# Patient Record
Sex: FEMALE | Race: WHITE | NOT HISPANIC OR LATINO | Employment: OTHER | URBAN - METROPOLITAN AREA
[De-identification: names, ages, dates, MRNs, and addresses within clinical notes are randomized per-mention and may not be internally consistent; named-entity substitution may affect disease eponyms.]

---

## 2017-01-17 ENCOUNTER — OFFICE VISIT (OUTPATIENT)
Dept: UROGYNECOLOGY | Facility: CLINIC | Age: 82
End: 2017-01-17
Payer: MEDICARE

## 2017-01-17 VITALS
DIASTOLIC BLOOD PRESSURE: 82 MMHG | BODY MASS INDEX: 18.66 KG/M2 | HEIGHT: 65 IN | WEIGHT: 112 LBS | SYSTOLIC BLOOD PRESSURE: 156 MMHG | HEART RATE: 74 BPM

## 2017-01-17 DIAGNOSIS — N39.8 VOIDING DYSFUNCTION: Primary | ICD-10-CM

## 2017-01-17 DIAGNOSIS — K58.0 IRRITABLE BOWEL SYNDROME WITH DIARRHEA: ICD-10-CM

## 2017-01-17 DIAGNOSIS — R39.12 POOR URINARY STREAM: ICD-10-CM

## 2017-01-17 LAB
BILIRUB SERPL-MCNC: ABNORMAL MG/DL
BLOOD URINE, POC: ABNORMAL
COLOR, POC UA: YELLOW
GLUCOSE UR QL STRIP: ABNORMAL
KETONES UR QL STRIP: ABNORMAL
LEUKOCYTE ESTERASE URINE, POC: ABNORMAL
NITRITE, POC UA: ABNORMAL
PH, POC UA: 7
PROTEIN, POC: ABNORMAL
SPECIFIC GRAVITY, POC UA: 1.01
UROBILINOGEN, POC UA: ABNORMAL

## 2017-01-17 PROCEDURE — 99999 PR PBB SHADOW E&M-EST. PATIENT-LVL III: CPT | Mod: PBBFAC,,, | Performed by: OBSTETRICS & GYNECOLOGY

## 2017-01-17 PROCEDURE — 99213 OFFICE O/P EST LOW 20 MIN: CPT | Mod: S$PBB,,, | Performed by: OBSTETRICS & GYNECOLOGY

## 2017-01-17 PROCEDURE — 81002 URINALYSIS NONAUTO W/O SCOPE: CPT | Mod: PBBFAC,PO | Performed by: OBSTETRICS & GYNECOLOGY

## 2017-01-17 PROCEDURE — 99213 OFFICE O/P EST LOW 20 MIN: CPT | Mod: PBBFAC,PO | Performed by: OBSTETRICS & GYNECOLOGY

## 2017-01-17 NOTE — PROGRESS NOTES
Subjective:     Chief Complaint: Diarrhea  History of Present Illness: Angelique Hamilton is a 83 y.o. female who presents for annual follow-up from colpocleisis in 2016.  Her major complaint is diarrhea from her IBS.  She has tried multiple medications without good success.  She wears a pad on a regular basis for fear of fecal soiling.  From a surgical standpoint she has done well she has no vaginal bleeding or discharge.  She is not very physically active because of mobility issues. she previously had some urge incontinence and frequency but this does not seem to be a major problem at this time.  She denies any dysuria or hematuria or recent UTIs.  The only urinary complaints she has at this time is that when she urinates her stream seems to go on a somewhat posterior direction.  This is not a significant problem and she does not want medications for it.  She has learned to adapt to it and leans forward when she sits on the toilet.    Review of Systems    Constitutional: No acute distress. No weight gain/loss.  Eyes: Cataracts.  ENT: Vertigo   Respiratory: No SOB.  Cardiovascular:  Hypertension  Gastrointestinal: IBS  Genitourinary: No vaginal bleeding or discharge.  Integument/Breast: Negative  Hematologic/Lymphatic: No history of anemia.  Musculoskeletal: osteoporosis  Neurological: Vertebral fracture, headaches  Behavioral/Psych: No history of depression.   Endocrine: No hormonal replacement.  Allergy/Immune: no recent reactions     Objective:   General Exam:  General appearance: frail  HEENT: Decreased auditory acuity  Neck: Normal thyroid.   Back: No CVA tenderness.  Kyphosis  RESP: No SOB.  Breasts: deferred  Abdomen: Benign without localizing signs.  Extremities: trace edema. No varices.  Lymphatic: noncontributory  Skin: No rashes. No lesions.  Neurologic: Intact.   Psych: Oriented.   Pelvic Exam:  V:  No lesions. No palpable nodes.   Va:No d/c bleeding or lesions.  Well-healed colpocleisis.  Sulcus on either  side is palpable with no discharge lesions or bleeding  .Meatus:No caruncle or stenosis  Urethra: Non tender. No suburethral masses.  Cx/Cuff: Normal   Uterus: Surgically absent.  Ad: No mass or tenderness.  Levators :Symmetrical. Normal tone. Non tender.  BL: Non tender  RV:  hemorrhoids.  Assessment:   Anatomic result is very good  Symptomatically she is doing well  IBS and diarrhea seem to be her major problem  Voiding dysfunction is not bothersome and she does not want medication at this time       Plan:      rtc 1 yr

## 2017-10-13 ENCOUNTER — HOSPITAL ENCOUNTER (EMERGENCY)
Facility: HOSPITAL | Age: 82
Discharge: HOME | End: 2017-10-13
Attending: EMERGENCY MEDICINE
Payer: MEDICARE

## 2017-10-13 VITALS
TEMPERATURE: 98 F | HEART RATE: 96 BPM | OXYGEN SATURATION: 98 % | DIASTOLIC BLOOD PRESSURE: 84 MMHG | HEIGHT: 60 IN | RESPIRATION RATE: 12 BRPM | SYSTOLIC BLOOD PRESSURE: 156 MMHG | BODY MASS INDEX: 21.99 KG/M2 | WEIGHT: 112 LBS

## 2017-10-13 DIAGNOSIS — R10.30 LOWER ABDOMINAL PAIN: Primary | ICD-10-CM

## 2017-10-13 DIAGNOSIS — K44.9 HIATAL HERNIA: ICD-10-CM

## 2017-10-13 DIAGNOSIS — N13.30 HYDRONEPHROSIS, RIGHT: ICD-10-CM

## 2017-10-13 LAB
ALBUMIN SERPL BCP-MCNC: 3.6 G/DL
ALP SERPL-CCNC: 83 U/L
ALT SERPL W/O P-5'-P-CCNC: 8 U/L
ANION GAP SERPL CALC-SCNC: 11 MMOL/L
AST SERPL-CCNC: 18 U/L
BACTERIA #/AREA URNS HPF: ABNORMAL /HPF
BASOPHILS # BLD AUTO: 0 K/UL
BASOPHILS NFR BLD: 0.9 %
BILIRUB SERPL-MCNC: 0.7 MG/DL
BILIRUB UR QL STRIP: NEGATIVE
BUN SERPL-MCNC: 19 MG/DL
CALCIUM SERPL-MCNC: 10.2 MG/DL
CHLORIDE SERPL-SCNC: 101 MMOL/L
CLARITY UR: CLEAR
CO2 SERPL-SCNC: 28 MMOL/L
COLOR UR: YELLOW
CREAT SERPL-MCNC: 1.3 MG/DL
DIFFERENTIAL METHOD: ABNORMAL
EOSINOPHIL # BLD AUTO: 0.1 K/UL
EOSINOPHIL NFR BLD: 3.2 %
ERYTHROCYTE [DISTWIDTH] IN BLOOD BY AUTOMATED COUNT: 15.3 %
EST. GFR  (AFRICAN AMERICAN): 44 ML/MIN/1.73 M^2
EST. GFR  (NON AFRICAN AMERICAN): 38 ML/MIN/1.73 M^2
GLUCOSE SERPL-MCNC: 92 MG/DL
GLUCOSE UR QL STRIP: NEGATIVE
HCT VFR BLD AUTO: 32.8 %
HGB BLD-MCNC: 10.9 G/DL
HGB UR QL STRIP: ABNORMAL
KETONES UR QL STRIP: NEGATIVE
LEUKOCYTE ESTERASE UR QL STRIP: NEGATIVE
LIPASE SERPL-CCNC: 28 U/L
LYMPHOCYTES # BLD AUTO: 0.9 K/UL
LYMPHOCYTES NFR BLD: 20.5 %
MCH RBC QN AUTO: 28.4 PG
MCHC RBC AUTO-ENTMCNC: 33.2 G/DL
MCV RBC AUTO: 85 FL
MICROSCOPIC COMMENT: ABNORMAL
MONOCYTES # BLD AUTO: 0.3 K/UL
MONOCYTES NFR BLD: 6.3 %
NEUTROPHILS # BLD AUTO: 3 K/UL
NEUTROPHILS NFR BLD: 69.1 %
NITRITE UR QL STRIP: NEGATIVE
PH UR STRIP: 7 [PH] (ref 5–8)
PLATELET # BLD AUTO: 206 K/UL
PMV BLD AUTO: 7.3 FL
POTASSIUM SERPL-SCNC: 4.1 MMOL/L
PROT SERPL-MCNC: 7.4 G/DL
PROT UR QL STRIP: NEGATIVE
RBC # BLD AUTO: 3.84 M/UL
RBC #/AREA URNS HPF: 12 /HPF (ref 0–4)
SODIUM SERPL-SCNC: 140 MMOL/L
SP GR UR STRIP: <=1.005 (ref 1–1.03)
SQUAMOUS #/AREA URNS HPF: 3 /HPF
URN SPEC COLLECT METH UR: ABNORMAL
UROBILINOGEN UR STRIP-ACNC: NEGATIVE EU/DL
WBC # BLD AUTO: 4.3 K/UL
WBC #/AREA URNS HPF: 1 /HPF (ref 0–5)

## 2017-10-13 PROCEDURE — 96360 HYDRATION IV INFUSION INIT: CPT

## 2017-10-13 PROCEDURE — 25500020 PHARM REV CODE 255

## 2017-10-13 PROCEDURE — 80053 COMPREHEN METABOLIC PANEL: CPT

## 2017-10-13 PROCEDURE — 96361 HYDRATE IV INFUSION ADD-ON: CPT

## 2017-10-13 PROCEDURE — 36415 COLL VENOUS BLD VENIPUNCTURE: CPT

## 2017-10-13 PROCEDURE — 99284 EMERGENCY DEPT VISIT MOD MDM: CPT | Mod: 25

## 2017-10-13 PROCEDURE — 85025 COMPLETE CBC W/AUTO DIFF WBC: CPT

## 2017-10-13 PROCEDURE — 83690 ASSAY OF LIPASE: CPT

## 2017-10-13 PROCEDURE — 25000003 PHARM REV CODE 250: Performed by: EMERGENCY MEDICINE

## 2017-10-13 PROCEDURE — 81000 URINALYSIS NONAUTO W/SCOPE: CPT

## 2017-10-13 RX ORDER — DICYCLOMINE HYDROCHLORIDE 20 MG/1
20 TABLET ORAL 3 TIMES DAILY PRN
Qty: 20 TABLET | Refills: 0 | Status: SHIPPED | OUTPATIENT
Start: 2017-10-13 | End: 2017-11-12

## 2017-10-13 RX ORDER — SODIUM CHLORIDE 9 MG/ML
1000 INJECTION, SOLUTION INTRAVENOUS
Status: COMPLETED | OUTPATIENT
Start: 2017-10-13 | End: 2017-10-13

## 2017-10-13 RX ADMIN — SODIUM CHLORIDE 1000 ML: 0.9 INJECTION, SOLUTION INTRAVENOUS at 09:10

## 2017-10-13 RX ADMIN — IOHEXOL 75 ML: 350 INJECTION, SOLUTION INTRAVENOUS at 10:10

## 2017-10-14 NOTE — ED NOTES
Patient identifiers for Angelique Hamilton checked and correct.  LOC:  Patient is awake, alert, and aware of environment with an appropriate affect. Patient is oriented x 3 and speaking appropriately.  APPEARANCE:  Patient resting comfortably and in no acute distress. Patient is clean and well groomed, patient's clothing is properly fastened.  SKIN:  The skin is warm and dry. Patient has normal skin turgor and moist mucus membranes. Skin is intact; no bruising or breakdown noted.  MUSCULOSKELETAL:  Patient is moving all extremities well, no obvious deformities noted. Pulses intact.   RESPIRATORY:  Airway is open and patent. Respirations are spontaneous and non-labored with normal effort and rate.  CARDIAC:  Patient has a normal rate and rhythm. No peripheral edema noted. Capillary refill < 3 seconds.  ABDOMEN:  No distention noted.  Soft and non-tender upon palpation.  NEUROLOGICAL:  PERRL. Facial expression is symmetrical. Hand grasps are equal bilaterally. Normal sensation in all extremities when touched with finger.  Allergies reported:    Review of patient's allergies indicates:   Allergen Reactions    Flexeril [cyclobenzaprine]      Hallucinations    Hydrocodone     Statins-hmg-coa reductase inhibitors      OTHER NOTES:   Patient here with left side/flank pain off and on for few months and now worse.

## 2017-10-14 NOTE — ED PROVIDER NOTES
Encounter Date: 10/13/2017    SCRIBE #1 NOTE: I, Radha Servin, am scribing for, and in the presence of, Dr. Gómez.       History     Chief Complaint   Patient presents with    Flank Pain     L flank pain x months, worse today.       10/13/2017 8:55 PM     Chief complaint: Bilateral Flank Pain      Angelique Hamilton is a 84 y.o. female who presents to the ED with an onset of bilateral flank pain x a few months. Pain worsened today. Pt takes Tramadol nightly. Denies vomiting, nausea, fever, hematuria, dysuria, bloody stools, abnormal appetite. PMHx includes IBS, overactive bladder, constipation, and prolapsed rectum. Pertinent surgical history includes hysterectomy, oophorectomy.       The history is provided by the patient.     Review of patient's allergies indicates:   Allergen Reactions    Flexeril [cyclobenzaprine]      Hallucinations    Hydrocodone     Statins-hmg-coa reductase inhibitors      Past Medical History:   Diagnosis Date    Back problem     Constipation     History of fractured vertebra     Hyperlipidemia     Hypertension     IBS (irritable bowel syndrome)     Migraine headache     Osteoporosis     Rectal prolapse     Scoliosis     Vertigo      Past Surgical History:   Procedure Laterality Date    LEWIS      Back Pain    EYE SURGERY      Bilateral Cataracs    HYSTERECTOMY      OOPHORECTOMY      ROTATOR CUFF REPAIR      bialteral    TONSILLECTOMY       Family History   Problem Relation Age of Onset    Colon cancer Father     Breast cancer Mother 70    Hypertension Mother     Ovarian cancer Neg Hx      Social History   Substance Use Topics    Smoking status: Never Smoker    Smokeless tobacco: Never Used    Alcohol use No     Review of Systems   Constitutional: Negative for appetite change and fever.   HENT: Negative for sore throat.    Respiratory: Negative for shortness of breath.    Cardiovascular: Negative for chest pain.   Gastrointestinal: Negative for blood in stool, nausea  and vomiting.   Genitourinary: Positive for flank pain (Bilateral.). Negative for dysuria and hematuria.   Musculoskeletal: Negative for back pain.   Skin: Negative for rash.   Neurological: Negative for weakness.   Hematological: Does not bruise/bleed easily.       Physical Exam     Initial Vitals [10/13/17 1810]   BP Pulse Resp Temp SpO2   (!) 156/84 96 12 98.4 °F (36.9 °C) 98 %      MAP       108         Physical Exam    Nursing note and vitals reviewed.  Constitutional: She appears well-developed and well-nourished. She is not diaphoretic. No distress.   HENT:   Head: Normocephalic and atraumatic.   Eyes: EOM are normal. Pupils are equal, round, and reactive to light.   Neck: Normal range of motion. Neck supple.   Cardiovascular: Normal rate, regular rhythm and intact distal pulses. Exam reveals no gallop and no friction rub.    Murmur heard.  Pulmonary/Chest: Breath sounds normal. No respiratory distress. She has no wheezes. She has no rhonchi. She has no rales.   Abdominal: Soft. Bowel sounds are normal. There is tenderness. There is no rebound and no guarding.   Tenderness to bilateral lower quadrants.   Musculoskeletal: Normal range of motion.   Neurological: She is alert and oriented to person, place, and time.   Skin: Skin is warm and dry.   Psychiatric: She has a normal mood and affect. Her behavior is normal. Judgment and thought content normal.         ED Course   Procedures  Labs Reviewed   CBC W/ AUTO DIFFERENTIAL - Abnormal; Notable for the following:        Result Value    RBC 3.84 (*)     Hemoglobin 10.9 (*)     Hematocrit 32.8 (*)     RDW 15.3 (*)     MPV 7.3 (*)     Lymph # 0.9 (*)     All other components within normal limits   COMPREHENSIVE METABOLIC PANEL - Abnormal; Notable for the following:     ALT 8 (*)     eGFR if  44 (*)     eGFR if non  38 (*)     All other components within normal limits   URINALYSIS - Abnormal; Notable for the following:     Specific  Gravity, UA <=1.005 (*)     Occult Blood UA 2+ (*)     All other components within normal limits   URINALYSIS MICROSCOPIC - Abnormal; Notable for the following:     RBC, UA 12 (*)     All other components within normal limits   LIPASE     Imaging Results          CT Abdomen Pelvis With Contrast (In process)  Result time 10/13/17 22:23:57                        Medical Decision Making:   History:   Old Medical Records: I decided to obtain old medical records.  Initial Assessment:   84-year-old female presented with a chief complaint of abdominal pain.  Differential Diagnosis:   DDx includes, but is not limited to diverticulitis, appendicitis, colitis, and IBS.  Clinical Tests:   Lab Tests: Ordered and Reviewed  Radiological Study: Reviewed and Ordered  ED Management:  The patient was emergently evaluated in the emergency Department, her evaluation was significant for an elderly female with lower abdominal tenderness.  The patient's labs were significant for an contaminated urine.  The patient's CT scan was significant for only for a hilar hernia and mild right hydronephrosis, but showed no acute abnormalities per virtual radiology.  I'm unclear as to the etiology of the patient's pain but she is stable for discharge to home.  She will be discharged home with by mouth Cristhianyl and she is to follow-up with her PCP for further care.            Scribe Attestation:   Scribe #1: I performed the above scribed service and the documentation accurately describes the services I performed. I attest to the accuracy of the note.    Attending Attestation:           Physician Attestation for Scribe:  Physician Attestation Statement for Scribe #1: IRico, reviewed documentation, as scribed by OLAMIDE Servin in my presence, and it is both accurate and complete.                 ED Course      Clinical Impression:     1. Lower abdominal pain    2. Hiatal hernia    3. Hydronephrosis, right          Disposition:   Disposition:  Discharged  Condition: Stable                        Carlos Gómez MD  10/13/17 7795

## 2018-11-27 ENCOUNTER — HOSPITAL ENCOUNTER (EMERGENCY)
Facility: HOSPITAL | Age: 83
Discharge: HOME | End: 2018-11-27
Attending: EMERGENCY MEDICINE
Payer: MEDICARE

## 2018-11-27 VITALS
SYSTOLIC BLOOD PRESSURE: 173 MMHG | TEMPERATURE: 98 F | BODY MASS INDEX: 21.99 KG/M2 | HEIGHT: 60 IN | DIASTOLIC BLOOD PRESSURE: 83 MMHG | OXYGEN SATURATION: 97 % | WEIGHT: 112 LBS | HEART RATE: 83 BPM | RESPIRATION RATE: 18 BRPM

## 2018-11-27 DIAGNOSIS — K59.00 CONSTIPATION, UNSPECIFIED CONSTIPATION TYPE: ICD-10-CM

## 2018-11-27 DIAGNOSIS — R10.84 GENERALIZED ABDOMINAL PAIN: Primary | ICD-10-CM

## 2018-11-27 LAB
ALBUMIN SERPL BCP-MCNC: 3.9 G/DL
ALP SERPL-CCNC: 78 U/L
ALT SERPL W/O P-5'-P-CCNC: 10 U/L
ANION GAP SERPL CALC-SCNC: 11 MMOL/L
AST SERPL-CCNC: 24 U/L
BASOPHILS NFR BLD: 0 %
BILIRUB SERPL-MCNC: 0.8 MG/DL
BILIRUB UR QL STRIP: NEGATIVE
BUN SERPL-MCNC: 16 MG/DL
CALCIUM SERPL-MCNC: 10.2 MG/DL
CHLORIDE SERPL-SCNC: 98 MMOL/L
CLARITY UR: CLEAR
CO2 SERPL-SCNC: 28 MMOL/L
COLOR UR: YELLOW
CREAT SERPL-MCNC: 1 MG/DL
DIFFERENTIAL METHOD: ABNORMAL
EOSINOPHIL NFR BLD: 1 %
ERYTHROCYTE [DISTWIDTH] IN BLOOD BY AUTOMATED COUNT: 15.8 %
EST. GFR  (AFRICAN AMERICAN): 59 ML/MIN/1.73 M^2
EST. GFR  (NON AFRICAN AMERICAN): 51 ML/MIN/1.73 M^2
GLUCOSE SERPL-MCNC: 93 MG/DL
GLUCOSE UR QL STRIP: NEGATIVE
HCT VFR BLD AUTO: 31.2 %
HGB BLD-MCNC: 10.5 G/DL
HGB UR QL STRIP: ABNORMAL
KETONES UR QL STRIP: NEGATIVE
LACTATE SERPL-SCNC: 0.8 MMOL/L
LEUKOCYTE ESTERASE UR QL STRIP: NEGATIVE
LIPASE SERPL-CCNC: 24 U/L
LIPASE SERPL-CCNC: 29 U/L
LYMPHOCYTES NFR BLD: 13 %
MCH RBC QN AUTO: 28.9 PG
MCHC RBC AUTO-ENTMCNC: 33.7 G/DL
MCV RBC AUTO: 86 FL
MICROSCOPIC COMMENT: ABNORMAL
MONOCYTES NFR BLD: 3 %
NEUTROPHILS NFR BLD: 83 %
NITRITE UR QL STRIP: NEGATIVE
PH UR STRIP: 7 [PH] (ref 5–8)
PLATELET # BLD AUTO: 188 K/UL
PMV BLD AUTO: 8.1 FL
POTASSIUM SERPL-SCNC: 3.8 MMOL/L
PROT SERPL-MCNC: 7.3 G/DL
PROT UR QL STRIP: NEGATIVE
RBC # BLD AUTO: 3.64 M/UL
RBC #/AREA URNS HPF: 5 /HPF (ref 0–4)
SODIUM SERPL-SCNC: 137 MMOL/L
SP GR UR STRIP: <=1.005 (ref 1–1.03)
URN SPEC COLLECT METH UR: ABNORMAL
UROBILINOGEN UR STRIP-ACNC: NEGATIVE EU/DL
WBC # BLD AUTO: 3.8 K/UL

## 2018-11-27 PROCEDURE — 63600175 PHARM REV CODE 636 W HCPCS: Performed by: EMERGENCY MEDICINE

## 2018-11-27 PROCEDURE — 83605 ASSAY OF LACTIC ACID: CPT

## 2018-11-27 PROCEDURE — 25500020 PHARM REV CODE 255: Performed by: EMERGENCY MEDICINE

## 2018-11-27 PROCEDURE — 85027 COMPLETE CBC AUTOMATED: CPT

## 2018-11-27 PROCEDURE — 25000003 PHARM REV CODE 250: Performed by: EMERGENCY MEDICINE

## 2018-11-27 PROCEDURE — 96374 THER/PROPH/DIAG INJ IV PUSH: CPT

## 2018-11-27 PROCEDURE — 96361 HYDRATE IV INFUSION ADD-ON: CPT

## 2018-11-27 PROCEDURE — 83690 ASSAY OF LIPASE: CPT

## 2018-11-27 PROCEDURE — 80053 COMPREHEN METABOLIC PANEL: CPT

## 2018-11-27 PROCEDURE — 81000 URINALYSIS NONAUTO W/SCOPE: CPT

## 2018-11-27 PROCEDURE — 36415 COLL VENOUS BLD VENIPUNCTURE: CPT

## 2018-11-27 PROCEDURE — 83690 ASSAY OF LIPASE: CPT | Mod: 91

## 2018-11-27 PROCEDURE — 99285 EMERGENCY DEPT VISIT HI MDM: CPT | Mod: 25

## 2018-11-27 PROCEDURE — 96376 TX/PRO/DX INJ SAME DRUG ADON: CPT

## 2018-11-27 PROCEDURE — 85007 BL SMEAR W/DIFF WBC COUNT: CPT

## 2018-11-27 RX ORDER — POLYETHYLENE GLYCOL 3350 17 G/17G
17 POWDER, FOR SOLUTION ORAL DAILY
Qty: 238 G | Refills: 0 | Status: SHIPPED | OUTPATIENT
Start: 2018-11-27 | End: 2018-12-11

## 2018-11-27 RX ORDER — MECLIZINE HCL 12.5 MG 12.5 MG/1
6.25 TABLET ORAL 2 TIMES DAILY PRN
COMMUNITY
End: 2020-07-21 | Stop reason: SDUPTHER

## 2018-11-27 RX ORDER — SYRING-NEEDL,DISP,INSUL,0.3 ML 29 G X1/2"
296 SYRINGE, EMPTY DISPOSABLE MISCELLANEOUS
Status: COMPLETED | OUTPATIENT
Start: 2018-11-27 | End: 2018-11-27

## 2018-11-27 RX ORDER — DICYCLOMINE HYDROCHLORIDE 10 MG/1
20 CAPSULE ORAL
Status: COMPLETED | OUTPATIENT
Start: 2018-11-27 | End: 2018-11-27

## 2018-11-27 RX ORDER — ONDANSETRON 2 MG/ML
4 INJECTION INTRAMUSCULAR; INTRAVENOUS
Status: COMPLETED | OUTPATIENT
Start: 2018-11-27 | End: 2018-11-27

## 2018-11-27 RX ORDER — DOCUSATE SODIUM 100 MG/1
100 CAPSULE, LIQUID FILLED ORAL 2 TIMES DAILY PRN
Qty: 60 CAPSULE | Refills: 0 | Status: SHIPPED | OUTPATIENT
Start: 2018-11-27 | End: 2018-12-27

## 2018-11-27 RX ORDER — SODIUM CHLORIDE 9 MG/ML
INJECTION, SOLUTION INTRAVENOUS
Status: DISCONTINUED
Start: 2018-11-27 | End: 2018-11-28 | Stop reason: HOSPADM

## 2018-11-27 RX ORDER — POLYETHYLENE GLYCOL 3350, SODIUM SULFATE ANHYDROUS, SODIUM BICARBONATE, SODIUM CHLORIDE, POTASSIUM CHLORIDE 236; 22.74; 6.74; 5.86; 2.97 G/4L; G/4L; G/4L; G/4L; G/4L
240 POWDER, FOR SOLUTION ORAL ONCE
Qty: 240 ML | Refills: 0 | Status: SHIPPED | OUTPATIENT
Start: 2018-11-27 | End: 2018-11-27

## 2018-11-27 RX ADMIN — ONDANSETRON 4 MG: 2 INJECTION INTRAMUSCULAR; INTRAVENOUS at 07:11

## 2018-11-27 RX ADMIN — SODIUM CHLORIDE 1000 ML: 0.9 INJECTION, SOLUTION INTRAVENOUS at 07:11

## 2018-11-27 RX ADMIN — DICYCLOMINE HYDROCHLORIDE 20 MG: 10 CAPSULE ORAL at 08:11

## 2018-11-27 RX ADMIN — Medication 296 ML: at 11:11

## 2018-11-27 RX ADMIN — ONDANSETRON 4 MG: 2 INJECTION INTRAMUSCULAR; INTRAVENOUS at 09:11

## 2018-11-27 RX ADMIN — IOHEXOL 30 ML: 350 INJECTION, SOLUTION INTRAVENOUS at 09:11

## 2018-11-27 RX ADMIN — IOHEXOL 75 ML: 350 INJECTION, SOLUTION INTRAVENOUS at 10:11

## 2018-11-28 NOTE — ED NOTES
Bed: 14  Expected date:   Expected time:   Means of arrival:   Comments:  JEFFREY Hayden LPN  11/27/18 1836

## 2018-11-28 NOTE — PROGRESS NOTES
11/27/20186:29 PM  I have evaluated and performed a medical screening assessment on this patient while awaiting a thorough evaluation and treatment. All of the emergency department beds are occupied at this time. When appropriate, laboratory studies will be ordered from triage. The patient has been advised of this process and care plan. Patient complains of abdominal pain.    Orders pending:labs  Disposition: stable

## 2018-11-28 NOTE — PROGRESS NOTES
11/27/20186:33 PM  I have evaluated and performed a medical screening assessment on this patient while awaiting a thorough evaluation and treatment. All of the emergency department beds are occupied at this time. When appropriate, laboratory studies will be ordered from triage. The patient has been advised of this process and care plan. Patient complains of ABDOMINAL PAIN and urinary hesitancy and constipation    Orders pending: urinalysis and labs  Disposition: stable

## 2018-11-28 NOTE — ED PROVIDER NOTES
Encounter Date: 11/27/2018    SCRIBE #1 NOTE: ICarla, am scribing for, and in the presence of, Dr. Skaggs.       History     Chief Complaint   Patient presents with    Abdominal Pain     bm yesterday     Urinary Retention     last void early am        Time seen by provider: 7:30 PM on 11/27/2018    Angelique Hamilton is a 85 y.o. female who presents to the ED complaining of worsening upper abdominal pain x 1 day. Pt states that she has not had a bowel movement since yesterday and that it was small in size. She describes her pain as different than when she normally experiences constipation. Pt states that she has IBS and takes IBgard. She also notes a subjective fever x 1 day. The patient denies nausea, vomiting, difficulty urinating, or any other Sx at this time. Other PMHx includes HTN, HDL, and rectal prolapse. SHx includes a hysterectomy and oophorectomy.      The history is provided by the patient.     Review of patient's allergies indicates:   Allergen Reactions    Flexeril [cyclobenzaprine]      Hallucinations    Hydrocodone     Statins-hmg-coa reductase inhibitors      Past Medical History:   Diagnosis Date    Back problem     Constipation     History of fractured vertebra     Hyperlipidemia     Hypertension     IBS (irritable bowel syndrome)     Migraine headache     Osteoporosis     Rectal prolapse     Scoliosis     Vertigo      Past Surgical History:   Procedure Laterality Date    COLONOSCOPY N/A 6/15/2015    [facility]    COLPORRHAPHY N/A 3/31/2016    [facility]    LEWIS      Back Pain    EYE SURGERY      Bilateral Cataracs    HYSTERECTOMY      OOPHORECTOMY      RECTOPEXY    Altemeir procedure perineal approach N/A 6/15/2015    [facility]    ROTATOR CUFF REPAIR      bialteral    TONSILLECTOMY       Family History   Problem Relation Age of Onset    Colon cancer Father      Breast cancer Mother 70    Hypertension Mother     Ovarian cancer Neg Hx      Social History     Tobacco Use    Smoking status: Never Smoker    Smokeless tobacco: Never Used   Substance Use Topics    Alcohol use: No    Drug use: No     Review of Systems   Constitutional: Positive for fever (subjective).   HENT: Negative for congestion.    Eyes: Negative for visual disturbance.   Respiratory: Negative for wheezing.    Cardiovascular: Negative for chest pain.   Gastrointestinal: Positive for abdominal pain. Negative for nausea and vomiting.   Genitourinary: Negative for difficulty urinating and dysuria.   Musculoskeletal: Negative for joint swelling.   Skin: Negative for rash.   Neurological: Negative for syncope.   Hematological: Does not bruise/bleed easily.   Psychiatric/Behavioral: Negative for confusion.       Physical Exam     Initial Vitals [11/27/18 1726]   BP Pulse Resp Temp SpO2   (!) 175/80 87 18 97.9 °F (36.6 °C) --      MAP       --         Physical Exam    Nursing note and vitals reviewed.  Constitutional: She appears well-nourished.   HENT:   Head: Normocephalic and atraumatic.   Eyes: Conjunctivae and EOM are normal.   Neck: Normal range of motion. Neck supple. No thyroid mass present.   Cardiovascular: Normal rate, regular rhythm and normal heart sounds. Exam reveals no gallop and no friction rub.    No murmur heard.  Pulmonary/Chest: Breath sounds normal. She has no wheezes. She has no rhonchi. She has no rales.   Abdominal: Soft. Normal appearance and bowel sounds are normal. There is no tenderness. There is no rigidity.   Abdominal pain in left upper quadrant radiating down to left lower quadrant. There is no bloating.   Neurological: She is alert and oriented to person, place, and time. She has normal strength. No cranial nerve deficit or sensory deficit.   Skin: Skin is warm and dry. No rash noted. No erythema.   Psychiatric: She has a normal mood and affect. Her speech is normal. Cognition  and memory are normal.         ED Course   Procedures  Labs Reviewed   CBC W/ AUTO DIFFERENTIAL   COMPREHENSIVE METABOLIC PANEL   LIPASE   URINALYSIS, REFLEX TO URINE CULTURE   URINALYSIS MICROSCOPIC          Imaging Results    None          Medical Decision Making:   History:   Old Medical Records: I decided to obtain old medical records.  Clinical Tests:   Lab Tests: Ordered and Reviewed  Radiological Study: Ordered and Reviewed  ED Management:  This patient was interviewed and examined emergently.  Initial vital signs significant for systolic hypertension.  The patient has a nonsurgical abdominal examination at this time but a full lab analyses and CT scan of the abdomen and pelvis with dye, including oral and IV contrast is negative for evidence diverticulitis, perforation, abscess, colitis, appendicitis, pyelonephritis.  I suspect the patient's symptoms are progressing secondary to ongoing constipation and she will be provided multiple therapies to relieve this at home. I discussed with patient and her daughter that evaluation in the ED does not suggest any emergent or life threatening condition medical condition requiring immediate intervention beyond what was provided in the ED, and I believe patient is safe for discharge.  Regardless, an unremarkable evaluation in the ED does not preclude the development or presence of a serious of life threatening condition. As such, patient was instructed to return immediately for any worsening or change in current symptoms              Scribe Attestation:   Scribe #1: I performed the above scribed service and the documentation accurately describes the services I performed. I attest to the accuracy of the note.    I, Dr. Luther Skaggs, personally performed the services described in this documentation. All medical record entries made by the scribe were at my direction and in my presence.  I have reviewed the chart and agree that the record reflects my personal performance  and is accurate and complete. Luther Skaggs MD.  7:02 AM 11/28/2018             Clinical Impression:   The primary encounter diagnosis was Generalized abdominal pain. A diagnosis of Constipation, unspecified constipation type was also pertinent to this visit.      Disposition:   Disposition: Discharged  Condition: Stable                        Luther Skaggs MD  11/28/18 0702

## 2019-05-19 RX ORDER — FLUTICASONE PROPIONATE 50 MCG
SPRAY, SUSPENSION (ML) NASAL
COMMUNITY
End: 2020-07-21

## 2019-05-19 RX ORDER — CALCIUM CARB/VITAMIN D3/VIT K1 500-500-40
TABLET,CHEWABLE ORAL
COMMUNITY
End: 2019-06-03

## 2019-05-19 RX ORDER — DICYCLOMINE HYDROCHLORIDE 20 MG/1
20 TABLET ORAL
COMMUNITY
Start: 2016-12-29 | End: 2020-07-21

## 2019-05-19 RX ORDER — CEFUROXIME AXETIL 500 MG/1
500 TABLET ORAL
COMMUNITY
Start: 2017-09-14 | End: 2019-10-25

## 2019-05-19 RX ORDER — ALPRAZOLAM 0.25 MG/1
0.25 TABLET ORAL
COMMUNITY
Start: 2018-04-20 | End: 2019-10-25

## 2019-05-19 RX ORDER — LOSARTAN POTASSIUM 100 MG/1
TABLET ORAL
COMMUNITY
End: 2019-10-25

## 2019-05-19 RX ORDER — GABAPENTIN 100 MG/1
100 CAPSULE ORAL
COMMUNITY
Start: 2016-10-25 | End: 2019-10-25

## 2019-05-19 RX ORDER — HYOSCYAMINE SULFATE 0.38 MG/1
TABLET, EXTENDED RELEASE ORAL
COMMUNITY
End: 2020-07-21

## 2019-05-19 RX ORDER — HYDROCHLOROTHIAZIDE 12.5 MG/1
CAPSULE ORAL
COMMUNITY
End: 2019-10-25 | Stop reason: SDUPTHER

## 2019-05-19 RX ORDER — DIPHENOXYLATE HYDROCHLORIDE AND ATROPINE SULFATE 2.5; .025 MG/1; MG/1
TABLET ORAL
COMMUNITY
Start: 2016-05-24 | End: 2020-07-21

## 2019-05-20 ENCOUNTER — OFFICE VISIT (OUTPATIENT)
Dept: PODIATRY | Facility: CLINIC | Age: 84
End: 2019-05-20
Payer: MEDICARE

## 2019-05-20 VITALS
DIASTOLIC BLOOD PRESSURE: 89 MMHG | HEART RATE: 73 BPM | WEIGHT: 112 LBS | BODY MASS INDEX: 21.99 KG/M2 | SYSTOLIC BLOOD PRESSURE: 159 MMHG | RESPIRATION RATE: 17 BRPM | OXYGEN SATURATION: 98 % | HEIGHT: 60 IN

## 2019-05-20 DIAGNOSIS — M79.671 BILATERAL FOOT PAIN: ICD-10-CM

## 2019-05-20 DIAGNOSIS — M79.672 BILATERAL FOOT PAIN: ICD-10-CM

## 2019-05-20 DIAGNOSIS — B35.1 ONYCHOMYCOSIS DUE TO DERMATOPHYTE: ICD-10-CM

## 2019-05-20 DIAGNOSIS — L60.2 ONYCHOGRYPHOSIS: ICD-10-CM

## 2019-05-20 DIAGNOSIS — L98.9 BENIGN SKIN LESION: Primary | ICD-10-CM

## 2019-05-20 PROCEDURE — 99203 PR OFFICE/OUTPT VISIT, NEW, LEVL III, 30-44 MIN: ICD-10-PCS | Mod: 25,,, | Performed by: PODIATRIST

## 2019-05-20 PROCEDURE — 17110 DESTRUCTION B9 LES UP TO 14: CPT | Mod: ,,, | Performed by: PODIATRIST

## 2019-05-20 PROCEDURE — 99203 OFFICE O/P NEW LOW 30 MIN: CPT | Mod: 25,,, | Performed by: PODIATRIST

## 2019-05-20 PROCEDURE — 99070 SPECIAL SUPPLIES PHYS/QHP: CPT | Mod: ,,, | Performed by: PODIATRIST

## 2019-05-20 PROCEDURE — 17110 PR DESTRUCTION BENIGN LESIONS UP TO 14: ICD-10-PCS | Mod: ,,, | Performed by: PODIATRIST

## 2019-05-20 PROCEDURE — 99070 PR SPECIAL SUPPLIES: ICD-10-PCS | Mod: ,,, | Performed by: PODIATRIST

## 2019-05-20 NOTE — PROGRESS NOTES
[address] [facility] [address]  [place] [address]  Phone: [phone]   [phone]    Patient's PCP:Ryan Hurtado MD  Referring Provider: No ref. provider found    Subjective:      Chief Complaint:: Nail Care (bilateral great toenails thick and discolored)    HPI  Angelique Hamilton is a 86 y.o. female who presents with a complaint of  Thick painful bilateral great toenails and painful callouses on the heels of both feet lasting for months. Onset of the symptoms was unknown.  Current symptoms include pain.  Treatment to date have included topical balm. Patients rates pain 5/10 on pain scale.        Vitals:    05/20/19 0928   BP: (!) 159/89   Pulse: 73   Resp: 17     Shoe Size: 8/8.5    Past Surgical History:   Procedure Laterality Date    COLONOSCOPY N/A 6/15/2015    Performed by [facility] at [facility] OR    COLPORRHAPHY N/A 3/31/2016    Performed by Rogers Victor MD at [facility] OR    LEWIS      Back Pain    EYE SURGERY      Bilateral Cataracs    HYSTERECTOMY      OOPHORECTOMY      RECTOPEXY    Altemeir procedure perineal approach N/A 6/15/2015    Performed by [facility] at [facility] OR    ROTATOR CUFF REPAIR      bialteral    TONSILLECTOMY       Past Medical History:   Diagnosis Date    Back problem     Constipation     History of fractured vertebra     Hyperlipidemia     Hypertension     IBS (irritable bowel syndrome)     Migraine headache     Osteoporosis     Rectal prolapse     Scoliosis     Vertigo      Family History   Problem Relation Age of Onset    Colon cancer Father     Breast cancer Mother 70    Hypertension Mother     Ovarian cancer Neg Hx         Social History:   Marital Status:   Alcohol History:  reports that she does not drink alcohol.  Tobacco History:  reports that she has never smoked. She has never used smokeless tobacco.  Drug History:  reports that she does not use drugs.    Review of patient's allergies indicates:   Allergen Reactions    Flexeril  [cyclobenzaprine]      Hallucinations    Hydrocodone     Statins-hmg-coa reductase inhibitors        Current Outpatient Medications   Medication Sig Dispense Refill    ALPRAZolam (XANAX) 0.25 MG tablet Take 0.25 mg by mouth.      calcium-vitamin D3 (CALCIUM 500 + D) 500 mg(1,250mg) -200 unit per tablet Take 1 tablet by mouth 2 (two) times daily with meals.      cefUROXime (CEFTIN) 500 MG tablet Take 500 mg by mouth.      cholecalciferol, vitamin D3, (VITAMIN D3) 2,000 unit Tab Take 2,000 Units by mouth once daily.       cholecalciferol, vitamin D3, 400 unit Cap Vitamin D3 400 unit capsule   Take 1 capsule every day by oral route.      dicyclomine (BENTYL) 20 mg tablet Take 20 mg by mouth.      diphenoxylate-atropine 2.5-0.025 mg (LOMOTIL) 2.5-0.025 mg per tablet Take by mouth.      fluticasone propionate (FLONASE) 50 mcg/actuation nasal spray fluticasone propionate 50 mcg/actuation nasal spray,suspension   Inhale 1 spray every day by intranasal route.      gabapentin (NEURONTIN) 100 MG capsule Take 100 mg by mouth.      hydroCHLOROthiazide (HYDRODIURIL) 25 MG tablet Take 25 mg by mouth once daily.       hydroCHLOROthiazide (MICROZIDE) 12.5 mg capsule hydrochlorothiazide 12.5 mg capsule   Take 1 capsule every day by oral route.      hyoscyamine (LEVBID) 0.375 mg Tb12 hyoscyamine ER 0.375 mg tablet,extended release,12 hr   Take 1 tablet twice a day by oral route for 30 days.      losartan (COZAAR) 100 MG tablet losartan 100 mg tablet   Take 1 tablet every day by oral route.      losartan (COZAAR) 50 MG tablet Take 50 mg by mouth once daily.       meclizine (ANTIVERT) 12.5 mg tablet Take 6.25 mg by mouth 2 (two) times daily as needed for Dizziness (1/2 tablet twice daily as needed for dizziness).      mirabegron (MYRBETRIQ) 25 mg Tb24 ER tablet Take 25 mg by mouth.      multivit with minerals/lutein (MULTIVITAMIN 50 PLUS ORAL) multivitamin   1x daily      omeprazole (PRILOSEC) 20 MG capsule Take 20  mg by mouth once daily.        peppermint oil (IBGARD ORAL) Take 1 tablet by mouth once daily.      rifAXIMin (XIFAXAN) 550 mg Tab Take 550 mg by mouth.      tramadol (ULTRAM) 50 mg tablet Take 50 mg by mouth every evening.       verapamil (CALAN-SR) 240 MG CR tablet Take 120 mg by mouth every evening. 1/2 tablet at bedtime  11     No current facility-administered medications for this visit.        Review of Systems   Constitutional: Negative for chills, fatigue, fever and unexpected weight change.   HENT: Negative for hearing loss and trouble swallowing.    Eyes: Negative for photophobia and visual disturbance.   Respiratory: Positive for shortness of breath. Negative for cough and wheezing.    Cardiovascular: Positive for leg swelling. Negative for chest pain and palpitations.   Gastrointestinal: Negative for abdominal pain and nausea.   Genitourinary: Positive for frequency and urgency. Negative for dysuria.   Musculoskeletal: Negative for arthralgias, back pain and joint swelling.   Skin: Negative for rash.   Neurological: Positive for weakness and headaches. Negative for tremors, seizures and numbness.   Hematological: Bruises/bleeds easily.         Objective:    3 lesions on bilateral heels    Physical Exam:   Foot Exam    General  General Appearance: appears stated age and healthy   Orientation: alert and oriented to person, place, and time   Affect: appropriate   Gait: unimpaired       Right Foot/Ankle     Inspection and Palpation  Ecchymosis: none  Tenderness: none   Swelling: none   Arch: normal  Hammertoes: absent  Claw Toes: absent  Hallux valgus: no  Hallux limitus: no  Skin Exam: skin intact;     Neurovascular  Dorsalis pedis: 2+  Posterior tibial: 2+  Saphenous nerve sensation: normal  Tibial nerve sensation: normal  Superficial peroneal nerve sensation: normal  Deep peroneal nerve sensation: normal  Sural nerve sensation: normal      Left Foot/Ankle      Inspection and Palpation  Ecchymosis:  none  Tenderness: none   Swelling: none   Arch: normal  Hammertoes: absent  Claw toes: absent  Hallux valgus: no  Hallux limitus: no  Skin Exam: skin intact;     Neurovascular  Dorsalis pedis: 2+  Posterior tibial: 2+  Saphenous nerve sensation: normal  Tibial nerve sensation: normal  Superficial peroneal nerve sensation: normal  Deep peroneal nerve sensation: normal  Sural nerve sensation: normal          Physical Exam   Cardiovascular:   Pulses:       Dorsalis pedis pulses are 2+ on the right side, and 2+ on the left side.        Posterior tibial pulses are 2+ on the right side, and 2+ on the left side.   Musculoskeletal:        Feet:        Imaging:            Assessment:       1. Benign skin lesion    2. Onychomycosis due to dermatophyte    3. Onychogryphosis    4. Bilateral foot pain      Plan:   Benign skin lesion    Onychomycosis due to dermatophyte    Onychogryphosis    Bilateral foot pain    Cantharone treatment of benign skin lesions - 3 lesions on each heel. Informed consent was obtained, hyperkeratotic skin was debrided from each lesion utilizing a scapel, Cantharone acid was applied, and was covered with a Band-Aid. Written and verbal structures are given to the patient. Patient tolerated the procedure well.     Instructions After Cantharone Acid Treatment    1. Keep dry for 3 days  2. If a blister forms, pop it  3. After 3rd day, begin wart stick twice daily for two week  4. Follow-up appointment 2 weeks      Wart Treatment: Twice Daily    1. Bathe area at night  2. File with pumice stone or nail file  3. Apply wart stick to affected area  4. Cover with a bad aid    Follow up in about 2 weeks (around 6/3/2019) for f/u canthrone bilateral heels.    Procedures - None    Counseling:     I provided patient education verbally regarding:   Patient diagnosis, treatment options, as well as alternatives, risks, and benefits.   Fungal infection of toenails explained. Treatment options including no treatment,  periodic debridement, topical medications, oral medications, and removal of the nail were discussed, as well as success rates and risks of recurrence. We agreed on periodic debridement   patient will go to nail [facility] for her toenail since is a noncovered service item.     benign skin lesion:  I explained the lesion to the pt. and the treatment options of 1)  periodic debridement and off loading of the lesion.  2)  Canthrone treatment plan,  3)  Curretage of the lesion.  I explained there was no guarantee with any of the treatments that the lesion would comletely resolve or not reoccur.   benign skin lesion:  I explained the lesion to the pt. and the treatment options of 1)  periodic debridement and off loading of the lesion.  2)  Canthrone treatment plan,  3)  Curretage of the lesion.  I explained there was no guarantee with any of the treatments that the lesion would comletely resolve or not reoccur.     I discussed with the pt the medicare guidleines for routine foot care and that the services they are requesting does not meet the guidlines and will not be covered by medicare.  The service will be labled as an A-code and will not be submitted to medicare.  The cost of the services was discussed with the patient and/or family.        This note was created using Dragon voice recognition software that occasionally misinterpreted phrases or words.

## 2019-06-03 ENCOUNTER — OFFICE VISIT (OUTPATIENT)
Dept: PODIATRY | Facility: CLINIC | Age: 84
End: 2019-06-03
Payer: MEDICARE

## 2019-06-03 VITALS
DIASTOLIC BLOOD PRESSURE: 78 MMHG | SYSTOLIC BLOOD PRESSURE: 138 MMHG | BODY MASS INDEX: 21.99 KG/M2 | HEART RATE: 72 BPM | WEIGHT: 112 LBS | HEIGHT: 60 IN

## 2019-06-03 DIAGNOSIS — M79.671 BILATERAL FOOT PAIN: ICD-10-CM

## 2019-06-03 DIAGNOSIS — L98.9 BENIGN SKIN LESION: Primary | ICD-10-CM

## 2019-06-03 DIAGNOSIS — M79.672 BILATERAL FOOT PAIN: ICD-10-CM

## 2019-06-03 PROCEDURE — 99213 OFFICE O/P EST LOW 20 MIN: CPT | Mod: 25,,, | Performed by: PODIATRIST

## 2019-06-03 PROCEDURE — 17110 DESTRUCTION B9 LES UP TO 14: CPT | Mod: ,,, | Performed by: PODIATRIST

## 2019-06-03 PROCEDURE — 99213 PR OFFICE/OUTPT VISIT, EST, LEVL III, 20-29 MIN: ICD-10-PCS | Mod: 25,,, | Performed by: PODIATRIST

## 2019-06-03 PROCEDURE — 17110 PR DESTRUCTION BENIGN LESIONS UP TO 14: ICD-10-PCS | Mod: ,,, | Performed by: PODIATRIST

## 2019-06-03 NOTE — PROGRESS NOTES
[address] [facility] [address]  [place] [address]  Phone: [phone]   [phone]    Patient's PCP:Ryan Hurtado MD  Referring Provider: No ref. provider found    Subjective:      Chief Complaint:: Follow-up (b heel /canthrone)    HPI  Angelique Hamilton is a 86 y.o. female who presents with a follow up with canthrone bilateral heels.    Vitals:    06/03/19 0917   BP: 138/78   Pulse: 72     Shoe Size:     Past Surgical History:   Procedure Laterality Date    COLONOSCOPY N/A 6/15/2015    [facility]formed [facility] at [facility]    COLPORRHAPHY N/A 3/31/2016    Performed by Rogers [facility] at [facility] OR    LEWIS      Back Pain    EYE SURGERY      Bilateral Cataracs    HYSTERECTOMY      OOPHORECTOMY      RECTOPEXY    Altemeir procedure perineal approach N/A 6/15/2015    Performed by [facility] at [facility] OR    ROTATOR CUFF REPAIR      bialteral    TONSILLECTOMY       Past Medical History:   Diagnosis Date    Back problem     Constipation     History of fractured vertebra     Hyperlipidemia     Hypertension     IBS (irritable bowel syndrome)     Migraine headache     Osteoporosis     Rectal prolapse     Scoliosis     Vertigo      Family History   Problem Relation Age of Onset    Colon cancer Father     Breast cancer Mother 70    Hypertension Mother     Ovarian cancer Neg Hx         Social History:   Marital Status:   Alcohol History:  reports that she does not drink alcohol.  Tobacco History:  reports that she has never smoked. She has never used smokeless tobacco.  Drug History:  reports that she does not use drugs.    Review of patient's allergies indicates:   Allergen Reactions    Flexeril [cyclobenzaprine]      Hallucinations    Hydrocodone     Statins-hmg-coa reductase inhibitors        Current Outpatient Medications   Medication Sig Dispense Refill    ALPRAZolam (XANAX) 0.25 MG tablet Take 0.25 mg by mouth.      calcium-vitamin D3 (CALCIUM 500 + D) 500  mg(1,250mg) -200 unit per tablet Take 1 tablet by mouth 2 (two) times daily with meals.      cefUROXime (CEFTIN) 500 MG tablet Take 500 mg by mouth.      cholecalciferol, vitamin D3, (VITAMIN D3) 2,000 unit Tab Take 2,000 Units by mouth once daily.       dicyclomine (BENTYL) 20 mg tablet Take 20 mg by mouth.      diphenoxylate-atropine 2.5-0.025 mg (LOMOTIL) 2.5-0.025 mg per tablet Take by mouth.      fluticasone propionate (FLONASE) 50 mcg/actuation nasal spray fluticasone propionate 50 mcg/actuation nasal spray,suspension   Inhale 1 spray every day by intranasal route.      gabapentin (NEURONTIN) 100 MG capsule Take 100 mg by mouth.      hydroCHLOROthiazide (HYDRODIURIL) 25 MG tablet Take 25 mg by mouth once daily.       hydroCHLOROthiazide (MICROZIDE) 12.5 mg capsule hydrochlorothiazide 12.5 mg capsule   Take 1 capsule every day by oral route.      hyoscyamine (LEVBID) 0.375 mg Tb12 hyoscyamine ER 0.375 mg tablet,extended release,12 hr   Take 1 tablet twice a day by oral route for 30 days.      losartan (COZAAR) 100 MG tablet losartan 100 mg tablet   Take 1 tablet every day by oral route.      losartan (COZAAR) 50 MG tablet Take 50 mg by mouth once daily.       meclizine (ANTIVERT) 12.5 mg tablet Take 6.25 mg by mouth 2 (two) times daily as needed for Dizziness (1/2 tablet twice daily as needed for dizziness).      mirabegron (MYRBETRIQ) 25 mg Tb24 ER tablet Take 25 mg by mouth.      multivit with minerals/lutein (MULTIVITAMIN 50 PLUS ORAL) multivitamin   1x daily      omeprazole (PRILOSEC) 20 MG capsule Take 20 mg by mouth once daily.        peppermint oil (IBGARD ORAL) Take 1 tablet by mouth once daily.      rifAXIMin (XIFAXAN) 550 mg Tab Take 550 mg by mouth.      tramadol (ULTRAM) 50 mg tablet Take 50 mg by mouth every evening.       verapamil (CALAN-SR) 240 MG CR tablet Take 120 mg by mouth every evening. 1/2 tablet at bedtime  11     No current facility-administered medications for this  visit.        Review of Systems      Objective:        Physical Exam:   Foot Exam  Physical Exam  Cantherone chemical destruction of skin lesion bilateral  foot shows normal response with lesion still present   Imaging:            Assessment:       1. Benign skin lesion    2. Bilateral foot pain      Plan:   Benign skin lesion    Bilateral foot pain    Cantharone treatment of benign skin lesions - bilateral plantar calcaneus. Informed consent was obtained, hyperkeratotic skin was debrided from each lesion utilizing a scapel, Cantharone acid was applied, and was covered with a Band-Aid. Written and verbal structures are given to the patient. Patient tolerated the procedure well.     Instructions After Cantharone Acid Treatment    1. Keep dry for 3 days  2. If a blister forms, pop it  3. After 3rd day, begin wart stick twice daily for two week  4. Follow-up appointment 2 weeks      Wart Treatment: Twice Daily    1. Bathe area at night  2. File with pumice stone or nail file  3. Apply wart stick to affected area  4. Cover with a bad aid  Patient will attempt to control the keratosis on her own return as needed  No follow-ups on file.    Procedures - None    Counseling:     I provided patient education verbally regarding:   Patient diagnosis, treatment options, as well as alternatives, risks, and benefits.     This note was created using Dragon voice recognition software that occasionally misinterpreted phrases or words.

## 2019-08-02 ENCOUNTER — LAB VISIT (OUTPATIENT)
Dept: LAB | Facility: HOSPITAL | Age: 84
End: 2019-08-02
Attending: FAMILY MEDICINE
Payer: MEDICARE

## 2019-08-02 DIAGNOSIS — M81.0 SENILE OSTEOPOROSIS: Primary | ICD-10-CM

## 2019-08-02 DIAGNOSIS — I10 ESSENTIAL HYPERTENSION, MALIGNANT: ICD-10-CM

## 2019-08-02 DIAGNOSIS — N81.10 CYSTOCELE AFFECTING PREGNANCY: ICD-10-CM

## 2019-08-02 DIAGNOSIS — O34.80 CYSTOCELE AFFECTING PREGNANCY: ICD-10-CM

## 2019-08-02 LAB
ALBUMIN SERPL BCP-MCNC: 3.4 G/DL (ref 3.5–5.2)
ALP SERPL-CCNC: 110 U/L (ref 55–135)
ALT SERPL W/O P-5'-P-CCNC: 9 U/L (ref 10–44)
ANION GAP SERPL CALC-SCNC: 8 MMOL/L (ref 8–16)
AST SERPL-CCNC: 17 U/L (ref 10–40)
BASOPHILS # BLD AUTO: 0.05 K/UL (ref 0–0.2)
BASOPHILS NFR BLD: 1.3 % (ref 0–1.9)
BILIRUB SERPL-MCNC: 0.8 MG/DL (ref 0.1–1)
BILIRUB UR QL STRIP: NEGATIVE
BUN SERPL-MCNC: 16 MG/DL (ref 8–23)
CALCIUM SERPL-MCNC: 10 MG/DL (ref 8.7–10.5)
CHLORIDE SERPL-SCNC: 105 MMOL/L (ref 95–110)
CHOLEST SERPL-MCNC: 205 MG/DL (ref 120–199)
CHOLEST/HDLC SERPL: 2.3 {RATIO} (ref 2–5)
CLARITY UR: CLEAR
CO2 SERPL-SCNC: 31 MMOL/L (ref 23–29)
COLOR UR: YELLOW
CREAT SERPL-MCNC: 1 MG/DL (ref 0.5–1.4)
DIFFERENTIAL METHOD: ABNORMAL
EOSINOPHIL # BLD AUTO: 0.4 K/UL (ref 0–0.5)
EOSINOPHIL NFR BLD: 9.1 % (ref 0–8)
ERYTHROCYTE [DISTWIDTH] IN BLOOD BY AUTOMATED COUNT: 16.2 % (ref 11.5–14.5)
EST. GFR  (AFRICAN AMERICAN): 58.9 ML/MIN/1.73 M^2
EST. GFR  (NON AFRICAN AMERICAN): 51.1 ML/MIN/1.73 M^2
GLUCOSE SERPL-MCNC: 90 MG/DL (ref 70–110)
GLUCOSE UR QL STRIP: NEGATIVE
HCT VFR BLD AUTO: 31.3 % (ref 37–48.5)
HDLC SERPL-MCNC: 89 MG/DL (ref 40–75)
HDLC SERPL: 43.4 % (ref 20–50)
HGB BLD-MCNC: 9.5 G/DL (ref 12–16)
HGB UR QL STRIP: NEGATIVE
IMM GRANULOCYTES # BLD AUTO: 0.02 K/UL (ref 0–0.04)
IMM GRANULOCYTES NFR BLD AUTO: 0.5 % (ref 0–0.5)
KETONES UR QL STRIP: NEGATIVE
LDLC SERPL CALC-MCNC: 91.2 MG/DL (ref 63–159)
LEUKOCYTE ESTERASE UR QL STRIP: NEGATIVE
LYMPHOCYTES # BLD AUTO: 1.4 K/UL (ref 1–4.8)
LYMPHOCYTES NFR BLD: 36 % (ref 18–48)
MCH RBC QN AUTO: 25.7 PG (ref 27–31)
MCHC RBC AUTO-ENTMCNC: 30.4 G/DL (ref 32–36)
MCV RBC AUTO: 85 FL (ref 82–98)
MONOCYTES # BLD AUTO: 0.4 K/UL (ref 0.3–1)
MONOCYTES NFR BLD: 10.9 % (ref 4–15)
NEUTROPHILS # BLD AUTO: 1.7 K/UL (ref 1.8–7.7)
NEUTROPHILS NFR BLD: 42.2 % (ref 38–73)
NITRITE UR QL STRIP: NEGATIVE
NONHDLC SERPL-MCNC: 116 MG/DL
NRBC BLD-RTO: 0 /100 WBC
PH UR STRIP: 7 [PH] (ref 5–8)
PLATELET # BLD AUTO: 213 K/UL (ref 150–350)
PMV BLD AUTO: 10.4 FL (ref 9.2–12.9)
POTASSIUM SERPL-SCNC: 4.2 MMOL/L (ref 3.5–5.1)
PROT SERPL-MCNC: 6.8 G/DL (ref 6–8.4)
PROT UR QL STRIP: NEGATIVE
RBC # BLD AUTO: 3.69 M/UL (ref 4–5.4)
SODIUM SERPL-SCNC: 144 MMOL/L (ref 136–145)
SP GR UR STRIP: 1 (ref 1–1.03)
TRIGL SERPL-MCNC: 124 MG/DL (ref 30–150)
TSH SERPL DL<=0.005 MIU/L-ACNC: 1.7 UIU/ML (ref 0.34–5.6)
URN SPEC COLLECT METH UR: NORMAL
UROBILINOGEN UR STRIP-ACNC: NEGATIVE EU/DL
WBC # BLD AUTO: 3.94 K/UL (ref 3.9–12.7)

## 2019-08-02 PROCEDURE — 81003 URINALYSIS AUTO W/O SCOPE: CPT

## 2019-08-02 PROCEDURE — 80053 COMPREHEN METABOLIC PANEL: CPT

## 2019-08-02 PROCEDURE — 80061 LIPID PANEL: CPT

## 2019-08-02 PROCEDURE — 84443 ASSAY THYROID STIM HORMONE: CPT

## 2019-08-02 PROCEDURE — 85025 COMPLETE CBC W/AUTO DIFF WBC: CPT

## 2019-09-23 ENCOUNTER — TELEPHONE (OUTPATIENT)
Dept: FAMILY MEDICINE | Facility: CLINIC | Age: 84
End: 2019-09-23

## 2019-09-23 ENCOUNTER — OUTSIDE PLACE OF SERVICE (OUTPATIENT)
Dept: ADMINISTRATIVE | Facility: HOSPITAL | Age: 84
End: 2019-09-23
Payer: MEDICARE

## 2019-09-23 DIAGNOSIS — M80.08XD AGE-RELATED OSTEOPOROSIS WITH CURRENT PATHOLOGICAL FRACTURE OF VERTEBRA WITH ROUTINE HEALING: ICD-10-CM

## 2019-09-23 PROCEDURE — G0179 MD RECERTIFICATION HHA PT: HCPCS | Mod: ,,, | Performed by: FAMILY MEDICINE

## 2019-09-23 PROCEDURE — G0179 PR HOME HEALTH MD RECERTIFICATION: ICD-10-PCS | Mod: ,,, | Performed by: FAMILY MEDICINE

## 2019-09-23 NOTE — TELEPHONE ENCOUNTER
MO CANTRELL  8/11/2019 11:29:39 AM > send letter. LABS LOOK GOOD EXCEPT SHE'S SLIGHTLY MORE  ANEMIC  HCT  31...RECHECK CBC IN 1 MONTH..  DEXTER ROLDAN  8/12/2019 7:57:29 AM > See if she is still with [facility]     Spoke to pt-she still uses [facility]-

## 2019-09-26 ENCOUNTER — TELEPHONE (OUTPATIENT)
Dept: FAMILY MEDICINE | Facility: CLINIC | Age: 84
End: 2019-09-26

## 2019-09-26 NOTE — TELEPHONE ENCOUNTER
----- Message from Margo Harrell sent at 9/26/2019  2:56 PM CDT -----  Timmy at [facility] needs lab orders for Angelique. #[phone]. Fax #[phone]

## 2019-09-27 ENCOUNTER — LAB VISIT (OUTPATIENT)
Dept: LAB | Facility: HOSPITAL | Age: 84
End: 2019-09-27
Attending: FAMILY MEDICINE
Payer: MEDICARE

## 2019-09-27 DIAGNOSIS — I10 ESSENTIAL HYPERTENSION, MALIGNANT: Primary | ICD-10-CM

## 2019-09-27 LAB
BASOPHILS # BLD AUTO: 0.04 K/UL (ref 0–0.2)
BASOPHILS NFR BLD: 1 % (ref 0–1.9)
DIFFERENTIAL METHOD: ABNORMAL
EOSINOPHIL # BLD AUTO: 0.1 K/UL (ref 0–0.5)
EOSINOPHIL NFR BLD: 1.7 % (ref 0–8)
ERYTHROCYTE [DISTWIDTH] IN BLOOD BY AUTOMATED COUNT: 16.2 % (ref 11.5–14.5)
HCT VFR BLD AUTO: 30 % (ref 37–48.5)
HGB BLD-MCNC: 9.7 G/DL (ref 12–16)
IMM GRANULOCYTES # BLD AUTO: 0.03 K/UL (ref 0–0.04)
IMM GRANULOCYTES NFR BLD AUTO: 0.7 % (ref 0–0.5)
LYMPHOCYTES # BLD AUTO: 0.9 K/UL (ref 1–4.8)
LYMPHOCYTES NFR BLD: 22.1 % (ref 18–48)
MCH RBC QN AUTO: 25.9 PG (ref 27–31)
MCHC RBC AUTO-ENTMCNC: 32.3 G/DL (ref 32–36)
MCV RBC AUTO: 80 FL (ref 82–98)
MONOCYTES # BLD AUTO: 0.3 K/UL (ref 0.3–1)
MONOCYTES NFR BLD: 7.2 % (ref 4–15)
NEUTROPHILS # BLD AUTO: 2.7 K/UL (ref 1.8–7.7)
NEUTROPHILS NFR BLD: 67.3 % (ref 38–73)
NRBC BLD-RTO: 0 /100 WBC
PLATELET # BLD AUTO: 192 K/UL (ref 150–350)
PMV BLD AUTO: 9.9 FL (ref 9.2–12.9)
RBC # BLD AUTO: 3.75 M/UL (ref 4–5.4)
WBC # BLD AUTO: 4.02 K/UL (ref 3.9–12.7)

## 2019-09-27 PROCEDURE — 36415 COLL VENOUS BLD VENIPUNCTURE: CPT

## 2019-09-27 PROCEDURE — 85025 COMPLETE CBC W/AUTO DIFF WBC: CPT

## 2019-09-30 ENCOUNTER — TELEPHONE (OUTPATIENT)
Dept: FAMILY MEDICINE | Facility: CLINIC | Age: 84
End: 2019-09-30

## 2019-09-30 NOTE — TELEPHONE ENCOUNTER
Spoke with pt and let her know that her anemia is mildly worsening. Agrees to discuss options in October visit.

## 2019-09-30 NOTE — TELEPHONE ENCOUNTER
----- Message from Ryan Hurtado MD sent at 9/29/2019  8:26 PM CDT -----  Call patient.  Her anemia is mildly worsening.  Hematocrit down to 30.0 we will discuss different treatment options during her October visit.

## 2019-10-18 ENCOUNTER — TELEPHONE (OUTPATIENT)
Dept: FAMILY MEDICINE | Facility: CLINIC | Age: 84
End: 2019-10-18

## 2019-10-18 NOTE — TELEPHONE ENCOUNTER
Let her know as long as stool is not black or bloody then can take another dose of lomotil. If she starts with pain, fever or watery diarrhea then need to be seen.

## 2019-10-18 NOTE — TELEPHONE ENCOUNTER
Pt states she has IBS she went to the bathroom 6/7 times yesterday. She took something for diarrhea it didn't help. She has been to the bathroom 5 times already this morning. Its not diarrhea. Its super soft. [phone]

## 2019-10-24 RX ORDER — HYDROCODONE BITARTRATE AND ACETAMINOPHEN 7.5; 325 MG/1; MG/1
1 TABLET ORAL EVERY 6 HOURS
Refills: 0 | COMMUNITY
Start: 2019-07-24 | End: 2019-10-25

## 2019-10-24 RX ORDER — LISINOPRIL 20 MG/1
20 TABLET ORAL DAILY
Refills: 3 | COMMUNITY
Start: 2019-08-19 | End: 2020-09-01

## 2019-10-24 RX ORDER — METHOCARBAMOL 500 MG/1
2 TABLET, FILM COATED ORAL EVERY 6 HOURS
Refills: 0 | COMMUNITY
Start: 2019-07-23 | End: 2019-10-25

## 2019-10-24 RX ORDER — VERAPAMIL HYDROCHLORIDE 120 MG/1
1 TABLET, FILM COATED, EXTENDED RELEASE ORAL DAILY
Refills: 3 | COMMUNITY
Start: 2019-08-30 | End: 2020-09-01 | Stop reason: SDUPTHER

## 2019-10-25 ENCOUNTER — TELEPHONE (OUTPATIENT)
Dept: FAMILY MEDICINE | Facility: CLINIC | Age: 84
End: 2019-10-25

## 2019-10-25 ENCOUNTER — OFFICE VISIT (OUTPATIENT)
Dept: FAMILY MEDICINE | Facility: CLINIC | Age: 84
End: 2019-10-25
Payer: MEDICARE

## 2019-10-25 VITALS
SYSTOLIC BLOOD PRESSURE: 136 MMHG | WEIGHT: 94 LBS | HEIGHT: 56 IN | BODY MASS INDEX: 21.15 KG/M2 | HEART RATE: 60 BPM | DIASTOLIC BLOOD PRESSURE: 86 MMHG

## 2019-10-25 DIAGNOSIS — D50.8 IRON DEFICIENCY ANEMIA SECONDARY TO INADEQUATE DIETARY IRON INTAKE: ICD-10-CM

## 2019-10-25 DIAGNOSIS — E78.2 MIXED HYPERLIPIDEMIA: ICD-10-CM

## 2019-10-25 DIAGNOSIS — Z23 NEED FOR INFLUENZA VACCINATION: ICD-10-CM

## 2019-10-25 DIAGNOSIS — K21.9 GASTROESOPHAGEAL REFLUX DISEASE, ESOPHAGITIS PRESENCE NOT SPECIFIED: ICD-10-CM

## 2019-10-25 DIAGNOSIS — R42 VERTIGO: ICD-10-CM

## 2019-10-25 DIAGNOSIS — I35.0 AORTIC STENOSIS WITH TRILEAFLET VALVE: ICD-10-CM

## 2019-10-25 DIAGNOSIS — G43.909 MIGRAINE WITHOUT STATUS MIGRAINOSUS, NOT INTRACTABLE, UNSPECIFIED MIGRAINE TYPE: ICD-10-CM

## 2019-10-25 DIAGNOSIS — Z87.81 HISTORY OF VERTEBRAL COMPRESSION FRACTURE: ICD-10-CM

## 2019-10-25 DIAGNOSIS — K58.2 IRRITABLE BOWEL SYNDROME WITH BOTH CONSTIPATION AND DIARRHEA: ICD-10-CM

## 2019-10-25 DIAGNOSIS — I10 ESSENTIAL HYPERTENSION: Primary | ICD-10-CM

## 2019-10-25 DIAGNOSIS — M15.0 PRIMARY GENERALIZED (OSTEO)ARTHRITIS: ICD-10-CM

## 2019-10-25 PROCEDURE — 99214 OFFICE O/P EST MOD 30 MIN: CPT | Mod: 25,S$GLB,, | Performed by: FAMILY MEDICINE

## 2019-10-25 PROCEDURE — G0008 FLU VACCINE - HIGH DOSE (65+) PRESERVATIVE FREE IM: ICD-10-PCS | Mod: S$GLB,,, | Performed by: FAMILY MEDICINE

## 2019-10-25 PROCEDURE — G0008 ADMIN INFLUENZA VIRUS VAC: HCPCS | Mod: S$GLB,,, | Performed by: FAMILY MEDICINE

## 2019-10-25 PROCEDURE — 90662 FLU VACCINE - HIGH DOSE (65+) PRESERVATIVE FREE IM: ICD-10-PCS | Mod: S$GLB,,, | Performed by: FAMILY MEDICINE

## 2019-10-25 PROCEDURE — 90662 IIV NO PRSV INCREASED AG IM: CPT | Mod: S$GLB,,, | Performed by: FAMILY MEDICINE

## 2019-10-25 PROCEDURE — 99214 PR OFFICE/OUTPT VISIT, EST, LEVL IV, 30-39 MIN: ICD-10-PCS | Mod: 25,S$GLB,, | Performed by: FAMILY MEDICINE

## 2019-10-25 RX ORDER — OMEPRAZOLE 20 MG/1
20 CAPSULE, DELAYED RELEASE ORAL DAILY
Qty: 90 CAPSULE | Refills: 3 | Status: SHIPPED | OUTPATIENT
Start: 2019-10-25 | End: 2020-04-17 | Stop reason: SDUPTHER

## 2019-10-25 RX ORDER — DOCUSATE SODIUM 100 MG/1
100 CAPSULE, LIQUID FILLED ORAL 2 TIMES DAILY
COMMUNITY
End: 2020-07-21

## 2019-10-25 RX ORDER — TRAMADOL HYDROCHLORIDE 50 MG/1
50 TABLET ORAL NIGHTLY
Qty: 30 TABLET | Refills: 5 | Status: SHIPPED | OUTPATIENT
Start: 2019-10-25 | End: 2020-04-09 | Stop reason: SDUPTHER

## 2019-10-25 NOTE — TELEPHONE ENCOUNTER
----- Message from Clarita Zelaya sent at 10/25/2019  7:32 AM CDT -----  vm- pt has been having a stomach virus, she has been on a bland diet and diahrea medication for 5 days. She took at constipatation pill and stool softener. She has an appt today and would like to know if she should still come in   [phone]

## 2019-10-25 NOTE — PROGRESS NOTES
SUBJECTIVE:    Patient ID: Angelique Hamilton is a 86 y.o. female.    Chief Complaint: Follow-up (brought bottles but only wants refills on 2 medications ac); Diarrhea; and Abdominal Pain    This 86-year-old female has had difficulty with her bowels recently she also states feeling constipation and diarrhea.  She took 3 Senokot sin several stool softeners and had a blowout in ball vein incontinence of stool that required her daughter-in-law to come clean upper house.  She has been on a bland diet at the recommendation of her home health nurse and now is willing to increase it to include fiber.  He has had no recent falls.  She lives in an apartment alone but has family to check in on her.      Lab Visit on 09/27/2019   Component Date Value Ref Range Status    WBC 09/27/2019 4.02  3.90 - 12.70 K/uL Final    RBC 09/27/2019 3.75* 4.00 - 5.40 M/uL Final    Hemoglobin 09/27/2019 9.7* 12.0 - 16.0 g/dL Final    Hematocrit 09/27/2019 30.0* 37.0 - 48.5 % Final    Mean Corpuscular Volume 09/27/2019 80* 82 - 98 fL Final    Mean Corpuscular Hemoglobin 09/27/2019 25.9* 27.0 - 31.0 pg Final    Mean Corpuscular Hemoglobin Conc 09/27/2019 32.3  32.0 - 36.0 g/dL Final    RDW 09/27/2019 16.2* 11.5 - 14.5 % Final    Platelets 09/27/2019 192  150 - 350 K/uL Final    MPV 09/27/2019 9.9  9.2 - 12.9 fL Final    Immature Granulocytes 09/27/2019 0.7* 0.0 - 0.5 % Final    Gran # (ANC) 09/27/2019 2.7  1.8 - 7.7 K/uL Final    Immature Grans (Abs) 09/27/2019 0.03  0.00 - 0.04 K/uL Final    Lymph # 09/27/2019 0.9* 1.0 - 4.8 K/uL Final    Mono # 09/27/2019 0.3  0.3 - 1.0 K/uL Final    Eos # 09/27/2019 0.1  0.0 - 0.5 K/uL Final    Baso # 09/27/2019 0.04  0.00 - 0.20 K/uL Final    nRBC 09/27/2019 0  0 /100 WBC Final    Gran% 09/27/2019 67.3  38.0 - 73.0 % Final    Lymph% 09/27/2019 22.1  18.0 - 48.0 % Final    Mono% 09/27/2019 7.2  4.0 - 15.0 % Final    Eosinophil% 09/27/2019 1.7  0.0 - 8.0 % Final    Basophil% 09/27/2019 1.0   0.0 - 1.9 % Final    Differential Method 09/27/2019 Automated   Final   Lab Visit on 08/02/2019   Component Date Value Ref Range Status    WBC 08/02/2019 3.94  3.90 - 12.70 K/uL Final    RBC 08/02/2019 3.69* 4.00 - 5.40 M/uL Final    Hemoglobin 08/02/2019 9.5* 12.0 - 16.0 g/dL Final    Hematocrit 08/02/2019 31.3* 37.0 - 48.5 % Final    Mean Corpuscular Volume 08/02/2019 85  82 - 98 fL Final    Mean Corpuscular Hemoglobin 08/02/2019 25.7* 27.0 - 31.0 pg Final    Mean Corpuscular Hemoglobin Conc 08/02/2019 30.4* 32.0 - 36.0 g/dL Final    RDW 08/02/2019 16.2* 11.5 - 14.5 % Final    Platelets 08/02/2019 213  150 - 350 K/uL Final    MPV 08/02/2019 10.4  9.2 - 12.9 fL Final    Immature Granulocytes 08/02/2019 0.5  0.0 - 0.5 % Final    Gran # (ANC) 08/02/2019 1.7* 1.8 - 7.7 K/uL Final    Immature Grans (Abs) 08/02/2019 0.02  0.00 - 0.04 K/uL Final    Lymph # 08/02/2019 1.4  1.0 - 4.8 K/uL Final    Mono # 08/02/2019 0.4  0.3 - 1.0 K/uL Final    Eos # 08/02/2019 0.4  0.0 - 0.5 K/uL Final    Baso # 08/02/2019 0.05  0.00 - 0.20 K/uL Final    nRBC 08/02/2019 0  0 /100 WBC Final    Gran% 08/02/2019 42.2  38.0 - 73.0 % Final    Lymph% 08/02/2019 36.0  18.0 - 48.0 % Final    Mono% 08/02/2019 10.9  4.0 - 15.0 % Final    Eosinophil% 08/02/2019 9.1* 0.0 - 8.0 % Final    Basophil% 08/02/2019 1.3  0.0 - 1.9 % Final    Differential Method 08/02/2019 Automated   Final    Cholesterol 08/02/2019 205* 120 - 199 mg/dL Final    Triglycerides 08/02/2019 124  30 - 150 mg/dL Final    HDL 08/02/2019 89* 40 - 75 mg/dL Final    LDL Cholesterol 08/02/2019 91.2  63.0 - 159.0 mg/dL Final    Hdl/Cholesterol Ratio 08/02/2019 43.4  20.0 - 50.0 % Final    Total Cholesterol/HDL Ratio 08/02/2019 2.3  2.0 - 5.0 Final    Non-HDL Cholesterol 08/02/2019 116  mg/dL Final    Specimen UA 08/02/2019 Urine, Clean CatchUrine, Illeal LoopUrin   Final    Color, UA 08/02/2019 Yellow  Yellow, Straw, Enma Final    Appearance, UA  08/02/2019 Clear  Clear Final    pH, UA 08/02/2019 7.0  5.0 - 8.0 Final    Specific Gravity, UA 08/02/2019 1.005  1.005 - 1.030 Final    Protein, UA 08/02/2019 Negative  Negative Final    Glucose, UA 08/02/2019 Negative  Negative Final    Ketones, UA 08/02/2019 Negative  Negative Final    Bilirubin (UA) 08/02/2019 Negative  Negative Final    Occult Blood UA 08/02/2019 Negative  Negative Final    Nitrite, UA 08/02/2019 Negative  Negative Final    Urobilinogen, UA 08/02/2019 Negative  Negative EU/dL Final    Leukocytes, UA 08/02/2019 Negative  Negative Final    TSH 08/02/2019 1.700  0.340 - 5.600 uIU/mL Final    Sodium 08/02/2019 144  136 - 145 mmol/L Final    Potassium 08/02/2019 4.2  3.5 - 5.1 mmol/L Final    Chloride 08/02/2019 105  95 - 110 mmol/L Final    CO2 08/02/2019 31* 23 - 29 mmol/L Final    Glucose 08/02/2019 90  70 - 110 mg/dL Final    BUN, Bld 08/02/2019 16  8 - 23 mg/dL Final    Creatinine 08/02/2019 1.0  0.5 - 1.4 mg/dL Final    Calcium 08/02/2019 10.0  8.7 - 10.5 mg/dL Final    Total Protein 08/02/2019 6.8  6.0 - 8.4 g/dL Final    Albumin 08/02/2019 3.4* 3.5 - 5.2 g/dL Final    Total Bilirubin 08/02/2019 0.8  0.1 - 1.0 mg/dL Final    Alkaline Phosphatase 08/02/2019 110  55 - 135 U/L Final    AST 08/02/2019 17  10 - 40 U/L Final    ALT 08/02/2019 9* 10 - 44 U/L Final    Anion Gap 08/02/2019 8  8 - 16 mmol/L Final    eGFR if  08/02/2019 58.9* >60 mL/min/1.73 m^2 Final    eGFR if non African American 08/02/2019 51.1* >60 mL/min/1.73 m^2 Final       Past Medical History:   Diagnosis Date    Back problem     Constipation     History of fractured vertebra     Hyperlipidemia     Hypertension     IBS (irritable bowel syndrome)     Migraine headache     Osteoporosis     Rectal prolapse     Scoliosis     Vertigo      Past Surgical History:   Procedure Laterality Date    LEWIS      Back Pain    EYE SURGERY      Bilateral Cataracs    HYSTERECTOMY       OOPHORECTOMY      ROTATOR CUFF REPAIR      bialteral    TONSILLECTOMY       Family History   Problem Relation Age of Onset    Colon cancer Father     Breast cancer Mother 70    Hypertension Mother     Ovarian cancer Neg Hx        Marital Status:   Alcohol History:  reports that she does not drink alcohol.  Tobacco History:  reports that she has never smoked. She has never used smokeless tobacco.  Drug History:  reports that she does not use drugs.    Review of patient's allergies indicates:   Allergen Reactions    Antihistamines - alkylamine     Torrie [amlodipine-olmesartan]     Flexeril [cyclobenzaprine]      Hallucinations    Hydrocodone     Hydrocodone-acetaminophen Other (See Comments)    Simvastatin     Statins-hmg-coa reductase inhibitors        Current Outpatient Medications:     cholecalciferol, vitamin D3, (VITAMIN D3) 2,000 unit Tab, Take 2,000 Units by mouth once daily. , Disp: , Rfl:     docusate sodium (COLACE) 100 MG capsule, Take 100 mg by mouth 2 (two) times daily., Disp: , Rfl:     hydroCHLOROthiazide (HYDRODIURIL) 25 MG tablet, Take 25 mg by mouth once daily. , Disp: , Rfl:     lisinopril (PRINIVIL,ZESTRIL) 20 MG tablet, Take 20 mg by mouth once daily., Disp: , Rfl: 3    meclizine (ANTIVERT) 12.5 mg tablet, Take 6.25 mg by mouth 2 (two) times daily as needed for Dizziness (1/2 tablet twice daily as needed for dizziness)., Disp: , Rfl:     omeprazole (PRILOSEC) 20 MG capsule, Take 1 capsule (20 mg total) by mouth once daily., Disp: 90 capsule, Rfl: 3    traMADol (ULTRAM) 50 mg tablet, Take 1 tablet (50 mg total) by mouth every evening., Disp: 30 tablet, Rfl: 5    verapamil (CALAN-SR) 120 MG CR tablet, Take 1 tablet by mouth once daily., Disp: , Rfl: 3    calcium-vitamin D3 (CALCIUM 500 + D) 500 mg(1,250mg) -200 unit per tablet, Take 1 tablet by mouth 2 (two) times daily with meals., Disp: , Rfl:     dicyclomine (BENTYL) 20 mg tablet, Take 20 mg by mouth., Disp: , Rfl:  "    diphenoxylate-atropine 2.5-0.025 mg (LOMOTIL) 2.5-0.025 mg per tablet, Take by mouth., Disp: , Rfl:     fluticasone propionate (FLONASE) 50 mcg/actuation nasal spray, fluticasone propionate 50 mcg/actuation nasal spray,suspension  Inhale 1 spray every day by intranasal route., Disp: , Rfl:     hyoscyamine (LEVBID) 0.375 mg Tb12, hyoscyamine ER 0.375 mg tablet,extended release,12 hr  Take 1 tablet twice a day by oral route for 30 days., Disp: , Rfl:     Review of Systems   Constitutional: Negative for appetite change, chills, fatigue, fever and unexpected weight change.   HENT: Negative for congestion, ear pain, sinus pain, sore throat and trouble swallowing.    Eyes: Negative for pain, discharge and visual disturbance.   Respiratory: Negative for apnea, cough, shortness of breath and wheezing.    Cardiovascular: Negative for chest pain, palpitations and leg swelling.   Gastrointestinal: Positive for constipation and diarrhea (laxatives). Negative for abdominal pain, nausea and vomiting.        Dysphagia w/ pills   Endocrine: Negative for heat intolerance, polydipsia and polyuria.   Genitourinary: Negative for difficulty urinating, dyspareunia, dysuria, frequency, hematuria and menstrual problem.        Nocturia 2-3 x a night   Musculoskeletal: Negative for arthralgias, back pain (back hurts  daily, not on meds), gait problem, joint swelling and myalgias.   Allergic/Immunologic: Negative for environmental allergies, food allergies and immunocompromised state.   Neurological: Negative for dizziness, tremors, seizures, numbness and headaches.        Walks w/ rolling walker/rollator   Psychiatric/Behavioral: Negative for behavioral problems, confusion, hallucinations, sleep disturbance and suicidal ideas. The patient is not nervous/anxious.           Objective:      Vitals:    10/25/19 0827   BP: 136/86   Pulse: 60   Weight: 42.6 kg (94 lb)   Height: 4' 8" (1.422 m)     Body mass index is 21.07 kg/m².  Physical " Exam   Constitutional: She is oriented to person, place, and time. She appears well-developed and well-nourished.   Frail elderly white female walking with a walker   HENT:   Head: Normocephalic and atraumatic.   Right Ear: External ear normal.   Left Ear: External ear normal.   Nose: Nose normal.   Mouth/Throat: Oropharynx is clear and moist.   Eyes: Pupils are equal, round, and reactive to light. EOM are normal.   Neck: Normal range of motion. Neck supple. Carotid bruit is not present. No thyromegaly present.   Cardiovascular: Normal rate, regular rhythm and intact distal pulses.   No murmur heard.  Grade 3/6 systolic murmur   Pulmonary/Chest: Effort normal and breath sounds normal. She has no wheezes. She has no rales.   Abdominal: Soft. Bowel sounds are normal. She exhibits no distension. There is no hepatosplenomegaly. There is no tenderness.   Musculoskeletal: Normal range of motion. She exhibits no tenderness or deformity.        Lumbar back: Normal. She exhibits no pain and no spasm.   Bends 90 degrees at  Waist, has moderate severe kyphoscoliosis, knees have full range of motion.  Walks slowly with a walker.   Lymphadenopathy:     She has no cervical adenopathy.   Neurological: She is alert and oriented to person, place, and time. No cranial nerve deficit. Coordination normal.   Skin: Skin is warm and dry. No rash noted.   Psychiatric: She has a normal mood and affect. Her behavior is normal. Judgment and thought content normal.   Nursing note and vitals reviewed.        Assessment:       1. Essential hypertension    2. Mixed hyperlipidemia    3. Vertigo    4. Migraine without status migrainosus, not intractable, unspecified migraine type    5. Need for influenza vaccination    6. Gastroesophageal reflux disease, esophagitis presence not specified    7. Primary generalized (osteo)arthritis    8. Iron deficiency anemia secondary to inadequate dietary iron intake    9. History of vertebral compression  fracture    10. Aortic stenosis with trileaflet valve    11. Irritable bowel syndrome with both constipation and diarrhea         Plan:       Essential hypertension  -     CBC auto differential; Future; Expected date: 10/25/2019  Blood pressure stable at this time  Mixed hyperlipidemia    Vertigo  Continue meclizine twice a day  Migraine without status migrainosus, not intractable, unspecified migraine type  No migraines lately  Need for influenza vaccination  -     Influenza - High Dose (65+) (PF) (IM)  Flu vaccine today  Gastroesophageal reflux disease, esophagitis presence not specified  -     omeprazole (PRILOSEC) 20 MG capsule; Take 1 capsule (20 mg total) by mouth once daily.  Dispense: 90 capsule; Refill: 3  Managed her GERD well with omeprazole  Primary generalized (osteo)arthritis  -     traMADol (ULTRAM) 50 mg tablet; Take 1 tablet (50 mg total) by mouth every evening.  Dispense: 30 tablet; Refill: 5    Iron deficiency anemia secondary to inadequate dietary iron intake  -     CBC auto differential; Future; Expected date: 10/25/2019  -     Ferritin; Future; Expected date: 10/25/2019  -     Basic metabolic panel; Future; Expected date: 10/25/2019  Add ferrous sulfate 325 mg daily and vitamin-C 500 mg daily.  Recheck CBC and ferritin in 2 months  History of vertebral compression fracture    Aortic stenosis with trileaflet valve  Dr. Barreto following this and Cardiology    Follow up in about 2 months (around 12/25/2019) for Isela in 2 months with labs.      Has irritable bowel syndrome and laxative abuse. recommend Belvita crackers and fiber supplements.  Reduce her laxatives.

## 2019-11-12 ENCOUNTER — TELEPHONE (OUTPATIENT)
Dept: FAMILY MEDICINE | Facility: CLINIC | Age: 84
End: 2019-11-12

## 2019-11-12 NOTE — TELEPHONE ENCOUNTER
----- Message from Margo Harrell sent at 11/12/2019 12:41 PM CST -----  Pt is calling concerning her hair falling out. She says it's coming  out by the handfull? Please advise Pt #[phone]

## 2019-11-21 ENCOUNTER — TELEPHONE (OUTPATIENT)
Dept: FAMILY MEDICINE | Facility: CLINIC | Age: 84
End: 2019-11-21

## 2019-11-21 NOTE — TELEPHONE ENCOUNTER
----- Message from Margo Harrell sent at 11/21/2019  4:11 PM CST -----  Riccardo with [facility] is calling concerning the patient is losing her hair badly. It is falling out. She is concerned that it might be due to her iron deficiency. #[phone].

## 2019-11-21 NOTE — TELEPHONE ENCOUNTER
Please let him know hair loss is likely due to overal poor nutrition. She is supposed to be on iron and vitamin C daily and having repeat labs in December to monitor her anemia.

## 2019-11-21 NOTE — TELEPHONE ENCOUNTER
Spoke with Riccardo and let him know the below per Margo. Agrees to relay the message to the pt. Will have pt repeat labs before OV.

## 2019-12-16 ENCOUNTER — TELEPHONE (OUTPATIENT)
Dept: FAMILY MEDICINE | Facility: CLINIC | Age: 84
End: 2019-12-16

## 2019-12-17 NOTE — TELEPHONE ENCOUNTER
Please let pt/HH know no additional labs needed however if she continues to have concerns regarding hair loss then can refer her to dermatology- Dr. Raymundo

## 2019-12-17 NOTE — TELEPHONE ENCOUNTER
----- Message from Misty Mayorga MA sent at 12/16/2019  4:52 PM CST -----      ----- Message -----  From: Edith Chavez  Sent: 12/16/2019   4:20 PM CST  To: Ryan Hurtado [facility]    HOME HEALTH, [facility], 12/16/19

## 2019-12-18 ENCOUNTER — TELEPHONE (OUTPATIENT)
Dept: FAMILY MEDICINE | Facility: CLINIC | Age: 84
End: 2019-12-18

## 2019-12-18 NOTE — TELEPHONE ENCOUNTER
Spoke to patient that lab is due prior to 12/27 office visit. Said that home health is supposed to come draw it this week. Patient said that she is with [facility]. I faxed orders to them just to be sure they have what we need.

## 2019-12-20 ENCOUNTER — LAB VISIT (OUTPATIENT)
Dept: LAB | Facility: HOSPITAL | Age: 84
End: 2019-12-20
Attending: FAMILY MEDICINE
Payer: MEDICARE

## 2019-12-20 DIAGNOSIS — D63.1 ANEMIA OF CHRONIC RENAL FAILURE: Primary | ICD-10-CM

## 2019-12-20 DIAGNOSIS — N18.30 CHRONIC KIDNEY DISEASE, STAGE III (MODERATE): ICD-10-CM

## 2019-12-20 DIAGNOSIS — N18.9 ANEMIA OF CHRONIC RENAL FAILURE: Primary | ICD-10-CM

## 2019-12-20 DIAGNOSIS — I12.9 PARENCHYMAL RENAL HYPERTENSION: ICD-10-CM

## 2019-12-20 LAB
ANION GAP SERPL CALC-SCNC: 8 MMOL/L (ref 8–16)
BUN SERPL-MCNC: 14 MG/DL (ref 8–23)
CALCIUM SERPL-MCNC: 10.4 MG/DL (ref 8.7–10.5)
CHLORIDE SERPL-SCNC: 95 MMOL/L (ref 95–110)
CO2 SERPL-SCNC: 29 MMOL/L (ref 23–29)
CREAT SERPL-MCNC: 1 MG/DL (ref 0.5–1.4)
ERYTHROCYTE [DISTWIDTH] IN BLOOD BY AUTOMATED COUNT: 18.7 % (ref 11.5–14.5)
EST. GFR  (AFRICAN AMERICAN): 58.9 ML/MIN/1.73 M^2
EST. GFR  (NON AFRICAN AMERICAN): 51.1 ML/MIN/1.73 M^2
FERRITIN SERPL-MCNC: 31 NG/ML (ref 20–300)
GLUCOSE SERPL-MCNC: 84 MG/DL (ref 70–110)
HCT VFR BLD AUTO: 39.9 % (ref 37–48.5)
HGB BLD-MCNC: 13.3 G/DL (ref 12–16)
MCH RBC QN AUTO: 29.9 PG (ref 27–31)
MCHC RBC AUTO-ENTMCNC: 33.3 G/DL (ref 32–36)
MCV RBC AUTO: 90 FL (ref 82–98)
PLATELET # BLD AUTO: 183 K/UL (ref 150–350)
PMV BLD AUTO: 10.1 FL (ref 9.2–12.9)
POTASSIUM SERPL-SCNC: 5.1 MMOL/L (ref 3.5–5.1)
RBC # BLD AUTO: 4.45 M/UL (ref 4–5.4)
SODIUM SERPL-SCNC: 132 MMOL/L (ref 136–145)
WBC # BLD AUTO: 4.85 K/UL (ref 3.9–12.7)

## 2019-12-20 PROCEDURE — 36415 COLL VENOUS BLD VENIPUNCTURE: CPT

## 2019-12-20 PROCEDURE — 80048 BASIC METABOLIC PNL TOTAL CA: CPT

## 2019-12-20 PROCEDURE — 85027 COMPLETE CBC AUTOMATED: CPT

## 2019-12-20 PROCEDURE — 82728 ASSAY OF FERRITIN: CPT

## 2019-12-27 ENCOUNTER — OFFICE VISIT (OUTPATIENT)
Dept: FAMILY MEDICINE | Facility: CLINIC | Age: 84
End: 2019-12-27
Payer: MEDICARE

## 2019-12-27 DIAGNOSIS — I10 ESSENTIAL HYPERTENSION: ICD-10-CM

## 2019-12-27 DIAGNOSIS — L65.9 HAIR LOSS: ICD-10-CM

## 2019-12-27 DIAGNOSIS — I35.0 AORTIC VALVE STENOSIS, ETIOLOGY OF CARDIAC VALVE DISEASE UNSPECIFIED: ICD-10-CM

## 2019-12-27 DIAGNOSIS — K58.9 IRRITABLE BOWEL SYNDROME, UNSPECIFIED TYPE: ICD-10-CM

## 2019-12-27 DIAGNOSIS — E44.1 MILD PROTEIN-CALORIE MALNUTRITION: ICD-10-CM

## 2019-12-27 DIAGNOSIS — D50.9 IRON DEFICIENCY ANEMIA, UNSPECIFIED IRON DEFICIENCY ANEMIA TYPE: Primary | ICD-10-CM

## 2019-12-27 PROCEDURE — 1126F AMNT PAIN NOTED NONE PRSNT: CPT | Mod: S$GLB,,, | Performed by: NURSE PRACTITIONER

## 2019-12-27 PROCEDURE — 1159F MED LIST DOCD IN RCRD: CPT | Mod: S$GLB,,, | Performed by: NURSE PRACTITIONER

## 2019-12-27 PROCEDURE — 99214 OFFICE O/P EST MOD 30 MIN: CPT | Mod: S$GLB,,, | Performed by: NURSE PRACTITIONER

## 2019-12-27 PROCEDURE — 99214 PR OFFICE/OUTPT VISIT, EST, LEVL IV, 30-39 MIN: ICD-10-PCS | Mod: S$GLB,,, | Performed by: NURSE PRACTITIONER

## 2019-12-27 PROCEDURE — 1159F PR MEDICATION LIST DOCUMENTED IN MEDICAL RECORD: ICD-10-PCS | Mod: S$GLB,,, | Performed by: NURSE PRACTITIONER

## 2019-12-27 PROCEDURE — 1126F PR PAIN SEVERITY QUANTIFIED, NO PAIN PRESENT: ICD-10-PCS | Mod: S$GLB,,, | Performed by: NURSE PRACTITIONER

## 2019-12-27 NOTE — PROGRESS NOTES
SUBJECTIVE:    Patient ID: Angelique Hamilton is a 86 y.o. female.    Chief Complaint: Hypertension (2 month follow up.....labs completed on 12/20/19.....Med bottles present and reconciled.....mlr) and Hair Loss (Pt also reports new onset of hair loss which begun about 2 weeks after her last visit.....mlr)    Pt here for regular f/u-patient reports since her last appointment here in October she has noticed she has been losing quite a bit a hair.  She is complaining of diffuse thinning of her hair, no patchy baldness.  Patient here with her daughter-in-law and reports overall she is eating a little bit better though appetite continues to be quite poor- has gained 7lbs since Oct visit.  Family is trying to get her to drink more protein shakes and she has been on iron supplements since anemia diagnosed a couple months ago.  Overall feeling okay, no complaints other than the hair loss  Has follow-up soon with Dr. Barreto      Lab Visit on 12/20/2019   Component Date Value Ref Range Status    WBC 12/20/2019 4.85  3.90 - 12.70 K/uL Final    RBC 12/20/2019 4.45  4.00 - 5.40 M/uL Final    Hemoglobin 12/20/2019 13.3  12.0 - 16.0 g/dL Final    Hematocrit 12/20/2019 39.9  37.0 - 48.5 % Final    Mean Corpuscular Volume 12/20/2019 90  82 - 98 fL Final    Mean Corpuscular Hemoglobin 12/20/2019 29.9  27.0 - 31.0 pg Final    Mean Corpuscular Hemoglobin Conc 12/20/2019 33.3  32.0 - 36.0 g/dL Final    RDW 12/20/2019 18.7* 11.5 - 14.5 % Final    Platelets 12/20/2019 183  150 - 350 K/uL Final    MPV 12/20/2019 10.1  9.2 - 12.9 fL Final    Sodium 12/20/2019 132* 136 - 145 mmol/L Final    Potassium 12/20/2019 5.1  3.5 - 5.1 mmol/L Final    Chloride 12/20/2019 95  95 - 110 mmol/L Final    CO2 12/20/2019 29  23 - 29 mmol/L Final    Glucose 12/20/2019 84  70 - 110 mg/dL Final    BUN, Bld 12/20/2019 14  8 - 23 mg/dL Final    Creatinine 12/20/2019 1.0  0.5 - 1.4 mg/dL Final    Calcium 12/20/2019 10.4  8.7 - 10.5 mg/dL Final     Anion Gap 12/20/2019 8  8 - 16 mmol/L Final    eGFR if  12/20/2019 58.9* >60 mL/min/1.73 m^2 Final    eGFR if non  12/20/2019 51.1* >60 mL/min/1.73 m^2 Final    Ferritin 12/20/2019 31  20.0 - 300.0 ng/mL Final   Lab Visit on 09/27/2019   Component Date Value Ref Range Status    WBC 09/27/2019 4.02  3.90 - 12.70 K/uL Final    RBC 09/27/2019 3.75* 4.00 - 5.40 M/uL Final    Hemoglobin 09/27/2019 9.7* 12.0 - 16.0 g/dL Final    Hematocrit 09/27/2019 30.0* 37.0 - 48.5 % Final    Mean Corpuscular Volume 09/27/2019 80* 82 - 98 fL Final    Mean Corpuscular Hemoglobin 09/27/2019 25.9* 27.0 - 31.0 pg Final    Mean Corpuscular Hemoglobin Conc 09/27/2019 32.3  32.0 - 36.0 g/dL Final    RDW 09/27/2019 16.2* 11.5 - 14.5 % Final    Platelets 09/27/2019 192  150 - 350 K/uL Final    MPV 09/27/2019 9.9  9.2 - 12.9 fL Final    Immature Granulocytes 09/27/2019 0.7* 0.0 - 0.5 % Final    Gran # (ANC) 09/27/2019 2.7  1.8 - 7.7 K/uL Final    Immature Grans (Abs) 09/27/2019 0.03  0.00 - 0.04 K/uL Final    Lymph # 09/27/2019 0.9* 1.0 - 4.8 K/uL Final    Mono # 09/27/2019 0.3  0.3 - 1.0 K/uL Final    Eos # 09/27/2019 0.1  0.0 - 0.5 K/uL Final    Baso # 09/27/2019 0.04  0.00 - 0.20 K/uL Final    nRBC 09/27/2019 0  0 /100 WBC Final    Gran% 09/27/2019 67.3  38.0 - 73.0 % Final    Lymph% 09/27/2019 22.1  18.0 - 48.0 % Final    Mono% 09/27/2019 7.2  4.0 - 15.0 % Final    Eosinophil% 09/27/2019 1.7  0.0 - 8.0 % Final    Basophil% 09/27/2019 1.0  0.0 - 1.9 % Final    Differential Method 09/27/2019 Automated   Final   Lab Visit on 08/02/2019   Component Date Value Ref Range Status    WBC 08/02/2019 3.94  3.90 - 12.70 K/uL Final    RBC 08/02/2019 3.69* 4.00 - 5.40 M/uL Final    Hemoglobin 08/02/2019 9.5* 12.0 - 16.0 g/dL Final    Hematocrit 08/02/2019 31.3* 37.0 - 48.5 % Final    Mean Corpuscular Volume 08/02/2019 85  82 - 98 fL Final    Mean Corpuscular Hemoglobin 08/02/2019 25.7*  27.0 - 31.0 pg Final    Mean Corpuscular Hemoglobin Conc 08/02/2019 30.4* 32.0 - 36.0 g/dL Final    RDW 08/02/2019 16.2* 11.5 - 14.5 % Final    Platelets 08/02/2019 213  150 - 350 K/uL Final    MPV 08/02/2019 10.4  9.2 - 12.9 fL Final    Immature Granulocytes 08/02/2019 0.5  0.0 - 0.5 % Final    Gran # (ANC) 08/02/2019 1.7* 1.8 - 7.7 K/uL Final    Immature Grans (Abs) 08/02/2019 0.02  0.00 - 0.04 K/uL Final    Lymph # 08/02/2019 1.4  1.0 - 4.8 K/uL Final    Mono # 08/02/2019 0.4  0.3 - 1.0 K/uL Final    Eos # 08/02/2019 0.4  0.0 - 0.5 K/uL Final    Baso # 08/02/2019 0.05  0.00 - 0.20 K/uL Final    nRBC 08/02/2019 0  0 /100 WBC Final    Gran% 08/02/2019 42.2  38.0 - 73.0 % Final    Lymph% 08/02/2019 36.0  18.0 - 48.0 % Final    Mono% 08/02/2019 10.9  4.0 - 15.0 % Final    Eosinophil% 08/02/2019 9.1* 0.0 - 8.0 % Final    Basophil% 08/02/2019 1.3  0.0 - 1.9 % Final    Differential Method 08/02/2019 Automated   Final    Cholesterol 08/02/2019 205* 120 - 199 mg/dL Final    Triglycerides 08/02/2019 124  30 - 150 mg/dL Final    HDL 08/02/2019 89* 40 - 75 mg/dL Final    LDL Cholesterol 08/02/2019 91.2  63.0 - 159.0 mg/dL Final    Hdl/Cholesterol Ratio 08/02/2019 43.4  20.0 - 50.0 % Final    Total Cholesterol/HDL Ratio 08/02/2019 2.3  2.0 - 5.0 Final    Non-HDL Cholesterol 08/02/2019 116  mg/dL Final    Specimen UA 08/02/2019 Urine, Clean CatchUrine, Illeal LoopUrin   Final    Color, UA 08/02/2019 Yellow  Yellow, Straw, Enma Final    Appearance, UA 08/02/2019 Clear  Clear Final    pH, UA 08/02/2019 7.0  5.0 - 8.0 Final    Specific Gravity, UA 08/02/2019 1.005  1.005 - 1.030 Final    Protein, UA 08/02/2019 Negative  Negative Final    Glucose, UA 08/02/2019 Negative  Negative Final    Ketones, UA 08/02/2019 Negative  Negative Final    Bilirubin (UA) 08/02/2019 Negative  Negative Final    Occult Blood UA 08/02/2019 Negative  Negative Final    Nitrite, UA 08/02/2019 Negative  Negative Final     Urobilinogen, UA 08/02/2019 Negative  Negative EU/dL Final    Leukocytes, UA 08/02/2019 Negative  Negative Final    TSH 08/02/2019 1.700  0.340 - 5.600 uIU/mL Final    Sodium 08/02/2019 144  136 - 145 mmol/L Final    Potassium 08/02/2019 4.2  3.5 - 5.1 mmol/L Final    Chloride 08/02/2019 105  95 - 110 mmol/L Final    CO2 08/02/2019 31* 23 - 29 mmol/L Final    Glucose 08/02/2019 90  70 - 110 mg/dL Final    BUN, Bld 08/02/2019 16  8 - 23 mg/dL Final    Creatinine 08/02/2019 1.0  0.5 - 1.4 mg/dL Final    Calcium 08/02/2019 10.0  8.7 - 10.5 mg/dL Final    Total Protein 08/02/2019 6.8  6.0 - 8.4 g/dL Final    Albumin 08/02/2019 3.4* 3.5 - 5.2 g/dL Final    Total Bilirubin 08/02/2019 0.8  0.1 - 1.0 mg/dL Final    Alkaline Phosphatase 08/02/2019 110  55 - 135 U/L Final    AST 08/02/2019 17  10 - 40 U/L Final    ALT 08/02/2019 9* 10 - 44 U/L Final    Anion Gap 08/02/2019 8  8 - 16 mmol/L Final    eGFR if  08/02/2019 58.9* >60 mL/min/1.73 m^2 Final    eGFR if non African American 08/02/2019 51.1* >60 mL/min/1.73 m^2 Final       Past Medical History:   Diagnosis Date    Back problem     Constipation     History of fractured vertebra     Hyperlipidemia     Hypertension     IBS (irritable bowel syndrome)     Migraine headache     Osteoporosis     Rectal prolapse     Scoliosis     Vertigo      Past Surgical History:   Procedure Laterality Date    LEWIS      Back Pain    EYE SURGERY      Bilateral Cataracs    HYSTERECTOMY      OOPHORECTOMY      ROTATOR CUFF REPAIR      bialteral    TONSILLECTOMY       Family History   Problem Relation Age of Onset    Colon cancer Father     Breast cancer Mother 70    Hypertension Mother     Ovarian cancer Neg Hx        Marital Status:   Alcohol History:  reports that she does not drink alcohol.  Tobacco History:  reports that she has never smoked. She has never used smokeless tobacco.  Drug History:  reports that she does not use  drugs.    Review of patient's allergies indicates:   Allergen Reactions    Antihistamines - alkylamine     Torrie [amlodipine-olmesartan]     Flexeril [cyclobenzaprine]      Hallucinations    Hydrocodone     Hydrocodone-acetaminophen Other (See Comments)    Simvastatin     Statins-hmg-coa reductase inhibitors        Current Outpatient Medications:     calcium-vitamin D3 (CALCIUM 500 + D) 500 mg(1,250mg) -200 unit per tablet, Take 1 tablet by mouth 2 (two) times daily with meals., Disp: , Rfl:     cholecalciferol, vitamin D3, (VITAMIN D3) 2,000 unit Tab, Take 2,000 Units by mouth once daily. , Disp: , Rfl:     dicyclomine (BENTYL) 20 mg tablet, Take 20 mg by mouth., Disp: , Rfl:     diphenoxylate-atropine 2.5-0.025 mg (LOMOTIL) 2.5-0.025 mg per tablet, Take by mouth., Disp: , Rfl:     docusate sodium (COLACE) 100 MG capsule, Take 100 mg by mouth 2 (two) times daily., Disp: , Rfl:     fluticasone propionate (FLONASE) 50 mcg/actuation nasal spray, fluticasone propionate 50 mcg/actuation nasal spray,suspension  Inhale 1 spray every day by intranasal route., Disp: , Rfl:     hydroCHLOROthiazide (HYDRODIURIL) 25 MG tablet, Take 25 mg by mouth every other day. , Disp: , Rfl:     hyoscyamine (LEVBID) 0.375 mg Tb12, hyoscyamine ER 0.375 mg tablet,extended release,12 hr  Take 1 tablet twice a day by oral route for 30 days., Disp: , Rfl:     lisinopril (PRINIVIL,ZESTRIL) 20 MG tablet, Take 20 mg by mouth once daily., Disp: , Rfl: 3    meclizine (ANTIVERT) 12.5 mg tablet, Take 6.25 mg by mouth 2 (two) times daily as needed for Dizziness (1/2 tablet twice daily as needed for dizziness)., Disp: , Rfl:     omeprazole (PRILOSEC) 20 MG capsule, Take 1 capsule (20 mg total) by mouth once daily., Disp: 90 capsule, Rfl: 3    traMADol (ULTRAM) 50 mg tablet, Take 1 tablet (50 mg total) by mouth every evening., Disp: 30 tablet, Rfl: 5    verapamil (CALAN-SR) 120 MG CR tablet, Take 1 tablet by mouth once daily., Disp: ,  "Rfl: 3    Review of Systems   Constitutional: Positive for unexpected weight change (Weight up 7 lb since October his it). Negative for chills and fever.   HENT: Negative for sore throat.    Respiratory: Negative for cough, shortness of breath and wheezing.    Cardiovascular: Negative for chest pain, palpitations and leg swelling.   Gastrointestinal: Positive for diarrhea ( chronic diarrhea after she eats). Negative for abdominal pain, blood in stool, constipation, nausea and vomiting.   Genitourinary: Negative for dysuria and hematuria.   Skin: Negative for rash.        Hair thinning   Neurological: Negative for dizziness and headaches.   Psychiatric/Behavioral: Negative for dysphoric mood.          Objective:      Vitals:    12/27/19 0846   BP: 122/78   Pulse: 76   Weight: 45.8 kg (101 lb)   Height: 4' 8" (1.422 m)     Physical Exam   Constitutional: She is oriented to person, place, and time. She appears well-developed and well-nourished.   HENT:   Head: Normocephalic and atraumatic.   Right Ear: Tympanic membrane and ear canal normal.   Left Ear: Tympanic membrane and ear canal normal.   Mouth/Throat: Mucous membranes are normal. No posterior oropharyngeal erythema.   Neck: Neck supple. Carotid bruit is not present.   Cardiovascular: Normal rate and regular rhythm. Exam reveals no gallop and no friction rub.   Murmur (2nd ICS RSB, radiates into bilat carotids) heard.   Systolic murmur is present with a grade of 3/6.  Pulmonary/Chest: Effort normal and breath sounds normal. No respiratory distress. She has no wheezes. She has no rales.   Abdominal: Soft. She exhibits no distension. There is no tenderness.   Lymphadenopathy:     She has no cervical adenopathy.   Neurological: She is alert and oriented to person, place, and time. She has normal strength. Gait normal.   Skin: Skin is warm and dry. No rash noted.   Diffuse thinning of hair to scalp, no rash or patchy baldness   Psychiatric: She has a normal mood and " affect.   Nursing note and vitals reviewed.        Assessment:       1. Iron deficiency anemia, unspecified iron deficiency anemia type    2. Hair loss    3. Essential hypertension    4. Irritable bowel syndrome, unspecified type    5. Mild protein-calorie malnutrition    6. Aortic valve stenosis, etiology of cardiac valve disease unspecified           Plan:       Iron deficiency anemia, unspecified iron deficiency anemia type  -reviewed recent labs with patient/family.  Edema anemia has now corrected with iron supplement.  Advised to continue with iron and vitamin-C daily    Hair loss  -long discussion with patient and family regarding her hair loss.  This is most likely nutritional in discussed importance of proper nutrition including protein intake.  Advised it can be several months after nutrition has improved to begin to see improvement in hair loss/thinning however offered referral to Dermatology if condition continues to worsen    Essential hypertension  -BP well controlled    Irritable bowel syndrome, unspecified type  -stable, chronic intermittent diarrhea though denies any diffuse diarrhea or worsening recently     Mild protein-calorie malnutrition  -long discussion with patient's family regarding importance of nutrition.  Recommended protein supplement at least 1-2 cans a day and also discussed other sources of protein    Aortic valve stenosis, etiology of cardiac valve disease unspecified  -stable, asymptomatic    Follow up in about 4 months (around 4/27/2020).        12/29/2019 Margo Burns NP

## 2019-12-29 VITALS
SYSTOLIC BLOOD PRESSURE: 122 MMHG | DIASTOLIC BLOOD PRESSURE: 78 MMHG | HEIGHT: 56 IN | BODY MASS INDEX: 22.72 KG/M2 | WEIGHT: 101 LBS | HEART RATE: 76 BPM

## 2020-01-18 ENCOUNTER — TELEPHONE (OUTPATIENT)
Dept: FAMILY MEDICINE | Facility: CLINIC | Age: 85
End: 2020-01-18

## 2020-01-18 DIAGNOSIS — R53.1 WEAKNESS: Primary | ICD-10-CM

## 2020-01-19 NOTE — TELEPHONE ENCOUNTER
----- Message from Misty Mayorga MA sent at 1/17/2020  8:27 AM CST -----      ----- Message -----  From: Edith Chavez  Sent: 1/17/2020   8:21 AM CST  To: Ryan Hurtado [facility], 01/16/20

## 2020-01-23 ENCOUNTER — TELEPHONE (OUTPATIENT)
Dept: FAMILY MEDICINE | Facility: CLINIC | Age: 85
End: 2020-01-23

## 2020-01-23 DIAGNOSIS — R53.1 WEAKNESS: Primary | ICD-10-CM

## 2020-01-23 NOTE — TELEPHONE ENCOUNTER
----- Message from Inga Raza sent at 1/23/2020  9:33 AM CST -----  [place] at home-pt is having trouble going to the take a bath, and chair transfers and has weakness needing occupational therapy shawanda SanchezKmd-[phone]  Fax [phone]

## 2020-02-12 ENCOUNTER — EXTERNAL HOME HEALTH (OUTPATIENT)
Dept: HOME HEALTH SERVICES | Facility: HOSPITAL | Age: 85
End: 2020-02-12

## 2020-02-18 ENCOUNTER — EXTERNAL HOME HEALTH (OUTPATIENT)
Dept: HOME HEALTH SERVICES | Facility: HOSPITAL | Age: 85
End: 2020-02-18
Payer: MEDICARE

## 2020-02-23 ENCOUNTER — TELEPHONE (OUTPATIENT)
Dept: FAMILY MEDICINE | Facility: CLINIC | Age: 85
End: 2020-02-23

## 2020-02-23 DIAGNOSIS — R53.1 WEAKNESS: Primary | ICD-10-CM

## 2020-02-24 RX ORDER — HYDROCHLOROTHIAZIDE 25 MG/1
25 TABLET ORAL EVERY OTHER DAY
Qty: 45 TABLET | Refills: 1 | Status: SHIPPED | OUTPATIENT
Start: 2020-02-24 | End: 2020-07-21 | Stop reason: SDUPTHER

## 2020-02-24 NOTE — TELEPHONE ENCOUNTER
----- Message from Misty Mayorga MA sent at 2/20/2020 11:12 AM CST -----      ----- Message -----  From: Edith Chavez  Sent: 2/20/2020  11:04 AM CST  To: Ryan Hurtado [facility], 02/20/20

## 2020-02-24 NOTE — TELEPHONE ENCOUNTER
----- Message from Inga Raza sent at 2/24/2020 10:17 AM CST -----  Refills on hydroCHLOROthiazide   [facility]    Pt  [phone]

## 2020-03-05 ENCOUNTER — TELEPHONE (OUTPATIENT)
Dept: HOME HEALTH SERVICES | Facility: HOSPITAL | Age: 85
End: 2020-03-05

## 2020-03-06 ENCOUNTER — TELEPHONE (OUTPATIENT)
Dept: HOME HEALTH SERVICES | Facility: HOSPITAL | Age: 85
End: 2020-03-06

## 2020-03-24 ENCOUNTER — EXTERNAL HOME HEALTH (OUTPATIENT)
Dept: HOME HEALTH SERVICES | Facility: HOSPITAL | Age: 85
End: 2020-03-24

## 2020-04-08 DIAGNOSIS — M15.0 PRIMARY GENERALIZED (OSTEO)ARTHRITIS: ICD-10-CM

## 2020-04-08 NOTE — TELEPHONE ENCOUNTER
----- Message from Nataliia Cordova MA sent at 4/8/2020 11:52 AM CDT -----  Brittany olsen/ SIMRAN called in and would like to know if pt can take Tramadol 50 mg nightly, however HH would like to know if pt can take another Tramadol 50 mg during the day?    Pharmacy - [facility] [address]    Brittany - [phone]

## 2020-04-09 RX ORDER — TRAMADOL HYDROCHLORIDE 50 MG/1
50 TABLET ORAL EVERY 12 HOURS PRN
Qty: 60 TABLET | Refills: 2 | Status: SHIPPED | OUTPATIENT
Start: 2020-04-09 | End: 2020-09-01 | Stop reason: SDUPTHER

## 2020-04-17 ENCOUNTER — OFFICE VISIT (OUTPATIENT)
Dept: FAMILY MEDICINE | Facility: CLINIC | Age: 85
End: 2020-04-17
Payer: MEDICARE

## 2020-04-17 DIAGNOSIS — K21.9 GASTROESOPHAGEAL REFLUX DISEASE, ESOPHAGITIS PRESENCE NOT SPECIFIED: ICD-10-CM

## 2020-04-17 DIAGNOSIS — M41.9 SCOLIOSIS OF THORACOLUMBAR SPINE, UNSPECIFIED SCOLIOSIS TYPE: ICD-10-CM

## 2020-04-17 DIAGNOSIS — D64.9 ANEMIA, UNSPECIFIED TYPE: ICD-10-CM

## 2020-04-17 DIAGNOSIS — E44.1 MILD PROTEIN-CALORIE MALNUTRITION: ICD-10-CM

## 2020-04-17 DIAGNOSIS — M51.36 DDD (DEGENERATIVE DISC DISEASE), LUMBAR: ICD-10-CM

## 2020-04-17 DIAGNOSIS — I10 ESSENTIAL HYPERTENSION: Primary | ICD-10-CM

## 2020-04-17 PROCEDURE — 99213 PR OFFICE/OUTPT VISIT, EST, LEVL III, 20-29 MIN: ICD-10-PCS | Mod: 95,,, | Performed by: NURSE PRACTITIONER

## 2020-04-17 PROCEDURE — 99213 OFFICE O/P EST LOW 20 MIN: CPT | Mod: 95,,, | Performed by: NURSE PRACTITIONER

## 2020-04-17 RX ORDER — OMEPRAZOLE 20 MG/1
20 CAPSULE, DELAYED RELEASE ORAL DAILY
Qty: 90 CAPSULE | Refills: 3 | Status: SHIPPED | OUTPATIENT
Start: 2020-04-17 | End: 2021-04-17 | Stop reason: SDUPTHER

## 2020-04-17 NOTE — PROGRESS NOTES
Subjective:        The chief complaint leading to consultation is: regular f/u for HTN/lumbar DDD  The patient location is:  Home  Visit type: Virtual visit with synchronous audio/video or audio only  This was a video visit in lieu of in-person visit due to the coronavirus emergency. Patient acknowledged and consented to the video visit encounter.     Pt reports overall doing okay- has been having more lower back pain which is now radiating toward left buttock, previously used to radiate into right buttock. Taking tramadol which does help with pain. Denies radiation of pain down left leg, no falls. Has previously seen Dr. Hannah and had LEWIS which provided some relief.  Reports weight has been stable at 99lbs- drinking ensure 1-2 cans a day. Hair has started to grow back also  Monitors her BP every AM- last few readings- 117/65, 125/70,134/68,113/63      Past Surgical History:   Procedure Laterality Date    LEWIS      Back Pain    EYE SURGERY      Bilateral Cataracs    HYSTERECTOMY      OOPHORECTOMY      ROTATOR CUFF REPAIR      bialteral    TONSILLECTOMY       Past Medical History:   Diagnosis Date    Back problem     Constipation     History of fractured vertebra     Hyperlipidemia     Hypertension     IBS (irritable bowel syndrome)     Migraine headache     Osteoporosis     Rectal prolapse     Scoliosis     Vertigo      Family History   Problem Relation Age of Onset    Colon cancer Father     Breast cancer Mother 70    Hypertension Mother     Ovarian cancer Neg Hx         Social History:   Marital Status:   Alcohol History:  reports that she does not drink alcohol.  Tobacco History:  reports that she has never smoked. She has never used smokeless tobacco.  Drug History:  reports that she does not use drugs.    Review of patient's allergies indicates:   Allergen Reactions    Antihistamines - alkylamine     Torrie [amlodipine-olmesartan]     Flexeril [cyclobenzaprine]       Hallucinations    Hydrocodone     Hydrocodone-acetaminophen Other (See Comments)    Simvastatin     Statins-hmg-coa reductase inhibitors        Current Outpatient Medications   Medication Sig Dispense Refill    calcium-vitamin D3 (CALCIUM 500 + D) 500 mg(1,250mg) -200 unit per tablet Take 1 tablet by mouth 2 (two) times daily with meals.      cholecalciferol, vitamin D3, (VITAMIN D3) 2,000 unit Tab Take 2,000 Units by mouth once daily.       dicyclomine (BENTYL) 20 mg tablet Take 20 mg by mouth.      diphenoxylate-atropine 2.5-0.025 mg (LOMOTIL) 2.5-0.025 mg per tablet Take by mouth.      docusate sodium (COLACE) 100 MG capsule Take 100 mg by mouth 2 (two) times daily.      fluticasone propionate (FLONASE) 50 mcg/actuation nasal spray fluticasone propionate 50 mcg/actuation nasal spray,suspension   Inhale 1 spray every day by intranasal route.      hydroCHLOROthiazide (HYDRODIURIL) 25 MG tablet Take 1 tablet (25 mg total) by mouth every other day. 45 tablet 1    hyoscyamine (LEVBID) 0.375 mg Tb12 hyoscyamine ER 0.375 mg tablet,extended release,12 hr   Take 1 tablet twice a day by oral route for 30 days.      lisinopril (PRINIVIL,ZESTRIL) 20 MG tablet Take 20 mg by mouth once daily.  3    meclizine (ANTIVERT) 12.5 mg tablet Take 6.25 mg by mouth 2 (two) times daily as needed for Dizziness (1/2 tablet twice daily as needed for dizziness).      omeprazole (PRILOSEC) 20 MG capsule Take 1 capsule (20 mg total) by mouth once daily. 90 capsule 3    traMADoL (ULTRAM) 50 mg tablet Take 1 tablet (50 mg total) by mouth every 12 (twelve) hours as needed for Pain. 60 tablet 2    verapamil (CALAN-SR) 120 MG CR tablet Take 1 tablet by mouth once daily.  3     No current facility-administered medications for this visit.        Review of Systems   Constitutional: Negative for appetite change, chills, fever and unexpected weight change.   Respiratory: Negative for cough, shortness of breath and wheezing.     Cardiovascular: Negative for chest pain, palpitations and leg swelling.   Gastrointestinal: Negative for abdominal pain, constipation and diarrhea.   Genitourinary: Positive for frequency. Negative for dysuria and hematuria.   Musculoskeletal: Positive for back pain. Negative for gait problem.   Skin: Negative for rash.   Neurological: Negative for dizziness (takes meclizine bid routinely), syncope and numbness.         Objective:        Physical Exam:   Physical Exam   Constitutional: She is oriented to person, place, and time. She appears well-developed and well-nourished.   Neurological: She is alert and oriented to person, place, and time.   Skin:   New hair growth noted to scalp   Psychiatric: She has a normal mood and affect.            Assessment:       1. Essential hypertension    2. Gastroesophageal reflux disease, esophagitis presence not specified    3. Scoliosis of thoracolumbar spine, unspecified scoliosis type    4. DDD (degenerative disc disease), lumbar    5. Anemia, unspecified type    6. Mild protein-calorie malnutrition      Plan:   Essential hypertension  -     CBC auto differential; Future; Expected date: 04/17/2020  -     Comprehensive metabolic panel; Future; Expected date: 04/17/2020  -     TSH w/reflex to FT4; Future; Expected date: 04/17/2020  -     Ferritin; Future; Expected date: 04/17/2020  -BP well controlled on home readings    Gastroesophageal reflux disease, esophagitis presence not specified  -     omeprazole (PRILOSEC) 20 MG capsule; Take 1 capsule (20 mg total) by mouth once daily.  Dispense: 90 capsule; Refill: 3  -controlled    Scoliosis of thoracolumbar spine, unspecified scoliosis type  -continue with tramadol, also recommended topical pain patch to help with pain    DDD (degenerative disc disease), lumbar  -pt advised may benefit from another LEWIS once COVID19 restrictions are lifted, continue with tramadol for now and call if pain not controlled or develops new  symptoms    Anemia, unspecified type  -     Ferritin; Future; Expected date: 04/17/2020  -corrected on last labs    Mild protein-calorie malnutrition  -improved/stable with weight holding at 99-100lbs, up from 94lbs, hair is also beginning to grow back which is a good sign. Continue with protein supplements      Follow up in about 4 months (around 8/17/2020) for HTN, Labs before next visit.    Total time spent with patient: 18    Each patient to whom he or she provides medical services by telemedicine is:  (1) informed of the relationship between the physician and patient and the respective role of any other health care provider with respect to management of the patient; and (2) notified that he or she may decline to receive medical services by telemedicine and may withdraw from such care at any time.    This note was created using [employer] voice recognition software that occasionally misinterprets phrases or words.

## 2020-04-20 ENCOUNTER — TELEPHONE (OUTPATIENT)
Dept: FAMILY MEDICINE | Facility: CLINIC | Age: 85
End: 2020-04-20

## 2020-04-20 NOTE — TELEPHONE ENCOUNTER
----- Message from Margo Burns NP sent at 4/17/2020  8:45 AM CDT -----  Needs f/u appt in 4 monts with labs before next visit

## 2020-05-07 ENCOUNTER — TELEPHONE (OUTPATIENT)
Dept: FAMILY MEDICINE | Facility: CLINIC | Age: 85
End: 2020-05-07

## 2020-05-07 ENCOUNTER — DOCUMENT SCAN (OUTPATIENT)
Dept: HOME HEALTH SERVICES | Facility: HOSPITAL | Age: 85
End: 2020-05-07

## 2020-05-07 NOTE — TELEPHONE ENCOUNTER
Please call pt and let her know we got HH note stating BP running on the low side. Recommend reducing lisinopril to 10mg daily and continue to monitor BP- call if BP >150/90

## 2020-05-07 NOTE — TELEPHONE ENCOUNTER
----- Message from Misty Mayorga MA sent at 5/7/2020 10:27 AM CDT -----      ----- Message -----  From: Edith Chavez  Sent: 5/7/2020   9:33 AM CDT  To: Ryan Hurtado Staff    [facility], 5/06/20

## 2020-05-08 NOTE — TELEPHONE ENCOUNTER
Spoke to pt's daughter in law. Advised to cut lisinopril to 10mg from 20mg & monitor BP & let us know if it gets 150/90 or higher. She said okay and will have pt cut her 20mg tabs in half.

## 2020-06-09 ENCOUNTER — TELEPHONE (OUTPATIENT)
Dept: FAMILY MEDICINE | Facility: CLINIC | Age: 85
End: 2020-06-09

## 2020-06-09 NOTE — TELEPHONE ENCOUNTER
----- Message from Harry Farias sent at 6/9/2020  2:13 PM CDT -----  Contact: Angelique Alfonso   Pt says  That since Home health has been gone for the past 2 weeks now, her bp has been going down and today it is low 93/60 and yesterday it was 92/61. She would like to know what should she do ? She is on half of the Lisinopril currently . Please give the patient a call back # [phone]

## 2020-06-09 NOTE — TELEPHONE ENCOUNTER
Spoke to pt. Denied having any dizziness. She is just has weakness/tiredness. Those BP readings are low for the patient she says.

## 2020-06-09 NOTE — TELEPHONE ENCOUNTER
Please call pt and let her know I recommend holding lisinopril unless BP is over 140. Ask if she's eating and drinking normally and if she's losing weight again. She needs to make sure she's staying hydrated with adequate protein intake.   Monitor BP at home and call if BP <90 off lisinopril or she's having symptoms of dizziness.

## 2020-06-17 ENCOUNTER — TELEPHONE (OUTPATIENT)
Dept: FAMILY MEDICINE | Facility: CLINIC | Age: 85
End: 2020-06-17

## 2020-06-17 NOTE — TELEPHONE ENCOUNTER
----- Message from Harry Farias sent at 6/17/2020 10:03 AM CDT -----  Regarding: Needs call back from Nurse  Contact: Karen pk's daughter  Daughter says that the patient has been really dizzy and thinks that her meclizine hasn't been going all the way down and would like to know can she crush the pills instead? The daughter doesn't think that's what the problem is but the patient has been weak as well as dizzy. She was told to stop taking the lisinopril but her pressure went up a little bit so she took half of the lisinopril . Please give the daughter a call back . Karen's# [phone]

## 2020-06-17 NOTE — TELEPHONE ENCOUNTER
Spoke to pt's daughter. Advised ok to crush the meclizine. And to make a log of her blood pressures and let us know how it's doing in a few days. She said yesterday is when the patient took the lisinopril because he top number was 138. But her BP this morning was 113/62.

## 2020-06-17 NOTE — TELEPHONE ENCOUNTER
Ok to crush meclizine?  Patient denied dizziness when she called and reported low BP in the 90's over 60's last week. Margo had her stop lisinopril and only take it if the top number was 140 or higher.     Please advise.

## 2020-06-21 ENCOUNTER — PATIENT MESSAGE (OUTPATIENT)
Dept: FAMILY MEDICINE | Facility: CLINIC | Age: 85
End: 2020-06-21

## 2020-06-21 NOTE — TELEPHONE ENCOUNTER
Please let pt know her BP readings look stable so I would continue to hold lisinopril unless it's consistently over 140/90. Ask how her dizziness symptoms are and also is she eating/drinking okay, losing weight? If she continues with symptoms she needs to be seen

## 2020-07-17 ENCOUNTER — TELEPHONE (OUTPATIENT)
Dept: FAMILY MEDICINE | Facility: CLINIC | Age: 85
End: 2020-07-17

## 2020-07-17 NOTE — TELEPHONE ENCOUNTER
----- Message from Brittany Gamez sent at 7/17/2020  8:49 AM CDT -----  Korin whittington's daughter in law stated when using refresh eyedrops and a clump came out now her eye is very red and hurting.    # [phone]

## 2020-07-21 ENCOUNTER — OFFICE VISIT (OUTPATIENT)
Dept: FAMILY MEDICINE | Facility: CLINIC | Age: 85
End: 2020-07-21
Payer: MEDICARE

## 2020-07-21 VITALS
TEMPERATURE: 98 F | HEART RATE: 72 BPM | DIASTOLIC BLOOD PRESSURE: 80 MMHG | WEIGHT: 107 LBS | BODY MASS INDEX: 24.07 KG/M2 | OXYGEN SATURATION: 95 % | HEIGHT: 56 IN | SYSTOLIC BLOOD PRESSURE: 162 MMHG

## 2020-07-21 DIAGNOSIS — M41.9 SCOLIOSIS OF THORACOLUMBAR SPINE, UNSPECIFIED SCOLIOSIS TYPE: ICD-10-CM

## 2020-07-21 DIAGNOSIS — H11.32 SUBCONJUNCTIVAL HEMORRHAGE OF LEFT EYE: ICD-10-CM

## 2020-07-21 DIAGNOSIS — R42 VERTIGO: ICD-10-CM

## 2020-07-21 DIAGNOSIS — R42 DIZZINESS: Primary | ICD-10-CM

## 2020-07-21 DIAGNOSIS — I10 ESSENTIAL HYPERTENSION: ICD-10-CM

## 2020-07-21 PROCEDURE — 99214 OFFICE O/P EST MOD 30 MIN: CPT | Mod: S$GLB,,, | Performed by: NURSE PRACTITIONER

## 2020-07-21 PROCEDURE — 99214 PR OFFICE/OUTPT VISIT, EST, LEVL IV, 30-39 MIN: ICD-10-PCS | Mod: S$GLB,,, | Performed by: NURSE PRACTITIONER

## 2020-07-21 RX ORDER — BIOTIN 1 MG
1000 TABLET ORAL DAILY
COMMUNITY

## 2020-07-21 RX ORDER — HYDROCHLOROTHIAZIDE 25 MG/1
25 TABLET ORAL EVERY OTHER DAY
Qty: 45 TABLET | Refills: 1 | Status: SHIPPED | OUTPATIENT
Start: 2020-07-21 | End: 2021-03-19 | Stop reason: SDUPTHER

## 2020-07-21 RX ORDER — FERROUS SULFATE 325(65) MG
325 TABLET ORAL NIGHTLY
COMMUNITY

## 2020-07-21 RX ORDER — MECLIZINE HCL 12.5 MG 12.5 MG/1
6.25 TABLET ORAL 2 TIMES DAILY PRN
Qty: 90 TABLET | Refills: 1 | Status: SHIPPED | OUTPATIENT
Start: 2020-07-21 | End: 2021-03-11 | Stop reason: SDUPTHER

## 2020-07-21 NOTE — PROGRESS NOTES
" Patient ID: Angelique Hamilton is a 87 y.o. female.    Chief Complaint: Dizziness (brought bottles// SW) and Eye Problem (irritation, left eye x1 week// SW)    86 y/o female here for sick visit. Pt reports has a hx of vertigo and takes meclizine regularly BID which helps. Recently over the past couple weeks however describes a different "dizziness" though is really unable to further describe her symptoms. Reports it's not like the spinning type sensation she has with her vertigo. Describes a vague "foggy feeling", most often in the morning when she first gets up  And during the morning time. Seems to feel better later in the day. Denies HA, vision change, balance issues, palpitations or any recent falls/head trauma.    Also c/oing last week used some dry eye eye drops and accidentally instilled large amt into the left eye- woke up the next am with subconjunctival hemmorrhage and mild erythema around the eye. Denies eye pain, discharge, matting or vision change.  BP elevated today however Lisinopril was stopped a few weeks ago d/t low BP readings and dizzines c/os at the time. Daughter in law sent in home readings recently and BP looks well controlled at home off lisinopril. Pt reports she had f/u with Dr. Barreto within the past couple weeks and he also advised her goal BP of <150/90 and rec staying off lisinopril unless BP is consistently above that range  Reports appetite is good and no change in energy level.              Past Medical History:   Diagnosis Date    Back problem     Constipation     History of fractured vertebra     Hyperlipidemia     Hypertension     IBS (irritable bowel syndrome)     Migraine headache     Osteoporosis     Rectal prolapse     Scoliosis     Vertigo      Past Surgical History:   Procedure Laterality Date    LEWIS      Back Pain    EYE SURGERY      Bilateral Cataracs    HYSTERECTOMY      OOPHORECTOMY      ROTATOR CUFF REPAIR      bialteral    TONSILLECTOMY   "         Tobacco History:  reports that she has never smoked. She has never used smokeless tobacco.      Review of patient's allergies indicates:   Allergen Reactions    Antihistamines - alkylamine     Torrie [amlodipine-olmesartan]     Flexeril [cyclobenzaprine]      Hallucinations    Hydrocodone     Hydrocodone-acetaminophen Other (See Comments)    Simvastatin     Statins-hmg-coa reductase inhibitors        Current Outpatient Medications:     biotin 1 mg tablet, Take 1,000 mcg by mouth 3 (three) times daily., Disp: , Rfl:     calcium-vitamin D3 (CALCIUM 500 + D) 500 mg(1,250mg) -200 unit per tablet, Take 1 tablet by mouth 2 (two) times daily with meals., Disp: , Rfl:     cholecalciferol, vitamin D3, (VITAMIN D3) 2,000 unit Tab, Take 2,000 Units by mouth once daily. , Disp: , Rfl:     ferrous sulfate (IRON) 325 mg (65 mg iron) Tab tablet, Take 325 mg by mouth daily with breakfast., Disp: , Rfl:     hydroCHLOROthiazide (HYDRODIURIL) 25 MG tablet, Take 1 tablet (25 mg total) by mouth every other day., Disp: 45 tablet, Rfl: 1    meclizine (ANTIVERT) 12.5 mg tablet, Take 0.5 tablets (6.25 mg total) by mouth 2 (two) times daily as needed for Dizziness (1/2 tablet twice daily as needed for dizziness)., Disp: 90 tablet, Rfl: 1    omeprazole (PRILOSEC) 20 MG capsule, Take 1 capsule (20 mg total) by mouth once daily., Disp: 90 capsule, Rfl: 3    traMADoL (ULTRAM) 50 mg tablet, Take 1 tablet (50 mg total) by mouth every 12 (twelve) hours as needed for Pain., Disp: 60 tablet, Rfl: 2    verapamil (CALAN-SR) 120 MG CR tablet, Take 1 tablet by mouth once daily., Disp: , Rfl: 3    lisinopril (PRINIVIL,ZESTRIL) 20 MG tablet, Take 20 mg by mouth once daily., Disp: , Rfl: 3    Review of Systems   Constitutional: Negative for chills, fever and unexpected weight change.   HENT: Positive for postnasal drip. Negative for ear pain, rhinorrhea and sore throat.    Eyes: Positive for redness. Negative for pain, discharge,  "itching and visual disturbance.   Respiratory: Negative for cough, shortness of breath and wheezing.    Cardiovascular: Negative for chest pain, palpitations and leg swelling.   Gastrointestinal: Negative for abdominal pain, constipation and diarrhea.   Genitourinary: Negative for dysuria and hematuria.   Skin: Negative for rash.   Neurological: Positive for dizziness. Negative for syncope, speech difficulty and headaches.          Objective:      Vitals:    07/21/20 0804 07/21/20 0813 07/21/20 0846 07/21/20 0847   BP: (!) 144/72 (!) 160/82 (!) 142/68 (!) 162/80   Pulse: 72      Temp: 98 °F (36.7 °C)      SpO2:    95%   Weight: 48.5 kg (107 lb)      Height: 4' 8" (1.422 m)        Physical Exam  Vitals signs and nursing note reviewed.   Constitutional:       General: She is not in acute distress.     Appearance: She is well-developed.      Comments: Elderly WF, ambulating with walker   HENT:      Head: Normocephalic and atraumatic.      Right Ear: Ear canal normal.      Left Ear: Tympanic membrane and ear canal normal.      Ears:      Comments: Cerumen to right ear, not completely impacted, unable to visualize TM     Mouth/Throat:      Pharynx: No posterior oropharyngeal erythema.   Eyes:      General:         Left eye: No foreign body.      Extraocular Movements: Extraocular movements intact.      Conjunctiva/sclera:      Left eye: Left conjunctiva is not injected. Hemorrhage present. No exudate.    Neck:      Musculoskeletal: Neck supple.      Vascular: No carotid bruit.   Cardiovascular:      Rate and Rhythm: Normal rate and regular rhythm.      Heart sounds: Murmur present. Systolic (2 ICS radiates to bilateral carotids) murmur present. No friction rub. No gallop.    Pulmonary:      Effort: Pulmonary effort is normal. No respiratory distress.      Breath sounds: Normal breath sounds. No wheezing or rales.   Abdominal:      General: There is no distension.      Palpations: Abdomen is soft.      Tenderness: There " is no abdominal tenderness.   Lymphadenopathy:      Cervical: No cervical adenopathy.   Skin:     General: Skin is warm and dry.      Findings: No rash.   Neurological:      General: No focal deficit present.      Mental Status: She is alert and oriented to person, place, and time.      Cranial Nerves: No cranial nerve deficit.      Motor: No weakness.      Gait: Gait normal.      Comments: Gait stable with walker, able to rise from chair using arms to push up           Assessment:       1. Dizziness    2. Subconjunctival hemorrhage of left eye    3. Vertigo    4. Essential hypertension    5. Scoliosis of thoracolumbar spine, unspecified scoliosis type           Plan:       Dizziness  Comments:  no obvious cause for pt's vague sxs with normal exa- recommended labs and continue monitor BP at home- cautioned to call if sxs worsen and will send for CT scan    Subconjunctival hemorrhage of left eye  Comments:  pt reassured appears to have subconjunctival hemorrhage, appears to likely have rubbed her eye as she has faint ecchymosis around the eye also- cautioned to call for any pain or vision change    Vertigo  Comments:  pt reports vertigo sxs stable with meclizine  Orders:  -     meclizine (ANTIVERT) 12.5 mg tablet; Take 0.5 tablets (6.25 mg total) by mouth 2 (two) times daily as needed for Dizziness (1/2 tablet twice daily as needed for dizziness).  Dispense: 90 tablet; Refill: 1    Essential hypertension  Comments:  BP a little on the high side though pt/family advised goal BP <150/90, continue to monitor at home and call if BP consistently above that range  Orders:  -     hydroCHLOROthiazide (HYDRODIURIL) 25 MG tablet; Take 1 tablet (25 mg total) by mouth every other day.  Dispense: 45 tablet; Refill: 1    Scoliosis of thoracolumbar spine, unspecified scoliosis type  Comments:  c/oing of increased mid-lower back pain- only takes tramadol qhs- advised could take during day if needed or could take aleve occas, 1-2  times/week      Follow up for as scheduled, labs to be drawn today.        7/21/2020 Margo Burns NP

## 2020-07-21 NOTE — PATIENT INSTRUCTIONS
Subconjunctival Hemorrhage    A subconjunctival hemorrhage is a result of a broken blood vessel in the white portion of the eye. It is usually painless and may be caused by coughing, sneezing, or vomiting. An injury to the eye can cause this. It can also be a sign of hypertension (high blood pressure) or a bleeding disorder.  Although it can look frightening, the presence of the blood is not serious. The blood will be reabsorbed without treatment within 2 to 3 weeks.  Home care  You may continue your usual activities.  Follow-up care  Follow up with your healthcare provider, or as advised.  When to seek medical advice  Contact your healthcare provider right away if any of these occur:  · Pain in the eye  · Change in vision  · The blood does not disappear within three weeks  · Increasing redness or swelling of the eye  · Severe headache or dizziness  · Signs of bruising or bleeding from other parts of your body  Date Last Reviewed: 6/14/2015  © 7872-5072 [employer], [facility]. [address] [place] [address], [place] [address]. All rights reserved. This information is not intended as a substitute for professional medical care. Always follow your healthcare professional's instructions.

## 2020-07-22 LAB
ALBUMIN SERPL-MCNC: 4.1 G/DL (ref 3.6–5.1)
ALBUMIN/GLOB SERPL: 1.4 (CALC) (ref 1–2.5)
ALP SERPL-CCNC: 83 U/L (ref 37–153)
ALT SERPL-CCNC: 8 U/L (ref 6–29)
AST SERPL-CCNC: 20 U/L (ref 10–35)
BASOPHILS # BLD AUTO: 51 CELLS/UL (ref 0–200)
BASOPHILS NFR BLD AUTO: 1.1 %
BILIRUB SERPL-MCNC: 1.4 MG/DL (ref 0.2–1.2)
BUN SERPL-MCNC: 22 MG/DL (ref 7–25)
BUN/CREAT SERPL: 23 (CALC) (ref 6–22)
CALCIUM SERPL-MCNC: 10.2 MG/DL (ref 8.6–10.4)
CHLORIDE SERPL-SCNC: 98 MMOL/L (ref 98–110)
CO2 SERPL-SCNC: 34 MMOL/L (ref 20–32)
CREAT SERPL-MCNC: 0.97 MG/DL (ref 0.6–0.88)
EOSINOPHIL # BLD AUTO: 239 CELLS/UL (ref 15–500)
EOSINOPHIL NFR BLD AUTO: 5.2 %
ERYTHROCYTE [DISTWIDTH] IN BLOOD BY AUTOMATED COUNT: 11.7 % (ref 11–15)
FERRITIN SERPL-MCNC: 60 NG/ML (ref 16–288)
GFRSERPLBLD MDRD-ARVRAT: 53 ML/MIN/1.73M2
GLOBULIN SER CALC-MCNC: 2.9 G/DL (CALC) (ref 1.9–3.7)
GLUCOSE SERPL-MCNC: 82 MG/DL (ref 65–99)
HCT VFR BLD AUTO: 42 % (ref 35–45)
HGB BLD-MCNC: 14 G/DL (ref 11.7–15.5)
LYMPHOCYTES # BLD AUTO: 1228 CELLS/UL (ref 850–3900)
LYMPHOCYTES NFR BLD AUTO: 26.7 %
MCH RBC QN AUTO: 31.8 PG (ref 27–33)
MCHC RBC AUTO-ENTMCNC: 33.3 G/DL (ref 32–36)
MCV RBC AUTO: 95.5 FL (ref 80–100)
MONOCYTES # BLD AUTO: 382 CELLS/UL (ref 200–950)
MONOCYTES NFR BLD AUTO: 8.3 %
NEUTROPHILS # BLD AUTO: 2700 CELLS/UL (ref 1500–7800)
NEUTROPHILS NFR BLD AUTO: 58.7 %
PLATELET # BLD AUTO: 158 THOUSAND/UL (ref 140–400)
PMV BLD REES-ECKER: 10 FL (ref 7.5–12.5)
POTASSIUM SERPL-SCNC: 3.5 MMOL/L (ref 3.5–5.3)
PROT SERPL-MCNC: 7 G/DL (ref 6.1–8.1)
RBC # BLD AUTO: 4.4 MILLION/UL (ref 3.8–5.1)
SODIUM SERPL-SCNC: 141 MMOL/L (ref 135–146)
TSH SERPL-ACNC: 2.44 MIU/L (ref 0.4–4.5)
WBC # BLD AUTO: 4.6 THOUSAND/UL (ref 3.8–10.8)

## 2020-07-23 ENCOUNTER — TELEPHONE (OUTPATIENT)
Dept: FAMILY MEDICINE | Facility: CLINIC | Age: 85
End: 2020-07-23

## 2020-07-23 NOTE — PROGRESS NOTES
Please call pt and let her know her labs look good. Kidney function is stable- normal potassium, liver, blood sugar and thyroid levels and no anemia on blood count.

## 2020-07-23 NOTE — TELEPHONE ENCOUNTER
----- Message from Margo Burns NP sent at 7/22/2020  8:23 PM CDT -----  Please call pt and let her know her labs look good. Kidney function is stable- normal potassium, liver, blood sugar and thyroid levels and no anemia on blood count.

## 2020-08-17 ENCOUNTER — TELEPHONE (OUTPATIENT)
Dept: FAMILY MEDICINE | Facility: CLINIC | Age: 85
End: 2020-08-17

## 2020-08-17 NOTE — TELEPHONE ENCOUNTER
Spoke to pt regarding her appt on 8/25. Offered virtual visit pt stated she wants to come into the office for her appt. c-19 reviewed. Pt stated her daughter in law will come into the office with her

## 2020-09-01 ENCOUNTER — OFFICE VISIT (OUTPATIENT)
Dept: FAMILY MEDICINE | Facility: CLINIC | Age: 85
End: 2020-09-01
Payer: MEDICARE

## 2020-09-01 VITALS
WEIGHT: 104 LBS | BODY MASS INDEX: 23.39 KG/M2 | SYSTOLIC BLOOD PRESSURE: 128 MMHG | HEART RATE: 64 BPM | HEIGHT: 56 IN | DIASTOLIC BLOOD PRESSURE: 82 MMHG | OXYGEN SATURATION: 99 % | TEMPERATURE: 98 F

## 2020-09-01 DIAGNOSIS — M15.0 PRIMARY GENERALIZED (OSTEO)ARTHRITIS: ICD-10-CM

## 2020-09-01 DIAGNOSIS — M41.9 SCOLIOSIS OF THORACOLUMBAR SPINE, UNSPECIFIED SCOLIOSIS TYPE: ICD-10-CM

## 2020-09-01 DIAGNOSIS — I10 ESSENTIAL HYPERTENSION: Primary | ICD-10-CM

## 2020-09-01 DIAGNOSIS — D50.9 IRON DEFICIENCY ANEMIA, UNSPECIFIED IRON DEFICIENCY ANEMIA TYPE: ICD-10-CM

## 2020-09-01 DIAGNOSIS — R42 DIZZINESS: ICD-10-CM

## 2020-09-01 DIAGNOSIS — F41.9 ANXIETY: ICD-10-CM

## 2020-09-01 PROCEDURE — 99214 OFFICE O/P EST MOD 30 MIN: CPT | Mod: S$GLB,,, | Performed by: NURSE PRACTITIONER

## 2020-09-01 PROCEDURE — 99214 PR OFFICE/OUTPT VISIT, EST, LEVL IV, 30-39 MIN: ICD-10-PCS | Mod: S$GLB,,, | Performed by: NURSE PRACTITIONER

## 2020-09-01 RX ORDER — TRAMADOL HYDROCHLORIDE 50 MG/1
50 TABLET ORAL EVERY 12 HOURS PRN
Qty: 60 TABLET | Refills: 2 | Status: SHIPPED | OUTPATIENT
Start: 2020-09-01 | End: 2021-01-12 | Stop reason: SDUPTHER

## 2020-09-01 RX ORDER — TRAMADOL HYDROCHLORIDE 50 MG/1
50 TABLET ORAL EVERY 12 HOURS PRN
Qty: 60 TABLET | Refills: 2 | Status: CANCELLED | OUTPATIENT
Start: 2020-09-01

## 2020-09-01 RX ORDER — VERAPAMIL HYDROCHLORIDE 120 MG/1
120 TABLET, FILM COATED, EXTENDED RELEASE ORAL DAILY
Qty: 90 TABLET | Refills: 3 | Status: SHIPPED | OUTPATIENT
Start: 2020-09-01 | End: 2022-10-28 | Stop reason: SDUPTHER

## 2020-09-01 RX ORDER — BUSPIRONE HYDROCHLORIDE 5 MG/1
5 TABLET ORAL DAILY
Qty: 30 TABLET | Refills: 5 | Status: SHIPPED | OUTPATIENT
Start: 2020-09-01 | End: 2021-01-21
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
## 2020-11-04 NOTE — TELEPHONE ENCOUNTER
She can try biotin or hair/skin/nails OTC- her labs in August were normal so no obvious cause, could be nutritional or just age related. If she's having balding patches then needs to be seen however She just saw Dr. Hurtado 2 weeks ago and he didn't mention any significant concern   Clindamycin Counseling: I counseled the patient regarding use of clindamycin as an antibiotic for prophylactic and/or therapeutic purposes. Clindamycin is active against numerous classes of bacteria, including skin bacteria. Side effects may include nausea, diarrhea, gastrointestinal upset, rash, hives, yeast infections, and in rare cases, colitis.

## 2021-01-12 DIAGNOSIS — M15.0 PRIMARY GENERALIZED (OSTEO)ARTHRITIS: ICD-10-CM

## 2021-01-12 RX ORDER — TRAMADOL HYDROCHLORIDE 50 MG/1
50 TABLET ORAL EVERY 12 HOURS PRN
Qty: 60 TABLET | Refills: 2 | Status: SHIPPED | OUTPATIENT
Start: 2021-01-12 | End: 2021-07-15 | Stop reason: SDUPTHER

## 2021-01-18 ENCOUNTER — TELEPHONE (OUTPATIENT)
Dept: FAMILY MEDICINE | Facility: CLINIC | Age: 86
End: 2021-01-18

## 2021-01-21 ENCOUNTER — OFFICE VISIT (OUTPATIENT)
Dept: FAMILY MEDICINE | Facility: CLINIC | Age: 86
End: 2021-01-21
Payer: MEDICARE

## 2021-01-21 ENCOUNTER — TELEPHONE (OUTPATIENT)
Dept: FAMILY MEDICINE | Facility: CLINIC | Age: 86
End: 2021-01-21

## 2021-01-21 VITALS
SYSTOLIC BLOOD PRESSURE: 128 MMHG | DIASTOLIC BLOOD PRESSURE: 82 MMHG | WEIGHT: 100 LBS | BODY MASS INDEX: 22.5 KG/M2 | HEART RATE: 70 BPM | HEIGHT: 56 IN

## 2021-01-21 DIAGNOSIS — R13.10 DYSPHAGIA, UNSPECIFIED TYPE: Primary | ICD-10-CM

## 2021-01-21 DIAGNOSIS — I35.0 AORTIC STENOSIS WITH TRILEAFLET VALVE: ICD-10-CM

## 2021-01-21 DIAGNOSIS — I10 ESSENTIAL HYPERTENSION: ICD-10-CM

## 2021-01-21 DIAGNOSIS — M41.9 SCOLIOSIS OF THORACOLUMBAR SPINE, UNSPECIFIED SCOLIOSIS TYPE: ICD-10-CM

## 2021-01-21 DIAGNOSIS — N30.00 ACUTE CYSTITIS WITHOUT HEMATURIA: ICD-10-CM

## 2021-01-21 PROCEDURE — 99214 PR OFFICE/OUTPT VISIT, EST, LEVL IV, 30-39 MIN: ICD-10-PCS | Mod: 25,S$GLB,, | Performed by: NURSE PRACTITIONER

## 2021-01-21 PROCEDURE — 99214 OFFICE O/P EST MOD 30 MIN: CPT | Mod: 25,S$GLB,, | Performed by: NURSE PRACTITIONER

## 2021-01-21 PROCEDURE — 81003 URINALYSIS AUTO W/O SCOPE: CPT | Mod: QW,S$GLB,, | Performed by: NURSE PRACTITIONER

## 2021-01-21 PROCEDURE — 81003 POCT URINALYSIS, DIPSTICK, AUTOMATED, W/O SCOPE: ICD-10-PCS | Mod: QW,S$GLB,, | Performed by: NURSE PRACTITIONER

## 2021-01-21 RX ORDER — CEFUROXIME AXETIL 250 MG/1
250 TABLET ORAL 2 TIMES DAILY
Qty: 14 TABLET | Refills: 0 | Status: SHIPPED | OUTPATIENT
Start: 2021-01-21 | End: 2021-01-28

## 2021-01-21 RX ORDER — LISINOPRIL 10 MG/1
5 TABLET ORAL DAILY
COMMUNITY
Start: 2020-12-30 | End: 2023-11-25 | Stop reason: DRUGHIGH

## 2021-01-23 LAB — BACTERIA UR CULT: NORMAL

## 2021-01-25 ENCOUNTER — TELEPHONE (OUTPATIENT)
Dept: FAMILY MEDICINE | Facility: CLINIC | Age: 86
End: 2021-01-25

## 2021-01-25 LAB
BILIRUB UR QL STRIP: NEGATIVE
GLUCOSE UR QL STRIP: NEGATIVE
KETONES UR QL STRIP: NEGATIVE
LEUKOCYTE ESTERASE UR QL STRIP: NEGATIVE
PH, POC UA: 6.5
POC BLOOD, URINE: POSITIVE
POC NITRATES, URINE: NEGATIVE
PROT UR QL STRIP: NEGATIVE
SP GR UR STRIP: 1.01 (ref 1–1.03)
UROBILINOGEN UR STRIP-ACNC: NEGATIVE (ref 0.1–1.1)

## 2021-02-25 ENCOUNTER — TELEPHONE (OUTPATIENT)
Dept: FAMILY MEDICINE | Facility: CLINIC | Age: 86
End: 2021-02-25

## 2021-02-25 DIAGNOSIS — I10 ESSENTIAL HYPERTENSION: ICD-10-CM

## 2021-02-25 DIAGNOSIS — D50.9 IRON DEFICIENCY ANEMIA, UNSPECIFIED IRON DEFICIENCY ANEMIA TYPE: ICD-10-CM

## 2021-02-25 DIAGNOSIS — F41.9 ANXIETY: ICD-10-CM

## 2021-02-25 DIAGNOSIS — Z79.899 ENCOUNTER FOR LONG-TERM (CURRENT) USE OF OTHER MEDICATIONS: Primary | ICD-10-CM

## 2021-03-04 ENCOUNTER — TELEPHONE (OUTPATIENT)
Dept: FAMILY MEDICINE | Facility: CLINIC | Age: 86
End: 2021-03-04

## 2021-03-11 ENCOUNTER — OFFICE VISIT (OUTPATIENT)
Dept: FAMILY MEDICINE | Facility: CLINIC | Age: 86
End: 2021-03-11
Payer: MEDICARE

## 2021-03-11 VITALS
DIASTOLIC BLOOD PRESSURE: 78 MMHG | HEIGHT: 56 IN | BODY MASS INDEX: 22.72 KG/M2 | SYSTOLIC BLOOD PRESSURE: 138 MMHG | HEART RATE: 68 BPM | WEIGHT: 101 LBS

## 2021-03-11 DIAGNOSIS — G40.909 NONINTRACTABLE EPILEPSY WITHOUT STATUS EPILEPTICUS, UNSPECIFIED EPILEPSY TYPE: ICD-10-CM

## 2021-03-11 DIAGNOSIS — R42 VERTIGO: ICD-10-CM

## 2021-03-11 DIAGNOSIS — N32.81 OVERACTIVE BLADDER: Primary | ICD-10-CM

## 2021-03-11 DIAGNOSIS — M79.671 BILATERAL FOOT PAIN: ICD-10-CM

## 2021-03-11 DIAGNOSIS — E44.1 MILD PROTEIN-CALORIE MALNUTRITION: ICD-10-CM

## 2021-03-11 DIAGNOSIS — K58.2 IRRITABLE BOWEL SYNDROME WITH BOTH CONSTIPATION AND DIARRHEA: ICD-10-CM

## 2021-03-11 DIAGNOSIS — M46.1 SACROILIITIS, NOT ELSEWHERE CLASSIFIED: ICD-10-CM

## 2021-03-11 DIAGNOSIS — E78.2 MIXED HYPERLIPIDEMIA: ICD-10-CM

## 2021-03-11 DIAGNOSIS — N18.31 STAGE 3A CHRONIC KIDNEY DISEASE: ICD-10-CM

## 2021-03-11 DIAGNOSIS — M79.672 BILATERAL FOOT PAIN: ICD-10-CM

## 2021-03-11 DIAGNOSIS — I10 ESSENTIAL HYPERTENSION: ICD-10-CM

## 2021-03-11 DIAGNOSIS — I35.0 AORTIC STENOSIS WITH TRILEAFLET VALVE: ICD-10-CM

## 2021-03-11 DIAGNOSIS — Z87.81 HISTORY OF VERTEBRAL COMPRESSION FRACTURE: ICD-10-CM

## 2021-03-11 PROCEDURE — 99214 PR OFFICE/OUTPT VISIT, EST, LEVL IV, 30-39 MIN: ICD-10-PCS | Mod: S$GLB,,, | Performed by: FAMILY MEDICINE

## 2021-03-11 PROCEDURE — 99214 OFFICE O/P EST MOD 30 MIN: CPT | Mod: S$GLB,,, | Performed by: FAMILY MEDICINE

## 2021-03-11 RX ORDER — MECLIZINE HCL 12.5 MG 12.5 MG/1
6.25 TABLET ORAL 2 TIMES DAILY PRN
Qty: 90 TABLET | Refills: 1 | Status: SHIPPED | OUTPATIENT
Start: 2021-03-11 | End: 2021-08-27 | Stop reason: SDUPTHER

## 2021-03-11 RX ORDER — OXYBUTYNIN CHLORIDE 5 MG/1
TABLET ORAL
Qty: 60 TABLET | Refills: 5 | Status: SHIPPED | OUTPATIENT
Start: 2021-03-11 | End: 2021-06-09

## 2021-03-12 PROBLEM — M46.1 SACROILIITIS, NOT ELSEWHERE CLASSIFIED: Status: ACTIVE | Noted: 2021-03-12

## 2021-03-12 PROBLEM — G40.909 NONINTRACTABLE EPILEPSY WITHOUT STATUS EPILEPTICUS: Status: ACTIVE | Noted: 2021-03-12

## 2021-03-12 PROBLEM — N18.31 STAGE 3A CHRONIC KIDNEY DISEASE: Status: ACTIVE | Noted: 2021-03-12

## 2021-03-12 LAB
ALBUMIN SERPL-MCNC: 4.2 G/DL (ref 3.6–5.1)
ALBUMIN/CREAT UR: 7 MCG/MG CREAT
ALBUMIN/GLOB SERPL: 1.4 (CALC) (ref 1–2.5)
ALP SERPL-CCNC: 97 U/L (ref 37–153)
ALT SERPL-CCNC: 12 U/L (ref 6–29)
APPEARANCE UR: CLEAR
AST SERPL-CCNC: 23 U/L (ref 10–35)
BACTERIA #/AREA URNS HPF: ABNORMAL /HPF
BACTERIA UR CULT: ABNORMAL
BASOPHILS # BLD AUTO: 62 CELLS/UL (ref 0–200)
BASOPHILS NFR BLD AUTO: 0.9 %
BILIRUB SERPL-MCNC: 1.4 MG/DL (ref 0.2–1.2)
BILIRUB UR QL STRIP: NEGATIVE
BUN SERPL-MCNC: 28 MG/DL (ref 7–25)
BUN/CREAT SERPL: 31 (CALC) (ref 6–22)
CALCIUM SERPL-MCNC: 9.9 MG/DL (ref 8.6–10.4)
CHLORIDE SERPL-SCNC: 95 MMOL/L (ref 98–110)
CHOLEST SERPL-MCNC: 246 MG/DL
CHOLEST/HDLC SERPL: 2.5 (CALC)
CO2 SERPL-SCNC: 31 MMOL/L (ref 20–32)
COLOR UR: YELLOW
CREAT SERPL-MCNC: 0.89 MG/DL (ref 0.6–0.88)
CREAT UR-MCNC: 43 MG/DL (ref 20–275)
EOSINOPHIL # BLD AUTO: 248 CELLS/UL (ref 15–500)
EOSINOPHIL NFR BLD AUTO: 3.6 %
ERYTHROCYTE [DISTWIDTH] IN BLOOD BY AUTOMATED COUNT: 11.5 % (ref 11–15)
GFRSERPLBLD MDRD-ARVRAT: 58 ML/MIN/1.73M2
GLOBULIN SER CALC-MCNC: 2.9 G/DL (CALC) (ref 1.9–3.7)
GLUCOSE SERPL-MCNC: 89 MG/DL (ref 65–99)
GLUCOSE UR QL STRIP: NEGATIVE
HCT VFR BLD AUTO: 39.3 % (ref 35–45)
HDLC SERPL-MCNC: 98 MG/DL
HGB BLD-MCNC: 13.3 G/DL (ref 11.7–15.5)
HGB UR QL STRIP: NEGATIVE
HYALINE CASTS #/AREA URNS LPF: ABNORMAL /LPF
KETONES UR QL STRIP: NEGATIVE
LDLC SERPL CALC-MCNC: 127 MG/DL (CALC)
LEUKOCYTE ESTERASE UR QL STRIP: NEGATIVE
LYMPHOCYTES # BLD AUTO: 1470 CELLS/UL (ref 850–3900)
LYMPHOCYTES NFR BLD AUTO: 21.3 %
MCH RBC QN AUTO: 32.5 PG (ref 27–33)
MCHC RBC AUTO-ENTMCNC: 33.8 G/DL (ref 32–36)
MCV RBC AUTO: 96.1 FL (ref 80–100)
MICROALBUMIN UR-MCNC: 0.3 MG/DL
MONOCYTES # BLD AUTO: 545 CELLS/UL (ref 200–950)
MONOCYTES NFR BLD AUTO: 7.9 %
NEUTROPHILS # BLD AUTO: 4575 CELLS/UL (ref 1500–7800)
NEUTROPHILS NFR BLD AUTO: 66.3 %
NITRITE UR QL STRIP: NEGATIVE
NONHDLC SERPL-MCNC: 148 MG/DL (CALC)
PH UR STRIP: 6.5 [PH] (ref 5–8)
PLATELET # BLD AUTO: 174 THOUSAND/UL (ref 140–400)
PMV BLD REES-ECKER: 10.1 FL (ref 7.5–12.5)
POTASSIUM SERPL-SCNC: 3.6 MMOL/L (ref 3.5–5.3)
PROT SERPL-MCNC: 7.1 G/DL (ref 6.1–8.1)
PROT UR QL STRIP: NEGATIVE
RBC # BLD AUTO: 4.09 MILLION/UL (ref 3.8–5.1)
RBC #/AREA URNS HPF: ABNORMAL /HPF
SODIUM SERPL-SCNC: 137 MMOL/L (ref 135–146)
SP GR UR STRIP: 1.01 (ref 1–1.03)
SQUAMOUS #/AREA URNS HPF: ABNORMAL /HPF
TRIGL SERPL-MCNC: 107 MG/DL
TSH SERPL-ACNC: 2.28 MIU/L (ref 0.4–4.5)
WBC # BLD AUTO: 6.9 THOUSAND/UL (ref 3.8–10.8)
WBC #/AREA URNS HPF: ABNORMAL /HPF

## 2021-03-15 ENCOUNTER — TELEPHONE (OUTPATIENT)
Dept: FAMILY MEDICINE | Facility: CLINIC | Age: 86
End: 2021-03-15

## 2021-03-19 DIAGNOSIS — I10 ESSENTIAL HYPERTENSION: ICD-10-CM

## 2021-03-19 RX ORDER — HYDROCHLOROTHIAZIDE 25 MG/1
25 TABLET ORAL EVERY OTHER DAY
Qty: 45 TABLET | Refills: 1 | Status: ON HOLD | OUTPATIENT
Start: 2021-03-19 | End: 2023-11-26 | Stop reason: SDUPTHER

## 2021-04-13 ENCOUNTER — TELEPHONE (OUTPATIENT)
Dept: FAMILY MEDICINE | Facility: CLINIC | Age: 86
End: 2021-04-13

## 2021-04-17 DIAGNOSIS — K21.9 GASTROESOPHAGEAL REFLUX DISEASE: ICD-10-CM

## 2021-04-18 ENCOUNTER — HOSPITAL ENCOUNTER (EMERGENCY)
Facility: HOSPITAL | Age: 86
Discharge: HOME | End: 2021-04-18
Attending: EMERGENCY MEDICINE
Payer: MEDICARE

## 2021-04-18 VITALS
OXYGEN SATURATION: 98 % | TEMPERATURE: 98 F | SYSTOLIC BLOOD PRESSURE: 149 MMHG | HEIGHT: 56 IN | BODY MASS INDEX: 22.72 KG/M2 | DIASTOLIC BLOOD PRESSURE: 76 MMHG | HEART RATE: 67 BPM | WEIGHT: 101 LBS | RESPIRATION RATE: 16 BRPM

## 2021-04-18 DIAGNOSIS — R41.0 CONFUSION: ICD-10-CM

## 2021-04-18 DIAGNOSIS — F22 DELUSIONS: Primary | ICD-10-CM

## 2021-04-18 LAB
ALBUMIN SERPL BCP-MCNC: 3.8 G/DL (ref 3.5–5.2)
ALP SERPL-CCNC: 86 U/L (ref 55–135)
ALT SERPL W/O P-5'-P-CCNC: 11 U/L (ref 10–44)
AMPHET+METHAMPHET UR QL: NEGATIVE
ANION GAP SERPL CALC-SCNC: 12 MMOL/L (ref 8–16)
APAP SERPL-MCNC: <3 UG/ML (ref 10–20)
AST SERPL-CCNC: 20 U/L (ref 10–40)
BARBITURATES UR QL SCN>200 NG/ML: NEGATIVE
BASOPHILS # BLD AUTO: 0.06 K/UL (ref 0–0.2)
BASOPHILS NFR BLD: 1.3 % (ref 0–1.9)
BENZODIAZ UR QL SCN>200 NG/ML: NEGATIVE
BILIRUB SERPL-MCNC: 1 MG/DL (ref 0.1–1)
BILIRUB UR QL STRIP: NEGATIVE
BUN SERPL-MCNC: 21 MG/DL (ref 8–23)
BZE UR QL SCN: NEGATIVE
CALCIUM SERPL-MCNC: 10.2 MG/DL (ref 8.7–10.5)
CANNABINOIDS UR QL SCN: NEGATIVE
CHLORIDE SERPL-SCNC: 98 MMOL/L (ref 95–110)
CLARITY UR: CLEAR
CO2 SERPL-SCNC: 29 MMOL/L (ref 23–29)
COLOR UR: YELLOW
CREAT SERPL-MCNC: 1 MG/DL (ref 0.5–1.4)
CREAT UR-MCNC: 29.1 MG/DL (ref 15–325)
DIFFERENTIAL METHOD: ABNORMAL
EOSINOPHIL # BLD AUTO: 0.2 K/UL (ref 0–0.5)
EOSINOPHIL NFR BLD: 4.5 % (ref 0–8)
ERYTHROCYTE [DISTWIDTH] IN BLOOD BY AUTOMATED COUNT: 11.9 % (ref 11.5–14.5)
EST. GFR  (AFRICAN AMERICAN): 59 ML/MIN/1.73 M^2
EST. GFR  (NON AFRICAN AMERICAN): 51 ML/MIN/1.73 M^2
ETHANOL SERPL-MCNC: <10 MG/DL
GLUCOSE SERPL-MCNC: 99 MG/DL (ref 70–110)
GLUCOSE UR QL STRIP: NEGATIVE
HCT VFR BLD AUTO: 39.1 % (ref 37–48.5)
HGB BLD-MCNC: 13.3 G/DL (ref 12–16)
HGB UR QL STRIP: ABNORMAL
IMM GRANULOCYTES # BLD AUTO: 0.02 K/UL (ref 0–0.04)
IMM GRANULOCYTES NFR BLD AUTO: 0.4 % (ref 0–0.5)
KETONES UR QL STRIP: NEGATIVE
LEUKOCYTE ESTERASE UR QL STRIP: NEGATIVE
LYMPHOCYTES # BLD AUTO: 0.9 K/UL (ref 1–4.8)
LYMPHOCYTES NFR BLD: 19.6 % (ref 18–48)
MCH RBC QN AUTO: 32.9 PG (ref 27–31)
MCHC RBC AUTO-ENTMCNC: 34 G/DL (ref 32–36)
MCV RBC AUTO: 97 FL (ref 82–98)
METHADONE UR QL SCN>300 NG/ML: NEGATIVE
MONOCYTES # BLD AUTO: 0.5 K/UL (ref 0.3–1)
MONOCYTES NFR BLD: 9.8 % (ref 4–15)
NEUTROPHILS # BLD AUTO: 3 K/UL (ref 1.8–7.7)
NEUTROPHILS NFR BLD: 64.4 % (ref 38–73)
NITRITE UR QL STRIP: NEGATIVE
NRBC BLD-RTO: 0 /100 WBC
OPIATES UR QL SCN: NEGATIVE
PCP UR QL SCN>25 NG/ML: NEGATIVE
PH UR STRIP: 8 [PH] (ref 5–8)
PLATELET # BLD AUTO: 153 K/UL (ref 150–450)
PMV BLD AUTO: 9.3 FL (ref 9.2–12.9)
POTASSIUM SERPL-SCNC: 3.6 MMOL/L (ref 3.5–5.1)
PROT SERPL-MCNC: 7 G/DL (ref 6–8.4)
PROT UR QL STRIP: NEGATIVE
RBC # BLD AUTO: 4.04 M/UL (ref 4–5.4)
SODIUM SERPL-SCNC: 139 MMOL/L (ref 136–145)
SP GR UR STRIP: 1.01 (ref 1–1.03)
TOXICOLOGY INFORMATION: NORMAL
TSH SERPL DL<=0.005 MIU/L-ACNC: 3.92 UIU/ML (ref 0.4–4)
URN SPEC COLLECT METH UR: ABNORMAL
UROBILINOGEN UR STRIP-ACNC: NEGATIVE EU/DL
WBC # BLD AUTO: 4.69 K/UL (ref 3.9–12.7)

## 2021-04-18 PROCEDURE — 85025 COMPLETE CBC W/AUTO DIFF WBC: CPT | Performed by: EMERGENCY MEDICINE

## 2021-04-18 PROCEDURE — 80053 COMPREHEN METABOLIC PANEL: CPT | Performed by: EMERGENCY MEDICINE

## 2021-04-18 PROCEDURE — 80143 DRUG ASSAY ACETAMINOPHEN: CPT | Performed by: EMERGENCY MEDICINE

## 2021-04-18 PROCEDURE — 81003 URINALYSIS AUTO W/O SCOPE: CPT | Mod: 59 | Performed by: EMERGENCY MEDICINE

## 2021-04-18 PROCEDURE — 99285 EMERGENCY DEPT VISIT HI MDM: CPT | Mod: 25

## 2021-04-18 PROCEDURE — 82077 ASSAY SPEC XCP UR&BREATH IA: CPT | Performed by: EMERGENCY MEDICINE

## 2021-04-18 PROCEDURE — 36415 COLL VENOUS BLD VENIPUNCTURE: CPT | Performed by: EMERGENCY MEDICINE

## 2021-04-18 PROCEDURE — 84443 ASSAY THYROID STIM HORMONE: CPT | Performed by: EMERGENCY MEDICINE

## 2021-04-18 PROCEDURE — 80307 DRUG TEST PRSMV CHEM ANLYZR: CPT | Performed by: EMERGENCY MEDICINE

## 2021-04-19 RX ORDER — OMEPRAZOLE 20 MG/1
20 CAPSULE, DELAYED RELEASE ORAL DAILY
Qty: 90 CAPSULE | Refills: 3 | Status: SHIPPED | OUTPATIENT
Start: 2021-04-19 | End: 2021-06-02 | Stop reason: SDUPTHER

## 2021-04-19 RX ORDER — QUETIAPINE FUMARATE 50 MG/1
50 TABLET, FILM COATED ORAL NIGHTLY
Qty: 30 TABLET | Refills: 2 | Status: SHIPPED | OUTPATIENT
Start: 2021-04-19 | End: 2021-07-10 | Stop reason: SDUPTHER

## 2021-04-29 ENCOUNTER — PATIENT MESSAGE (OUTPATIENT)
Dept: RESEARCH | Facility: HOSPITAL | Age: 86
End: 2021-04-29

## 2021-05-01 NOTE — Clinical Note
Needs f/u appt in 4 monts with labs before next visit
impairments found/functional limitations in following categories/risk reduction/prevention/rehab potential/therapy frequency/predicted duration of therapy intervention/anticipated equipment needs at discharge/anticipated discharge recommendation

## 2021-05-22 ENCOUNTER — HOSPITAL ENCOUNTER (EMERGENCY)
Facility: HOSPITAL | Age: 86
Discharge: HOME | End: 2021-05-22
Attending: EMERGENCY MEDICINE
Payer: MEDICARE

## 2021-05-22 VITALS
BODY MASS INDEX: 22.95 KG/M2 | TEMPERATURE: 98 F | OXYGEN SATURATION: 99 % | RESPIRATION RATE: 16 BRPM | DIASTOLIC BLOOD PRESSURE: 86 MMHG | SYSTOLIC BLOOD PRESSURE: 179 MMHG | WEIGHT: 102 LBS | HEART RATE: 79 BPM | HEIGHT: 56 IN

## 2021-05-22 VITALS
RESPIRATION RATE: 18 BRPM | SYSTOLIC BLOOD PRESSURE: 157 MMHG | BODY MASS INDEX: 22.87 KG/M2 | WEIGHT: 102 LBS | DIASTOLIC BLOOD PRESSURE: 72 MMHG | OXYGEN SATURATION: 98 % | HEART RATE: 82 BPM | TEMPERATURE: 98 F

## 2021-05-22 DIAGNOSIS — K59.00 CONSTIPATION, UNSPECIFIED CONSTIPATION TYPE: Primary | ICD-10-CM

## 2021-05-22 DIAGNOSIS — R10.84 ABDOMINAL PAIN, DIFFUSE: ICD-10-CM

## 2021-05-22 LAB
ALBUMIN SERPL BCP-MCNC: 3.8 G/DL (ref 3.5–5.2)
ALP SERPL-CCNC: 81 U/L (ref 55–135)
ALT SERPL W/O P-5'-P-CCNC: 13 U/L (ref 10–44)
ANION GAP SERPL CALC-SCNC: 10 MMOL/L (ref 8–16)
AST SERPL-CCNC: 24 U/L (ref 10–40)
BASOPHILS # BLD AUTO: 0.03 K/UL (ref 0–0.2)
BASOPHILS NFR BLD: 0.6 % (ref 0–1.9)
BILIRUB SERPL-MCNC: 0.7 MG/DL (ref 0.1–1)
BUN SERPL-MCNC: 25 MG/DL (ref 8–23)
CALCIUM SERPL-MCNC: 9.6 MG/DL (ref 8.7–10.5)
CHLORIDE SERPL-SCNC: 97 MMOL/L (ref 95–110)
CO2 SERPL-SCNC: 28 MMOL/L (ref 23–29)
CREAT SERPL-MCNC: 1 MG/DL (ref 0.5–1.4)
DIFFERENTIAL METHOD: ABNORMAL
EOSINOPHIL # BLD AUTO: 0.1 K/UL (ref 0–0.5)
EOSINOPHIL NFR BLD: 2.1 % (ref 0–8)
ERYTHROCYTE [DISTWIDTH] IN BLOOD BY AUTOMATED COUNT: 11.9 % (ref 11.5–14.5)
EST. GFR  (AFRICAN AMERICAN): 58 ML/MIN/1.73 M^2
EST. GFR  (NON AFRICAN AMERICAN): 50 ML/MIN/1.73 M^2
GLUCOSE SERPL-MCNC: 136 MG/DL (ref 70–110)
HCT VFR BLD AUTO: 37.2 % (ref 37–48.5)
HGB BLD-MCNC: 12.4 G/DL (ref 12–16)
IMM GRANULOCYTES # BLD AUTO: 0.02 K/UL (ref 0–0.04)
IMM GRANULOCYTES NFR BLD AUTO: 0.4 % (ref 0–0.5)
LYMPHOCYTES # BLD AUTO: 1 K/UL (ref 1–4.8)
LYMPHOCYTES NFR BLD: 21.4 % (ref 18–48)
MCH RBC QN AUTO: 32.9 PG (ref 27–31)
MCHC RBC AUTO-ENTMCNC: 33.3 G/DL (ref 32–36)
MCV RBC AUTO: 99 FL (ref 82–98)
MONOCYTES # BLD AUTO: 0.3 K/UL (ref 0.3–1)
MONOCYTES NFR BLD: 7.1 % (ref 4–15)
NEUTROPHILS # BLD AUTO: 3.2 K/UL (ref 1.8–7.7)
NEUTROPHILS NFR BLD: 68.4 % (ref 38–73)
NRBC BLD-RTO: 0 /100 WBC
PLATELET # BLD AUTO: 139 K/UL (ref 150–450)
PMV BLD AUTO: 9.3 FL (ref 9.2–12.9)
POTASSIUM SERPL-SCNC: 4 MMOL/L (ref 3.5–5.1)
PROT SERPL-MCNC: 6.8 G/DL (ref 6–8.4)
RBC # BLD AUTO: 3.77 M/UL (ref 4–5.4)
SODIUM SERPL-SCNC: 135 MMOL/L (ref 136–145)
WBC # BLD AUTO: 4.67 K/UL (ref 3.9–12.7)

## 2021-05-22 PROCEDURE — 85025 COMPLETE CBC W/AUTO DIFF WBC: CPT | Performed by: EMERGENCY MEDICINE

## 2021-05-22 PROCEDURE — 99284 EMERGENCY DEPT VISIT MOD MDM: CPT | Mod: 25,27

## 2021-05-22 PROCEDURE — 25000003 PHARM REV CODE 250: Performed by: EMERGENCY MEDICINE

## 2021-05-22 PROCEDURE — 36415 COLL VENOUS BLD VENIPUNCTURE: CPT | Performed by: EMERGENCY MEDICINE

## 2021-05-22 PROCEDURE — 80053 COMPREHEN METABOLIC PANEL: CPT | Performed by: EMERGENCY MEDICINE

## 2021-05-22 PROCEDURE — 99281 EMR DPT VST MAYX REQ PHY/QHP: CPT | Mod: 25

## 2021-05-22 RX ORDER — ONDANSETRON 4 MG/1
8 TABLET, ORALLY DISINTEGRATING ORAL
Status: COMPLETED | OUTPATIENT
Start: 2021-05-22 | End: 2021-05-22

## 2021-05-22 RX ORDER — SYRING-NEEDL,DISP,INSUL,0.3 ML 29 G X1/2"
296 SYRINGE, EMPTY DISPOSABLE MISCELLANEOUS ONCE
Status: COMPLETED | OUTPATIENT
Start: 2021-05-22 | End: 2021-05-22

## 2021-05-22 RX ORDER — POLYETHYLENE GLYCOL 3350, SODIUM SULFATE ANHYDROUS, SODIUM BICARBONATE, SODIUM CHLORIDE, POTASSIUM CHLORIDE 236; 22.74; 6.74; 5.86; 2.97 G/4L; G/4L; G/4L; G/4L; G/4L
4 POWDER, FOR SOLUTION ORAL ONCE
Qty: 4000 ML | Refills: 0 | Status: SHIPPED | OUTPATIENT
Start: 2021-05-22 | End: 2021-05-22

## 2021-05-22 RX ORDER — POLYETHYLENE GLYCOL 3350, SODIUM SULFATE ANHYDROUS, SODIUM BICARBONATE, SODIUM CHLORIDE, POTASSIUM CHLORIDE 236; 22.74; 6.74; 5.86; 2.97 G/4L; G/4L; G/4L; G/4L; G/4L
4 POWDER, FOR SOLUTION ORAL ONCE
Qty: 4000 ML | Refills: 0 | Status: SHIPPED | OUTPATIENT
Start: 2021-05-22 | End: 2021-05-22 | Stop reason: SDUPTHER

## 2021-05-22 RX ADMIN — MAGNESIUM CITRATE 296 ML: 1.75 LIQUID ORAL at 05:05

## 2021-05-22 RX ADMIN — ONDANSETRON 8 MG: 4 TABLET, ORALLY DISINTEGRATING ORAL at 08:05

## 2021-05-22 RX ADMIN — MAGNESIUM CITRATE: 1.75 LIQUID ORAL at 06:05

## 2021-05-29 ENCOUNTER — HOSPITAL ENCOUNTER (EMERGENCY)
Facility: HOSPITAL | Age: 86
Discharge: HOME | End: 2021-05-29
Attending: EMERGENCY MEDICINE
Payer: MEDICARE

## 2021-05-29 VITALS
HEART RATE: 78 BPM | BODY MASS INDEX: 22.95 KG/M2 | WEIGHT: 102 LBS | TEMPERATURE: 99 F | HEIGHT: 56 IN | DIASTOLIC BLOOD PRESSURE: 67 MMHG | RESPIRATION RATE: 16 BRPM | OXYGEN SATURATION: 97 % | SYSTOLIC BLOOD PRESSURE: 135 MMHG

## 2021-05-29 DIAGNOSIS — F01.50 VASCULAR DEMENTIA WITHOUT BEHAVIORAL DISTURBANCE: Primary | ICD-10-CM

## 2021-05-29 DIAGNOSIS — K21.9 GASTROESOPHAGEAL REFLUX DISEASE: ICD-10-CM

## 2021-05-29 DIAGNOSIS — R06.02 SOB (SHORTNESS OF BREATH): ICD-10-CM

## 2021-05-29 DIAGNOSIS — R07.89 CHEST DISCOMFORT: ICD-10-CM

## 2021-05-29 LAB
ALBUMIN SERPL BCP-MCNC: 3.7 G/DL (ref 3.5–5.2)
ALP SERPL-CCNC: 76 U/L (ref 55–135)
ALT SERPL W/O P-5'-P-CCNC: 22 U/L (ref 10–44)
ANION GAP SERPL CALC-SCNC: 11 MMOL/L (ref 8–16)
AST SERPL-CCNC: 28 U/L (ref 10–40)
BACTERIA #/AREA URNS HPF: NORMAL /HPF
BASOPHILS # BLD AUTO: 0.04 K/UL (ref 0–0.2)
BASOPHILS NFR BLD: 0.7 % (ref 0–1.9)
BILIRUB SERPL-MCNC: 0.9 MG/DL (ref 0.1–1)
BILIRUB UR QL STRIP: NEGATIVE
BNP SERPL-MCNC: 131 PG/ML (ref 0–99)
BUN SERPL-MCNC: 19 MG/DL (ref 8–23)
CALCIUM SERPL-MCNC: 10.2 MG/DL (ref 8.7–10.5)
CHLORIDE SERPL-SCNC: 102 MMOL/L (ref 95–110)
CLARITY UR: CLEAR
CO2 SERPL-SCNC: 26 MMOL/L (ref 23–29)
COLOR UR: YELLOW
CREAT SERPL-MCNC: 0.9 MG/DL (ref 0.5–1.4)
DIFFERENTIAL METHOD: ABNORMAL
EOSINOPHIL # BLD AUTO: 0.1 K/UL (ref 0–0.5)
EOSINOPHIL NFR BLD: 1.5 % (ref 0–8)
ERYTHROCYTE [DISTWIDTH] IN BLOOD BY AUTOMATED COUNT: 12 % (ref 11.5–14.5)
EST. GFR  (AFRICAN AMERICAN): >60 ML/MIN/1.73 M^2
EST. GFR  (NON AFRICAN AMERICAN): 57 ML/MIN/1.73 M^2
GLUCOSE SERPL-MCNC: 97 MG/DL (ref 70–110)
GLUCOSE UR QL STRIP: NEGATIVE
HCT VFR BLD AUTO: 38.5 % (ref 37–48.5)
HGB BLD-MCNC: 12.9 G/DL (ref 12–16)
HGB UR QL STRIP: ABNORMAL
IMM GRANULOCYTES # BLD AUTO: 0.04 K/UL (ref 0–0.04)
IMM GRANULOCYTES NFR BLD AUTO: 0.7 % (ref 0–0.5)
KETONES UR QL STRIP: NEGATIVE
LEUKOCYTE ESTERASE UR QL STRIP: NEGATIVE
LYMPHOCYTES # BLD AUTO: 0.9 K/UL (ref 1–4.8)
LYMPHOCYTES NFR BLD: 16.6 % (ref 18–48)
MCH RBC QN AUTO: 32.4 PG (ref 27–31)
MCHC RBC AUTO-ENTMCNC: 33.5 G/DL (ref 32–36)
MCV RBC AUTO: 97 FL (ref 82–98)
MICROSCOPIC COMMENT: NORMAL
MONOCYTES # BLD AUTO: 0.5 K/UL (ref 0.3–1)
MONOCYTES NFR BLD: 8.3 % (ref 4–15)
NEUTROPHILS # BLD AUTO: 3.9 K/UL (ref 1.8–7.7)
NEUTROPHILS NFR BLD: 72.2 % (ref 38–73)
NITRITE UR QL STRIP: NEGATIVE
NRBC BLD-RTO: 0 /100 WBC
PH UR STRIP: 7 [PH] (ref 5–8)
PLATELET # BLD AUTO: 150 K/UL (ref 150–450)
PMV BLD AUTO: 9 FL (ref 9.2–12.9)
POTASSIUM SERPL-SCNC: 4.2 MMOL/L (ref 3.5–5.1)
PROT SERPL-MCNC: 6.8 G/DL (ref 6–8.4)
PROT UR QL STRIP: NEGATIVE
RBC # BLD AUTO: 3.98 M/UL (ref 4–5.4)
RBC #/AREA URNS HPF: 3 /HPF (ref 0–4)
SODIUM SERPL-SCNC: 139 MMOL/L (ref 136–145)
SP GR UR STRIP: <=1.005 (ref 1–1.03)
URN SPEC COLLECT METH UR: ABNORMAL
UROBILINOGEN UR STRIP-ACNC: NEGATIVE EU/DL
WBC # BLD AUTO: 5.41 K/UL (ref 3.9–12.7)

## 2021-05-29 PROCEDURE — 99285 EMERGENCY DEPT VISIT HI MDM: CPT | Mod: 25

## 2021-05-29 PROCEDURE — 93010 EKG 12-LEAD: ICD-10-PCS | Mod: ,,, | Performed by: INTERNAL MEDICINE

## 2021-05-29 PROCEDURE — 80053 COMPREHEN METABOLIC PANEL: CPT | Performed by: EMERGENCY MEDICINE

## 2021-05-29 PROCEDURE — 36415 COLL VENOUS BLD VENIPUNCTURE: CPT | Performed by: EMERGENCY MEDICINE

## 2021-05-29 PROCEDURE — 85025 COMPLETE CBC W/AUTO DIFF WBC: CPT | Performed by: EMERGENCY MEDICINE

## 2021-05-29 PROCEDURE — 93005 ELECTROCARDIOGRAM TRACING: CPT

## 2021-05-29 PROCEDURE — 81000 URINALYSIS NONAUTO W/SCOPE: CPT | Performed by: EMERGENCY MEDICINE

## 2021-05-29 PROCEDURE — 93010 ELECTROCARDIOGRAM REPORT: CPT | Mod: ,,, | Performed by: INTERNAL MEDICINE

## 2021-05-29 PROCEDURE — P9612 CATHETERIZE FOR URINE SPEC: HCPCS

## 2021-05-29 PROCEDURE — 83880 ASSAY OF NATRIURETIC PEPTIDE: CPT | Performed by: EMERGENCY MEDICINE

## 2021-05-29 RX ORDER — OMEPRAZOLE 20 MG/1
20 CAPSULE, DELAYED RELEASE ORAL DAILY
Qty: 90 CAPSULE | Refills: 3 | Status: CANCELLED | OUTPATIENT
Start: 2021-05-29

## 2021-06-02 DIAGNOSIS — K21.9 GASTROESOPHAGEAL REFLUX DISEASE: ICD-10-CM

## 2021-06-02 RX ORDER — OMEPRAZOLE 20 MG/1
20 CAPSULE, DELAYED RELEASE ORAL 2 TIMES DAILY
Qty: 180 CAPSULE | Refills: 1 | Status: SHIPPED | OUTPATIENT
Start: 2021-06-02 | End: 2021-11-23 | Stop reason: SDUPTHER

## 2021-06-02 RX ORDER — FLUTICASONE PROPIONATE 50 MCG
1 SPRAY, SUSPENSION (ML) NASAL DAILY
Qty: 16 G | Refills: 11 | Status: ON HOLD | OUTPATIENT
Start: 2021-06-02 | End: 2023-02-07

## 2021-06-09 ENCOUNTER — OFFICE VISIT (OUTPATIENT)
Dept: FAMILY MEDICINE | Facility: CLINIC | Age: 86
End: 2021-06-09
Payer: MEDICARE

## 2021-06-09 VITALS
BODY MASS INDEX: 23.14 KG/M2 | HEART RATE: 56 BPM | OXYGEN SATURATION: 99 % | SYSTOLIC BLOOD PRESSURE: 120 MMHG | WEIGHT: 103.19 LBS | DIASTOLIC BLOOD PRESSURE: 74 MMHG

## 2021-06-09 DIAGNOSIS — N32.81 OAB (OVERACTIVE BLADDER): Primary | ICD-10-CM

## 2021-06-09 DIAGNOSIS — F22 DELUSIONS: ICD-10-CM

## 2021-06-09 DIAGNOSIS — I10 ESSENTIAL HYPERTENSION: ICD-10-CM

## 2021-06-09 DIAGNOSIS — R39.9 UTI SYMPTOMS: ICD-10-CM

## 2021-06-09 LAB
BILIRUB UR QL STRIP: NEGATIVE
GLUCOSE UR QL STRIP: NEGATIVE
KETONES UR QL STRIP: NEGATIVE
LEUKOCYTE ESTERASE UR QL STRIP: NEGATIVE
PH, POC UA: 6
POC BLOOD, URINE: POSITIVE
POC NITRATES, URINE: NEGATIVE
PROT UR QL STRIP: NEGATIVE
SP GR UR STRIP: 1.01 (ref 1–1.03)
UROBILINOGEN UR STRIP-ACNC: ABNORMAL (ref 0.1–1.1)

## 2021-06-09 PROCEDURE — 81003 URINALYSIS AUTO W/O SCOPE: CPT | Mod: QW,S$GLB,, | Performed by: NURSE PRACTITIONER

## 2021-06-09 PROCEDURE — 99213 OFFICE O/P EST LOW 20 MIN: CPT | Mod: 25,S$GLB,, | Performed by: NURSE PRACTITIONER

## 2021-06-09 PROCEDURE — 81003 POCT URINALYSIS, DIPSTICK, AUTOMATED, W/O SCOPE: ICD-10-PCS | Mod: QW,S$GLB,, | Performed by: NURSE PRACTITIONER

## 2021-06-09 PROCEDURE — 99213 PR OFFICE/OUTPT VISIT, EST, LEVL III, 20-29 MIN: ICD-10-PCS | Mod: 25,S$GLB,, | Performed by: NURSE PRACTITIONER

## 2021-06-09 RX ORDER — MIRABEGRON 25 MG/1
25 TABLET, FILM COATED, EXTENDED RELEASE ORAL DAILY
Qty: 28 TABLET | Refills: 0 | COMMUNITY
Start: 2021-06-09 | End: 2021-06-24 | Stop reason: SDUPTHER

## 2021-06-11 LAB — BACTERIA UR CULT: ABNORMAL

## 2021-06-13 RX ORDER — NITROFURANTOIN 25; 75 MG/1; MG/1
100 CAPSULE ORAL 2 TIMES DAILY
Qty: 14 CAPSULE | Refills: 0 | Status: SHIPPED | OUTPATIENT
Start: 2021-06-13 | End: 2021-06-20

## 2021-06-14 ENCOUNTER — TELEPHONE (OUTPATIENT)
Dept: FAMILY MEDICINE | Facility: CLINIC | Age: 86
End: 2021-06-14

## 2021-06-24 ENCOUNTER — OFFICE VISIT (OUTPATIENT)
Dept: UROLOGY | Facility: CLINIC | Age: 86
End: 2021-06-24
Payer: MEDICARE

## 2021-06-24 VITALS
HEART RATE: 68 BPM | WEIGHT: 103.19 LBS | RESPIRATION RATE: 18 BRPM | DIASTOLIC BLOOD PRESSURE: 86 MMHG | HEIGHT: 56 IN | BODY MASS INDEX: 23.21 KG/M2 | SYSTOLIC BLOOD PRESSURE: 179 MMHG

## 2021-06-24 DIAGNOSIS — K58.2 IRRITABLE BOWEL SYNDROME WITH BOTH CONSTIPATION AND DIARRHEA: ICD-10-CM

## 2021-06-24 DIAGNOSIS — N32.81 OAB (OVERACTIVE BLADDER): Primary | ICD-10-CM

## 2021-06-24 DIAGNOSIS — I10 ESSENTIAL HYPERTENSION: ICD-10-CM

## 2021-06-24 PROCEDURE — 99204 OFFICE O/P NEW MOD 45 MIN: CPT | Mod: S$PBB,,, | Performed by: STUDENT IN AN ORGANIZED HEALTH CARE EDUCATION/TRAINING PROGRAM

## 2021-06-24 PROCEDURE — 99999 PR PBB SHADOW E&M-EST. PATIENT-LVL IV: CPT | Mod: PBBFAC,,, | Performed by: STUDENT IN AN ORGANIZED HEALTH CARE EDUCATION/TRAINING PROGRAM

## 2021-06-24 PROCEDURE — 99214 OFFICE O/P EST MOD 30 MIN: CPT | Mod: PBBFAC,PN | Performed by: STUDENT IN AN ORGANIZED HEALTH CARE EDUCATION/TRAINING PROGRAM

## 2021-06-24 PROCEDURE — 99204 PR OFFICE/OUTPT VISIT, NEW, LEVL IV, 45-59 MIN: ICD-10-PCS | Mod: S$PBB,,, | Performed by: STUDENT IN AN ORGANIZED HEALTH CARE EDUCATION/TRAINING PROGRAM

## 2021-06-24 PROCEDURE — 99999 PR PBB SHADOW E&M-EST. PATIENT-LVL IV: ICD-10-PCS | Mod: PBBFAC,,, | Performed by: STUDENT IN AN ORGANIZED HEALTH CARE EDUCATION/TRAINING PROGRAM

## 2021-06-24 RX ORDER — MIRABEGRON 25 MG/1
25 TABLET, FILM COATED, EXTENDED RELEASE ORAL DAILY
Qty: 30 TABLET | Refills: 11 | Status: SHIPPED | OUTPATIENT
Start: 2021-06-24 | End: 2021-07-29

## 2021-07-12 RX ORDER — QUETIAPINE FUMARATE 50 MG/1
50 TABLET, FILM COATED ORAL NIGHTLY
Qty: 30 TABLET | Refills: 5 | Status: SHIPPED | OUTPATIENT
Start: 2021-07-12 | End: 2023-11-25 | Stop reason: DRUGHIGH

## 2021-07-15 DIAGNOSIS — M15.0 PRIMARY GENERALIZED (OSTEO)ARTHRITIS: ICD-10-CM

## 2021-07-15 RX ORDER — TRAMADOL HYDROCHLORIDE 50 MG/1
50 TABLET ORAL EVERY 12 HOURS PRN
Qty: 60 TABLET | Refills: 2 | Status: SHIPPED | OUTPATIENT
Start: 2021-07-15 | End: 2022-01-10 | Stop reason: SDUPTHER

## 2021-07-27 ENCOUNTER — TELEPHONE (OUTPATIENT)
Dept: FAMILY MEDICINE | Facility: CLINIC | Age: 86
End: 2021-07-27

## 2021-07-28 ENCOUNTER — TELEPHONE (OUTPATIENT)
Dept: FAMILY MEDICINE | Facility: CLINIC | Age: 86
End: 2021-07-28

## 2021-07-29 ENCOUNTER — OFFICE VISIT (OUTPATIENT)
Dept: FAMILY MEDICINE | Facility: CLINIC | Age: 86
End: 2021-07-29
Payer: MEDICARE

## 2021-07-29 VITALS
HEART RATE: 76 BPM | HEIGHT: 56 IN | DIASTOLIC BLOOD PRESSURE: 76 MMHG | BODY MASS INDEX: 23.17 KG/M2 | WEIGHT: 103 LBS | SYSTOLIC BLOOD PRESSURE: 128 MMHG

## 2021-07-29 DIAGNOSIS — I10 ESSENTIAL HYPERTENSION: ICD-10-CM

## 2021-07-29 DIAGNOSIS — F02.80 LATE ONSET ALZHEIMER'S DISEASE WITHOUT BEHAVIORAL DISTURBANCE: Primary | ICD-10-CM

## 2021-07-29 DIAGNOSIS — R42 VERTIGO: ICD-10-CM

## 2021-07-29 DIAGNOSIS — M15.0 PRIMARY GENERALIZED (OSTEO)ARTHRITIS: ICD-10-CM

## 2021-07-29 DIAGNOSIS — N32.81 OAB (OVERACTIVE BLADDER): ICD-10-CM

## 2021-07-29 DIAGNOSIS — I35.0 AORTIC STENOSIS WITH TRILEAFLET VALVE: ICD-10-CM

## 2021-07-29 DIAGNOSIS — G30.1 LATE ONSET ALZHEIMER'S DISEASE WITHOUT BEHAVIORAL DISTURBANCE: Primary | ICD-10-CM

## 2021-07-29 DIAGNOSIS — E78.2 MIXED HYPERLIPIDEMIA: ICD-10-CM

## 2021-07-29 DIAGNOSIS — N18.31 STAGE 3A CHRONIC KIDNEY DISEASE: ICD-10-CM

## 2021-07-29 PROCEDURE — 99214 PR OFFICE/OUTPT VISIT, EST, LEVL IV, 30-39 MIN: ICD-10-PCS | Mod: S$GLB,,, | Performed by: FAMILY MEDICINE

## 2021-07-29 PROCEDURE — 99214 OFFICE O/P EST MOD 30 MIN: CPT | Mod: S$GLB,,, | Performed by: FAMILY MEDICINE

## 2021-07-29 RX ORDER — TOLTERODINE 2 MG/1
2 CAPSULE, EXTENDED RELEASE ORAL DAILY
Qty: 30 CAPSULE | Refills: 3 | Status: SHIPPED | OUTPATIENT
Start: 2021-07-29 | End: 2021-11-29 | Stop reason: SDUPTHER

## 2021-07-30 ENCOUNTER — PATIENT MESSAGE (OUTPATIENT)
Dept: FAMILY MEDICINE | Facility: CLINIC | Age: 86
End: 2021-07-30

## 2021-08-23 ENCOUNTER — TELEPHONE (OUTPATIENT)
Dept: FAMILY MEDICINE | Facility: CLINIC | Age: 86
End: 2021-08-23

## 2021-08-27 DIAGNOSIS — R42 VERTIGO: ICD-10-CM

## 2021-08-27 RX ORDER — MECLIZINE HCL 12.5 MG 12.5 MG/1
6.25 TABLET ORAL 2 TIMES DAILY PRN
Qty: 90 TABLET | Refills: 1 | Status: SHIPPED | OUTPATIENT
Start: 2021-08-27 | End: 2021-11-23 | Stop reason: SDUPTHER

## 2021-11-10 ENCOUNTER — TELEPHONE (OUTPATIENT)
Dept: FAMILY MEDICINE | Facility: CLINIC | Age: 86
End: 2021-11-10
Payer: MEDICARE

## 2021-11-23 DIAGNOSIS — K21.9 GASTROESOPHAGEAL REFLUX DISEASE: ICD-10-CM

## 2021-11-23 DIAGNOSIS — R42 VERTIGO: ICD-10-CM

## 2021-11-23 RX ORDER — MECLIZINE HCL 12.5 MG 12.5 MG/1
6.25 TABLET ORAL 2 TIMES DAILY PRN
Qty: 90 TABLET | Refills: 1 | Status: SHIPPED | OUTPATIENT
Start: 2021-11-23 | End: 2022-05-18 | Stop reason: SDUPTHER

## 2021-11-23 RX ORDER — OMEPRAZOLE 20 MG/1
20 CAPSULE, DELAYED RELEASE ORAL 2 TIMES DAILY
Qty: 180 CAPSULE | Refills: 1 | Status: SHIPPED | OUTPATIENT
Start: 2021-11-23 | End: 2022-05-18 | Stop reason: SDUPTHER

## 2021-11-29 DIAGNOSIS — N32.81 OAB (OVERACTIVE BLADDER): ICD-10-CM

## 2021-11-30 RX ORDER — TOLTERODINE 2 MG/1
2 CAPSULE, EXTENDED RELEASE ORAL DAILY
Qty: 30 CAPSULE | Refills: 3 | Status: SHIPPED | OUTPATIENT
Start: 2021-11-30 | End: 2022-04-03 | Stop reason: SDUPTHER

## 2021-12-11 NOTE — TELEPHONE ENCOUNTER
Daughter in law called back. She said patient used the last bit of soluation of eye drop and she heard your not suppose to. Eye is red. Not itching. Also having some dizziness. Not able to come on Monday so made appt for Tuesday with Margo. But I advised that they should probably go to an urgent care over the weekend if it worsens    OFFICE VISIT      Patient: Carroll Camara Date of Service: 2021   : 1958 MRN: [id number]     SUBJECTIVE:     Chief Complaint   Patient presents with   â¢ Follow-up     6 month check up    â¢ Imm/Inj     influenza vaccine    â¢ Other     pt scheduled for booster 2021 in [facility]      Patient prefers to speak in [facility] and declines      HISTORY OF PRESENT ILLNESS:  Carroll Camara is a 61year old male who presents today for diabetes follow     Did not bring in his sugar log  Recalls sugar of 131 this morning  Notes occasional high sugar, but does not recall numbers  Believes sugar related to consuming higher CHO load on certain occasions  Taking medication as directed and not missing doses   Denies any  infections with Quique Reyes for exercise about 1-2 times per week   Denies any vision changes or loss and UTD on diabetic eye exam   Denies any foot ulcers or paresthesias     Recently had skin tag removal on left inner thigh  Prescribed Bactroban by his derm and applying as directed  Wishes looked at today to ensure no infection  Covers with Band-Aid during day to prevent friction/rubbing  No discharge or erythema     Wishes Shingrix and flu vaccine today   Has appt for Covid vaccine on      Not checking his BP at home  Denies CP, TONY, or leg edema  No cough with his ACE-I  Needs refill on his medication  Tries to eat healthy, balanced diet     REVIEW OF SYSTEMS:  Review of Systems   Constitutional: Negative. HENT: Negative. Eyes: Negative. Respiratory: Negative. Cardiovascular: Negative. Gastrointestinal: Negative. Endocrine: Negative for cold intolerance and heat intolerance. Genitourinary: Negative. Musculoskeletal: Negative. Skin: Positive for wound. Negative for color change. Neurological: Negative. Psychiatric/Behavioral: Negative.         MEDICATIONS:  Current Outpatient Medications   Medication Sig   â¢ albuterol 108 (90 Base) MCG/ACT inhaler USE 2 INHALATIONS ORALLY   EVERY 4 HOURS AS NEEDED FORWHEEZING   â¢ mupirocin (BACTROBAN) 2 % ointment APPLY TOPICALLY TO THE AFFECTED AREA THREE TIMES DAILY FOR 5 DAYS   â¢ glyBURIDE (DIABETA) 5 MG tablet Take one tablet by mouth once daily with breakfast for diabetes   â¢ atorvastatin (LIPITOR) 20 MG tablet Take one tablet by mouth once daily for cholesterol   â¢ aspirin (ECOTRIN) 81 MG EC tablet Take 81 mg by mouth daily. â¢ empagliflozin (JARDIANCE) 25 MG tablet Take one tablet by mouth once daily with breakfast for diabetes   â¢ lisinopril (ZESTRIL) 40 MG tablet Take one tablet by mouth once daily for high blood pressure   â¢ metformin (GLUCOPHAGE) 1000 MG tablet TAKE 1 TABLET TWICE A DAY  WITH BREAKFAST AND DINNER FOR DIABETES   â¢ montelukast (SINGULAIR) 10 MG tablet TAKE ONE TABLET BY MOUTH ONCE DAILY FOR ALLERGIES   â¢ Multiple Vitamins-Minerals (Multivitamin Adults 50+) Tab Take 1 tablet by mouth daily. â¢ fexofenadine (ALLEGRA) 180 MG tablet Take 1 tablet by mouth daily. In the morning at 8 am     No current facility-administered medications for this visit.         ALLERGIES:  ALLERGIES:   Allergen Reactions   â¢ Pneumococcal Vac Polyvalent Other (See Comments)     unknown       PAST MEDICAL HISTORY:  Past Medical History:   Diagnosis Date   â¢ Choledocholithiasis    â¢ Diabetes mellitus (CMS/HCC)    â¢ Gallstone pancreatitis    â¢ Herpesviral infection of urogenital system    â¢ HSV-2 (herpes simplex virus 2) infection    â¢ Hyperlipidemia    â¢ Hypertension    â¢ Hyponatremia    â¢ Ileus, gallstone (CMS/HCC)    â¢ Mild persistent allergic asthma    â¢ Onychomycosis of toenail    â¢ PND (post-nasal drip)    â¢ Proteinuria    â¢ Seasonal allergies        PAST SURGICAL HISTORY:  Past Surgical History:   Procedure Laterality Date   â¢ Cholecystectomy     â¢ Colonoscopy  04/28/2018    Dr Felicia El, repeat 10 years    â¢ Ercp         FAMILY HISTORY:  Family History   Problem Relation Age of Onset   â¢ Diabetes Mother    â¢ Hypertension Mother "  â¢ Alcohol Abuse Father    â¢ Diabetes Father    â¢ Other Father         cerebral embolism       SOCIAL HISTORY:  Social History     Tobacco Use   â¢ Smoking status: Never Smoker   â¢ Smokeless tobacco: Never Used   Vaping Use   â¢ Vaping Use: never used   Substance Use Topics   â¢ Alcohol use: Not Currently   â¢ Drug use: Never         OBJECTIVE:   PHYSICAL EXAM :  Vital Signs:    Visit Vitals  /76 (BP Location: LUE - Left upper extremity, Patient Position: Sitting, Cuff Size: Regular)   Pulse 78   Temp 97.7 Â°F (36.5 Â°C) (Oral)   Ht 5' 8"" (1.727 m)   Wt 68.7 kg (151 lb 7.3 oz)   SpO2 98%   BMI 23.03 kg/mÂ²     Vital signs and nursing note reviewed    Gen:  A&Ox3, NAD, well appearing  HEENT:  NC, AT, no conjunctival discharge and sclera normal  Chest:  No w/r/r. Normal respiratory effort and rate  CV:  RRR, S1, S2, No m/r/g. No pedal edema. No carotid bruits  Abd:  Soft, NT, ND, NABS  Ext:  No edema. No cyanosis  Neuro:  Speech clear. Mentation is normal.  Gait normal.  Normal monofilament exam to feet bilaterally. Skin:  Not diaphoretic. Normal skin tone. No rash. Left inner thigh with clean based shave biopsied skin lesion approximately 1 x 1.5 cm. Healing by secondary intention and no discharge or surrounding erythema. Feet without ulceration or skin breakdown   Psych:  Calm and pleasant affect. Speech not pressured. Assessment AND PLAN:   This is a 61year old year-old male who presents with:    Dyslipidemia associated with type 2 diabetes mellitus (CMS/[facility])  - GLYCOHEMOGLOBIN; Future  - COMPREHENSIVE METABOLIC PANEL; Future  - MICROALBUMIN URINE RANDOM; Future  - LIPID PANEL WITH REFLEX; Future  - CBC WITH DIFFERENTIAL; Future  - CBC WITH DIFFERENTIAL  - LIPID PANEL WITH REFLEX  - MICROALBUMIN URINE RANDOM  - COMPREHENSIVE METABOLIC PANEL  - GLYCOHEMOGLOBIN    Mixed hyperlipidemia  - COMPREHENSIVE METABOLIC PANEL;  Future  - LIPID PANEL WITH REFLEX; Future  - LIPID PANEL WITH REFLEX  - " COMPREHENSIVE METABOLIC PANEL    Need for influenza vaccination  - INFLUENZA QUADRIVALENT SPLIT PRES FREE 0.5 ML VACC, IM (FLULAVAL,FLUARIX,FLUZONE)    Need for shingles vaccine  - ZOSTER SHINGLES RECOMBINANT ADJUVANTED IM(SHINGRIX)    Essential hypertension    Visit for wound check    HTN well controlled  Home BP monitoring 1-2 times per month advised  Recommended goal of < 130/80 and currently at goal  Heart healthy, low glycemic diet recommended  Encouraged to increase exercise to 30 minutes 5 days per week  Healthy BMI maintenance encouraged  Check sugars TIW and bring log to each visit  Advised AIC goal of 6-7% and fasting sugar of   Daily foot checks and annual (or more frequent if recommended by ophthalmologist) eye exam advised in light of diabetes. Continue statin therapy and asa in light of T2DM for primary prevention   Informed that wound is healing well by secondary intention in layman's terms and encouraged to continue wound care plan as directed by his dermatologist  Monitor for S&S of infection at home  COVID precautions advised and booster vaccine recommended per guidelines. Universal masking recommended in light of current circulating COVID variants. Time allowed for questions and expression of concerns. Questions answered to pt's satisfaction. RTC in 6 months for PCP visit and in 2-3 months for Shingrix #2      Instructions provided as documented in the AVS.      The patient indicated understanding of the diagnosis and agreed with the plan of care.

## 2022-01-04 ENCOUNTER — PATIENT MESSAGE (OUTPATIENT)
Dept: FAMILY MEDICINE | Facility: CLINIC | Age: 87
End: 2022-01-04
Payer: MEDICARE

## 2022-01-04 ENCOUNTER — TELEPHONE (OUTPATIENT)
Dept: FAMILY MEDICINE | Facility: CLINIC | Age: 87
End: 2022-01-04
Payer: MEDICARE

## 2022-01-04 RX ORDER — MUPIROCIN 20 MG/G
OINTMENT TOPICAL 2 TIMES DAILY
Qty: 30 G | Refills: 2 | Status: SHIPPED | OUTPATIENT
Start: 2022-01-04 | End: 2022-09-11 | Stop reason: SDUPTHER

## 2022-01-04 NOTE — TELEPHONE ENCOUNTER
----- Message from Nohemy Lujan sent at 1/4/2022 11:05 AM CST -----  Patient daughter in law (Batsheva)called and stated that the patient has a wound on her bottom and it's not healing she stated that she has put in creams and other things on the site but its just not getting better please give her a call at [phone]

## 2022-01-04 NOTE — TELEPHONE ENCOUNTER
Spoke with Batsheva, she is going to send a picture of the wound when she gets home. States it was better and now its starting up again. Not able to bring her into the office.

## 2022-01-10 DIAGNOSIS — M15.0 PRIMARY GENERALIZED (OSTEO)ARTHRITIS: ICD-10-CM

## 2022-01-11 RX ORDER — TRAMADOL HYDROCHLORIDE 50 MG/1
50 TABLET ORAL EVERY 12 HOURS PRN
Qty: 60 TABLET | Refills: 2 | Status: SHIPPED | OUTPATIENT
Start: 2022-01-11 | End: 2022-05-05 | Stop reason: SDUPTHER

## 2022-01-21 ENCOUNTER — TELEPHONE (OUTPATIENT)
Dept: FAMILY MEDICINE | Facility: CLINIC | Age: 87
End: 2022-01-21
Payer: MEDICARE

## 2022-01-21 DIAGNOSIS — I10 ESSENTIAL HYPERTENSION: ICD-10-CM

## 2022-01-21 DIAGNOSIS — E78.2 MIXED HYPERLIPIDEMIA: ICD-10-CM

## 2022-01-21 DIAGNOSIS — Z79.899 ENCOUNTER FOR LONG-TERM (CURRENT) USE OF OTHER MEDICATIONS: Primary | ICD-10-CM

## 2022-01-21 DIAGNOSIS — N18.31 STAGE 3A CHRONIC KIDNEY DISEASE: ICD-10-CM

## 2022-01-30 LAB
ALBUMIN SERPL-MCNC: 4 G/DL (ref 3.6–5.1)
ALBUMIN/CREAT UR: 29 MCG/MG CREAT
ALBUMIN/GLOB SERPL: 1.3 (CALC) (ref 1–2.5)
ALP SERPL-CCNC: 100 U/L (ref 37–153)
ALT SERPL-CCNC: 11 U/L (ref 6–29)
APPEARANCE UR: ABNORMAL
AST SERPL-CCNC: 22 U/L (ref 10–35)
BACTERIA #/AREA URNS HPF: ABNORMAL /HPF
BACTERIA UR CULT: ABNORMAL
BACTERIA UR CULT: ABNORMAL
BASOPHILS # BLD AUTO: 31 CELLS/UL (ref 0–200)
BASOPHILS NFR BLD AUTO: 0.4 %
BILIRUB SERPL-MCNC: 1 MG/DL (ref 0.2–1.2)
BILIRUB UR QL STRIP: NEGATIVE
BUN SERPL-MCNC: 28 MG/DL (ref 7–25)
BUN/CREAT SERPL: 26 (CALC) (ref 6–22)
CALCIUM SERPL-MCNC: 10.2 MG/DL (ref 8.6–10.4)
CAOX CRY #/AREA URNS HPF: ABNORMAL /HPF
CHLORIDE SERPL-SCNC: 103 MMOL/L (ref 98–110)
CHOLEST SERPL-MCNC: 256 MG/DL
CHOLEST/HDLC SERPL: 2.5 (CALC)
CO2 SERPL-SCNC: 34 MMOL/L (ref 20–32)
COLOR UR: YELLOW
CREAT SERPL-MCNC: 1.07 MG/DL (ref 0.6–0.88)
CREAT UR-MCNC: 99 MG/DL (ref 20–275)
EOSINOPHIL # BLD AUTO: 109 CELLS/UL (ref 15–500)
EOSINOPHIL NFR BLD AUTO: 1.4 %
ERYTHROCYTE [DISTWIDTH] IN BLOOD BY AUTOMATED COUNT: 12 % (ref 11–15)
GLOBULIN SER CALC-MCNC: 3.2 G/DL (CALC) (ref 1.9–3.7)
GLUCOSE SERPL-MCNC: 79 MG/DL (ref 65–99)
GLUCOSE UR QL STRIP: NEGATIVE
HCT VFR BLD AUTO: 40.2 % (ref 35–45)
HDLC SERPL-MCNC: 101 MG/DL
HGB BLD-MCNC: 13.6 G/DL (ref 11.7–15.5)
HGB UR QL STRIP: ABNORMAL
HYALINE CASTS #/AREA URNS LPF: ABNORMAL /LPF
KETONES UR QL STRIP: NEGATIVE
LDLC SERPL CALC-MCNC: 137 MG/DL (CALC)
LEUKOCYTE ESTERASE UR QL STRIP: ABNORMAL
LYMPHOCYTES # BLD AUTO: 1466 CELLS/UL (ref 850–3900)
LYMPHOCYTES NFR BLD AUTO: 18.8 %
MCH RBC QN AUTO: 32.9 PG (ref 27–33)
MCHC RBC AUTO-ENTMCNC: 33.8 G/DL (ref 32–36)
MCV RBC AUTO: 97.3 FL (ref 80–100)
MICROALBUMIN UR-MCNC: 2.9 MG/DL
MONOCYTES # BLD AUTO: 562 CELLS/UL (ref 200–950)
MONOCYTES NFR BLD AUTO: 7.2 %
NEUTROPHILS # BLD AUTO: 5632 CELLS/UL (ref 1500–7800)
NEUTROPHILS NFR BLD AUTO: 72.2 %
NITRITE UR QL STRIP: POSITIVE
NONHDLC SERPL-MCNC: 155 MG/DL (CALC)
PH UR STRIP: 5.5 [PH] (ref 5–8)
PLATELET # BLD AUTO: 169 THOUSAND/UL (ref 140–400)
PMV BLD REES-ECKER: 10.7 FL (ref 7.5–12.5)
POTASSIUM SERPL-SCNC: 4.2 MMOL/L (ref 3.5–5.3)
PROT SERPL-MCNC: 7.2 G/DL (ref 6.1–8.1)
PROT UR QL STRIP: ABNORMAL
RBC # BLD AUTO: 4.13 MILLION/UL (ref 3.8–5.1)
RBC #/AREA URNS HPF: ABNORMAL /HPF
SODIUM SERPL-SCNC: 142 MMOL/L (ref 135–146)
SP GR UR STRIP: 1.02 (ref 1–1.03)
SQUAMOUS #/AREA URNS HPF: ABNORMAL /HPF
TRIGL SERPL-MCNC: 81 MG/DL
TSH SERPL-ACNC: 1.83 MIU/L (ref 0.4–4.5)
WBC # BLD AUTO: 7.8 THOUSAND/UL (ref 3.8–10.8)
WBC #/AREA URNS HPF: ABNORMAL /HPF

## 2022-01-30 NOTE — TELEPHONE ENCOUNTER
Call patient.  Urine test shows a possible E coli bladder infection, if she is having symptoms,we can add Cipro 500 mg b.i.d. x1 week.

## 2022-01-31 ENCOUNTER — TELEPHONE (OUTPATIENT)
Dept: FAMILY MEDICINE | Facility: CLINIC | Age: 87
End: 2022-01-31
Payer: MEDICARE

## 2022-01-31 NOTE — TELEPHONE ENCOUNTER
----- Message from Ryan Hurtado MD sent at 1/30/2022  2:22 PM CST -----  Call patient.  Urine test shows a possible E coli bladder infection, if she is having symptoms,we can add Cipro 500 mg b.i.d. x1 week.

## 2022-02-03 ENCOUNTER — OFFICE VISIT (OUTPATIENT)
Dept: FAMILY MEDICINE | Facility: CLINIC | Age: 87
End: 2022-02-03
Payer: MEDICARE

## 2022-02-03 VITALS
WEIGHT: 97 LBS | BODY MASS INDEX: 21.82 KG/M2 | HEART RATE: 68 BPM | DIASTOLIC BLOOD PRESSURE: 60 MMHG | HEIGHT: 56 IN | SYSTOLIC BLOOD PRESSURE: 100 MMHG

## 2022-02-03 DIAGNOSIS — E78.2 MIXED HYPERLIPIDEMIA: ICD-10-CM

## 2022-02-03 DIAGNOSIS — I35.0 AORTIC STENOSIS WITH TRILEAFLET VALVE: ICD-10-CM

## 2022-02-03 DIAGNOSIS — R42 VERTIGO: ICD-10-CM

## 2022-02-03 DIAGNOSIS — G40.909 NONINTRACTABLE EPILEPSY WITHOUT STATUS EPILEPTICUS, UNSPECIFIED EPILEPSY TYPE: ICD-10-CM

## 2022-02-03 DIAGNOSIS — Z87.81 HISTORY OF VERTEBRAL COMPRESSION FRACTURE: ICD-10-CM

## 2022-02-03 DIAGNOSIS — K58.2 IRRITABLE BOWEL SYNDROME WITH BOTH CONSTIPATION AND DIARRHEA: ICD-10-CM

## 2022-02-03 DIAGNOSIS — I10 PRIMARY HYPERTENSION: ICD-10-CM

## 2022-02-03 DIAGNOSIS — N30.00 ACUTE CYSTITIS WITHOUT HEMATURIA: Primary | ICD-10-CM

## 2022-02-03 DIAGNOSIS — M41.9 KYPHOSCOLIOSIS: ICD-10-CM

## 2022-02-03 PROCEDURE — 99214 PR OFFICE/OUTPT VISIT, EST, LEVL IV, 30-39 MIN: ICD-10-PCS | Mod: S$GLB,,, | Performed by: FAMILY MEDICINE

## 2022-02-03 PROCEDURE — 99214 OFFICE O/P EST MOD 30 MIN: CPT | Mod: S$GLB,,, | Performed by: FAMILY MEDICINE

## 2022-02-03 RX ORDER — DONEPEZIL HYDROCHLORIDE 5 MG/1
5 TABLET, ORALLY DISINTEGRATING ORAL NIGHTLY
COMMUNITY
End: 2023-11-25 | Stop reason: DRUGHIGH

## 2022-02-03 RX ORDER — CIPROFLOXACIN 250 MG/1
250 TABLET, FILM COATED ORAL 2 TIMES DAILY
Qty: 20 TABLET | Refills: 0 | Status: SHIPPED | OUTPATIENT
Start: 2022-02-03 | End: 2023-02-07

## 2022-02-03 NOTE — PROGRESS NOTES
"  SUBJECTIVE:    Patient ID: Angelique Hamilton is a 88 y.o. female.    Chief Complaint: Follow-up (No bottles // Lab-results //discomfort to urination //abc)    88-year-old female here for a follow-up. She is present with her daughter-in-law Batsheva who is helping with history. The patient currently lives with her son and daughter-in-law. She stays active by walking up and down a long hallway at their home. She also helps with some house work such as sweeping. She states that she is eating well as Batsheva is a very good cook. She also drinks broth to increase her fluid intake.    Patient states that she is feeling "slow" today. Her blood pressure was low at 100/60.     Patient also has concerns for "burning" when urinating. She says that this is "new" and that her bladder "seems worse." She has come in and was treated for UTIs before. She denies any changes in color, cloudiness, or bubbles in her urine. She denies blood in her urine as well. She states that she does not feel any pain or burning outside of when urinating. She denies redness and itchiness of her genital area. She denies fevers, chills, dyspnea, and dizziness.    She also mentions back pain 2/2 to scoliosis and osteoporosis. She uses a walker to get around. She states that she has to bend to use the walker and that that bothers her back. She takes tramadol at night for pain.    She recently had a burning sore on her buttocks that was treated and resolved.    The patient previously suffered from vertigo. She hasn't had any symptoms recently (denies dizziness, light-headedness, headaches). She is on meclizine which has improved her symptoms.    The patient sees neurology Dr. Reilly for late on-set Alzheimers and is on donepezil. Her daughter-in-law states that she has seen improvement - her concentration has improved and she is less confused.     She also sees cardiology Dr. Barreto for aortic stenosis and essential hypertension. She has an appointment with " Dr. Barreto next week for an echocardiogram.    Cholesterol 256, , TG 81,     Cardiology - Dr. Barreto  Neurology - Dr. Reilly      Assessment/Plan:    Acute cystitis  Prescribe Cipro 250 BID    Scoliosis, abnormal walk  Prescribe up-walker for trial run      Telephone on 01/21/2022   Component Date Value Ref Range Status    WBC 01/27/2022 7.8  3.8 - 10.8 Thousand/uL Final    RBC 01/27/2022 4.13  3.80 - 5.10 Million/uL Final    Hemoglobin 01/27/2022 13.6  11.7 - 15.5 g/dL Final    Hematocrit 01/27/2022 40.2  35.0 - 45.0 % Final    MCV 01/27/2022 97.3  80.0 - 100.0 fL Final    MCH 01/27/2022 32.9  27.0 - 33.0 pg Final    MCHC 01/27/2022 33.8  32.0 - 36.0 g/dL Final    RDW 01/27/2022 12.0  11.0 - 15.0 % Final    Platelets 01/27/2022 169  140 - 400 Thousand/uL Final    MPV 01/27/2022 10.7  7.5 - 12.5 fL Final    Neutrophils, Abs 01/27/2022 5,632  1,500 - 7,800 cells/uL Final    Lymph # 01/27/2022 1,466  850 - 3,900 cells/uL Final    Mono # 01/27/2022 562  200 - 950 cells/uL Final    Eos # 01/27/2022 109  15 - 500 cells/uL Final    Baso # 01/27/2022 31  0 - 200 cells/uL Final    Neutrophils Relative 01/27/2022 72.2  % Final    Lymph % 01/27/2022 18.8  % Final    Mono % 01/27/2022 7.2  % Final    Eosinophil % 01/27/2022 1.4  % Final    Basophil % 01/27/2022 0.4  % Final    Glucose 01/27/2022 79  65 - 99 mg/dL Final    BUN 01/27/2022 28* 7 - 25 mg/dL Final    Creatinine 01/27/2022 1.07* 0.60 - 0.88 mg/dL Final    eGFR if non  01/27/2022 46* > OR = 60 mL/min/1.73m2 Final    eGFR if  01/27/2022 54* > OR = 60 mL/min/1.73m2 Final    BUN/Creatinine Ratio 01/27/2022 26* 6 - 22 (calc) Final    Sodium 01/27/2022 142  135 - 146 mmol/L Final    Potassium 01/27/2022 4.2  3.5 - 5.3 mmol/L Final    Chloride 01/27/2022 103  98 - 110 mmol/L Final    CO2 01/27/2022 34* 20 - 32 mmol/L Final    Calcium 01/27/2022 10.2  8.6 - 10.4 mg/dL Final    Total Protein  01/27/2022 7.2  6.1 - 8.1 g/dL Final    Albumin 01/27/2022 4.0  3.6 - 5.1 g/dL Final    Globulin, Total 01/27/2022 3.2  1.9 - 3.7 g/dL (calc) Final    Albumin/Globulin Ratio 01/27/2022 1.3  1.0 - 2.5 (calc) Final    Total Bilirubin 01/27/2022 1.0  0.2 - 1.2 mg/dL Final    Alkaline Phosphatase 01/27/2022 100  37 - 153 U/L Final    AST 01/27/2022 22  10 - 35 U/L Final    ALT 01/27/2022 11  6 - 29 U/L Final    Cholesterol 01/27/2022 256* <200 mg/dL Final    HDL 01/27/2022 101  > OR = 50 mg/dL Final    Triglycerides 01/27/2022 81  <150 mg/dL Final    LDL Cholesterol 01/27/2022 137* mg/dL (calc) Final    HDL/Cholesterol Ratio 01/27/2022 2.5  <5.0 (calc) Final    Non HDL Chol. (LDL+VLDL) 01/27/2022 155* <130 mg/dL (calc) Final    Creatinine, Urine 01/27/2022 99  20 - 275 mg/dL Final    Microalb, Ur 01/27/2022 2.9  See Note: mg/dL Final    Microalb/Creat Ratio 01/27/2022 29  <30 mcg/mg creat Final    TSH 01/27/2022 1.83  0.40 - 4.50 mIU/L Final    Color, UA 01/27/2022 YELLOW  YELLOW Final    Appearance, UA 01/27/2022 CLOUDY* CLEAR Final    Specific [place], UA 01/27/2022 1.019  1.001 - 1.035 Final    pH, UA 01/27/2022 5.5  5.0 - 8.0 Final    Glucose, UA 01/27/2022 NEGATIVE  NEGATIVE Final    Bilirubin, UA 01/27/2022 NEGATIVE  NEGATIVE Final    Ketones, UA 01/27/2022 NEGATIVE  NEGATIVE Final    Occult Blood UA 01/27/2022 1+* NEGATIVE Final    Protein, UA 01/27/2022 TRACE* NEGATIVE Final    Nitrite, UA 01/27/2022 POSITIVE* NEGATIVE Final    Leukocytes, UA 01/27/2022 2+* NEGATIVE Final    WBC Casts, UA 01/27/2022 > OR = 60* < OR = 5 /HPF Final    RBC Casts, UA 01/27/2022 0-2  < OR = 2 /HPF Final    Squam Epithel, UA 01/27/2022 10-20* < OR = 5 /HPF Final    Bacteria, UA 01/27/2022 MANY* NONE SEEN /HPF Final    Ca Oxalate Yesenia, UA 01/27/2022 FEW  NONE OR FEW /HPF Final    Hyaline Casts, UA 01/27/2022 NONE SEEN  NONE SEEN /LPF Final    Reflexive Urine Culture 01/27/2022    Final    Urine  Culture, Routine 01/27/2022 *  Final       Past Medical History:   Diagnosis Date    Back problem     Constipation     History of fractured vertebra     Hyperlipidemia     Hypertension     IBS (irritable bowel syndrome)     Migraine headache     Osteoporosis     Rectal prolapse     Scoliosis     Vertigo      Social History     Socioeconomic History    Marital status:    Tobacco Use    Smoking status: Never Smoker    Smokeless tobacco: Never Used   Substance and Sexual Activity    Alcohol use: No    Drug use: No    Sexual activity: Not Currently     Partners: Male     Birth control/protection: None     Past Surgical History:   Procedure Laterality Date    LEWIS      Back Pain    EYE SURGERY      Bilateral Cataracs    HYSTERECTOMY      OOPHORECTOMY      ROTATOR CUFF REPAIR      bialteral    TONSILLECTOMY       Family History   Problem Relation Age of Onset    Colon cancer Father     Breast cancer Mother 70    Hypertension Mother     Ovarian cancer Neg Hx        Review of patient's allergies indicates:   Allergen Reactions    Antihistamines - alkylamine     Torrie [amlodipine-olmesartan]     Flexeril [cyclobenzaprine]      Hallucinations    Hydrocodone     Hydrocodone-acetaminophen Other (See Comments)    Simvastatin     Statins-hmg-coa reductase inhibitors        Current Outpatient Medications:     biotin 1 mg tablet, Take 1,000 mcg by mouth 3 (three) times daily., Disp: , Rfl:     calcium-vitamin D3 (OS-AYSAH 500 + D3) 500 mg-5 mcg (200 unit) per tablet, Take 1 tablet by mouth 2 (two) times daily with meals., Disp: , Rfl:     cholecalciferol, vitamin D3, (VITAMIN D3) 2,000 unit Tab, Take 2,000 Units by mouth once daily. , Disp: , Rfl:     donepeziL (ARICEPT ODT) 5 MG TbDL, Take 5 mg by mouth nightly., Disp: , Rfl:     ferrous sulfate (FEOSOL) 325 mg (65 mg iron) Tab tablet, Take 325 mg by mouth daily with breakfast., Disp: , Rfl:     fluticasone propionate (FLONASE) 50  mcg/actuation nasal spray, 1 spray (50 mcg total) by Each Nostril route once daily., Disp: 16 g, Rfl: 11    hydroCHLOROthiazide (HYDRODIURIL) 25 MG tablet, Take 1 tablet (25 mg total) by mouth every other day., Disp: 45 tablet, Rfl: 1    lisinopriL 10 MG tablet, Take 10 mg by mouth once daily., Disp: , Rfl:     meclizine (ANTIVERT) 12.5 mg tablet, Take 0.5 tablets (6.25 mg total) by mouth 2 (two) times daily as needed for Dizziness (1/2 tablet twice daily as needed for dizziness)., Disp: 90 tablet, Rfl: 1    mupirocin (BACTROBAN) 2 % ointment, Apply topically 2 (two) times daily., Disp: 30 g, Rfl: 2    omeprazole (PRILOSEC) 20 MG capsule, Take 1 capsule (20 mg total) by mouth 2 (two) times a day., Disp: 180 capsule, Rfl: 1    QUEtiapine (SEROQUEL) 50 MG tablet, Take 1 tablet (50 mg total) by mouth every evening. (Patient taking differently: Take 50 mg by mouth every evening. Take 2 tablets nightly), Disp: 30 tablet, Rfl: 5    tolterodine (DETROL LA) 2 MG Cp24, Take 1 capsule (2 mg total) by mouth once daily. For bladder, Disp: 30 capsule, Rfl: 3    traMADoL (ULTRAM) 50 mg tablet, Take 1 tablet (50 mg total) by mouth every 12 (twelve) hours as needed for Pain., Disp: 60 tablet, Rfl: 2    verapamiL (CALAN-SR) 120 MG CR tablet, Take 1 tablet (120 mg total) by mouth once daily., Disp: 90 tablet, Rfl: 3    ciprofloxacin HCl (CIPRO) 250 MG tablet, Take 1 tablet (250 mg total) by mouth 2 (two) times daily., Disp: 20 tablet, Rfl: 0    Review of Systems   Constitutional: Positive for appetite change and fatigue. Negative for activity change and fever.   HENT: Negative for congestion, postnasal drip, rhinorrhea, sinus pain, sore throat and trouble swallowing.    Respiratory: Negative for cough, chest tightness and shortness of breath.    Cardiovascular: Negative for chest pain, palpitations and leg swelling.   Gastrointestinal: Positive for constipation (on miralax). Negative for abdominal distention, abdominal  "pain, diarrhea, nausea and vomiting.   Genitourinary: Negative for difficulty urinating, flank pain, frequency, hematuria, urgency, vaginal discharge and vaginal pain.        Endorses burning when urinating   Musculoskeletal: Positive for back pain.   Neurological: Positive for dizziness (Intermittent vertigo improved with meclizine). Negative for syncope, weakness, light-headedness, numbness and headaches.   Psychiatric/Behavioral: Negative for confusion, decreased concentration (Improved on donepezil) and sleep disturbance (some nights better than others).          Objective:      Vitals:    02/03/22 1107   BP: 100/60   Pulse: 68   Weight: 44 kg (97 lb)   Height: 4' 8" (1.422 m)     Physical Exam  Constitutional:       Appearance: Normal appearance. She is normal weight.      Comments: Elderly female. Uses a walker.   HENT:      Head: Normocephalic and atraumatic.      Right Ear: Tympanic membrane, ear canal and external ear normal.      Left Ear: Tympanic membrane, ear canal and external ear normal.   Cardiovascular:      Rate and Rhythm: Normal rate and regular rhythm.      Heart sounds: Murmur (3/6 systolic murmur) heard.       Pulmonary:      Effort: No respiratory distress.      Breath sounds: Normal breath sounds. No wheezing.   Abdominal:      General: Abdomen is flat. Bowel sounds are normal. There is no distension.      Palpations: Abdomen is soft. There is no mass.      Tenderness: There is no abdominal tenderness.   Musculoskeletal:      Cervical back: Normal range of motion.      Right lower leg: No edema.      Left lower leg: No edema.      Comments: kyphoscoliosis. Knee crepitus bilaterally. Normal ROM of shoulders   Skin:     General: Skin is warm and dry.   Neurological:      General: No focal deficit present.      Mental Status: She is alert. Mental status is at baseline.   Psychiatric:         Mood and Affect: Mood normal.         Behavior: Behavior normal.         Thought Content: Thought " content normal.         Judgment: Judgment normal.           Assessment:       1. Acute cystitis without hematuria    2. History of vertebral compression fracture    3. Nonintractable epilepsy without status epilepticus, unspecified epilepsy type    4. Vertigo    5. Primary hypertension    6. Mixed hyperlipidemia    7. Irritable bowel syndrome with both constipation and diarrhea    8. Aortic stenosis with trileaflet valve    9. Kyphoscoliosis         Plan:       Acute cystitis without hematuria  -     ciprofloxacin HCl (CIPRO) 250 MG tablet; Take 1 tablet (250 mg total) by mouth 2 (two) times daily.  Dispense: 20 tablet; Refill: 0  Patient has UTI noticed on the UA, add Cipro 250 mg b.i.d. times 10 days.  History of vertebral compression fracture  Asymptomatic at this time  Nonintractable epilepsy without status epilepticus, unspecified epilepsy type    Vertigo  Meclizine working well  Primary hypertension  Blood pressure well controlled currently  Mixed hyperlipidemia  Cholesterol elevated 256 TG 81  Irritable bowel syndrome with both constipation and diarrhea    Aortic stenosis with trileaflet valve  Grade 3/6 systolic murmur, no surgery is planned at this time due to advanced age.  Kyphoscoliosis  Consider up walker   GFR 46, chronic kidney disease stage 3  Follow up in about 6 months (around 8/3/2022), or dementia.        2/4/2022 Ryan Hurtado

## 2022-02-04 PROBLEM — M41.9 KYPHOSCOLIOSIS: Status: ACTIVE | Noted: 2022-02-04

## 2022-02-07 ENCOUNTER — TELEPHONE (OUTPATIENT)
Dept: FAMILY MEDICINE | Facility: CLINIC | Age: 87
End: 2022-02-07
Payer: MEDICARE

## 2022-02-07 DIAGNOSIS — M41.9 KYPHOSCOLIOSIS: Primary | ICD-10-CM

## 2022-02-07 DIAGNOSIS — Z87.81 HISTORY OF VERTEBRAL COMPRESSION FRACTURE: ICD-10-CM

## 2022-02-07 DIAGNOSIS — M46.1 SACROILIITIS, NOT ELSEWHERE CLASSIFIED: ICD-10-CM

## 2022-02-09 ENCOUNTER — TELEPHONE (OUTPATIENT)
Dept: FAMILY MEDICINE | Facility: CLINIC | Age: 87
End: 2022-02-09
Payer: MEDICARE

## 2022-02-09 NOTE — TELEPHONE ENCOUNTER
----- Message from Brittany Gamez sent at 2/9/2022 10:29 AM CST -----  Pt's daughter calling said he Handicap tag expires soon need to know what to do to renew it  #[phone]

## 2022-02-11 ENCOUNTER — TELEPHONE (OUTPATIENT)
Dept: FAMILY MEDICINE | Facility: CLINIC | Age: 87
End: 2022-02-11
Payer: MEDICARE

## 2022-02-11 NOTE — TELEPHONE ENCOUNTER
Spoke with pt and let her know that the companies we've sent it to in [place] do not carry it. She states the insurance paid for the first one - she is going to call her insurance and find out if they cover it and where we can send it.

## 2022-02-11 NOTE — TELEPHONE ENCOUNTER
----- Message from Nohemy Lujan sent at 2/11/2022 10:45 AM CST -----  Luis Luo called and he would like to speak to Misty about an order that was sent over for a walker and he stated they do not carry that type of walker she would have to use another provider if any questions please give him a call at [phone]

## 2022-02-25 ENCOUNTER — TELEPHONE (OUTPATIENT)
Dept: FAMILY MEDICINE | Facility: CLINIC | Age: 87
End: 2022-02-25
Payer: MEDICARE

## 2022-02-25 NOTE — TELEPHONE ENCOUNTER
----- Message from Sheba Jolly sent at 2/25/2022 10:56 AM CST -----  The patients daughter Karen dropped off a form for Dr. Hurtado to fill out. She lives with her son. They are getting there house raised. Whitney needs this form saying the patient is handi cap . I gave the form to Ofelia. Karen # [phone] ELISABETH

## 2022-02-25 NOTE — TELEPHONE ENCOUNTER
----- Message from Brittany Gamez sent at 2/25/2022 10:24 AM CST -----  Pt's daughter calling said they are having there house raised and will be dropping off paperwork needed to show [employer] handicap accessibility is needed.

## 2022-03-13 DIAGNOSIS — M15.0 PRIMARY GENERALIZED (OSTEO)ARTHRITIS: ICD-10-CM

## 2022-03-13 RX ORDER — TRAMADOL HYDROCHLORIDE 50 MG/1
50 TABLET ORAL EVERY 12 HOURS PRN
Qty: 60 TABLET | Refills: 2 | Status: CANCELLED | OUTPATIENT
Start: 2022-03-13

## 2022-03-22 ENCOUNTER — TELEPHONE (OUTPATIENT)
Dept: FAMILY MEDICINE | Facility: CLINIC | Age: 87
End: 2022-03-22
Payer: MEDICARE

## 2022-03-22 NOTE — TELEPHONE ENCOUNTER
----- Message from Nohemy Lujan sent at 3/22/2022 11:53 AM CDT -----  Patient daughter (Karen)called and and stated that someone called the patient and told her that her new walker was denied and she stated that the patient cant remember what they told her or what to do she would like to speak to someone about it please give her a call at [phone]

## 2022-03-22 NOTE — TELEPHONE ENCOUNTER
Spoke with pts daughter she is going to call the insurance and find out what company has them and will let us know so we can fax order

## 2022-04-03 DIAGNOSIS — N32.81 OAB (OVERACTIVE BLADDER): ICD-10-CM

## 2022-04-04 ENCOUNTER — HOSPITAL ENCOUNTER (EMERGENCY)
Facility: HOSPITAL | Age: 87
Discharge: HOME | End: 2022-04-05
Attending: EMERGENCY MEDICINE
Payer: MEDICARE

## 2022-04-04 DIAGNOSIS — R07.9 CHEST PAIN: ICD-10-CM

## 2022-04-04 DIAGNOSIS — K59.00 CONSTIPATION, UNSPECIFIED CONSTIPATION TYPE: Primary | ICD-10-CM

## 2022-04-04 LAB
ALBUMIN SERPL BCP-MCNC: 3.8 G/DL (ref 3.5–5.2)
ALP SERPL-CCNC: 106 U/L (ref 55–135)
ALT SERPL W/O P-5'-P-CCNC: 16 U/L (ref 10–44)
ANION GAP SERPL CALC-SCNC: 11 MMOL/L (ref 8–16)
AST SERPL-CCNC: 23 U/L (ref 10–40)
BACTERIA #/AREA URNS HPF: NORMAL /HPF
BASOPHILS # BLD AUTO: 0.03 K/UL (ref 0–0.2)
BASOPHILS NFR BLD: 0.6 % (ref 0–1.9)
BILIRUB SERPL-MCNC: 0.7 MG/DL (ref 0.1–1)
BILIRUB UR QL STRIP: NEGATIVE
BNP SERPL-MCNC: 287 PG/ML (ref 0–99)
BUN SERPL-MCNC: 24 MG/DL (ref 8–23)
CALCIUM SERPL-MCNC: 10.2 MG/DL (ref 8.7–10.5)
CHLORIDE SERPL-SCNC: 104 MMOL/L (ref 95–110)
CLARITY UR: CLEAR
CO2 SERPL-SCNC: 27 MMOL/L (ref 23–29)
COLOR UR: YELLOW
CREAT SERPL-MCNC: 1 MG/DL (ref 0.5–1.4)
DIFFERENTIAL METHOD: ABNORMAL
EOSINOPHIL # BLD AUTO: 0.1 K/UL (ref 0–0.5)
EOSINOPHIL NFR BLD: 1 % (ref 0–8)
ERYTHROCYTE [DISTWIDTH] IN BLOOD BY AUTOMATED COUNT: 12.2 % (ref 11.5–14.5)
EST. GFR  (AFRICAN AMERICAN): 58 ML/MIN/1.73 M^2
EST. GFR  (NON AFRICAN AMERICAN): 50 ML/MIN/1.73 M^2
GLUCOSE SERPL-MCNC: 122 MG/DL (ref 70–110)
GLUCOSE UR QL STRIP: NEGATIVE
HCT VFR BLD AUTO: 39 % (ref 37–48.5)
HGB BLD-MCNC: 12.9 G/DL (ref 12–16)
HGB UR QL STRIP: ABNORMAL
IMM GRANULOCYTES # BLD AUTO: 0.04 K/UL (ref 0–0.04)
IMM GRANULOCYTES NFR BLD AUTO: 0.8 % (ref 0–0.5)
KETONES UR QL STRIP: NEGATIVE
LEUKOCYTE ESTERASE UR QL STRIP: ABNORMAL
LIPASE SERPL-CCNC: 37 U/L (ref 4–60)
LYMPHOCYTES # BLD AUTO: 1.1 K/UL (ref 1–4.8)
LYMPHOCYTES NFR BLD: 22.4 % (ref 18–48)
MCH RBC QN AUTO: 32.8 PG (ref 27–31)
MCHC RBC AUTO-ENTMCNC: 33.1 G/DL (ref 32–36)
MCV RBC AUTO: 99 FL (ref 82–98)
MICROSCOPIC COMMENT: NORMAL
MONOCYTES # BLD AUTO: 0.4 K/UL (ref 0.3–1)
MONOCYTES NFR BLD: 7.4 % (ref 4–15)
NEUTROPHILS # BLD AUTO: 3.4 K/UL (ref 1.8–7.7)
NEUTROPHILS NFR BLD: 67.8 % (ref 38–73)
NITRITE UR QL STRIP: NEGATIVE
NRBC BLD-RTO: 0 /100 WBC
PH UR STRIP: 7 [PH] (ref 5–8)
PLATELET # BLD AUTO: 140 K/UL (ref 150–450)
PMV BLD AUTO: 10 FL (ref 9.2–12.9)
POTASSIUM SERPL-SCNC: 3.9 MMOL/L (ref 3.5–5.1)
PROT SERPL-MCNC: 7.5 G/DL (ref 6–8.4)
PROT UR QL STRIP: NEGATIVE
RBC # BLD AUTO: 3.93 M/UL (ref 4–5.4)
RBC #/AREA URNS HPF: 2 /HPF (ref 0–4)
SODIUM SERPL-SCNC: 142 MMOL/L (ref 136–145)
SP GR UR STRIP: <=1.005 (ref 1–1.03)
TROPONIN I SERPL DL<=0.01 NG/ML-MCNC: 0.01 NG/ML (ref 0–0.03)
URN SPEC COLLECT METH UR: ABNORMAL
UROBILINOGEN UR STRIP-ACNC: NEGATIVE EU/DL
WBC # BLD AUTO: 4.99 K/UL (ref 3.9–12.7)
WBC #/AREA URNS HPF: 2 /HPF (ref 0–5)

## 2022-04-04 PROCEDURE — 85025 COMPLETE CBC W/AUTO DIFF WBC: CPT | Performed by: EMERGENCY MEDICINE

## 2022-04-04 PROCEDURE — 93005 ELECTROCARDIOGRAM TRACING: CPT

## 2022-04-04 PROCEDURE — 25500020 PHARM REV CODE 255

## 2022-04-04 PROCEDURE — 93010 EKG 12-LEAD: ICD-10-PCS | Mod: ,,, | Performed by: GENERAL PRACTICE

## 2022-04-04 PROCEDURE — 81000 URINALYSIS NONAUTO W/SCOPE: CPT | Performed by: EMERGENCY MEDICINE

## 2022-04-04 PROCEDURE — 83880 ASSAY OF NATRIURETIC PEPTIDE: CPT | Performed by: EMERGENCY MEDICINE

## 2022-04-04 PROCEDURE — 83690 ASSAY OF LIPASE: CPT | Performed by: EMERGENCY MEDICINE

## 2022-04-04 PROCEDURE — 99285 EMERGENCY DEPT VISIT HI MDM: CPT | Mod: 25

## 2022-04-04 PROCEDURE — 84484 ASSAY OF TROPONIN QUANT: CPT | Performed by: EMERGENCY MEDICINE

## 2022-04-04 PROCEDURE — 36415 COLL VENOUS BLD VENIPUNCTURE: CPT | Performed by: EMERGENCY MEDICINE

## 2022-04-04 PROCEDURE — 80053 COMPREHEN METABOLIC PANEL: CPT | Performed by: EMERGENCY MEDICINE

## 2022-04-04 PROCEDURE — 93010 ELECTROCARDIOGRAM REPORT: CPT | Mod: ,,, | Performed by: GENERAL PRACTICE

## 2022-04-04 RX ORDER — TOLTERODINE 2 MG/1
2 CAPSULE, EXTENDED RELEASE ORAL DAILY
Qty: 30 CAPSULE | Refills: 3 | Status: SHIPPED | OUTPATIENT
Start: 2022-04-04 | End: 2022-06-28 | Stop reason: SDUPTHER

## 2022-04-04 RX ADMIN — IOHEXOL 75 ML: 350 INJECTION, SOLUTION INTRAVENOUS at 11:04

## 2022-04-05 VITALS
WEIGHT: 100 LBS | HEIGHT: 56 IN | RESPIRATION RATE: 18 BRPM | BODY MASS INDEX: 22.5 KG/M2 | HEART RATE: 73 BPM | TEMPERATURE: 98 F | SYSTOLIC BLOOD PRESSURE: 131 MMHG | OXYGEN SATURATION: 96 % | DIASTOLIC BLOOD PRESSURE: 67 MMHG

## 2022-04-05 RX ORDER — POLYETHYLENE GLYCOL 3350 17 G/17G
17 POWDER, FOR SOLUTION ORAL DAILY
Qty: 116 G | Refills: 0 | Status: ON HOLD | OUTPATIENT
Start: 2022-04-05 | End: 2023-02-07

## 2022-04-05 NOTE — ED PROVIDER NOTES
Encounter Date: 4/4/2022       History     Chief Complaint   Patient presents with    Abdominal Pain     With some chest discomfort     A 80-year-old female past medical history of hypertension, IBS constipation and rectal prolapse presents today with epigastric pain.  Patient reports she has not had a bowel movement 2 days.  Patient states her blood pressures been elevated as well to the pain.  She states she has had some nausea but no emesis.  She states her bowel movements are hard but denies any blood and has not had 1 in 2 days.  Patient day she takes a little bit of MiraLax under coffee every morning but has not tried anything more since being constipated. Pt reports she always has some mild chest discomfort. Denies fevers, chills, sob, cough, dysuria, hematuria vaginal bleeding or vaginal discharge.    The history is provided by the patient and the EMS personnel. No  was used.     Review of patient's allergies indicates:   Allergen Reactions    Antihistamines - alkylamine     Torrie [amlodipine-olmesartan]     Flexeril [cyclobenzaprine]      Hallucinations    Hydrocodone     Hydrocodone-acetaminophen Other (See Comments)    Simvastatin     Statins-hmg-coa reductase inhibitors      Past Medical History:   Diagnosis Date    Back problem     Constipation     History of fractured vertebra     Hyperlipidemia     Hypertension     IBS (irritable bowel syndrome)     Migraine headache     Osteoporosis     Rectal prolapse     Scoliosis     Vertigo      Past Surgical History:   Procedure Laterality Date    LEWIS      Back Pain    EYE SURGERY      Bilateral Cataracs    HYSTERECTOMY      OOPHORECTOMY      ROTATOR CUFF REPAIR      bialteral    TONSILLECTOMY       Family History   Problem Relation Age of Onset    Colon cancer Father     Breast cancer Mother 70    Hypertension Mother     Ovarian cancer Neg Hx      Social History     Tobacco Use    Smoking status: Never Smoker     Smokeless tobacco: Never Used   Substance Use Topics    Alcohol use: No    Drug use: No     Review of Systems   Constitutional: Negative for activity change, diaphoresis and fever.   HENT: Negative for ear pain, rhinorrhea, sore throat and trouble swallowing.    Eyes: Negative for pain and visual disturbance.   Respiratory: Negative for cough, shortness of breath and stridor.    Cardiovascular: Positive for chest pain.   Gastrointestinal: Positive for abdominal pain, constipation and nausea. Negative for blood in stool, diarrhea and vomiting.   Genitourinary: Negative for dysuria, hematuria, vaginal bleeding and vaginal discharge.   Musculoskeletal: Negative for gait problem.   Skin: Negative for rash and wound.   Neurological: Negative for seizures and headaches.   Psychiatric/Behavioral: Negative for hallucinations and suicidal ideas.       Physical Exam     Initial Vitals [04/04/22 2141]   BP Pulse Resp Temp SpO2   (!) 173/90 78 18 98 °F (36.7 °C) 98 %      MAP       --         Physical Exam    Nursing note and vitals reviewed.  Constitutional: She is not diaphoretic. No distress.   Elderly appearing   HENT:   Head: Normocephalic and atraumatic.   Nose: Nose normal.   Eyes: EOM are normal. No scleral icterus.   Neck: Neck supple.   Normal range of motion.  Cardiovascular: Normal rate, regular rhythm, normal heart sounds and intact distal pulses. Exam reveals no gallop and no friction rub.    No murmur heard.  Pulmonary/Chest: Breath sounds normal. No stridor. No respiratory distress. She has no wheezes. She has no rhonchi. She has no rales.   Abdominal: Abdomen is soft. Bowel sounds are normal. She exhibits no distension. There is no abdominal tenderness (minimal diffuse). There is no rebound and no guarding.   Musculoskeletal:         General: Normal range of motion.      Cervical back: Normal range of motion and neck supple.     Neurological: She is alert and oriented to person, place, and time. She has  normal strength. No cranial nerve deficit or sensory deficit.   Skin: Skin is warm and dry. Capillary refill takes less than 2 seconds. No rash noted.   Psychiatric: She has a normal mood and affect.         ED Course   Procedures  Labs Reviewed   CBC W/ AUTO DIFFERENTIAL - Abnormal; Notable for the following components:       Result Value    RBC 3.93 (*)     MCV 99 (*)     MCH 32.8 (*)     Platelets 140 (*)     Immature Granulocytes 0.8 (*)     All other components within normal limits   COMPREHENSIVE METABOLIC PANEL - Abnormal; Notable for the following components:    Glucose 122 (*)     BUN 24 (*)     eGFR if  58 (*)     eGFR if non  50 (*)     All other components within normal limits   URINALYSIS, REFLEX TO URINE CULTURE - Abnormal; Notable for the following components:    Specific Gravity, UA <=1.005 (*)     Occult Blood UA 1+ (*)     Leukocytes, UA Trace (*)     All other components within normal limits    Narrative:     Specimen Source->Urine   B-TYPE NATRIURETIC PEPTIDE - Abnormal; Notable for the following components:     (*)     All other components within normal limits   LIPASE   TROPONIN I   URINALYSIS MICROSCOPIC    Narrative:     Specimen Source->Urine          Imaging Results          CT Abdomen Pelvis With Contrast (Final result)  Result time 04/04/22 23:41:04    Final result by Rogers Llanos MD (04/04/22 23:41:04)                 Impression:      Large hiatal hernia.    Marked constipation without rectal fecal impaction or zone of transition to suggest focal obstruction.    Aortic and mitral valve dystrophic calcified change.      Electronically signed by: Rogers Llanos  Date:    04/04/2022  Time:    23:41             Narrative:    EXAMINATION:  CT ABDOMEN PELVIS WITH CONTRAST    CLINICAL HISTORY:  Bowel obstruction suspected;Abdominal pain, acute, nonlocalized;    TECHNIQUE:  Low dose axial images, sagittal and coronal reformations were obtained from  the lung bases to the pubic symphysis following the IV administration of 75 mL of Omnipaque 350 .  Oral contrast was not administered.    COMPARISON:  None.    FINDINGS:  Abdomen:    - Lower thorax:Atherosclerotic calcifications in the aortic valve and mitral valve plane.  A large hiatal hernia is present filled with fluid.    - Lung bases: No infiltrates and no nodules.  Linear band of fibrosis in the middle lobe is noted.    - Liver: No focal mass.    - Gallbladder: No calcified gallstones.    - Bile Ducts: No evidence of intra or extra hepatic biliary ductal dilation.    - Spleen: Negative.    - Kidneys: No mass or hydronephrosis.  Multiple cysts.  Prominent extrarenal pelvis on the right.    - Adrenals: Unremarkable.    - Pancreas: No mass or peripancreatic fat stranding.    - Retroperitoneum:  No significant adenopathy.    - Vascular: No abdominal aortic aneurysm.    - Abdominal wall:  Unremarkable.    Pelvis:    No pelvic mass, adenopathy, or free fluid.    Bowel/Mesentery:    No evidence of bowel obstruction or inflammation.  Prominent waste material throughout the colon with out a significant rectal fecal impaction.    Bones:  No acute osseous abnormality and no suspicious lytic or blastic lesion.  Multilevel vertebroplasty with osteopenia throughout.                               X-Ray Chest AP Portable (Final result)  Result time 04/04/22 23:07:10    Final result by Rogers Llanos MD (04/04/22 23:07:10)                 Impression:      Increase in size of the hiatal hernia since the prior exam.      Electronically signed by: Rogers Llanos  Date:    04/04/2022  Time:    23:07             Narrative:    EXAMINATION:  XR CHEST AP PORTABLE    CLINICAL HISTORY:  Chest pain, unspecified    TECHNIQUE:  Single frontal view of the chest was performed.    COMPARISON:  05/29/2021    FINDINGS:  The lungs are clear, with normal appearance of pulmonary vasculature and no pleural effusion or pneumothorax.    The  cardiac silhouette is normal in size. The hilar and mediastinal contours are stable.  Hiatal hernia appears to have increased in size..    Bones are intact.                                 Medications   iohexoL (OMNIPAQUE 350) 350 mg iodine/mL injection (75 mLs  Given 4/4/22 2326)     Medical Decision Making:   ED Management:  Patient's history and exam most consistent with constipation as an etiology for their pain.    Patient's symptoms not typical for other emergent causes of abdominal pain such as, but not limited to, appendicitis, abdominal aortic aneurysm, pancreatitis, atypical ACS or PE, SBO, mesenteric ischemia, serious intra-abdominal bacterial illness.    Given patient's age, ttp and comorbidities CT and labs ordered. All results discussed in detail with patient and daughter. CT consistent with constipation offered enema and observation but pt and daughter requested discharge with miralax and close fu which I feel is reasonable.     Rx: Miralax 17gm    Disposition:   Patient will be discharged with strict return precautions and follow up with primary MD within 24-48 hours for further evaluation.  Patient understands that this still may have an early presentation of an emergent medical condition such as appendicitis that will require a recheck.                 ED Course as of 04/05/22 0547   Mon Apr 04, 2022 2327 EKG is normal sinus rhythm.  Rate of 77. Normal intervals.  No STEMI. [BD]   2328 X-Ray Chest AP Portable    Impression:     Increase in size of the hiatal hernia since the prior exam.        Electronically signed by: Rogers Llanos  Date:                                            04/04/2022  Time:                                           23:07 [BD]   2328 BNP(!): 287 [BD]   Tue Apr 05, 2022   0019 Impression:     Large hiatal hernia.     Marked constipation without rectal fecal impaction or zone of transition to suggest focal obstruction.     Aortic and mitral valve dystrophic calcified  change.        Electronically signed by: Rogers Llanos  Date:                                            04/04/2022  Time:                                           23:41 [BD]   0121 Patient had bowel movement in the emergency room and feels much improved. [BD]      ED Course User Index  [BD] Sg Pina MD             Clinical Impression:   Final diagnoses:  [R07.9] Chest pain  [K59.00] Constipation, unspecified constipation type (Primary)          ED Disposition Condition    Discharge Stable        ED Prescriptions     Medication Sig Dispense Start Date End Date Auth. Provider    polyethylene glycol (GLYCOLAX) 17 gram/dose powder Take 17 g by mouth once daily. 116 g 4/5/2022  Sg Pina MD        Follow-up Information     Follow up With Specialties Details Why Contact Info    Ryan Hurtado MD Family Medicine, Home Health Services, Hospice Services Schedule an appointment as soon as possible for a visit   [address]  [address]  [facility] [address]  [phone]      [facility] - Emergency Dept Emergency Medicine Go to  As needed, If symptoms worsen [address] [place] [id number] [phone]           Sg Pina MD  04/05/22 0547

## 2022-04-05 NOTE — ED NOTES
Patient is alert & oriented, vital signs stable, denies any distress, complained of abdominal pain which is resolving with medication, awaiting blood work and CT results, denies any distress, needs assessed, safety sweep done, will continue to monitor.

## 2022-04-17 ENCOUNTER — PATIENT MESSAGE (OUTPATIENT)
Dept: FAMILY MEDICINE | Facility: CLINIC | Age: 87
End: 2022-04-17

## 2022-04-18 ENCOUNTER — PATIENT MESSAGE (OUTPATIENT)
Dept: FAMILY MEDICINE | Facility: CLINIC | Age: 87
End: 2022-04-18

## 2022-05-05 DIAGNOSIS — M15.0 PRIMARY GENERALIZED (OSTEO)ARTHRITIS: ICD-10-CM

## 2022-05-05 RX ORDER — TRAMADOL HYDROCHLORIDE 50 MG/1
50 TABLET ORAL EVERY 12 HOURS PRN
Qty: 60 TABLET | Refills: 2 | Status: SHIPPED | OUTPATIENT
Start: 2022-05-05 | End: 2022-07-05 | Stop reason: SDUPTHER

## 2022-05-18 DIAGNOSIS — R42 VERTIGO: ICD-10-CM

## 2022-05-18 RX ORDER — MECLIZINE HCL 12.5 MG 12.5 MG/1
6.25 TABLET ORAL 2 TIMES DAILY PRN
Qty: 90 TABLET | Refills: 1 | Status: SHIPPED | OUTPATIENT
Start: 2022-05-18 | End: 2022-11-12 | Stop reason: SDUPTHER

## 2022-05-27 ENCOUNTER — PATIENT MESSAGE (OUTPATIENT)
Dept: FAMILY MEDICINE | Facility: CLINIC | Age: 87
End: 2022-05-27

## 2022-05-31 ENCOUNTER — PATIENT MESSAGE (OUTPATIENT)
Dept: FAMILY MEDICINE | Facility: CLINIC | Age: 87
End: 2022-05-31

## 2022-05-31 RX ORDER — PANTOPRAZOLE SODIUM 40 MG/1
40 TABLET, DELAYED RELEASE ORAL DAILY
Qty: 30 TABLET | Refills: 3 | Status: SHIPPED | OUTPATIENT
Start: 2022-05-31 | End: 2022-09-23 | Stop reason: SDUPTHER

## 2022-06-19 ENCOUNTER — PATIENT MESSAGE (OUTPATIENT)
Dept: FAMILY MEDICINE | Facility: CLINIC | Age: 87
End: 2022-06-19

## 2022-06-20 RX ORDER — FAMOTIDINE 20 MG/1
20 TABLET, FILM COATED ORAL 2 TIMES DAILY
Qty: 60 TABLET | Refills: 5 | Status: SHIPPED | OUTPATIENT
Start: 2022-06-20 | End: 2022-12-19 | Stop reason: SDUPTHER

## 2022-06-20 NOTE — TELEPHONE ENCOUNTER
I would recommend adding famotidine 20 mg twice a day and continue with pantoprazole daily.  If she continues to  have issues she should be seen.

## 2022-06-28 ENCOUNTER — PATIENT MESSAGE (OUTPATIENT)
Dept: FAMILY MEDICINE | Facility: CLINIC | Age: 87
End: 2022-06-28

## 2022-06-28 DIAGNOSIS — N32.81 OAB (OVERACTIVE BLADDER): ICD-10-CM

## 2022-06-28 RX ORDER — TOLTERODINE 2 MG/1
2 CAPSULE, EXTENDED RELEASE ORAL DAILY
Qty: 30 CAPSULE | Refills: 3 | Status: SHIPPED | OUTPATIENT
Start: 2022-06-28 | End: 2022-09-27 | Stop reason: SDUPTHER

## 2022-07-05 DIAGNOSIS — M15.0 PRIMARY GENERALIZED (OSTEO)ARTHRITIS: ICD-10-CM

## 2022-07-05 RX ORDER — TRAMADOL HYDROCHLORIDE 50 MG/1
50 TABLET ORAL EVERY 12 HOURS PRN
Qty: 60 TABLET | Refills: 2 | Status: SHIPPED | OUTPATIENT
Start: 2022-07-05 | End: 2022-09-12 | Stop reason: SDUPTHER

## 2022-07-21 ENCOUNTER — PATIENT MESSAGE (OUTPATIENT)
Dept: FAMILY MEDICINE | Facility: CLINIC | Age: 87
End: 2022-07-21

## 2022-07-21 NOTE — TELEPHONE ENCOUNTER
"It sounds like she has a trigger finger. Can be painful at times and certainly will "stick". Lets let Dr. Hurtado examine her next week as ascheduled and he can get her in with ortho if that's all it is.  "

## 2022-08-04 ENCOUNTER — OFFICE VISIT (OUTPATIENT)
Dept: FAMILY MEDICINE | Facility: CLINIC | Age: 87
End: 2022-08-04
Payer: MEDICARE

## 2022-08-04 VITALS
HEIGHT: 56 IN | BODY MASS INDEX: 24.07 KG/M2 | DIASTOLIC BLOOD PRESSURE: 80 MMHG | WEIGHT: 107 LBS | HEART RATE: 96 BPM | SYSTOLIC BLOOD PRESSURE: 138 MMHG

## 2022-08-04 DIAGNOSIS — I10 PRIMARY HYPERTENSION: ICD-10-CM

## 2022-08-04 DIAGNOSIS — Z87.81 HISTORY OF VERTEBRAL COMPRESSION FRACTURE: ICD-10-CM

## 2022-08-04 DIAGNOSIS — M65.331 TRIGGER MIDDLE FINGER OF RIGHT HAND: ICD-10-CM

## 2022-08-04 DIAGNOSIS — M41.9 KYPHOSCOLIOSIS: Primary | ICD-10-CM

## 2022-08-04 DIAGNOSIS — G40.909 NONINTRACTABLE EPILEPSY WITHOUT STATUS EPILEPTICUS, UNSPECIFIED EPILEPSY TYPE: ICD-10-CM

## 2022-08-04 DIAGNOSIS — E78.2 MIXED HYPERLIPIDEMIA: ICD-10-CM

## 2022-08-04 DIAGNOSIS — I35.0 AORTIC STENOSIS WITH TRILEAFLET VALVE: ICD-10-CM

## 2022-08-04 DIAGNOSIS — L89.152 PRESSURE INJURY OF SACRAL REGION, STAGE 2: ICD-10-CM

## 2022-08-04 PROCEDURE — 99214 PR OFFICE/OUTPT VISIT, EST, LEVL IV, 30-39 MIN: ICD-10-PCS | Mod: S$GLB,,, | Performed by: FAMILY MEDICINE

## 2022-08-04 PROCEDURE — 99214 OFFICE O/P EST MOD 30 MIN: CPT | Mod: S$GLB,,, | Performed by: FAMILY MEDICINE

## 2022-08-04 RX ORDER — SERTRALINE HYDROCHLORIDE 25 MG/1
25 TABLET, FILM COATED ORAL DAILY
COMMUNITY

## 2022-08-06 PROBLEM — M65.331 TRIGGER MIDDLE FINGER OF RIGHT HAND: Status: ACTIVE | Noted: 2022-08-06

## 2022-08-06 PROBLEM — L89.152 PRESSURE INJURY OF SACRAL REGION, STAGE 2: Status: ACTIVE | Noted: 2022-08-06

## 2022-08-06 NOTE — PROGRESS NOTES
SUBJECTIVE:    Patient ID: Angelique Hamilton is a 89 y.o. female.    Chief Complaint: Fall (One month ago // runny nose with blood in the mornings // left ring finger stiffness // abc )    89-year-old female complains of lower back aches.  She also states that her bottom hurts.  She wears pads every day and has a and erosion of her skin.  She has been using Libby's Buttpaste but the lesion is not healing.  She sleeps all night in her recliner on her back.  She does not roll from side to side.  She walks with a Rollator and has significant scoliosis arthritis.    She has gained 7 lb since last visit.    She takes tramadol at bedtime and Tylenol during the day if needed for pain.    Her right eyelid is very droopy with the ptosis.  It does interfere with her vision.    She complains of trigger fingers to right hand.    Complains of a nose bleed intermittently in the morning; she uses Flonase at night.      Admission on 04/04/2022, Discharged on 04/05/2022   Component Date Value Ref Range Status    WBC 04/04/2022 4.99  3.90 - 12.70 K/uL Final    RBC 04/04/2022 3.93 (A) 4.00 - 5.40 M/uL Final    Hemoglobin 04/04/2022 12.9  12.0 - 16.0 g/dL Final    Hematocrit 04/04/2022 39.0  37.0 - 48.5 % Final    MCV 04/04/2022 99 (A) 82 - 98 fL Final    MCH 04/04/2022 32.8 (A) 27.0 - 31.0 pg Final    MCHC 04/04/2022 33.1  32.0 - 36.0 g/dL Final    RDW 04/04/2022 12.2  11.5 - 14.5 % Final    Platelets 04/04/2022 140 (A) 150 - 450 K/uL Final    MPV 04/04/2022 10.0  9.2 - 12.9 fL Final    Immature Granulocytes 04/04/2022 0.8 (A) 0.0 - 0.5 % Final    Gran # (ANC) 04/04/2022 3.4  1.8 - 7.7 K/uL Final    Immature Grans (Abs) 04/04/2022 0.04  0.00 - 0.04 K/uL Final    Lymph # 04/04/2022 1.1  1.0 - 4.8 K/uL Final    Mono # 04/04/2022 0.4  0.3 - 1.0 K/uL Final    Eos # 04/04/2022 0.1  0.0 - 0.5 K/uL Final    Baso # 04/04/2022 0.03  0.00 - 0.20 K/uL Final    nRBC 04/04/2022 0  0 /100 WBC Final    Gran % 04/04/2022 67.8   38.0 - 73.0 % Final    Lymph % 04/04/2022 22.4  18.0 - 48.0 % Final    Mono % 04/04/2022 7.4  4.0 - 15.0 % Final    Eosinophil % 04/04/2022 1.0  0.0 - 8.0 % Final    Basophil % 04/04/2022 0.6  0.0 - 1.9 % Final    Differential Method 04/04/2022 Automated   Final    Sodium 04/04/2022 142  136 - 145 mmol/L Final    Potassium 04/04/2022 3.9  3.5 - 5.1 mmol/L Final    Chloride 04/04/2022 104  95 - 110 mmol/L Final    CO2 04/04/2022 27  23 - 29 mmol/L Final    Glucose 04/04/2022 122 (A) 70 - 110 mg/dL Final    BUN 04/04/2022 24 (A) 8 - 23 mg/dL Final    Creatinine 04/04/2022 1.0  0.5 - 1.4 mg/dL Final    Calcium 04/04/2022 10.2  8.7 - 10.5 mg/dL Final    Total Protein 04/04/2022 7.5  6.0 - 8.4 g/dL Final    Albumin 04/04/2022 3.8  3.5 - 5.2 g/dL Final    Total Bilirubin 04/04/2022 0.7  0.1 - 1.0 mg/dL Final    Alkaline Phosphatase 04/04/2022 106  55 - 135 U/L Final    AST 04/04/2022 23  10 - 40 U/L Final    ALT 04/04/2022 16  10 - 44 U/L Final    Anion Gap 04/04/2022 11  8 - 16 mmol/L Final    eGFR if  04/04/2022 58 (A) >60 mL/min/1.73 m^2 Final    eGFR if non African American 04/04/2022 50 (A) >60 mL/min/1.73 m^2 Final    Lipase 04/04/2022 37  4 - 60 U/L Final    Specimen UA 04/04/2022 Urine, Clean Catch   Final    Color, UA 04/04/2022 Yellow  Yellow, Straw, Enma Final    Appearance, UA 04/04/2022 Clear  Clear Final    pH, UA 04/04/2022 7.0  5.0 - 8.0 Final    Specific Gravity, UA 04/04/2022 <=1.005 (A) 1.005 - 1.030 Final    Protein, UA 04/04/2022 Negative  Negative Final    Glucose, UA 04/04/2022 Negative  Negative Final    Ketones, UA 04/04/2022 Negative  Negative Final    Bilirubin (UA) 04/04/2022 Negative  Negative Final    Occult Blood UA 04/04/2022 1+ (A) Negative Final    Nitrite, UA 04/04/2022 Negative  Negative Final    Urobilinogen, UA 04/04/2022 Negative  <2.0 EU/dL Final    Leukocytes, UA 04/04/2022 Trace (A) Negative Final    Troponin I 04/04/2022 0.008   0.000 - 0.026 ng/mL Final    BNP 04/04/2022 287 (A) 0 - 99 pg/mL Final    RBC, UA 04/04/2022 2  0 - 4 /hpf Final    WBC, UA 04/04/2022 2  0 - 5 /hpf Final    Bacteria 04/04/2022 None  None-Occ /hpf Final    Microscopic Comment 04/04/2022 SEE COMMENT   Final       Past Medical History:   Diagnosis Date    Back problem     Constipation     History of fractured vertebra     Hyperlipidemia     Hypertension     IBS (irritable bowel syndrome)     Migraine headache     Osteoporosis     Rectal prolapse     Scoliosis     Vertigo      Social History     Socioeconomic History    Marital status:    Tobacco Use    Smoking status: Never Smoker    Smokeless tobacco: Never Used   Substance and Sexual Activity    Alcohol use: No    Drug use: No    Sexual activity: Not Currently     Partners: Male     Birth control/protection: None     Social Determinants of Health     Financial Resource Strain: Unknown    Difficulty of Paying Living Expenses: Patient refused   Food Insecurity: Unknown    Worried About Running Out of Food in the Last Year: Patient refused    Ran Out of Food in the Last Year: Patient refused   Transportation Needs: No Transportation Needs    Lack of Transportation (Medical): No    Lack of Transportation (Non-Medical): No   Physical Activity: Inactive    Days of Exercise per Week: 0 days    Minutes of Exercise per Session: 0 min   Stress: Stress Concern Present    Feeling of Stress : To some extent   Social Connections: Unknown    Frequency of Communication with Friends and Family: Once a week    Frequency of Social Gatherings with Friends and Family: Once a week    Active Member of Clubs or Organizations: No    Attends Club or Organization Meetings: Never    Marital Status:    Housing Stability: Unknown    Unable to Pay for Housing in the Last Year: Patient refused    Number of Places Lived in the Last Year: 1    Unstable Housing in the Last Year: No     Past  Surgical History:   Procedure Laterality Date    LEWIS      Back Pain    EYE SURGERY      Bilateral Cataracs    HYSTERECTOMY      OOPHORECTOMY      ROTATOR CUFF REPAIR      bialteral    TONSILLECTOMY       Family History   Problem Relation Age of Onset    Colon cancer Father     Breast cancer Mother 70    Hypertension Mother     Ovarian cancer Neg Hx        Review of patient's allergies indicates:   Allergen Reactions    Antihistamines - alkylamine     Torrie [amlodipine-olmesartan]     Flexeril [cyclobenzaprine]      Hallucinations    Hydrocodone     Hydrocodone-acetaminophen Other (See Comments)    Simvastatin     Statins-hmg-coa reductase inhibitors        Current Outpatient Medications:     biotin 1 mg tablet, Take 1,000 mcg by mouth 3 (three) times daily., Disp: , Rfl:     calcium-vitamin D3 (OS-AYSHA 500 + D3) 500 mg-5 mcg (200 unit) per tablet, Take 1 tablet by mouth 2 (two) times daily with meals., Disp: , Rfl:     cholecalciferol, vitamin D3, (VITAMIN D3) 2,000 unit Tab, Take 2,000 Units by mouth once daily. , Disp: , Rfl:     ciprofloxacin HCl (CIPRO) 250 MG tablet, Take 1 tablet (250 mg total) by mouth 2 (two) times daily., Disp: 20 tablet, Rfl: 0    donepeziL (ARICEPT ODT) 5 MG TbDL, Take 5 mg by mouth nightly., Disp: , Rfl:     famotidine (PEPCID) 20 MG tablet, Take 1 tablet (20 mg total) by mouth 2 (two) times daily., Disp: 60 tablet, Rfl: 5    ferrous sulfate (FEOSOL) 325 mg (65 mg iron) Tab tablet, Take 325 mg by mouth daily with breakfast., Disp: , Rfl:     fluticasone propionate (FLONASE) 50 mcg/actuation nasal spray, 1 spray (50 mcg total) by Each Nostril route once daily., Disp: 16 g, Rfl: 11    hydroCHLOROthiazide (HYDRODIURIL) 25 MG tablet, Take 1 tablet (25 mg total) by mouth every other day., Disp: 45 tablet, Rfl: 1    lisinopriL 10 MG tablet, Take 10 mg by mouth once daily., Disp: , Rfl:     meclizine (ANTIVERT) 12.5 mg tablet, Take 0.5 tablets (6.25 mg total) by mouth  2 (two) times daily as needed for Dizziness (1/2 tablet twice daily as needed for dizziness)., Disp: 90 tablet, Rfl: 1    mupirocin (BACTROBAN) 2 % ointment, Apply topically 2 (two) times daily., Disp: 30 g, Rfl: 2    pantoprazole (PROTONIX) 40 MG tablet, Take 1 tablet (40 mg total) by mouth once daily., Disp: 30 tablet, Rfl: 3    polyethylene glycol (GLYCOLAX) 17 gram/dose powder, Take 17 g by mouth once daily., Disp: 116 g, Rfl: 0    sertraline (ZOLOFT) 25 MG tablet, Take 25 mg by mouth once daily., Disp: , Rfl:     tolterodine (DETROL LA) 2 MG Cp24, Take 1 capsule (2 mg total) by mouth once daily. For bladder, Disp: 30 capsule, Rfl: 3    traMADoL (ULTRAM) 50 mg tablet, Take 1 tablet (50 mg total) by mouth every 12 (twelve) hours as needed for Pain., Disp: 60 tablet, Rfl: 2    verapamiL (CALAN-SR) 120 MG CR tablet, Take 1 tablet (120 mg total) by mouth once daily., Disp: 90 tablet, Rfl: 3    QUEtiapine (SEROQUEL) 50 MG tablet, Take 1 tablet (50 mg total) by mouth every evening. (Patient taking differently: Take 50 mg by mouth every evening. Take 2 tablets nightly), Disp: 30 tablet, Rfl: 5    Review of Systems   Constitutional: Negative for appetite change, chills, fatigue, fever and unexpected weight change.   HENT: Negative for congestion, ear pain, sinus pain, sore throat and trouble swallowing.    Eyes: Negative for pain, discharge and visual disturbance.   Respiratory: Negative for apnea, cough, shortness of breath and wheezing.    Cardiovascular: Negative for chest pain, palpitations and leg swelling.   Gastrointestinal: Negative for abdominal pain, blood in stool, constipation, diarrhea, nausea and vomiting.   Endocrine: Negative for heat intolerance, polydipsia and polyuria.   Genitourinary: Negative for difficulty urinating, dyspareunia, dysuria, frequency, hematuria and menstrual problem.   Musculoskeletal: Positive for arthralgias, back pain and gait problem. Negative for joint swelling and  "myalgias.   Skin: Positive for wound (Nonhealing buttock wound).   Allergic/Immunologic: Negative for environmental allergies, food allergies and immunocompromised state.   Neurological: Negative for dizziness, tremors, seizures, numbness and headaches.   Psychiatric/Behavioral: Negative for behavioral problems, confusion, hallucinations and suicidal ideas. The patient is nervous/anxious.           Objective:      Vitals:    08/04/22 1111   BP: 138/80   Pulse: 96   Weight: 48.5 kg (107 lb)   Height: 4' 8" (1.422 m)     Physical Exam  Vitals and nursing note reviewed.   Constitutional:       Appearance: She is well-developed.      Comments: Frail elderly female   HENT:      Head: Normocephalic and atraumatic.      Right Ear: Tympanic membrane and external ear normal.      Left Ear: Tympanic membrane and external ear normal.      Nose: Nose normal.      Mouth/Throat:      Pharynx: Oropharynx is clear.   Eyes:      Pupils: Pupils are equal, round, and reactive to light.      Comments: Right eyelid upper lid- ptosis   Neck:      Thyroid: No thyromegaly.      Vascular: No carotid bruit.   Cardiovascular:      Rate and Rhythm: Normal rate and regular rhythm.      Heart sounds: Normal heart sounds. No murmur heard.  Pulmonary:      Effort: Pulmonary effort is normal.      Breath sounds: Normal breath sounds. No wheezing or rales.   Abdominal:      General: Bowel sounds are normal. There is no distension.      Palpations: Abdomen is soft.      Tenderness: There is no abdominal tenderness.   Musculoskeletal:         General: No tenderness or deformity. Normal range of motion.      Cervical back: Normal range of motion and neck supple.      Lumbar back: Normal. No spasms.      Comments: Bends 60 degrees at  waist patient has significant kyphoscoliosis.  Shoulders good range of motion knees are crepitant.  She has trigger fingers on her right hand.  No edema to lower extremities   Lymphadenopathy:      Cervical: No cervical " adenopathy.   Skin:     General: Skin is warm and dry.      Findings: No rash.      Comments: Small stage II decubitus on the sacrum noted .no infection or cellulitis noted   Neurological:      Mental Status: She is alert and oriented to person, place, and time.      Cranial Nerves: No cranial nerve deficit.      Coordination: Coordination normal.   Psychiatric:         Behavior: Behavior normal.         Thought Content: Thought content normal.         Judgment: Judgment normal.           Assessment:       1. Kyphoscoliosis    2. History of vertebral compression fracture    3. Primary hypertension    4. Nonintractable epilepsy without status epilepticus, unspecified epilepsy type    5. Aortic stenosis with trileaflet valve    6. Mixed hyperlipidemia    7. Pressure injury of sacral region, stage 2    8. Trigger middle finger of right hand         Plan:       Kyphoscoliosis  Continue tramadol at night Tylenol during the day.  History of vertebral compression fracture    Primary hypertension  -     CBC Auto Differential; Future; Expected date: 08/04/2022  -     Comprehensive Metabolic Panel; Future; Expected date: 08/04/2022  -     Lipid Panel; Future; Expected date: 08/04/2022  -     TSH w/reflex to FT4; Future; Expected date: 08/04/2022  Blood pressure well controlled  Nonintractable epilepsy without status epilepticus, unspecified epilepsy type    Aortic stenosis with trileaflet valve  Asymptomatic blood movement is limited  Mixed hyperlipidemia  Needs some lab work done this summer  Pressure injury of sacral region, stage 2  Continue Bactroban/Libby's paste  Trigger middle finger of right hand  Offered orthopedic referral for cortisone injection if she desires.  Hold off on Flonase for 1 week to see if nose bleeds improve  Follow up in about 4 months (around 12/4/2022), or OA.        8/6/2022 Ryan Hurtado

## 2022-09-11 DIAGNOSIS — M15.0 PRIMARY GENERALIZED (OSTEO)ARTHRITIS: ICD-10-CM

## 2022-09-12 RX ORDER — TRAMADOL HYDROCHLORIDE 50 MG/1
50 TABLET ORAL EVERY 12 HOURS PRN
Qty: 60 TABLET | Refills: 2 | Status: SHIPPED | OUTPATIENT
Start: 2022-09-12 | End: 2023-01-12

## 2022-09-12 RX ORDER — MUPIROCIN 20 MG/G
OINTMENT TOPICAL 2 TIMES DAILY
Qty: 30 G | Refills: 2 | Status: SHIPPED | OUTPATIENT
Start: 2022-09-12 | End: 2023-02-28 | Stop reason: SDUPTHER

## 2022-09-27 DIAGNOSIS — N32.81 OAB (OVERACTIVE BLADDER): ICD-10-CM

## 2022-09-27 RX ORDER — TOLTERODINE 2 MG/1
2 CAPSULE, EXTENDED RELEASE ORAL DAILY
Qty: 30 CAPSULE | Refills: 3 | Status: SHIPPED | OUTPATIENT
Start: 2022-09-27 | End: 2022-12-19 | Stop reason: SDUPTHER

## 2022-10-14 ENCOUNTER — HOSPITAL ENCOUNTER (EMERGENCY)
Facility: HOSPITAL | Age: 87
Discharge: HOME | End: 2022-10-14
Attending: EMERGENCY MEDICINE
Payer: MEDICARE

## 2022-10-14 VITALS
TEMPERATURE: 98 F | SYSTOLIC BLOOD PRESSURE: 158 MMHG | DIASTOLIC BLOOD PRESSURE: 72 MMHG | HEART RATE: 75 BPM | OXYGEN SATURATION: 97 %

## 2022-10-14 DIAGNOSIS — K44.9 HIATAL HERNIA: ICD-10-CM

## 2022-10-14 DIAGNOSIS — R07.89 ATYPICAL CHEST PAIN: Primary | ICD-10-CM

## 2022-10-14 DIAGNOSIS — R07.89 CHEST TIGHTNESS: ICD-10-CM

## 2022-10-14 DIAGNOSIS — R07.9 CHEST PAIN: ICD-10-CM

## 2022-10-14 LAB
ALBUMIN SERPL BCP-MCNC: 4.1 G/DL (ref 3.5–5.2)
ALP SERPL-CCNC: 179 U/L (ref 55–135)
ALT SERPL W/O P-5'-P-CCNC: 8 U/L (ref 10–44)
ANION GAP SERPL CALC-SCNC: 13 MMOL/L (ref 8–16)
AST SERPL-CCNC: 19 U/L (ref 10–40)
BASOPHILS # BLD AUTO: 0.04 K/UL (ref 0–0.2)
BASOPHILS NFR BLD: 0.7 % (ref 0–1.9)
BILIRUB SERPL-MCNC: 0.9 MG/DL (ref 0.1–1)
BNP SERPL-MCNC: 104 PG/ML (ref 0–99)
BUN SERPL-MCNC: 26 MG/DL (ref 8–23)
CALCIUM SERPL-MCNC: 10.5 MG/DL (ref 8.7–10.5)
CHLORIDE SERPL-SCNC: 100 MMOL/L (ref 95–110)
CO2 SERPL-SCNC: 26 MMOL/L (ref 23–29)
CREAT SERPL-MCNC: 1.3 MG/DL (ref 0.5–1.4)
DIFFERENTIAL METHOD: ABNORMAL
EOSINOPHIL # BLD AUTO: 0.2 K/UL (ref 0–0.5)
EOSINOPHIL NFR BLD: 2.6 % (ref 0–8)
ERYTHROCYTE [DISTWIDTH] IN BLOOD BY AUTOMATED COUNT: 12.1 % (ref 11.5–14.5)
EST. GFR  (NO RACE VARIABLE): 39 ML/MIN/1.73 M^2
GLUCOSE SERPL-MCNC: 102 MG/DL (ref 70–110)
HCT VFR BLD AUTO: 38.8 % (ref 37–48.5)
HGB BLD-MCNC: 13.1 G/DL (ref 12–16)
IMM GRANULOCYTES # BLD AUTO: 0.03 K/UL (ref 0–0.04)
IMM GRANULOCYTES NFR BLD AUTO: 0.5 % (ref 0–0.5)
LYMPHOCYTES # BLD AUTO: 1.2 K/UL (ref 1–4.8)
LYMPHOCYTES NFR BLD: 20 % (ref 18–48)
MCH RBC QN AUTO: 32.6 PG (ref 27–31)
MCHC RBC AUTO-ENTMCNC: 33.8 G/DL (ref 32–36)
MCV RBC AUTO: 97 FL (ref 82–98)
MONOCYTES # BLD AUTO: 0.3 K/UL (ref 0.3–1)
MONOCYTES NFR BLD: 5.7 % (ref 4–15)
NEUTROPHILS # BLD AUTO: 4.1 K/UL (ref 1.8–7.7)
NEUTROPHILS NFR BLD: 70.5 % (ref 38–73)
NRBC BLD-RTO: 0 /100 WBC
PLATELET # BLD AUTO: 191 K/UL (ref 150–450)
PMV BLD AUTO: 9.6 FL (ref 9.2–12.9)
POTASSIUM SERPL-SCNC: 4.2 MMOL/L (ref 3.5–5.1)
PROT SERPL-MCNC: 8.1 G/DL (ref 6–8.4)
RBC # BLD AUTO: 4.02 M/UL (ref 4–5.4)
SODIUM SERPL-SCNC: 139 MMOL/L (ref 136–145)
TROPONIN I SERPL DL<=0.01 NG/ML-MCNC: <0.006 NG/ML (ref 0–0.03)
TROPONIN I SERPL DL<=0.01 NG/ML-MCNC: <0.006 NG/ML (ref 0–0.03)
WBC # BLD AUTO: 5.84 K/UL (ref 3.9–12.7)

## 2022-10-14 PROCEDURE — 36415 COLL VENOUS BLD VENIPUNCTURE: CPT | Performed by: EMERGENCY MEDICINE

## 2022-10-14 PROCEDURE — 96374 THER/PROPH/DIAG INJ IV PUSH: CPT

## 2022-10-14 PROCEDURE — 83880 ASSAY OF NATRIURETIC PEPTIDE: CPT | Performed by: EMERGENCY MEDICINE

## 2022-10-14 PROCEDURE — 93005 ELECTROCARDIOGRAM TRACING: CPT

## 2022-10-14 PROCEDURE — 93010 EKG 12-LEAD: ICD-10-PCS | Mod: ,,, | Performed by: INTERNAL MEDICINE

## 2022-10-14 PROCEDURE — 25000003 PHARM REV CODE 250: Performed by: EMERGENCY MEDICINE

## 2022-10-14 PROCEDURE — 94760 N-INVAS EAR/PLS OXIMETRY 1: CPT

## 2022-10-14 PROCEDURE — 84484 ASSAY OF TROPONIN QUANT: CPT | Mod: 91 | Performed by: EMERGENCY MEDICINE

## 2022-10-14 PROCEDURE — 93010 ELECTROCARDIOGRAM REPORT: CPT | Mod: ,,, | Performed by: INTERNAL MEDICINE

## 2022-10-14 PROCEDURE — 80053 COMPREHEN METABOLIC PANEL: CPT | Performed by: EMERGENCY MEDICINE

## 2022-10-14 PROCEDURE — 99285 EMERGENCY DEPT VISIT HI MDM: CPT | Mod: 25

## 2022-10-14 PROCEDURE — 85025 COMPLETE CBC W/AUTO DIFF WBC: CPT | Performed by: EMERGENCY MEDICINE

## 2022-10-14 RX ORDER — ASPIRIN 325 MG
325 TABLET ORAL
Status: COMPLETED | OUTPATIENT
Start: 2022-10-14 | End: 2022-10-14

## 2022-10-14 RX ORDER — LIDOCAINE HYDROCHLORIDE 20 MG/ML
15 SOLUTION OROPHARYNGEAL
Status: COMPLETED | OUTPATIENT
Start: 2022-10-14 | End: 2022-10-14

## 2022-10-14 RX ORDER — MAG HYDROX/ALUMINUM HYD/SIMETH 200-200-20
30 SUSPENSION, ORAL (FINAL DOSE FORM) ORAL
Status: COMPLETED | OUTPATIENT
Start: 2022-10-14 | End: 2022-10-14

## 2022-10-14 RX ORDER — FAMOTIDINE 10 MG/ML
20 INJECTION INTRAVENOUS
Status: COMPLETED | OUTPATIENT
Start: 2022-10-14 | End: 2022-10-14

## 2022-10-14 RX ADMIN — ALUMINUM HYDROXIDE, MAGNESIUM HYDROXIDE, AND DIMETHICONE 30 ML: 200; 20; 200 SUSPENSION ORAL at 08:10

## 2022-10-14 RX ADMIN — FAMOTIDINE 20 MG: 10 INJECTION, SOLUTION INTRAVENOUS at 08:10

## 2022-10-14 RX ADMIN — LIDOCAINE HYDROCHLORIDE 15 ML: 20 SOLUTION ORAL; TOPICAL at 08:10

## 2022-10-14 RX ADMIN — ASPIRIN 325 MG ORAL TABLET 325 MG: 325 PILL ORAL at 08:10

## 2022-10-15 NOTE — ED PROVIDER NOTES
Encounter Date: 10/14/2022    SCRIBE #1 NOTE: I, Lourdes Ureña, am scribing for, and in the presence of,  Carlos Gómez MD.     History     Chief Complaint   Patient presents with    Chest Pain     Pt c/o chest tightness     Time seen by provider: 7:11 PM on 10/14/2022    Angelique Hamilton is a 89 y.o. female with PMHx of HTN, hiatal hernia, and HLD who presents to the ED with chest tightness since yesterday. Pt's daughter in law gave her OTC Tums with no improvement to chest tightness. Daughter in law reports pt has been eating chocolate. Pt also endorses swelling to the right foot for a few days. The patient denies fever, congestion, cough, SOB, N/V, abdominal pain, back pain, or any other symptoms at this time. PSHx of hysterectomy and tonsillectomy.     The history is provided by the patient and a relative.   Review of patient's allergies indicates:   Allergen Reactions    Antihistamines - alkylamine     Torrie [amlodipine-olmesartan]     Flexeril [cyclobenzaprine]      Hallucinations    Hydrocodone     Hydrocodone-acetaminophen Other (See Comments)    Simvastatin     Statins-hmg-coa reductase inhibitors      Past Medical History:   Diagnosis Date    Back problem     Constipation     History of fractured vertebra     Hyperlipidemia     Hypertension     IBS (irritable bowel syndrome)     Migraine headache     Osteoporosis     Rectal prolapse     Scoliosis     Vertigo      Past Surgical History:   Procedure Laterality Date    LEWIS      Back Pain    EYE SURGERY      Bilateral Cataracs    HYSTERECTOMY      OOPHORECTOMY      ROTATOR CUFF REPAIR      bialteral    TONSILLECTOMY       Family History   Problem Relation Age of Onset    Colon cancer Father     Breast cancer Mother 70    Hypertension Mother     Ovarian cancer Neg Hx      Social History     Tobacco Use    Smoking status: Never    Smokeless tobacco: Never   Substance Use Topics    Alcohol use: No    Drug use: No     Review of Systems   Constitutional:   Negative for fever.   HENT:  Negative for congestion.    Respiratory:  Positive for chest tightness. Negative for cough and shortness of breath.    Cardiovascular:  Negative for palpitations.   Gastrointestinal:  Negative for abdominal pain, nausea and vomiting.   Musculoskeletal:  Negative for back pain.        + right foot swelling   Skin:  Negative for pallor.   Hematological:  Does not bruise/bleed easily.   Psychiatric/Behavioral:  Negative for agitation.      Physical Exam     Initial Vitals [10/14/22 1936]   BP Pulse Resp Temp SpO2   (!) 150/76 73 -- 97.9 °F (36.6 °C) 99 %      MAP       --         Physical Exam    Nursing note and vitals reviewed.  Constitutional: She appears well-developed and well-nourished. She is not diaphoretic. No distress.   HENT:   Head: Normocephalic and atraumatic.   Eyes: EOM are normal. Pupils are equal, round, and reactive to light.   Neck: Neck supple.   Normal range of motion.  Cardiovascular:  Normal rate, regular rhythm and intact distal pulses.     Exam reveals no gallop and no friction rub.       Murmur heard.  Pulmonary/Chest: Breath sounds normal. No respiratory distress. She has no wheezes. She has no rhonchi. She has no rales.   Abdominal: Abdomen is soft. Bowel sounds are normal. There is no abdominal tenderness. There is no rebound and no guarding.   Musculoskeletal:         General: Normal range of motion.      Cervical back: Normal range of motion and neck supple.      Comments: Kyphosis back.      Neurological: She is alert and oriented to person, place, and time.   Skin: Skin is warm and dry. No erythema.   Clear filled blisters noted along right dorsal foot without redness or tenderness. No edema noted.    Psychiatric: She has a normal mood and affect. Her behavior is normal. Judgment and thought content normal.       ED Course   Procedures  Labs Reviewed   CBC W/ AUTO DIFFERENTIAL - Abnormal; Notable for the following components:       Result Value    MCH 32.6  (*)     All other components within normal limits   COMPREHENSIVE METABOLIC PANEL - Abnormal; Notable for the following components:    BUN 26 (*)     Alkaline Phosphatase 179 (*)     ALT 8 (*)     eGFR 39 (*)     All other components within normal limits   B-TYPE NATRIURETIC PEPTIDE - Abnormal; Notable for the following components:     (*)     All other components within normal limits   TROPONIN I   TROPONIN I     EKG Readings: (Independently Interpreted)   Initial Reading: No STEMI. Rhythm: Normal Sinus Rhythm. Heart Rate: 80. ST Segments: Normal ST Segments. T Waves: Normal. Axis: Normal.     Imaging Results              X-Ray Chest AP Portable (Final result)  Result time 10/14/22 19:46:11      Final result by Rogers Llanos MD (10/14/22 19:46:11)                   Impression:      Slight decompression of hiatal hernia with no acute findings evident the chest.      Electronically signed by: Rogers Llanos  Date:    10/14/2022  Time:    19:46               Narrative:    EXAMINATION:  XR CHEST AP PORTABLE    CLINICAL HISTORY:  Chest Pain;    TECHNIQUE:  Single frontal view of the chest was performed.    COMPARISON:  04/04/2022    FINDINGS:  Double density with air in the behind the heart compatible with large hiatal hernia appears slightly decompressed.  The lungs and pleural spaces remain clear with mild emphysematous and fibrotic change.  Scoliosis with multilevel thoracic vertebroplasty is noted.                                       Medications   aspirin tablet 325 mg (325 mg Oral Given 10/14/22 2001)   famotidine (PF) injection 20 mg (20 mg Intravenous Given 10/14/22 2033)   aluminum-magnesium hydroxide-simethicone 200-200-20 mg/5 mL suspension 30 mL (30 mLs Oral Given 10/14/22 2033)     And   LIDOcaine HCl 2% oral solution 15 mL (15 mLs Oral Given 10/14/22 2033)     Medical Decision Making:   History:   Old Medical Records: I decided to obtain old medical records.  Initial Assessment:    89-year-old female presented with chest pain.  Differential Diagnosis:   Initial differential diagnosis included but not limited to atypical MI, reflux disease, and hiatal hernia.  Independently Interpreted Test(s):   I have ordered and independently interpreted EKG Reading(s) - see prior notes  Clinical Tests:   Lab Tests: Ordered and Reviewed  Radiological Study: Reviewed and Ordered  Medical Tests: Reviewed and Ordered  ED Management:  The patient was emergently evaluated in the emergency department, her evaluation was significant for an elderly female with a normal lung exam.  The patient's EKG showed no acute abnormalities per my independent interpretation.  Patient's chest x-ray showed no acute abnormalities per Radiology.  The patient's labs showed no acute abnormalities, including multiple serial negative troponins.  The patient was treated with parental Pepcid, and a GI cocktail here in the emergency department.  The patient had resolution of her symptoms after treatment with these medications.  The etiology of the patient's chest pain is likely related to her hiatal hernia.  The patient is stable for discharge to home.  She is instructed to take her home medications as previously prescribed.  She is to otherwise follow up with her PCP for further care.        Scribe Attestation:   Scribe #1: I performed the above scribed service and the documentation accurately describes the services I performed. I attest to the accuracy of the note.               I, Dr. Carlos Gómez, personally performed the services described in this documentation. All medical record entries made by the scribe were at my direction and in my presence.  I have reviewed the chart and agree that the record reflects my personal performance and is accurate and complete. Carlos Gómez MD.  12:42 AM 10/15/2022      Clinical Impression:   Final diagnoses:  [R07.89] Chest tightness  [R07.9] Chest pain  [R07.89] Atypical chest pain  (Primary)  [K44.9] Hiatal hernia        ED Disposition Condition    Discharge Stable          ED Prescriptions    None       Follow-up Information       Follow up With Specialties Details Why Contact Info    Ryan Hurtado MD Family Medicine, Home Health Services, Hospice Services Schedule an appointment as soon as possible for a visit   [address]  [address]  [facility] [address]  [phone]               Carlos Gómez MD  10/15/22 0044

## 2022-10-19 ENCOUNTER — TELEPHONE (OUTPATIENT)
Dept: FAMILY MEDICINE | Facility: CLINIC | Age: 87
End: 2022-10-19

## 2022-10-19 NOTE — TELEPHONE ENCOUNTER
----- Message from Clarita Zelaya sent at 10/19/2022  1:50 PM CDT -----  Bert morgan pt daughter in law is calling and fell a week ago. They were at the hospital Friday night for thoughts of a heart attack. Turned out to be an hernia. They did xrays and nothing showed up. Would like to know if she can get some muscle relaxer's as she is complaining of pain under her L breast and travels to her [place].   [facility]   [phone]

## 2022-10-19 NOTE — TELEPHONE ENCOUNTER
"Per Dr. Hurtado "No, muscle relaxer not good for 89 year olds. Stay on Tylenol or Aleve for pain." Spoke with pts daughter in law and let her know.   "

## 2022-10-23 ENCOUNTER — PATIENT MESSAGE (OUTPATIENT)
Dept: FAMILY MEDICINE | Facility: CLINIC | Age: 87
End: 2022-10-23

## 2022-10-24 ENCOUNTER — PATIENT MESSAGE (OUTPATIENT)
Dept: FAMILY MEDICINE | Facility: CLINIC | Age: 87
End: 2022-10-24

## 2022-10-24 RX ORDER — TRAMADOL HYDROCHLORIDE AND ACETAMINOPHEN 37.5; 325 MG/1; MG/1
1 TABLET, FILM COATED ORAL 2 TIMES DAILY
Qty: 40 TABLET | Refills: 0 | Status: SHIPPED | OUTPATIENT
Start: 2022-10-24 | End: 2022-11-11 | Stop reason: SDUPTHER

## 2022-10-28 DIAGNOSIS — I10 ESSENTIAL HYPERTENSION: ICD-10-CM

## 2022-10-28 RX ORDER — VERAPAMIL HYDROCHLORIDE 120 MG/1
120 TABLET, FILM COATED, EXTENDED RELEASE ORAL DAILY
Qty: 90 TABLET | Refills: 3 | Status: SHIPPED | OUTPATIENT
Start: 2022-10-28 | End: 2023-10-05 | Stop reason: SDUPTHER

## 2022-11-12 DIAGNOSIS — R42 VERTIGO: ICD-10-CM

## 2022-11-14 RX ORDER — MECLIZINE HCL 12.5 MG 12.5 MG/1
6.25 TABLET ORAL 2 TIMES DAILY PRN
Qty: 90 TABLET | Refills: 1 | Status: SHIPPED | OUTPATIENT
Start: 2022-11-14 | End: 2023-05-03 | Stop reason: SDUPTHER

## 2022-11-23 ENCOUNTER — TELEPHONE (OUTPATIENT)
Dept: FAMILY MEDICINE | Facility: CLINIC | Age: 87
End: 2022-11-23

## 2022-11-23 NOTE — TELEPHONE ENCOUNTER
Patient due for labs prior to visit. CMP and CBC are included in orders, but these were drawn 10/14 for another provider. There are a few abnormals. Does Dr Hurtado want to repeat, or should I cancel those with [facility]?

## 2022-11-28 ENCOUNTER — TELEPHONE (OUTPATIENT)
Dept: FAMILY MEDICINE | Facility: CLINIC | Age: 87
End: 2022-11-28

## 2022-11-28 NOTE — TELEPHONE ENCOUNTER
Spoke with pts daughter and got her scheduled - offered multiple thursdays but they had conflicting appointments.

## 2022-11-28 NOTE — TELEPHONE ENCOUNTER
----- Message from Nohemy Lujan sent at 11/28/2022 10:06 AM CST -----  Patient daughter (Boom)called and stated that she had to cancel the patient  appointment  because she has another doctor's appointment and she has to reschedule it and it has to be on a Thursday please give her a call at [phone]

## 2022-12-19 DIAGNOSIS — N32.81 OAB (OVERACTIVE BLADDER): ICD-10-CM

## 2022-12-19 RX ORDER — PANTOPRAZOLE SODIUM 40 MG/1
40 TABLET, DELAYED RELEASE ORAL DAILY
Qty: 90 TABLET | Refills: 1 | Status: CANCELLED | OUTPATIENT
Start: 2022-12-19 | End: 2023-12-19

## 2022-12-19 RX ORDER — TRAMADOL HYDROCHLORIDE AND ACETAMINOPHEN 37.5; 325 MG/1; MG/1
1 TABLET, FILM COATED ORAL 2 TIMES DAILY
Qty: 180 TABLET | Refills: 0 | Status: SHIPPED | OUTPATIENT
Start: 2022-12-19 | End: 2023-03-16 | Stop reason: SDUPTHER

## 2022-12-19 RX ORDER — FAMOTIDINE 20 MG/1
20 TABLET, FILM COATED ORAL 2 TIMES DAILY
Qty: 180 TABLET | Refills: 1 | Status: SHIPPED | OUTPATIENT
Start: 2022-12-19 | End: 2023-06-19 | Stop reason: SDUPTHER

## 2022-12-19 RX ORDER — TOLTERODINE 2 MG/1
2 CAPSULE, EXTENDED RELEASE ORAL DAILY
Qty: 30 CAPSULE | Refills: 3 | Status: SHIPPED | OUTPATIENT
Start: 2022-12-19 | End: 2023-03-23 | Stop reason: SDUPTHER

## 2022-12-19 RX ORDER — TOLTERODINE 2 MG/1
2 CAPSULE, EXTENDED RELEASE ORAL DAILY
Qty: 30 CAPSULE | Refills: 3 | OUTPATIENT
Start: 2022-12-19 | End: 2023-12-19

## 2022-12-19 NOTE — TELEPHONE ENCOUNTER
Pt is needing a refill for her pantoprazole. Last office visit 08/04/2022. Next office visit 01/12/2023.     New prescription for the pantoprazole was sent to the pharmacy on 09/23/2022, for a 90 day supply and with 1 refill.

## 2022-12-19 NOTE — TELEPHONE ENCOUNTER
Pt is needing a refill on her famotidine. Last office visit 08/04/2022. Next office visit 01/12/2023

## 2023-01-07 LAB
CHOLEST SERPL-MCNC: 216 MG/DL
CHOLEST/HDLC SERPL: 2.6 (CALC)
HDLC SERPL-MCNC: 84 MG/DL
LDLC SERPL CALC-MCNC: 110 MG/DL (CALC)
NONHDLC SERPL-MCNC: 132 MG/DL (CALC)
TRIGL SERPL-MCNC: 111 MG/DL
TSH SERPL-ACNC: 3.22 MIU/L (ref 0.4–4.5)

## 2023-01-08 NOTE — PROGRESS NOTES
Call patients family.  Her cholesterol has improved down to 216 from a high of 256.  Thyroid levels are normal.  Continue current medications.

## 2023-01-09 ENCOUNTER — TELEPHONE (OUTPATIENT)
Dept: FAMILY MEDICINE | Facility: CLINIC | Age: 88
End: 2023-01-09

## 2023-01-09 NOTE — TELEPHONE ENCOUNTER
Spoke to Karen, daughter, with results verbatim per Dr Hurtado. Said she saw them on portal. Verbalized understanding.

## 2023-01-09 NOTE — TELEPHONE ENCOUNTER
----- Message from Ryan Hurtado MD sent at 1/8/2023  4:35 PM CST -----  Call patients family.  Her cholesterol has improved down to 216 from a high of 256.  Thyroid levels are normal.  Continue current medications.

## 2023-01-12 ENCOUNTER — OFFICE VISIT (OUTPATIENT)
Dept: FAMILY MEDICINE | Facility: CLINIC | Age: 88
End: 2023-01-12
Payer: MEDICARE

## 2023-01-12 ENCOUNTER — TELEPHONE (OUTPATIENT)
Dept: FAMILY MEDICINE | Facility: CLINIC | Age: 88
End: 2023-01-12

## 2023-01-12 VITALS
BODY MASS INDEX: 25.19 KG/M2 | DIASTOLIC BLOOD PRESSURE: 80 MMHG | HEART RATE: 60 BPM | SYSTOLIC BLOOD PRESSURE: 136 MMHG | HEIGHT: 56 IN | WEIGHT: 112 LBS

## 2023-01-12 DIAGNOSIS — I70.0 AORTIC ATHEROSCLEROSIS: ICD-10-CM

## 2023-01-12 DIAGNOSIS — I10 PRIMARY HYPERTENSION: ICD-10-CM

## 2023-01-12 DIAGNOSIS — E44.1 MILD PROTEIN-CALORIE MALNUTRITION: ICD-10-CM

## 2023-01-12 DIAGNOSIS — N18.31 STAGE 3A CHRONIC KIDNEY DISEASE: ICD-10-CM

## 2023-01-12 DIAGNOSIS — F22 DELUSIONS: ICD-10-CM

## 2023-01-12 DIAGNOSIS — M41.9 KYPHOSCOLIOSIS: Primary | ICD-10-CM

## 2023-01-12 DIAGNOSIS — F01.A0 MILD VASCULAR DEMENTIA WITHOUT BEHAVIORAL DISTURBANCE, PSYCHOTIC DISTURBANCE, MOOD DISTURBANCE, OR ANXIETY: ICD-10-CM

## 2023-01-12 DIAGNOSIS — I35.0 AORTIC STENOSIS WITH TRILEAFLET VALVE: ICD-10-CM

## 2023-01-12 DIAGNOSIS — M46.1 SACROILIITIS, NOT ELSEWHERE CLASSIFIED: ICD-10-CM

## 2023-01-12 DIAGNOSIS — E78.2 MIXED HYPERLIPIDEMIA: ICD-10-CM

## 2023-01-12 DIAGNOSIS — G40.909 NONINTRACTABLE EPILEPSY WITHOUT STATUS EPILEPTICUS, UNSPECIFIED EPILEPSY TYPE: ICD-10-CM

## 2023-01-12 PROCEDURE — 99214 OFFICE O/P EST MOD 30 MIN: CPT | Mod: S$GLB,,, | Performed by: FAMILY MEDICINE

## 2023-01-12 PROCEDURE — 99214 PR OFFICE/OUTPT VISIT, EST, LEVL IV, 30-39 MIN: ICD-10-PCS | Mod: S$GLB,,, | Performed by: FAMILY MEDICINE

## 2023-01-12 NOTE — TELEPHONE ENCOUNTER
----- Message from Coni Alba sent at 1/12/2023  2:44 PM CST -----  Pt would like to reschedule her July appt. [phone]    
Spoke with patients daughter and got her rescheduled.   
Cardiac

## 2023-01-12 NOTE — PROGRESS NOTES
SUBJECTIVE:    Patient ID: Angelique Hamilton is a 89 y.o. female.    Chief Complaint: Hyperlipidemia (No bottles, Lab-results, abc )    89-year-old female lives with her daughter.  She eats 2 meals a day plus snacks.  She drinks coffee and very little water.  She is mobile per Rollator around the house and has had no recent falls.    Right eye ptosis-eyelid surgery has been suggested, Cardiology has not given her cardiac clearance for anesthesia as of yet.  She may be a poor risk for anesthesia.    She denies chest pain has mild shortness of breath with exertion.  One emergency room visit due to chest pain was deemed to be found GI related such as a hiatal hernia.    Osteoarthritis, uses Ultracet b.i.d. for pain.  She sleeps comfortably in a recliner every night.  Unable to lay in the bed due to kyphoscoliosis.    Aortic stenosis with trileaflet valve-cardiac murmur 3/6      Admission on 10/14/2022, Discharged on 10/14/2022   Component Date Value Ref Range Status    WBC 10/14/2022 5.84  3.90 - 12.70 K/uL Final    RBC 10/14/2022 4.02  4.00 - 5.40 M/uL Final    Hemoglobin 10/14/2022 13.1  12.0 - 16.0 g/dL Final    Hematocrit 10/14/2022 38.8  37.0 - 48.5 % Final    MCV 10/14/2022 97  82 - 98 fL Final    MCH 10/14/2022 32.6 (H)  27.0 - 31.0 pg Final    MCHC 10/14/2022 33.8  32.0 - 36.0 g/dL Final    RDW 10/14/2022 12.1  11.5 - 14.5 % Final    Platelets 10/14/2022 191  150 - 450 K/uL Final    MPV 10/14/2022 9.6  9.2 - 12.9 fL Final    Immature Granulocytes 10/14/2022 0.5  0.0 - 0.5 % Final    Gran # (ANC) 10/14/2022 4.1  1.8 - 7.7 K/uL Final    Immature Grans (Abs) 10/14/2022 0.03  0.00 - 0.04 K/uL Final    Lymph # 10/14/2022 1.2  1.0 - 4.8 K/uL Final    Mono # 10/14/2022 0.3  0.3 - 1.0 K/uL Final    Eos # 10/14/2022 0.2  0.0 - 0.5 K/uL Final    Baso # 10/14/2022 0.04  0.00 - 0.20 K/uL Final    nRBC 10/14/2022 0  0 /100 WBC Final    Gran % 10/14/2022 70.5  38.0 - 73.0 % Final    Lymph % 10/14/2022 20.0  18.0 - 48.0 %  Final    Mono % 10/14/2022 5.7  4.0 - 15.0 % Final    Eosinophil % 10/14/2022 2.6  0.0 - 8.0 % Final    Basophil % 10/14/2022 0.7  0.0 - 1.9 % Final    Differential Method 10/14/2022 Automated   Final    Sodium 10/14/2022 139  136 - 145 mmol/L Final    Potassium 10/14/2022 4.2  3.5 - 5.1 mmol/L Final    Chloride 10/14/2022 100  95 - 110 mmol/L Final    CO2 10/14/2022 26  23 - 29 mmol/L Final    Glucose 10/14/2022 102  70 - 110 mg/dL Final    BUN 10/14/2022 26 (H)  8 - 23 mg/dL Final    Creatinine 10/14/2022 1.3  0.5 - 1.4 mg/dL Final    Calcium 10/14/2022 10.5  8.7 - 10.5 mg/dL Final    Total Protein 10/14/2022 8.1  6.0 - 8.4 g/dL Final    Albumin 10/14/2022 4.1  3.5 - 5.2 g/dL Final    Total Bilirubin 10/14/2022 0.9  0.1 - 1.0 mg/dL Final    Alkaline Phosphatase 10/14/2022 179 (H)  55 - 135 U/L Final    AST 10/14/2022 19  10 - 40 U/L Final    ALT 10/14/2022 8 (L)  10 - 44 U/L Final    Anion Gap 10/14/2022 13  8 - 16 mmol/L Final    eGFR 10/14/2022 39 (A)  >60 mL/min/1.73 m^2 Final    Troponin I 10/14/2022 <0.006  0.000 - 0.026 ng/mL Final    BNP 10/14/2022 104 (H)  0 - 99 pg/mL Final    Troponin I 10/14/2022 <0.006  0.000 - 0.026 ng/mL Final   Office Visit on 08/04/2022   Component Date Value Ref Range Status    Cholesterol 01/06/2023 216 (H)  <200 mg/dL Final    HDL 01/06/2023 84  > OR = 50 mg/dL Final    Triglycerides 01/06/2023 111  <150 mg/dL Final    LDL Cholesterol 01/06/2023 110 (H)  mg/dL (calc) Final    HDL/Cholesterol Ratio 01/06/2023 2.6  <5.0 (calc) Final    Non HDL Chol. (LDL+VLDL) 01/06/2023 132 (H)  <130 mg/dL (calc) Final    TSH w/reflex to FT4 01/06/2023 3.22  0.40 - 4.50 mIU/L Final       Past Medical History:   Diagnosis Date    Back problem     Constipation     History of fractured vertebra     Hyperlipidemia     Hypertension     IBS (irritable bowel syndrome)     Migraine headache     Osteoporosis     Rectal prolapse     Scoliosis     Vertigo      Social History     Socioeconomic History     Marital status:    Tobacco Use    Smoking status: Never    Smokeless tobacco: Never   Substance and Sexual Activity    Alcohol use: No    Drug use: No    Sexual activity: Not Currently     Partners: Male     Birth control/protection: None     Social Determinants of Health     Financial Resource Strain: Low Risk     Difficulty of Paying Living Expenses: Not very hard   Food Insecurity: No Food Insecurity    Worried About Running Out of Food in the Last Year: Never true    Ran Out of Food in the Last Year: Never true   Transportation Needs: No Transportation Needs    Lack of Transportation (Medical): No    Lack of Transportation (Non-Medical): No   Physical Activity: Inactive    Days of Exercise per Week: 0 days    Minutes of Exercise per Session: 0 min   Stress: No Stress Concern Present    Feeling of Stress : Not at all   Social Connections: Unknown    Frequency of Communication with Friends and Family: Once a week    Frequency of Social Gatherings with Friends and Family: More than three times a week    Active Member of Clubs or Organizations: No    Attends Club or Organization Meetings: Never    Marital Status:    Housing Stability: Low Risk     Unable to Pay for Housing in the Last Year: No    Number of Places Lived in the Last Year: 1    Unstable Housing in the Last Year: No     Past Surgical History:   Procedure Laterality Date    LEWIS      Back Pain    EYE SURGERY      Bilateral Cataracs    HYSTERECTOMY      OOPHORECTOMY      ROTATOR CUFF REPAIR      bialteral    TONSILLECTOMY       Family History   Problem Relation Age of Onset    Colon cancer Father     Breast cancer Mother 70    Hypertension Mother     Ovarian cancer Neg Hx        Review of patient's allergies indicates:   Allergen Reactions    Antihistamines - alkylamine     Torrie [amlodipine-olmesartan]     Flexeril [cyclobenzaprine]      Hallucinations    Hydrocodone     Hydrocodone-acetaminophen Other (See Comments)    Simvastatin      Statins-hmg-coa reductase inhibitors        Current Outpatient Medications:     biotin 1 mg tablet, Take 1,000 mcg by mouth 3 (three) times daily., Disp: , Rfl:     calcium-vitamin D3 (OS-AYSHA 500 + D3) 500 mg-5 mcg (200 unit) per tablet, Take 1 tablet by mouth 2 (two) times daily with meals., Disp: , Rfl:     cholecalciferol, vitamin D3, (VITAMIN D3) 2,000 unit Tab, Take 2,000 Units by mouth once daily. , Disp: , Rfl:     ciprofloxacin HCl (CIPRO) 250 MG tablet, Take 1 tablet (250 mg total) by mouth 2 (two) times daily., Disp: 20 tablet, Rfl: 0    donepeziL (ARICEPT ODT) 5 MG TbDL, Take 5 mg by mouth nightly., Disp: , Rfl:     famotidine (PEPCID) 20 MG tablet, Take 1 tablet (20 mg total) by mouth 2 (two) times daily., Disp: 180 tablet, Rfl: 1    ferrous sulfate (FEOSOL) 325 mg (65 mg iron) Tab tablet, Take 325 mg by mouth daily with breakfast., Disp: , Rfl:     hydroCHLOROthiazide (HYDRODIURIL) 25 MG tablet, Take 1 tablet (25 mg total) by mouth every other day., Disp: 45 tablet, Rfl: 1    lisinopriL 10 MG tablet, Take 10 mg by mouth once daily., Disp: , Rfl:     meclizine (ANTIVERT) 12.5 mg tablet, Take 0.5 tablets (6.25 mg total) by mouth 2 (two) times daily as needed for Dizziness (1/2 tablet twice daily as needed for dizziness)., Disp: 90 tablet, Rfl: 1    mupirocin (BACTROBAN) 2 % ointment, Apply topically 2 (two) times daily., Disp: 30 g, Rfl: 2    pantoprazole (PROTONIX) 40 MG tablet, Take 1 tablet (40 mg total) by mouth once daily., Disp: 90 tablet, Rfl: 1    polyethylene glycol (GLYCOLAX) 17 gram/dose powder, Take 17 g by mouth once daily., Disp: 116 g, Rfl: 0    sertraline (ZOLOFT) 25 MG tablet, Take 25 mg by mouth once daily., Disp: , Rfl:     tolterodine (DETROL LA) 2 MG Cp24, Take 1 capsule (2 mg total) by mouth once daily. For bladder, Disp: 30 capsule, Rfl: 3    verapamiL (CALAN-SR) 120 MG CR tablet, Take 1 tablet (120 mg total) by mouth once daily., Disp: 90 tablet, Rfl: 3    fluticasone propionate  (FLONASE) 50 mcg/actuation nasal spray, 1 spray (50 mcg total) by Each Nostril route once daily. (Patient not taking: Reported on 1/12/2023), Disp: 16 g, Rfl: 11    QUEtiapine (SEROQUEL) 50 MG tablet, Take 1 tablet (50 mg total) by mouth every evening. (Patient taking differently: Take 50 mg by mouth every evening. Take 2 tablets nightly), Disp: 30 tablet, Rfl: 5    tramadol-acetaminophen 37.5-325 mg (ULTRACET) 37.5-325 mg Tab, Take 1 tablet by mouth 2 (two) times a day. (Patient not taking: Reported on 1/12/2023), Disp: 180 tablet, Rfl: 0    Review of Systems   Constitutional:  Negative for appetite change, chills, fatigue, fever and unexpected weight change.   HENT:  Negative for congestion, ear pain, sinus pain, sore throat and trouble swallowing.    Eyes:  Positive for visual disturbance (Right eye visual field compromise due to  ptosis). Negative for pain and discharge.   Respiratory:  Negative for apnea, cough, shortness of breath and wheezing.    Cardiovascular:  Negative for chest pain, palpitations and leg swelling.   Gastrointestinal:  Negative for abdominal pain, blood in stool, constipation, diarrhea, nausea and vomiting.   Endocrine: Negative for heat intolerance, polydipsia and polyuria.   Genitourinary:  Negative for difficulty urinating, dyspareunia, dysuria, frequency, hematuria and menstrual problem.   Musculoskeletal:  Positive for arthralgias and back pain (kyphoscoliosis). Negative for gait problem, joint swelling and myalgias.   Allergic/Immunologic: Negative for environmental allergies, food allergies and immunocompromised state.   Neurological:  Negative for dizziness, tremors, seizures, numbness and headaches.   Psychiatric/Behavioral:  Negative for behavioral problems, confusion, hallucinations and suicidal ideas. The patient is not nervous/anxious.         Moods are stable on quetiapine        Objective:      Vitals:    01/12/23 1346   BP: 136/80   Pulse: 60   Weight: 50.8 kg (112 lb)  "  Height: 4' 8" (1.422 m)     Physical Exam  Vitals and nursing note reviewed.   Constitutional:       Appearance: Normal appearance. She is well-developed.      Comments: Frail elderly female in no apparent distress ambulates with Rollator   HENT:      Head: Normocephalic and atraumatic.      Right Ear: Tympanic membrane and external ear normal. There is impacted cerumen.      Left Ear: Tympanic membrane and external ear normal.      Nose: Nose normal.      Mouth/Throat:      Pharynx: Oropharynx is clear.   Eyes:      Pupils: Pupils are equal, round, and reactive to light.   Neck:      Thyroid: No thyromegaly.      Vascular: No carotid bruit.      Comments: Kyphosis of the neck tilted forward  Cardiovascular:      Rate and Rhythm: Normal rate and regular rhythm.      Heart sounds: Murmur (grade 3/6 systolic murmur) heard.   Pulmonary:      Effort: Pulmonary effort is normal.      Breath sounds: Normal breath sounds. No wheezing or rales.   Abdominal:      General: Bowel sounds are normal. There is no distension.      Palpations: Abdomen is soft.      Tenderness: There is no abdominal tenderness.   Musculoskeletal:         General: No tenderness or deformity. Normal range of motion.      Cervical back: Normal range of motion and neck supple.      Lumbar back: Normal. No spasms.      Comments: Bends 60 degrees at  waist shoulders good range of motion knees are crepitant bilaterally.  She has 1+ pedal edema on the right and trace edema on the left foot   Lymphadenopathy:      Cervical: No cervical adenopathy.   Skin:     General: Skin is warm and dry.      Findings: No rash.   Neurological:      Mental Status: She is alert and oriented to person, place, and time. Mental status is at baseline.      Cranial Nerves: No cranial nerve deficit.      Coordination: Coordination normal.      Gait: Gait abnormal (slow wobbly gait).   Psychiatric:         Mood and Affect: Mood normal.         Behavior: Behavior normal.         " Thought Content: Thought content normal.         Judgment: Judgment normal.         Assessment:       1. Kyphoscoliosis    2. Delusions    3. Aortic atherosclerosis    4. Mild vascular dementia without behavioral disturbance, psychotic disturbance, mood disturbance, or anxiety    5. Mild protein-calorie malnutrition    6. Sacroiliitis, not elsewhere classified    7. Nonintractable epilepsy without status epilepticus, unspecified epilepsy type    8. Stage 3a chronic kidney disease    9. Aortic stenosis with trileaflet valve    10. Primary hypertension    11. Mixed hyperlipidemia           Plan:       Kyphoscoliosis  Managing well with Ultracet b.i.d. no recent falls.  Delusions  Quetiapine working well  mg daily  Aortic atherosclerosis    Mild vascular dementia without behavioral disturbance, psychotic disturbance, mood disturbance, or anxiety  Continue donedemetrio, Dr. Reilly neurology is retiring this year  Mild protein-calorie malnutrition  Eating well at home with meals provided by her daughter  Sacroiliitis, not elsewhere classified    Nonintractable epilepsy without status epilepticus, unspecified epilepsy type    Stage 3a chronic kidney disease  GFR 39  Aortic stenosis with trileaflet valve    Primary hypertension  Blood pressure well controlled  Mixed hyperlipidemia  Cholesterol 216 HDL 84   excellent lipid panel    Follow up in about 6 months (around 7/12/2023), or Hypertension, scoliosis.        1/12/2023 Ryan Hurtado

## 2023-02-07 ENCOUNTER — HOSPITAL ENCOUNTER (OUTPATIENT)
Facility: HOSPITAL | Age: 88
Discharge: HOME HEALTH | End: 2023-02-08
Attending: EMERGENCY MEDICINE | Admitting: HOSPITALIST
Payer: MEDICARE

## 2023-02-07 DIAGNOSIS — K52.9 COLITIS: Primary | ICD-10-CM

## 2023-02-07 PROBLEM — M79.672 BILATERAL FOOT PAIN: Status: RESOLVED | Noted: 2019-05-20 | Resolved: 2023-02-07

## 2023-02-07 PROBLEM — Z87.81 HISTORY OF VERTEBRAL COMPRESSION FRACTURE: Status: RESOLVED | Noted: 2019-10-25 | Resolved: 2023-02-07

## 2023-02-07 PROBLEM — L98.9 BENIGN SKIN LESION: Status: RESOLVED | Noted: 2019-05-20 | Resolved: 2023-02-07

## 2023-02-07 PROBLEM — L65.9 HAIR LOSS: Status: RESOLVED | Noted: 2019-12-27 | Resolved: 2023-02-07

## 2023-02-07 PROBLEM — M79.671 BILATERAL FOOT PAIN: Status: RESOLVED | Noted: 2019-05-20 | Resolved: 2023-02-07

## 2023-02-07 PROBLEM — B35.1 ONYCHOMYCOSIS DUE TO DERMATOPHYTE: Status: RESOLVED | Noted: 2019-05-20 | Resolved: 2023-02-07

## 2023-02-07 PROBLEM — L89.152 PRESSURE INJURY OF SACRAL REGION, STAGE 2: Status: RESOLVED | Noted: 2022-08-06 | Resolved: 2023-02-07

## 2023-02-07 PROBLEM — M65.331 TRIGGER MIDDLE FINGER OF RIGHT HAND: Status: RESOLVED | Noted: 2022-08-06 | Resolved: 2023-02-07

## 2023-02-07 PROBLEM — E86.0 DEHYDRATION: Status: ACTIVE | Noted: 2023-02-07

## 2023-02-07 PROBLEM — I70.0 AORTIC ATHEROSCLEROSIS: Status: RESOLVED | Noted: 2023-01-12 | Resolved: 2023-02-07

## 2023-02-07 PROBLEM — F22 DELUSIONS: Status: RESOLVED | Noted: 2023-01-12 | Resolved: 2023-02-07

## 2023-02-07 LAB
ALBUMIN SERPL BCP-MCNC: 3.8 G/DL (ref 3.5–5.2)
ALP SERPL-CCNC: 113 U/L (ref 55–135)
ALT SERPL W/O P-5'-P-CCNC: 10 U/L (ref 10–44)
ANION GAP SERPL CALC-SCNC: 10 MMOL/L (ref 8–16)
AST SERPL-CCNC: 20 U/L (ref 10–40)
BACTERIA #/AREA URNS HPF: ABNORMAL /HPF
BASOPHILS # BLD AUTO: 0.03 K/UL (ref 0–0.2)
BASOPHILS NFR BLD: 0.4 % (ref 0–1.9)
BILIRUB SERPL-MCNC: 1.1 MG/DL (ref 0.1–1)
BILIRUB UR QL STRIP: NEGATIVE
BUN SERPL-MCNC: 23 MG/DL (ref 8–23)
C DIFF GDH STL QL: NEGATIVE
C DIFF TOX A+B STL QL IA: NEGATIVE
CALCIUM SERPL-MCNC: 10.3 MG/DL (ref 8.7–10.5)
CHLORIDE SERPL-SCNC: 103 MMOL/L (ref 95–110)
CLARITY UR: CLEAR
CO2 SERPL-SCNC: 25 MMOL/L (ref 23–29)
COLOR UR: YELLOW
CREAT SERPL-MCNC: 1.5 MG/DL (ref 0.5–1.4)
DIFFERENTIAL METHOD: ABNORMAL
EOSINOPHIL # BLD AUTO: 0.1 K/UL (ref 0–0.5)
EOSINOPHIL NFR BLD: 1.1 % (ref 0–8)
ERYTHROCYTE [DISTWIDTH] IN BLOOD BY AUTOMATED COUNT: 12.5 % (ref 11.5–14.5)
EST. GFR  (NO RACE VARIABLE): 33 ML/MIN/1.73 M^2
GLUCOSE SERPL-MCNC: 139 MG/DL (ref 70–110)
GLUCOSE UR QL STRIP: NEGATIVE
HCT VFR BLD AUTO: 39.6 % (ref 37–48.5)
HGB BLD-MCNC: 13.1 G/DL (ref 12–16)
HGB UR QL STRIP: ABNORMAL
IMM GRANULOCYTES # BLD AUTO: 0.04 K/UL (ref 0–0.04)
IMM GRANULOCYTES NFR BLD AUTO: 0.5 % (ref 0–0.5)
KETONES UR QL STRIP: NEGATIVE
LEUKOCYTE ESTERASE UR QL STRIP: NEGATIVE
LIPASE SERPL-CCNC: 25 U/L (ref 4–60)
LYMPHOCYTES # BLD AUTO: 0.4 K/UL (ref 1–4.8)
LYMPHOCYTES NFR BLD: 5.3 % (ref 18–48)
MCH RBC QN AUTO: 32.3 PG (ref 27–31)
MCHC RBC AUTO-ENTMCNC: 33.1 G/DL (ref 32–36)
MCV RBC AUTO: 98 FL (ref 82–98)
MICROSCOPIC COMMENT: ABNORMAL
MONOCYTES # BLD AUTO: 0.3 K/UL (ref 0.3–1)
MONOCYTES NFR BLD: 3.6 % (ref 4–15)
NEUTROPHILS # BLD AUTO: 7.5 K/UL (ref 1.8–7.7)
NEUTROPHILS NFR BLD: 89.1 % (ref 38–73)
NITRITE UR QL STRIP: NEGATIVE
NRBC BLD-RTO: 0 /100 WBC
OB PNL STL: NEGATIVE
PH UR STRIP: 6 [PH] (ref 5–8)
PLATELET # BLD AUTO: 134 K/UL (ref 150–450)
PMV BLD AUTO: 10 FL (ref 9.2–12.9)
POTASSIUM SERPL-SCNC: 4.1 MMOL/L (ref 3.5–5.1)
PROT SERPL-MCNC: 7 G/DL (ref 6–8.4)
PROT UR QL STRIP: ABNORMAL
RBC # BLD AUTO: 4.06 M/UL (ref 4–5.4)
RBC #/AREA URNS HPF: 10 /HPF (ref 0–4)
RV AG STL QL IA.RAPID: NEGATIVE
SODIUM SERPL-SCNC: 138 MMOL/L (ref 136–145)
SP GR UR STRIP: 1.01 (ref 1–1.03)
URN SPEC COLLECT METH UR: ABNORMAL
UROBILINOGEN UR STRIP-ACNC: NEGATIVE EU/DL
WBC # BLD AUTO: 8.35 K/UL (ref 3.9–12.7)
WBC #/AREA STL HPF: NORMAL /[HPF]

## 2023-02-07 PROCEDURE — 87427 SHIGA-LIKE TOXIN AG IA: CPT | Performed by: PHYSICIAN ASSISTANT

## 2023-02-07 PROCEDURE — 81003 URINALYSIS AUTO W/O SCOPE: CPT | Performed by: PHYSICIAN ASSISTANT

## 2023-02-07 PROCEDURE — 87209 SMEAR COMPLEX STAIN: CPT | Performed by: PHYSICIAN ASSISTANT

## 2023-02-07 PROCEDURE — G0378 HOSPITAL OBSERVATION PER HR: HCPCS

## 2023-02-07 PROCEDURE — 99285 EMERGENCY DEPT VISIT HI MDM: CPT | Mod: 25

## 2023-02-07 PROCEDURE — 87329 GIARDIA AG IA: CPT | Performed by: PHYSICIAN ASSISTANT

## 2023-02-07 PROCEDURE — 36415 COLL VENOUS BLD VENIPUNCTURE: CPT | Performed by: PHYSICIAN ASSISTANT

## 2023-02-07 PROCEDURE — 25000003 PHARM REV CODE 250: Performed by: HOSPITALIST

## 2023-02-07 PROCEDURE — 63600175 PHARM REV CODE 636 W HCPCS: Performed by: HOSPITALIST

## 2023-02-07 PROCEDURE — 83690 ASSAY OF LIPASE: CPT | Performed by: PHYSICIAN ASSISTANT

## 2023-02-07 PROCEDURE — 87045 FECES CULTURE AEROBIC BACT: CPT | Performed by: PHYSICIAN ASSISTANT

## 2023-02-07 PROCEDURE — 63600175 PHARM REV CODE 636 W HCPCS: Performed by: PHYSICIAN ASSISTANT

## 2023-02-07 PROCEDURE — 96374 THER/PROPH/DIAG INJ IV PUSH: CPT

## 2023-02-07 PROCEDURE — 85025 COMPLETE CBC W/AUTO DIFF WBC: CPT | Performed by: PHYSICIAN ASSISTANT

## 2023-02-07 PROCEDURE — 87046 STOOL CULTR AEROBIC BACT EA: CPT | Mod: 59 | Performed by: PHYSICIAN ASSISTANT

## 2023-02-07 PROCEDURE — 96361 HYDRATE IV INFUSION ADD-ON: CPT | Mod: 59

## 2023-02-07 PROCEDURE — 87425 ROTAVIRUS AG IA: CPT | Performed by: PHYSICIAN ASSISTANT

## 2023-02-07 PROCEDURE — 81000 URINALYSIS NONAUTO W/SCOPE: CPT | Performed by: PHYSICIAN ASSISTANT

## 2023-02-07 PROCEDURE — 96361 HYDRATE IV INFUSION ADD-ON: CPT

## 2023-02-07 PROCEDURE — 25000003 PHARM REV CODE 250: Performed by: PHYSICIAN ASSISTANT

## 2023-02-07 PROCEDURE — 96372 THER/PROPH/DIAG INJ SC/IM: CPT | Performed by: HOSPITALIST

## 2023-02-07 PROCEDURE — 51702 INSERT TEMP BLADDER CATH: CPT

## 2023-02-07 PROCEDURE — 82272 OCCULT BLD FECES 1-3 TESTS: CPT | Performed by: PHYSICIAN ASSISTANT

## 2023-02-07 PROCEDURE — 80053 COMPREHEN METABOLIC PANEL: CPT | Performed by: PHYSICIAN ASSISTANT

## 2023-02-07 PROCEDURE — 87449 NOS EACH ORGANISM AG IA: CPT | Performed by: PHYSICIAN ASSISTANT

## 2023-02-07 PROCEDURE — 89055 LEUKOCYTE ASSESSMENT FECAL: CPT | Performed by: PHYSICIAN ASSISTANT

## 2023-02-07 RX ORDER — FAMOTIDINE 20 MG/1
20 TABLET, FILM COATED ORAL DAILY
Status: DISCONTINUED | OUTPATIENT
Start: 2023-02-08 | End: 2023-02-08 | Stop reason: HOSPADM

## 2023-02-07 RX ORDER — DONEPEZIL HYDROCHLORIDE 5 MG/1
5 TABLET, FILM COATED ORAL NIGHTLY
Status: DISCONTINUED | OUTPATIENT
Start: 2023-02-07 | End: 2023-02-08 | Stop reason: HOSPADM

## 2023-02-07 RX ORDER — IBUPROFEN 200 MG
16 TABLET ORAL
Status: DISCONTINUED | OUTPATIENT
Start: 2023-02-07 | End: 2023-02-08 | Stop reason: HOSPADM

## 2023-02-07 RX ORDER — LANOLIN ALCOHOL/MO/W.PET/CERES
1 CREAM (GRAM) TOPICAL DAILY
Status: DISCONTINUED | OUTPATIENT
Start: 2023-02-08 | End: 2023-02-08 | Stop reason: HOSPADM

## 2023-02-07 RX ORDER — NALOXONE HCL 0.4 MG/ML
0.02 VIAL (ML) INJECTION
Status: DISCONTINUED | OUTPATIENT
Start: 2023-02-07 | End: 2023-02-08 | Stop reason: HOSPADM

## 2023-02-07 RX ORDER — LANOLIN ALCOHOL/MO/W.PET/CERES
800 CREAM (GRAM) TOPICAL
Status: DISCONTINUED | OUTPATIENT
Start: 2023-02-07 | End: 2023-02-08 | Stop reason: HOSPADM

## 2023-02-07 RX ORDER — GLUCAGON 1 MG
1 KIT INJECTION
Status: DISCONTINUED | OUTPATIENT
Start: 2023-02-07 | End: 2023-02-08 | Stop reason: HOSPADM

## 2023-02-07 RX ORDER — VERAPAMIL HYDROCHLORIDE 120 MG/1
120 TABLET, FILM COATED, EXTENDED RELEASE ORAL DAILY
Status: DISCONTINUED | OUTPATIENT
Start: 2023-02-08 | End: 2023-02-08 | Stop reason: HOSPADM

## 2023-02-07 RX ORDER — IBUPROFEN 200 MG
24 TABLET ORAL
Status: DISCONTINUED | OUTPATIENT
Start: 2023-02-07 | End: 2023-02-08 | Stop reason: HOSPADM

## 2023-02-07 RX ORDER — SODIUM CHLORIDE 9 MG/ML
INJECTION, SOLUTION INTRAVENOUS CONTINUOUS
Status: DISCONTINUED | OUTPATIENT
Start: 2023-02-07 | End: 2023-02-08 | Stop reason: HOSPADM

## 2023-02-07 RX ORDER — TALC
9 POWDER (GRAM) TOPICAL NIGHTLY PRN
Status: DISCONTINUED | OUTPATIENT
Start: 2023-02-07 | End: 2023-02-08 | Stop reason: HOSPADM

## 2023-02-07 RX ORDER — ACETAMINOPHEN 500 MG
1000 TABLET ORAL EVERY 6 HOURS PRN
Status: DISCONTINUED | OUTPATIENT
Start: 2023-02-07 | End: 2023-02-08 | Stop reason: HOSPADM

## 2023-02-07 RX ORDER — ONDANSETRON 2 MG/ML
4 INJECTION INTRAMUSCULAR; INTRAVENOUS
Status: COMPLETED | OUTPATIENT
Start: 2023-02-07 | End: 2023-02-07

## 2023-02-07 RX ORDER — SERTRALINE HYDROCHLORIDE 25 MG/1
25 TABLET, FILM COATED ORAL DAILY
Status: DISCONTINUED | OUTPATIENT
Start: 2023-02-08 | End: 2023-02-08 | Stop reason: HOSPADM

## 2023-02-07 RX ORDER — AMOXICILLIN 250 MG
1 CAPSULE ORAL 2 TIMES DAILY
Status: DISCONTINUED | OUTPATIENT
Start: 2023-02-07 | End: 2023-02-08 | Stop reason: HOSPADM

## 2023-02-07 RX ORDER — LISINOPRIL 10 MG/1
10 TABLET ORAL DAILY
Status: DISCONTINUED | OUTPATIENT
Start: 2023-02-08 | End: 2023-02-08 | Stop reason: HOSPADM

## 2023-02-07 RX ORDER — ENOXAPARIN SODIUM 100 MG/ML
30 INJECTION SUBCUTANEOUS EVERY 24 HOURS
Status: DISCONTINUED | OUTPATIENT
Start: 2023-02-07 | End: 2023-02-08 | Stop reason: HOSPADM

## 2023-02-07 RX ORDER — SODIUM CHLORIDE 0.9 % (FLUSH) 0.9 %
10 SYRINGE (ML) INJECTION EVERY 12 HOURS PRN
Status: DISCONTINUED | OUTPATIENT
Start: 2023-02-07 | End: 2023-02-08 | Stop reason: HOSPADM

## 2023-02-07 RX ORDER — HYDROCHLOROTHIAZIDE 12.5 MG/1
25 TABLET ORAL EVERY OTHER DAY
Status: DISCONTINUED | OUTPATIENT
Start: 2023-02-08 | End: 2023-02-08 | Stop reason: HOSPADM

## 2023-02-07 RX ORDER — OXYBUTYNIN CHLORIDE 5 MG/1
5 TABLET, EXTENDED RELEASE ORAL DAILY
Status: DISCONTINUED | OUTPATIENT
Start: 2023-02-08 | End: 2023-02-08 | Stop reason: HOSPADM

## 2023-02-07 RX ADMIN — ONDANSETRON 4 MG: 2 INJECTION INTRAMUSCULAR; INTRAVENOUS at 12:02

## 2023-02-07 RX ADMIN — SODIUM CHLORIDE 500 ML: 9 INJECTION, SOLUTION INTRAVENOUS at 12:02

## 2023-02-07 RX ADMIN — DONEPEZIL HYDROCHLORIDE 5 MG: 5 TABLET, FILM COATED ORAL at 11:02

## 2023-02-07 RX ADMIN — SODIUM CHLORIDE 500 ML: 0.9 INJECTION, SOLUTION INTRAVENOUS at 05:02

## 2023-02-07 RX ADMIN — ENOXAPARIN SODIUM 30 MG: 100 INJECTION SUBCUTANEOUS at 11:02

## 2023-02-07 RX ADMIN — SODIUM CHLORIDE: 9 INJECTION, SOLUTION INTRAVENOUS at 11:02

## 2023-02-07 NOTE — ED PROVIDER NOTES
Encounter Date: 2/7/2023       History     Chief Complaint   Patient presents with    Diarrhea     X 2 days - feels weak      Angelique Hamilton is a 89 y.o. female presenting for evaluation of persistent diarrhea, abdominal pain and nausea for the last couple of days.  No fever.  No dysuria or hematuria.  She has been trying to drink a few sips of fluids with no improvement. No recent antibiotic use and no history of C. Diff.   She has a past medical history of Back problem, Constipation, History of fractured vertebra, Hyperlipidemia, Hypertension, IBS (irritable bowel syndrome), Migraine headache, Osteoporosis, Rectal prolapse, Scoliosis, and Vertigo.      The history is provided by the patient.   Review of patient's allergies indicates:   Allergen Reactions    Antihistamines - alkylamine     Torrie [amlodipine-olmesartan]     Flexeril [cyclobenzaprine]      Hallucinations    Hydrocodone     Hydrocodone-acetaminophen Other (See Comments)    Simvastatin     Statins-hmg-coa reductase inhibitors      Past Medical History:   Diagnosis Date    Back problem     Constipation     History of fractured vertebra     Hyperlipidemia     Hypertension     IBS (irritable bowel syndrome)     Migraine headache     Osteoporosis     Rectal prolapse     Scoliosis     Vertigo      Past Surgical History:   Procedure Laterality Date    LEWIS      Back Pain    EYE SURGERY      Bilateral Cataracs    HYSTERECTOMY      OOPHORECTOMY      ROTATOR CUFF REPAIR      bialteral    TONSILLECTOMY       Family History   Problem Relation Age of Onset    Colon cancer Father     Breast cancer Mother 70    Hypertension Mother     Ovarian cancer Neg Hx      Social History     Tobacco Use    Smoking status: Never    Smokeless tobacco: Never   Substance Use Topics    Alcohol use: No    Drug use: No     Review of Systems   Constitutional:  Negative for chills and fever.   Respiratory:  Negative for cough, chest tightness, shortness of breath and wheezing.     Cardiovascular:  Negative for chest pain and palpitations.   Gastrointestinal:  Positive for abdominal pain, diarrhea, nausea and vomiting. Negative for constipation.   Genitourinary:  Negative for dysuria and hematuria.   Musculoskeletal:  Negative for arthralgias, back pain, joint swelling, myalgias, neck pain and neck stiffness.   Skin:  Negative for color change, pallor, rash and wound.   Neurological:  Negative for weakness and numbness.   Hematological:  Does not bruise/bleed easily.     Physical Exam     Initial Vitals [02/07/23 1129]   BP Pulse Resp Temp SpO2   116/68 68 16 97.7 °F (36.5 °C) 100 %      MAP       --         Physical Exam    Nursing note and vitals reviewed.  Constitutional: She appears well-developed and well-nourished. She is not diaphoretic. No distress.   HENT:   Head: Normocephalic and atraumatic.   Mouth/Throat: Oropharynx is clear and moist.   Eyes: Conjunctivae are normal.   Neck: Neck supple.   Normal range of motion.  Cardiovascular:  Normal rate, regular rhythm and intact distal pulses.     Exam reveals no gallop and no friction rub.       Murmur heard.  Pulmonary/Chest: Breath sounds normal. No respiratory distress. She has no wheezes. She has no rhonchi. She has no rales.   Abdominal: Abdomen is soft. She exhibits no distension and no mass. There is abdominal tenderness.   Mild TTP noted across lower abdomen.     Musculoskeletal:         General: No tenderness or edema. Normal range of motion.      Cervical back: Normal range of motion and neck supple.     Neurological: She is alert and oriented to person, place, and time. She has normal strength. No sensory deficit.   Skin: Skin is warm and dry. No rash and no abscess noted. No erythema.   Psychiatric: She has a normal mood and affect.       ED Course   Procedures  Labs Reviewed   CBC W/ AUTO DIFFERENTIAL - Abnormal; Notable for the following components:       Result Value    MCH 32.3 (*)     Platelets 134 (*)     Lymph # 0.4  (*)     Gran % 89.1 (*)     Lymph % 5.3 (*)     Mono % 3.6 (*)     All other components within normal limits   COMPREHENSIVE METABOLIC PANEL - Abnormal; Notable for the following components:    Glucose 139 (*)     Creatinine 1.5 (*)     Total Bilirubin 1.1 (*)     eGFR 33 (*)     All other components within normal limits   URINALYSIS, REFLEX TO URINE CULTURE - Abnormal; Notable for the following components:    Protein, UA Trace (*)     Occult Blood UA 1+ (*)     All other components within normal limits    Narrative:     Specimen Source->Urine   URINALYSIS MICROSCOPIC - Abnormal; Notable for the following components:    RBC, UA 10 (*)     Bacteria Few (*)     All other components within normal limits    Narrative:     Specimen Source->Urine   CLOSTRIDIUM DIFFICILE   CULTURE, STOOL   ENTEROHEMORRHAGIC E.COLI   LIPASE   OCCULT BLOOD X 1, STOOL   ROTAVIRUS ANTIGEN, STOOL   WBC, STOOL   GIARDIA/CRYPTOSPORIDIUM (EIA)   STOOL EXAM-OVA,CYSTS,PARASITES          Imaging Results              CT Abdomen Pelvis  Without Contrast (Final result)  Result time 02/07/23 14:10:56   Procedure changed from CT Abdomen Pelvis With Contrast     Final result by Froylan Chi MD (02/07/23 14:10:56)                   Impression:      Nonspecific colitis, likely infectious or inflammatory in nature.    Chronic right hydronephrosis.  Right nephrolithiasis.    Large hiatal hernia.    Osteopenia status post multiple thoracolumbar vertebroplasties.      Electronically signed by: Froylan Chi MD  Date:    02/07/2023  Time:    14:10               Narrative:    EXAMINATION:  CT ABDOMEN PELVIS WITHOUT CONTRAST    CLINICAL HISTORY:  Abdominal abscess/infection suspected;    TECHNIQUE:  Low dose axial images, sagittal and coronal reformations were obtained from the lung bases to the pubic symphysis.  Oral contrast was not administered.    COMPARISON:  04/04/2022    FINDINGS:  The liver, spleen, pancreas, and adrenal glands are  unremarkable.    There is moderate right hydronephrosis, without significant change.  A 7 mm stone is present dependently within the right renal pelvis.  The ureter is nondilated.  There is a 2.3 cm simple cyst of the left kidney.  A few small cysts of the right kidney are also present.    There is a large hiatal hernia.  The bowel is nondilated.  The appendix is not seen.  There are no right lower quadrant findings of appendicitis.  There is hazy fat stranding along the descending colon and sigmoid colon, best seen in the coronal plane, compatible with a nonspecific colitis.    There has been hysterectomy.  There is heavy calcification of the aorta without aneurysm.  There is no free air, free fluid, or fluid collection.  Mild atelectatic changes and scarring are present at the lung bases.  There are small bilateral fat containing posterior diaphragmatic hernias.    The bones are diffusely osteopenic.  There are compression fractures of all imaged thoracolumbar vertebral bodies, all of which are status post vertebroplasty.                                       Medications   sodium chloride 0.9% bolus 500 mL 500 mL (has no administration in time range)   sodium chloride 0.9% bolus 500 mL 500 mL (0 mLs Intravenous Stopped 2/7/23 1354)   ondansetron injection 4 mg (4 mg Intravenous Given 2/7/23 1245)     Medical Decision Making:   History:   Old Medical Records: I decided to obtain old medical records.  Old Records Summarized: records from clinic visits and records from previous admission(s).  Differential Diagnosis:   Colitis   Viral Syndrome   Dehydration   Diverticulitis   Clinical Tests:   Lab Tests: Ordered and Reviewed  The following lab test(s) were unremarkable: CBC, CMP and Urinalysis  Radiological Study: Ordered and Reviewed  ED Management:  Pt emergently evaluated here in the ED.   Pt has had multiple loose bowel movements here in the ED.  Labs show mildly elevated creatinine and decreased GFR.  Stool  studies pending.  CT findings consistent with colitis.  UA negative for infection.  Vitals stable without tachycardia or fever.  Low suspicion for sepsis at this time. Pt is 89 years old with history of dementia and lives by herself.  Her renal function is decreased and her diarrhea is persistent. We do feel she would benefit from admission to the hospital for further evaluation and management.  Patient voices understanding and is agreeable to the plan. Dr. Klein agrees to admission.              ED Course as of 02/07/23 1708 Tue Feb 07, 2023 1654 Spoke with Dr. Klein and he agrees to admission.  [HS]   1655 Pt voices understanding and is agreeable to plan.  [HS]      ED Course User Index  [HS] Carol Corrigan PA-C                 Clinical Impression:   Final diagnoses:  [K52.9] Colitis (Primary)        ED Disposition Condition    Observation Stable                Carol Corrigan PA-C  02/07/23 [id number]

## 2023-02-07 NOTE — ED NOTES
Pt in with c/o weakness that started today. States that she has an episode of diarrhea a few days ago but denies diarrhea today. Pt's abdomen soft and no tender. Pt in with daughter who states pt was complaining of dizziness but has a hx of vertigo. Pt awake and alert, no distress noted.

## 2023-02-08 VITALS
BODY MASS INDEX: 23.96 KG/M2 | HEART RATE: 74 BPM | HEIGHT: 56 IN | OXYGEN SATURATION: 96 % | SYSTOLIC BLOOD PRESSURE: 140 MMHG | TEMPERATURE: 98 F | DIASTOLIC BLOOD PRESSURE: 69 MMHG | RESPIRATION RATE: 16 BRPM | WEIGHT: 106.5 LBS

## 2023-02-08 PROBLEM — N17.9 AKI (ACUTE KIDNEY INJURY): Status: ACTIVE | Noted: 2023-02-08

## 2023-02-08 LAB
ALBUMIN SERPL BCP-MCNC: 3.3 G/DL (ref 3.5–5.2)
ALP SERPL-CCNC: 92 U/L (ref 55–135)
ALT SERPL W/O P-5'-P-CCNC: 9 U/L (ref 10–44)
ANION GAP SERPL CALC-SCNC: 8 MMOL/L (ref 8–16)
AST SERPL-CCNC: 23 U/L (ref 10–40)
BASOPHILS # BLD AUTO: 0.03 K/UL (ref 0–0.2)
BASOPHILS NFR BLD: 0.7 % (ref 0–1.9)
BILIRUB SERPL-MCNC: 1.1 MG/DL (ref 0.1–1)
BUN SERPL-MCNC: 17 MG/DL (ref 8–23)
CALCIUM SERPL-MCNC: 9.3 MG/DL (ref 8.7–10.5)
CHLORIDE SERPL-SCNC: 108 MMOL/L (ref 95–110)
CO2 SERPL-SCNC: 25 MMOL/L (ref 23–29)
CREAT SERPL-MCNC: 1 MG/DL (ref 0.5–1.4)
CRYPTOSP AG STL QL IA: NEGATIVE
DIFFERENTIAL METHOD: ABNORMAL
EOSINOPHIL # BLD AUTO: 0.1 K/UL (ref 0–0.5)
EOSINOPHIL NFR BLD: 2.6 % (ref 0–8)
ERYTHROCYTE [DISTWIDTH] IN BLOOD BY AUTOMATED COUNT: 12.5 % (ref 11.5–14.5)
EST. GFR  (NO RACE VARIABLE): 54 ML/MIN/1.73 M^2
G LAMBLIA AG STL QL IA: NEGATIVE
GLUCOSE SERPL-MCNC: 85 MG/DL (ref 70–110)
HCT VFR BLD AUTO: 39.6 % (ref 37–48.5)
HGB BLD-MCNC: 12.9 G/DL (ref 12–16)
IMM GRANULOCYTES # BLD AUTO: 0.01 K/UL (ref 0–0.04)
IMM GRANULOCYTES NFR BLD AUTO: 0.2 % (ref 0–0.5)
LYMPHOCYTES # BLD AUTO: 0.9 K/UL (ref 1–4.8)
LYMPHOCYTES NFR BLD: 21.8 % (ref 18–48)
MAGNESIUM SERPL-MCNC: 1.5 MG/DL (ref 1.6–2.6)
MCH RBC QN AUTO: 31.8 PG (ref 27–31)
MCHC RBC AUTO-ENTMCNC: 32.6 G/DL (ref 32–36)
MCV RBC AUTO: 98 FL (ref 82–98)
MONOCYTES # BLD AUTO: 0.4 K/UL (ref 0.3–1)
MONOCYTES NFR BLD: 9.1 % (ref 4–15)
NEUTROPHILS # BLD AUTO: 2.8 K/UL (ref 1.8–7.7)
NEUTROPHILS NFR BLD: 65.6 % (ref 38–73)
NRBC BLD-RTO: 0 /100 WBC
PHOSPHATE SERPL-MCNC: 2.7 MG/DL (ref 2.7–4.5)
PLATELET # BLD AUTO: 120 K/UL (ref 150–450)
PMV BLD AUTO: 10.4 FL (ref 9.2–12.9)
POTASSIUM SERPL-SCNC: 4 MMOL/L (ref 3.5–5.1)
PROT SERPL-MCNC: 6.3 G/DL (ref 6–8.4)
RBC # BLD AUTO: 4.06 M/UL (ref 4–5.4)
SODIUM SERPL-SCNC: 141 MMOL/L (ref 136–145)
WBC # BLD AUTO: 4.27 K/UL (ref 3.9–12.7)

## 2023-02-08 PROCEDURE — 25000003 PHARM REV CODE 250: Performed by: HOSPITALIST

## 2023-02-08 PROCEDURE — 83735 ASSAY OF MAGNESIUM: CPT | Performed by: HOSPITALIST

## 2023-02-08 PROCEDURE — 36415 COLL VENOUS BLD VENIPUNCTURE: CPT | Performed by: HOSPITALIST

## 2023-02-08 PROCEDURE — 80053 COMPREHEN METABOLIC PANEL: CPT | Performed by: HOSPITALIST

## 2023-02-08 PROCEDURE — 96361 HYDRATE IV INFUSION ADD-ON: CPT | Mod: 59

## 2023-02-08 PROCEDURE — 85025 COMPLETE CBC W/AUTO DIFF WBC: CPT | Performed by: HOSPITALIST

## 2023-02-08 PROCEDURE — 84100 ASSAY OF PHOSPHORUS: CPT | Performed by: HOSPITALIST

## 2023-02-08 PROCEDURE — 97161 PT EVAL LOW COMPLEX 20 MIN: CPT

## 2023-02-08 PROCEDURE — G0378 HOSPITAL OBSERVATION PER HR: HCPCS

## 2023-02-08 PROCEDURE — 97165 OT EVAL LOW COMPLEX 30 MIN: CPT

## 2023-02-08 PROCEDURE — 97116 GAIT TRAINING THERAPY: CPT

## 2023-02-08 PROCEDURE — 97530 THERAPEUTIC ACTIVITIES: CPT

## 2023-02-08 RX ORDER — LOPERAMIDE HYDROCHLORIDE 2 MG/1
2 CAPSULE ORAL 4 TIMES DAILY PRN
Qty: 30 CAPSULE | Refills: 0 | Status: SHIPPED | OUTPATIENT
Start: 2023-02-08 | End: 2023-02-18

## 2023-02-08 RX ORDER — LOPERAMIDE HYDROCHLORIDE 2 MG/1
2 CAPSULE ORAL 4 TIMES DAILY PRN
Status: DISCONTINUED | OUTPATIENT
Start: 2023-02-08 | End: 2023-02-08 | Stop reason: HOSPADM

## 2023-02-08 RX ADMIN — SERTRALINE HYDROCHLORIDE 25 MG: 25 TABLET ORAL at 10:02

## 2023-02-08 RX ADMIN — FERROUS SULFATE TAB 325 MG (65 MG ELEMENTAL FE) 1 EACH: 325 (65 FE) TAB at 10:02

## 2023-02-08 RX ADMIN — FAMOTIDINE 20 MG: 20 TABLET, FILM COATED ORAL at 10:02

## 2023-02-08 RX ADMIN — VERAPAMIL HYDROCHLORIDE 120 MG: 120 TABLET, FILM COATED, EXTENDED RELEASE ORAL at 10:02

## 2023-02-08 RX ADMIN — HYDROCHLOROTHIAZIDE 25 MG: 12.5 TABLET ORAL at 10:02

## 2023-02-08 RX ADMIN — LISINOPRIL 10 MG: 10 TABLET ORAL at 10:02

## 2023-02-08 RX ADMIN — OXYBUTYNIN CHLORIDE 5 MG: 5 TABLET, EXTENDED RELEASE ORAL at 10:02

## 2023-02-08 RX ADMIN — Medication 800 MG: at 02:02

## 2023-02-08 NOTE — PT/OT/SLP EVAL
Occupational Therapy   Evaluation    Name: Angelique Hamilton  MRN: [id number]  Admitting Diagnosis: Colitis  Recent Surgery: * No surgery found *      Recommendations:     Discharge Recommendations: home health OT  Discharge Equipment Recommendations:  none  Barriers to discharge:       Assessment:     Angelique Hamilton is a 89 y.o. female with a medical diagnosis of Colitis.  She presents with the following performance deficits affecting function including: impaired endurance, impaired self care skills, impaired functional mobility, visual deficits.  Pt was up in chair and agreeable to OT. Pt demonstrates BUE AROM/strength WFL's. OT provided instruction in home safety with ADL's/IADL's including review of home set up and DME/AE. Pt verbalized understanding. Pt required Min assist with sit to stand with RW. Pt required Min assist with taking a few steps with RW for repositioning. OT provided education in calling for assist. Pt verbalized understanding.    Rehab Prognosis: Good; patient would benefit from acute skilled OT services to address these deficits and reach maximum level of function.       Plan:     Patient to be seen 5 x/week to address the above listed problems via self-care/home management, therapeutic activities, therapeutic exercises  Plan of Care Expires: 03/08/23  Plan of Care Reviewed with: patient    Subjective     Chief Complaint: No complaints  Patient/Family Comments/goals: To go home    Occupational Profile:  Living Environment: Pt lives with son/daughter in law in raised home with a lift. Pt has tub/shower with shower chair and standard toilet with grab bar. OT provided education in use of tub transfer bench. Pt verbalized understanding.  Previous level of function: Assistance with lower body dressing and tub/shower transfers. Pt reports ambulates with rollator.  Equipment Used at Home: rollator, shower chair, grab bar  Assistance upon Discharge: Family    Pain/Comfort:  Pain Rating 1: 0/10  Pain  Rating Post-Intervention 1: 0/10    Patients cultural, spiritual, Restoration conflicts given the current situation:      Objective:     Communicated with: Nurse Farley prior to session.  Patient found up in chair with chair check, peripheral IV, telemetry upon OT entry to room.    General Precautions: Standard, fall  Orthopedic Precautions: N/A  Braces: N/A  Respiratory Status: Room air    Occupational Performance:      Functional Mobility/Transfers:  Patient completed Sit <> Stand Transfer with minimum assistance  with  rolling walker     Activities of Daily Living:  Feeding:  stand by assistance set up  Grooming: minimum assistance    Bathing: moderate assistance    Upper Body Dressing: minimum assistance    Lower Body Dressing: moderate assistance    Toileting: moderate assistance      Cognitive/Visual Perceptual:  Pt alert and oriented to person. Pt reports having Alzheimer's dementia  [place]; R eye droop and vision impairment; L eye vision WFL's.  Physical Exam:  Upper Extremity Strength:    -       Right Upper Extremity: WFL  -       Left Upper Extremity: WFL    AMPAC 6 Click ADL:  AMPAC Total Score: 16    Treatment & Education:  OT provided education in role of OT. Pt verbalized understanding and was agreeable to OT.  OT provided instruction in home safety with ADL's/IADL's including review of home set up and DME/AE. Pt verbalized understanding.  OT provided education in calling for assist. Pt verbalized understanding.    Patient left up in chair with all lines intact, call button in reach, and chair alarm on    GOALS:   Multidisciplinary Problems       Occupational Therapy Goals          Problem: Occupational Therapy    Goal Priority Disciplines Outcome Interventions   Occupational Therapy Goal     OT, PT/OT     Description: Goals to be met by: 3/8/23     Patient will increase functional independence with ADLs by performing:    UE Dressing with Set-up Assistance.  LE Dressing with Minimal Assistance.  Grooming  while seated with Set-up Assistance.  Toileting from toilet with Stand-by Assistance for hygiene and clothing management.   Bathing from  shower chair/bench with Minimal Assistance.  Toilet transfer to toilet with Stand-by Assistance.  Increased functional strength to WFL for ADL's.                         History:     Past Medical History:   Diagnosis Date    Back problem     Constipation     History of fractured vertebra     Hyperlipidemia     Hypertension     IBS (irritable bowel syndrome)     Migraine headache     Osteoporosis     Rectal prolapse     Scoliosis     Vertigo          Past Surgical History:   Procedure Laterality Date    LEWIS      Back Pain    EYE SURGERY      Bilateral Cataracs    HYSTERECTOMY      OOPHORECTOMY      ROTATOR CUFF REPAIR      bialteral    TONSILLECTOMY         Time Tracking:     OT Date of Treatment: 02/08/23  OT Start Time: 1031  OT Stop Time: 1048  OT Total Time (min): 17 min    Billable Minutes:Evaluation 8  Therapeutic Activity 9    2/8/2023

## 2023-02-08 NOTE — ASSESSMENT & PLAN NOTE
Start IV fluids, follow-up patient clinical status in the morning and adjust fluid resuscitation as appropriate.

## 2023-02-08 NOTE — SUBJECTIVE & OBJECTIVE
Past Medical History:   Diagnosis Date    Back problem     Constipation     History of fractured vertebra     Hyperlipidemia     Hypertension     IBS (irritable bowel syndrome)     Migraine headache     Osteoporosis     Rectal prolapse     Scoliosis     Vertigo        Past Surgical History:   Procedure Laterality Date    LEWIS      Back Pain    EYE SURGERY      Bilateral Cataracs    HYSTERECTOMY      OOPHORECTOMY      ROTATOR CUFF REPAIR      bialteral    TONSILLECTOMY         Review of patient's allergies indicates:   Allergen Reactions    Antihistamines - alkylamine     Torrie [amlodipine-olmesartan]     Flexeril [cyclobenzaprine]      Hallucinations    Hydrocodone     Hydrocodone-acetaminophen Other (See Comments)    Simvastatin     Statins-hmg-coa reductase inhibitors        No current facility-administered medications on file prior to encounter.     Current Outpatient Medications on File Prior to Encounter   Medication Sig    biotin 1 mg tablet Take 1,000 mcg by mouth 3 (three) times daily.    calcium-vitamin D3 (OS-AYSHA 500 + D3) 500 mg-5 mcg (200 unit) per tablet Take 1 tablet by mouth 2 (two) times daily with meals.    cholecalciferol, vitamin D3, (VITAMIN D3) 2,000 unit Tab Take 2,000 Units by mouth once daily.     donepeziL (ARICEPT ODT) 5 MG TbDL Take 5 mg by mouth nightly.    famotidine (PEPCID) 20 MG tablet Take 1 tablet (20 mg total) by mouth 2 (two) times daily.    ferrous sulfate (FEOSOL) 325 mg (65 mg iron) Tab tablet Take 325 mg by mouth daily with breakfast.    fluticasone propionate (FLONASE) 50 mcg/actuation nasal spray 1 spray (50 mcg total) by Each Nostril route once daily. (Patient not taking: Reported on 1/12/2023)    hydroCHLOROthiazide (HYDRODIURIL) 25 MG tablet Take 1 tablet (25 mg total) by mouth every other day.    lisinopriL 10 MG tablet Take 10 mg by mouth once daily.    meclizine (ANTIVERT) 12.5 mg tablet Take 0.5 tablets (6.25 mg total) by mouth 2 (two) times daily as needed for  Dizziness (1/2 tablet twice daily as needed for dizziness).    mupirocin (BACTROBAN) 2 % ointment Apply topically 2 (two) times daily.    pantoprazole (PROTONIX) 40 MG tablet Take 1 tablet (40 mg total) by mouth once daily.    polyethylene glycol (GLYCOLAX) 17 gram/dose powder Take 17 g by mouth once daily.    QUEtiapine (SEROQUEL) 50 MG tablet Take 1 tablet (50 mg total) by mouth every evening. (Patient taking differently: Take 50 mg by mouth every evening. Take 2 tablets nightly)    sertraline (ZOLOFT) 25 MG tablet Take 25 mg by mouth once daily.    tolterodine (DETROL LA) 2 MG Cp24 Take 1 capsule (2 mg total) by mouth once daily. For bladder    tramadol-acetaminophen 37.5-325 mg (ULTRACET) 37.5-325 mg Tab Take 1 tablet by mouth 2 (two) times a day. (Patient not taking: Reported on 1/12/2023)    verapamiL (CALAN-SR) 120 MG CR tablet Take 1 tablet (120 mg total) by mouth once daily.    [DISCONTINUED] ciprofloxacin HCl (CIPRO) 250 MG tablet Take 1 tablet (250 mg total) by mouth 2 (two) times daily.     Family History       Problem Relation (Age of Onset)    Breast cancer Mother (70)    Colon cancer Father    Hypertension Mother          Tobacco Use    Smoking status: Never    Smokeless tobacco: Never   Substance and Sexual Activity    Alcohol use: No    Drug use: No    Sexual activity: Not Currently     Partners: Male     Birth control/protection: None       Objective:     Vital Signs (Most Recent):  Temp: 98 °F (36.7 °C) (02/07/23 1503)  Pulse: 71 (02/07/23 1503)  Resp: 16 (02/07/23 1129)  BP: (!) 158/74 (02/07/23 1503)  SpO2: 98 % (02/07/23 1503)   Vital Signs (24h Range):  Temp:  [97.7 °F (36.5 °C)-98 °F (36.7 °C)] 98 °F (36.7 °C)  Pulse:  [68-71] 71  Resp:  [16] 16  SpO2:  [98 %-100 %] 98 %  BP: (116-158)/(68-74) 158/74     Weight: 50.8 kg (111 lb 15.9 oz)  Body mass index is 25.11 kg/m².    Physical Exam  Vitals and nursing note reviewed.   Constitutional:       General: She is not in acute distress.      Appearance: Normal appearance.      Comments: Frail-appearing elderly  female in no acute distress.   Eyes:      Pupils: Pupils are equal, round, and reactive to light.   Cardiovascular:      Rate and Rhythm: Normal rate and regular rhythm.      Heart sounds: No murmur heard.  Pulmonary:      Effort: Pulmonary effort is normal.      Breath sounds: Normal breath sounds.   Abdominal:      General: There is no distension.      Palpations: Abdomen is soft.      Tenderness: There is no abdominal tenderness.   Skin:     Coloration: Skin is not pale.      Findings: No rash.   Neurological:      Mental Status: She is alert and oriented to person, place, and time.         CRANIAL NERVES     CN III, IV, VI   Pupils are equal, round, and reactive to light.     Significant Labs: All pertinent labs within the past 24 hours have been reviewed.    Significant Imaging: I have reviewed all pertinent imaging results/findings within the past 24 hours.

## 2023-02-08 NOTE — ASSESSMENT & PLAN NOTE
Dementia is controlled currently. Continue home dementia meds and non-pharmacologic interventions to prevent delirium (No VS between 11PM-5AM, activity during day, opening blinds, providing glasses/hearing aids, and up in chair during daytime). Use PRN anti-psychotics to prevent behavior of self harm during sundowning, and avoid narcotics and benzos unless absolutely necessary. PRN anti-psychotics prescribed to avoid self harm behaviors.

## 2023-02-08 NOTE — PLAN OF CARE
The pt is cleared for discharge home from case management.    02/08/23 1055   Final Note   Assessment Type Final Discharge Note   Anticipated Discharge Disposition Home-Health

## 2023-02-08 NOTE — PLAN OF CARE
The pt is accepted by [facility]  and has follow up appointments added to her AVS. Discharge plan closed in [facility].    02/08/23 1059   Post-Acute Status   Post-Acute Authorization Home Health   Home Health Status Set-up Complete/Auth obtained

## 2023-02-08 NOTE — HOSPITAL COURSE
Patient was admitted the hospital secondary to likely viral colitis and diarrhea with secondary dehydration.  Patient was given IV fluids and dehydration and acute kidney injury resolved.  Patient was feeling much better the following day, and was discharged home with home health and PT/OT.  C difficile was ruled out.  Patient had no evidence of white count or other signs of bacterial gastroenteritis, so antibiotics were not prescribed.  Patient was set up for nurse practitioner at home visit as well as outpatient case management given her advanced age and dementia with high likelihood for readmission.

## 2023-02-08 NOTE — ASSESSMENT & PLAN NOTE
Creatine stable for now. BMP reviewed- noted Estimated Creatinine Clearance: 17.6 mL/min (A) (based on SCr of 1.5 mg/dL (H)). according to latest data. Monitor UOP and serial BMP and adjust therapy as needed. Renally dose meds.

## 2023-02-08 NOTE — PT/OT/SLP EVAL
Physical Therapy Evaluation    Patient Name:  Angelique Hamilton   MRN:  [id number]    Recommendations:     Discharge Recommendations: home health PT   Discharge Equipment Recommendations: none   Barriers to discharge: None    Assessment:     Angelique Hamilton is a 89 y.o. female admitted with a medical diagnosis of Colitis.  She presents with the following impairments/functional limitations: impaired endurance, weakness, gait instability, impaired balance, decreased lower extremity function, pain, decreased ROM     PT eval- Pt found supine and awake upon entering the room. Pt anxious to move but agreeable to therapy. Feeling better today than yesterday. Pt hard of hearing. Supine to sit with Francine. Sit to stand from EOB with Francine using RW. Ambulated 250 ft Francine using RW. Pt with kyphotic posture and head tilt to the left. Pt would benefit from HHPT.    Rehab Prognosis: Fair; patient would benefit from acute skilled PT services to address these deficits and reach maximum level of function.    Recent Surgery: * No surgery found *      Plan:     During this hospitalization, patient to be seen 6 x/week to address the identified rehab impairments via gait training, therapeutic activities, therapeutic exercises and progress toward the following goals:    Plan of Care Expires:  02/28/23    Subjective   Pt stated that she is feeling better today than yesterday. Stated she could not get off the toilet or stop having diarrhea before coming to hospital. Stated that her neck and back hurts from time to time. Had pain in abdominal area.  Pt waiting for daughter-in-law to come visit her. Excited to go home.   Chief Complaint: abdominal pain  Patient/Family Comments/goals: to go home  Pain/Comfort:  Pain Rating 1: other (see comments) (pt did not rate)  Location 1: abdomen    Patients cultural, spiritual, Taoist conflicts given the current situation:      Living Environment:  Home with son and daughter-in-law  Left alone at times when  they are at work   Prior to admission, patients level of function was household ambulatory with rollator.  Equipment used at home: lift device, rollator.  DME owned (not currently used): none.  Upon discharge, patient will have assistance from son and daughter-in-law.    Objective:     Communicated with nurse Farley prior to session.  Patient found supine with telemetry, bed alarm, villarreal catheter  upon PT entry to room.    General Precautions: Standard, fall  Orthopedic Precautions:N/A   Braces: N/A  Respiratory Status: Room air    Exams:  Postural Exam:  Patient presented with the following abnormalities:    -       Rounded shoulders  -       Forward head  -       Kyphosis  -BMI: 23.87  RLE ROM: WFL  RLE Strength: WFL 4-/5  LLE ROM: WFL  LLE Strength: WFL 4-/5    Functional Mobility:  Bed Mobility:     Rolling Right: minimum assistance  Transfers:     Sit to Stand:  minimum assistance with rolling walker  Gait: pt ambulated 250 ft x 2 Francine using RW and chair following. Pace was slow with kyphotic posture. Took one seated rest.       AM-PAC 6 CLICK MOBILITY  Total Score:11       Treatment & Education:  PT eval- pt performed supine thera ex without difficulty. Pt educated on the importance of OOB activity for the prevention of physical deconditioning during hospital stay. Educated on the importance of safety with mobilization while at home. Encouraged to perform thera ex outside of therapy for strengthening.     Patient left up in chair with all lines intact and call button in reach.    GOALS:   Multidisciplinary Problems       Physical Therapy Goals          Problem: Physical Therapy    Goal Priority Disciplines Outcome Goal Variances Interventions   Physical Therapy Goal     PT, PT/OT Ongoing, Progressing     Description: Goals to be met by: 2023     Patient will increase functional independence with mobility by performin. Supine to sit with Contact Guard Assistance  2. Sit to stand transfer with  Contact Guard Assistance  3. Bed to chair transfer with Contact Guard Assistance using Rolling Walker  4. Gait  x 250 x2  feet with Contact Guard Assistance using Rolling Walker.   5. Lower extremity exercise program x20 reps                          History:     Past Medical History:   Diagnosis Date    Back problem     Constipation     History of fractured vertebra     Hyperlipidemia     Hypertension     IBS (irritable bowel syndrome)     Migraine headache     Osteoporosis     Rectal prolapse     Scoliosis     Vertigo        Past Surgical History:   Procedure Laterality Date    LEWIS      Back Pain    EYE SURGERY      Bilateral Cataracs    HYSTERECTOMY      OOPHORECTOMY      ROTATOR CUFF REPAIR      bialteral    TONSILLECTOMY         Time Tracking:     PT Received On: 02/08/23  PT Start Time: 0917     PT Stop Time: 0954  PT Total Time (min): 37 min     Billable Minutes: Evaluation 15 minutes and Gait Training 22  minutes      02/08/2023

## 2023-02-08 NOTE — H&P
[facility] Medicine  History & Physical    Patient Name: Angelique Hamilton  MRN: [id number]  Patient Class: OP- Observation  Admission Date: 2/7/2023  Attending Physician: Dav Klein MD  Primary Care Provider: Ryan Hurtado MD         Patient information was obtained from patient, past medical records and ER records.     Subjective:     Principal Problem:Colitis    Chief Complaint:   Chief Complaint   Patient presents with    Diarrhea     X 2 days - feels weak         HPI: Patient is an 89-year-old  female with a past medical history significant for hypertension and irritable bowel syndrome who presents the hospital for significant weakness and diarrhea over the past 3-4 days.  Patient states that she would had diarrhea approximately 1 week prior to admission, but it seemed to get better and came back 1 day prior to admission.  Of note, the patient was noted to have been prescribed ciprofloxacin approximately 3 days prior to admission.  Patient has dementia and is a poor historian.  The patient admits to occasional nausea, but denies any vomiting.  Patient denies any fever, abdominal pain, or other symptoms at this point in time.  Her children live in the house with her, and noted the patient was significantly weak this morning and unable to get off the toilet.  This is what prompted her to come to the emergency department.  In the ED, she was noted to have a mildly elevated creatinine, but otherwise labs appeared to be within normal limits.      Past Medical History:   Diagnosis Date    Back problem     Constipation     History of fractured vertebra     Hyperlipidemia     Hypertension     IBS (irritable bowel syndrome)     Migraine headache     Osteoporosis     Rectal prolapse     Scoliosis     Vertigo        Past Surgical History:   Procedure Laterality Date    LEWIS      Back Pain    EYE SURGERY      Bilateral Cataracs    HYSTERECTOMY      OOPHORECTOMY       ROTATOR CUFF REPAIR      bialteral    TONSILLECTOMY         Review of patient's allergies indicates:   Allergen Reactions    Antihistamines - alkylamine     Torrie [amlodipine-olmesartan]     Flexeril [cyclobenzaprine]      Hallucinations    Hydrocodone     Hydrocodone-acetaminophen Other (See Comments)    Simvastatin     Statins-hmg-coa reductase inhibitors        No current facility-administered medications on file prior to encounter.     Current Outpatient Medications on File Prior to Encounter   Medication Sig    biotin 1 mg tablet Take 1,000 mcg by mouth 3 (three) times daily.    calcium-vitamin D3 (OS-AYSHA 500 + D3) 500 mg-5 mcg (200 unit) per tablet Take 1 tablet by mouth 2 (two) times daily with meals.    cholecalciferol, vitamin D3, (VITAMIN D3) 2,000 unit Tab Take 2,000 Units by mouth once daily.     donepeziL (ARICEPT ODT) 5 MG TbDL Take 5 mg by mouth nightly.    famotidine (PEPCID) 20 MG tablet Take 1 tablet (20 mg total) by mouth 2 (two) times daily.    ferrous sulfate (FEOSOL) 325 mg (65 mg iron) Tab tablet Take 325 mg by mouth daily with breakfast.    fluticasone propionate (FLONASE) 50 mcg/actuation nasal spray 1 spray (50 mcg total) by Each Nostril route once daily. (Patient not taking: Reported on 1/12/2023)    hydroCHLOROthiazide (HYDRODIURIL) 25 MG tablet Take 1 tablet (25 mg total) by mouth every other day.    lisinopriL 10 MG tablet Take 10 mg by mouth once daily.    meclizine (ANTIVERT) 12.5 mg tablet Take 0.5 tablets (6.25 mg total) by mouth 2 (two) times daily as needed for Dizziness (1/2 tablet twice daily as needed for dizziness).    mupirocin (BACTROBAN) 2 % ointment Apply topically 2 (two) times daily.    pantoprazole (PROTONIX) 40 MG tablet Take 1 tablet (40 mg total) by mouth once daily.    polyethylene glycol (GLYCOLAX) 17 gram/dose powder Take 17 g by mouth once daily.    QUEtiapine (SEROQUEL) 50 MG tablet Take 1 tablet (50 mg total) by mouth every evening.  (Patient taking differently: Take 50 mg by mouth every evening. Take 2 tablets nightly)    sertraline (ZOLOFT) 25 MG tablet Take 25 mg by mouth once daily.    tolterodine (DETROL LA) 2 MG Cp24 Take 1 capsule (2 mg total) by mouth once daily. For bladder    tramadol-acetaminophen 37.5-325 mg (ULTRACET) 37.5-325 mg Tab Take 1 tablet by mouth 2 (two) times a day. (Patient not taking: Reported on 1/12/2023)    verapamiL (CALAN-SR) 120 MG CR tablet Take 1 tablet (120 mg total) by mouth once daily.    [DISCONTINUED] ciprofloxacin HCl (CIPRO) 250 MG tablet Take 1 tablet (250 mg total) by mouth 2 (two) times daily.     Family History       Problem Relation (Age of Onset)    Breast cancer Mother (70)    Colon cancer Father    Hypertension Mother          Tobacco Use    Smoking status: Never    Smokeless tobacco: Never   Substance and Sexual Activity    Alcohol use: No    Drug use: No    Sexual activity: Not Currently     Partners: Male     Birth control/protection: None       Objective:     Vital Signs (Most Recent):  Temp: 98 °F (36.7 °C) (02/07/23 1503)  Pulse: 71 (02/07/23 1503)  Resp: 16 (02/07/23 1129)  BP: (!) 158/74 (02/07/23 1503)  SpO2: 98 % (02/07/23 1503)   Vital Signs (24h Range):  Temp:  [97.7 °F (36.5 °C)-98 °F (36.7 °C)] 98 °F (36.7 °C)  Pulse:  [68-71] 71  Resp:  [16] 16  SpO2:  [98 %-100 %] 98 %  BP: (116-158)/(68-74) 158/74     Weight: 50.8 kg (111 lb 15.9 oz)  Body mass index is 25.11 kg/m².    Physical Exam  Vitals and nursing note reviewed.   Constitutional:       General: She is not in acute distress.     Appearance: Normal appearance.      Comments: Frail-appearing elderly  female in no acute distress.   Eyes:      Pupils: Pupils are equal, round, and reactive to light.   Cardiovascular:      Rate and Rhythm: Normal rate and regular rhythm.      Heart sounds: No murmur heard.  Pulmonary:      Effort: Pulmonary effort is normal.      Breath sounds: Normal breath sounds.   Abdominal:       General: There is no distension.      Palpations: Abdomen is soft.      Tenderness: There is no abdominal tenderness.   Skin:     Coloration: Skin is not pale.      Findings: No rash.   Neurological:      Mental Status: She is alert and oriented to person, place, and time.         CRANIAL NERVES     CN III, IV, VI   Pupils are equal, round, and reactive to light.     Significant Labs: All pertinent labs within the past 24 hours have been reviewed.    Significant Imaging: I have reviewed all pertinent imaging results/findings within the past 24 hours.    Assessment/Plan:     * Colitis  Patient with colitis, unknown etiology.  Will follow-up with C diff, and if positive start on treatment for such.  If C diff is negative, will attempt symptomatic management and fluid rehydration.  Monitor closely.      Dehydration  Start IV fluids, follow-up patient clinical status in the morning and adjust fluid resuscitation as appropriate.      Mild vascular dementia without behavioral disturbance, psychotic disturbance, mood disturbance, or anxiety  Dementia is controlled currently. Continue home dementia meds and non-pharmacologic interventions to prevent delirium (No VS between 11PM-5AM, activity during day, opening blinds, providing glasses/hearing aids, and up in chair during daytime). Use PRN anti-psychotics to prevent behavior of self harm during sundowning, and avoid narcotics and benzos unless absolutely necessary. PRN anti-psychotics prescribed to avoid self harm behaviors.      Stage 3a chronic kidney disease  Creatine stable for now. BMP reviewed- noted Estimated Creatinine Clearance: 17.6 mL/min (A) (based on SCr of 1.5 mg/dL (H)). according to latest data. Monitor UOP and serial BMP and adjust therapy as needed. Renally dose meds.            Nonintractable epilepsy without status epilepticus  Noted, continue standard medications and seizure precautions.      Irritable bowel syndrome with both constipation and  diarrhea  Noted, colitis on CT scan seems to suggest that this is not irritable bowel syndrome.  Continue to rule out C difficile and infectious colitis.      Aortic stenosis with trileaflet valve  Noted, chronic.      Hyperlipidemia   Patient is chronically on statin.will continue for now. Monitor clinically. Last LDL was   Lab Results   Component Value Date    LDLCALC 110 (H) 01/06/2023            HTN (hypertension)  Chronic, controlled.  Latest blood pressure and vitals reviewed-   Temp:  [97.7 °F (36.5 °C)-98 °F (36.7 °C)]   Pulse:  [68-71]   Resp:  [16]   BP: (116-158)/(68-74)   SpO2:  [98 %-100 %] .   Home meds for hypertension were reviewed and noted below.   Hypertension Medications             hydroCHLOROthiazide (HYDRODIURIL) 25 MG tablet Take 1 tablet (25 mg total) by mouth every other day.    lisinopriL 10 MG tablet Take 10 mg by mouth once daily.    verapamiL (CALAN-SR) 120 MG CR tablet Take 1 tablet (120 mg total) by mouth once daily.          While in the hospital, will manage blood pressure as follows; Continue home antihypertensive regimen    Will utilize p.r.n. blood pressure medication only if patient's blood pressure greater than  180/110 and she develops symptoms such as worsening chest pain or shortness of breath.          VTE Risk Mitigation (From admission, onward)    None      Lovenox 40 mg Q 24       Dav Klein MD  Department of Hospital Medicine   [facility] - Emergency Dept

## 2023-02-08 NOTE — PLAN OF CARE
02/08/23 1015   VERA Message   Medicare Outpatient and Observation Notification regarding financial responsibility Explained to patient/caregiver;Signed/date by patient/caregiver   Date VERA was signed 02/08/23   Time VERA was signed 1016

## 2023-02-08 NOTE — PLAN OF CARE
Goals to be met by: 3/8/23     Patient will increase functional independence with ADLs by performing:    UE Dressing with Set-up Assistance.  LE Dressing with Minimal Assistance.  Grooming while seated with Set-up Assistance.  Toileting from toilet with Stand-by Assistance for hygiene and clothing management.   Bathing from  shower chair/bench with Minimal Assistance.  Toilet transfer to toilet with Stand-by Assistance.  Increased functional strength to WFL for ADL's.

## 2023-02-08 NOTE — PLAN OF CARE
The pt is cleared for discharge home from case management with [facility] and has follow up appointments added to her avs.    02/08/23 1052   Final Note   Anticipated Discharge Disposition Home-Health   What phone number can be called within the next 1-3 days to see how you are doing after discharge? [phone]   Hospital Resources/Appts/Education Provided Appointments scheduled and added to AVS;Appointments scheduled by Navigator/Coordinator   Post-Acute Status   Post-Acute Authorization Home Health   Home Health Status Set-up Complete/Auth obtained

## 2023-02-08 NOTE — PROGRESS NOTES
Pharmacist Renal Dose Adjustment Note    Angelique Hamilton is a 89 y.o. female being treated with the medication Pepcid    Patient Data:    Vital Signs (Most Recent):  Temp: 98.8 °F (37.1 °C) (02/07/23 2115)  Pulse: 77 (02/07/23 2115)  Resp: 19 (02/07/23 2115)  BP: (!) 159/74 (02/07/23 2115)  SpO2: 96 % (02/07/23 2115)   Vital Signs (72h Range):  Temp:  [97.7 °F (36.5 °C)-98.8 °F (37.1 °C)]   Pulse:  [68-77]   Resp:  [16-20]   BP: (116-159)/(68-79)   SpO2:  [96 %-100 %]      Recent Labs   Lab 02/07/23  1212   CREATININE 1.5*     Serum creatinine: 1.5 mg/dL (H) 02/07/23 1212  Estimated creatinine clearance: 17.6 mL/min (A)    Medication:Pepcid dose: 20mg frequency BID will be changed to medication:Pepcid dose:20mg frequency:daily    Pharmacist's Name: Rivera Dubois  Pharmacist's Extension: [phone]

## 2023-02-08 NOTE — PLAN OF CARE
Plan of care reviewed with pt. Pt verbalized understanding. Patient is alert and oriented x 4, able to make needs known. A-febrile throughout the shift. Meds given per MAR. IVF infusing as ordered. Repositions self independently. No complaints of pain or discomfort. Mcneil to gravity in place for urinary retention, no s/s of infection to insertion site. Purposeful hourly/q2hr rounding done during shift to promote patient safety. NAD noted. Safety maintained with side rails up x3, bed wheels locked, bed in lowest positioned, call light in reach. Patient educated to call for assistance if needed, verbalized understanding. Pt remains free of falls. No further needs expressed at this time. Will continue to monitor.

## 2023-02-08 NOTE — NURSING
D/c education provided, pt verbalized understanding. PIV removed, belongings in hand, pt left floor safely via wheelchair.

## 2023-02-08 NOTE — PLAN OF CARE
[facility]  Initial Discharge Assessment       Primary Care Provider: Ryan Hurtado MD    Admission Diagnosis: Colitis [K52.9]    Admission Date: 2/7/2023  Expected Discharge Date: 2/8/2023    Discharge Barriers Identified: None    Payor: MEDICARE / Plan: MEDICARE PART A & B / Product Type: Government /     Extended Emergency Contact Information  Primary Emergency Contact: SANFORD ADKINS  Address: [address]           [place] [address] [place]  Mobile Phone: [phone]  Relation: Daughter  Preferred language: English   needed? No    Discharge Plan A: Home Health  Discharge Plan B: Home with family      [id number] - [place], [place] - [address]  [address]  [place] [address]  Phone: [phone] Fax: [phone]    SW met with patient at bedside to complete discharge planning assessment.  Patient alert and oriented xs 4.  Patient verified all demographic information on facesheet is correct.  Patient verified PCP is Dr. Hurtado.  Patient verified primary health insurance is Medicare and secondary is BCBS.  Patient with NO home health but has listed DME.  Patient request home health with [facility].  Patient choice signed.  Patient with NO POA or Living Will.  Patient not on dialysis or medication coumadin.  Patient with no 30 day admission.  Patient with no financial issues at this time.  Patient family will provide transportation upon discharge from facility.  Patient independent with ADLs but require assistance with bathing, live with son and daughter in law, family drives.      Initial Assessment (most recent)       Adult Discharge Assessment - 02/08/23 1015          Discharge Assessment    Assessment Type Discharge Planning Assessment     Confirmed/corrected address, phone number and insurance Yes     Confirmed Demographics Correct on Facesheet     Source of Information patient     Does patient/caregiver understand observation status Yes      Communicated FRANKY with patient/caregiver Yes     People in Home child(guy), adult     Facility Arrived From: home     Do you expect to return to your current living situation? Yes     Do you have help at home or someone to help you manage your care at home? Yes     Who are your caregiver(s) and their phone number(s)? family     Prior to hospitilization cognitive status: Alert/Oriented     Current cognitive status: Alert/Oriented     Walking or Climbing Stairs ambulation difficulty, requires equipment;stair climbing difficulty, requires equipment     Dressing/Bathing bathing difficulty, assistance 1 person     Equipment Currently Used at Home rollator;lift device     Readmission within 30 days? No     Patient currently being followed by outpatient case management? No     Do you currently have service(s) that help you manage your care at home? No     Do you take prescription medications? Yes     Do you have prescription coverage? Yes     Do you have any problems affording any of your prescribed medications? No     Is the patient taking medications as prescribed? yes     Who is going to help you get home at discharge? family     How do you get to doctors appointments? family or friend will provide     Are you on dialysis? No     Do you take coumadin? No     Discharge Plan A Home Health     Discharge Plan B Home with family     DME Needed Upon Discharge  none     Discharge Plan discussed with: Patient     Discharge Barriers Identified None

## 2023-02-08 NOTE — ASSESSMENT & PLAN NOTE
Chronic, controlled.  Latest blood pressure and vitals reviewed-   Temp:  [97.7 °F (36.5 °C)-98 °F (36.7 °C)]   Pulse:  [68-71]   Resp:  [16]   BP: (116-158)/(68-74)   SpO2:  [98 %-100 %] .   Home meds for hypertension were reviewed and noted below.   Hypertension Medications             hydroCHLOROthiazide (HYDRODIURIL) 25 MG tablet Take 1 tablet (25 mg total) by mouth every other day.    lisinopriL 10 MG tablet Take 10 mg by mouth once daily.    verapamiL (CALAN-SR) 120 MG CR tablet Take 1 tablet (120 mg total) by mouth once daily.          While in the hospital, will manage blood pressure as follows; Continue home antihypertensive regimen    Will utilize p.r.n. blood pressure medication only if patient's blood pressure greater than  180/110 and she develops symptoms such as worsening chest pain or shortness of breath.

## 2023-02-08 NOTE — HPI
Patient is an 89-year-old  female with a past medical history significant for hypertension and irritable bowel syndrome who presents the hospital for significant weakness and diarrhea over the past 3-4 days.  Patient states that she would had diarrhea approximately 1 week prior to admission, but it seemed to get better and came back 1 day prior to admission.  Of note, the patient was noted to have been prescribed ciprofloxacin approximately 3 days prior to admission.  Patient has dementia and is a poor historian.  The patient admits to occasional nausea, but denies any vomiting.  Patient denies any fever, abdominal pain, or other symptoms at this point in time.  Her children live in the house with her, and noted the patient was significantly weak this morning and unable to get off the toilet.  This is what prompted her to come to the emergency department.  In the ED, she was noted to have a mildly elevated creatinine, but otherwise labs appeared to be within normal limits.

## 2023-02-08 NOTE — NURSING
Pt arrived to room 210 via stretcher by nurse assistant. Pt belongings at side. Daughter in law, Karen at side. NAD. VSS. Oriented pt to room. Call light within reach

## 2023-02-08 NOTE — PLAN OF CARE
Problem: Physical Therapy  Goal: Physical Therapy Goal  Description: Goals to be met by: 2023     Patient will increase functional independence with mobility by performin. Supine to sit with Contact Guard Assistance  2. Sit to stand transfer with Contact Guard Assistance  3. Bed to chair transfer with Contact Guard Assistance using Rolling Walker  4. Gait  x 250 x2  feet with Contact Guard Assistance using Rolling Walker.   5. Lower extremity exercise program x20 reps     Outcome: Ongoing, Progressing     PT eval- pt sit to stand Francine using RW. Ambulated 250 ft Francine using RW.  With kyphotic roster and head tilted to the left. One seated rest. Pt hard of hearing. Would benefit from HHPT.

## 2023-02-08 NOTE — PLAN OF CARE
Problem: Physical Therapy  Goal: Physical Therapy Goal  Description: Goals to be met by: 2023     Patient will increase functional independence with mobility by performin. Supine to sit with Contact Guard Assistance  2. Sit to stand transfer with Contact Guard Assistance  3. Bed to chair transfer with Contact Guard Assistance using Rolling Walker  4. Gait  x 250 x2  feet with Contact Guard Assistance using Rolling Walker.   5. Lower extremity exercise program x20 reps     Outcome: Ongoing, Progressing     PT eval- pt sit to stand Francine using RW. Ambulated 250 ft Francine using RW.  One seated rest. Pt hard of hearing. Would benefit from HHPT.

## 2023-02-08 NOTE — ASSESSMENT & PLAN NOTE
Patient is chronically on statin.will continue for now. Monitor clinically. Last LDL was   Lab Results   Component Value Date    LDLCALC 110 (H) 01/06/2023

## 2023-02-08 NOTE — DISCHARGE INSTRUCTIONS
Discharge Instructions, [facility] Medicine    Thank you for choosing [facility] for your medical care. The primary doctor who is taking care of you at the time of your discharge is Dav Klein MD.     You were admitted to the hospital with Colitis.     Please note your discharge instructions, including diet/activity restrictions, follow-up appointments, and medication changes.  If you have any questions about your medical issues, prescriptions, or any other questions, please feel free to contact the [facility] Medicine Dept at [phone] and we will help.    If you are previously with Home health, outpatient PT/OT or under a therapy program, you are cleared to return to those programs.    Please direct all long term medication refills and follow up to your primary care provider, yRan Hurtado MD. Thank you again for letting us take care of your health care needs.

## 2023-02-08 NOTE — ASSESSMENT & PLAN NOTE
Patient with colitis, unknown etiology.  Will follow-up with C diff, and if positive start on treatment for such.  If C diff is negative, will attempt symptomatic management and fluid rehydration.  Monitor closely.

## 2023-02-08 NOTE — PROGRESS NOTES
Pharmacist Renal Dose Adjustment Note    Angelique Haimlton is a 89 y.o. female being treated with the medication Lovenox    Patient Data:    Vital Signs (Most Recent):  Temp: 98.8 °F (37.1 °C) (02/07/23 2115)  Pulse: 77 (02/07/23 2115)  Resp: 19 (02/07/23 2115)  BP: (!) 159/74 (02/07/23 2115)  SpO2: 96 % (02/07/23 2115)   Vital Signs (72h Range):  Temp:  [97.7 °F (36.5 °C)-98.8 °F (37.1 °C)]   Pulse:  [68-77]   Resp:  [16-20]   BP: (116-159)/(68-79)   SpO2:  [96 %-100 %]      Recent Labs   Lab 02/07/23  1212   CREATININE 1.5*     Serum creatinine: 1.5 mg/dL (H) 02/07/23 1212  Estimated creatinine clearance: 17.6 mL/min (A)    Medication:Lovenox dose: 40mg frequency q24h will be changed to medication:Lovenox dose:30mg frequency:q24h    Pharmacist's Name: Rivera Dubois  Pharmacist's Extension: [phone]

## 2023-02-08 NOTE — ED NOTES
Mcneil insert using sterile technique. Tolerated well, immediate return of 350cc of dark yellow urine.

## 2023-02-08 NOTE — ASSESSMENT & PLAN NOTE
Noted, colitis on CT scan seems to suggest that this is not irritable bowel syndrome.  Continue to rule out C difficile and infectious colitis.

## 2023-02-08 NOTE — PLAN OF CARE
CM sent HH referral and orders to [facility] to review for acceptance. AVS updated.    02/08/23 1048   Post-Acute Status   Post-Acute Authorization Home Health   Home Health Status Referrals Sent   Patient choice form signed by patient/caregiver List with quality metrics by geographic area provided

## 2023-02-09 LAB
E COLI SXT1 STL QL IA: NEGATIVE
E COLI SXT2 STL QL IA: NEGATIVE

## 2023-02-09 PROCEDURE — G0180 MD CERTIFICATION HHA PATIENT: HCPCS | Mod: ,,, | Performed by: FAMILY MEDICINE

## 2023-02-09 PROCEDURE — G0180 PR HOME HEALTH MD CERTIFICATION: ICD-10-PCS | Mod: ,,, | Performed by: FAMILY MEDICINE

## 2023-02-09 NOTE — DISCHARGE SUMMARY
[facility] Hospital Medicine  Discharge Summary      Patient Name: Angelique Hamilton  MRN: [id number]  MILAN: [id number]  Patient Class: OP- Observation  Admission Date: 2/7/2023  Hospital Length of Stay: 0 days  Discharge Date and Time: 2/8/2023  2:25 PM  Attending Physician: No att. providers found   Discharging Provider: Dav Klein MD  Primary Care Provider: Ryan Hurtado MD    Primary Care Team: Networked reference to record PCT     HPI:   Patient is an 89-year-old  female with a past medical history significant for hypertension and irritable bowel syndrome who presents the hospital for significant weakness and diarrhea over the past 3-4 days.  Patient states that she would had diarrhea approximately 1 week prior to admission, but it seemed to get better and came back 1 day prior to admission.  Of note, the patient was noted to have been prescribed ciprofloxacin approximately 3 days prior to admission.  Patient has dementia and is a poor historian.  The patient admits to occasional nausea, but denies any vomiting.  Patient denies any fever, abdominal pain, or other symptoms at this point in time.  Her children live in the house with her, and noted the patient was significantly weak this morning and unable to get off the toilet.  This is what prompted her to come to the emergency department.  In the ED, she was noted to have a mildly elevated creatinine, but otherwise labs appeared to be within normal limits.      * No surgery found *      Hospital Course:   Patient was admitted the hospital secondary to likely viral colitis and diarrhea with secondary dehydration.  Patient was given IV fluids and dehydration and acute kidney injury resolved.  Patient was feeling much better the following day, and was discharged home with home health and PT/OT.  C difficile was ruled out.  Patient had no evidence of white count or other signs of bacterial gastroenteritis, so antibiotics were not  prescribed.  Patient was set up for nurse practitioner at home visit as well as outpatient case management given her advanced age and dementia with high likelihood for readmission.       Goals of Care Treatment Preferences:  Code Status: Full Code      Consults:   Consults (From admission, onward)        Status Ordering Provider     IP consult to case management  Once        Provider:  (Not yet assigned)    Completed LEATHA LAU          No new Assessment & Plan notes have been filed under this hospital service since the last note was generated.  Service: Hospital Medicine    Final Active Diagnoses:    Diagnosis Date Noted POA    PRINCIPAL PROBLEM:  Colitis [K52.9] 02/07/2023 Yes    Dehydration [E86.0] 02/07/2023 Yes    HEATHER (acute kidney injury) [N17.9] 02/08/2023 Yes    Mild vascular dementia without behavioral disturbance, psychotic disturbance, mood disturbance, or anxiety [F01.A0] 01/12/2023 Yes    Nonintractable epilepsy without status epilepticus [G40.909] 03/12/2021 Yes    Stage 3a chronic kidney disease [N18.31] 03/12/2021 Yes    Aortic stenosis with trileaflet valve [I35.0] 10/25/2019 Yes    Irritable bowel syndrome with both constipation and diarrhea [K58.2] 10/25/2019 Yes    HTN (hypertension) [I10] 06/14/2016 Yes    Hyperlipidemia [E78.5] 06/14/2016 Yes      Problems Resolved During this Admission:       Discharged Condition: stable    Disposition: Home-Health Care [facility]    Follow Up:   Follow-up Information     Ryan Hurtado MD Follow up on 2/20/2023.    Specialties: Family Medicine, Home Health Services, Hospice Services  Why: Hospital follow up 11:40 am  Contact information:  [address]  [address]  [place] [facility] [address]  [phone]             [facility] Follow up.    Specialties: Home Health Services, Home Therapy Services, Home Living Aide Services  Why: Home Health  Contact information:  [address]  [address]  [phone]                     Patient Instructions:      Ambulatory referral/consult to Home Health   Standing Status: Future   Referral Priority: Routine Referral Type: Home Health Care   Referral Reason: Specialty Services Required   Requested Specialty: Home Health Services   Number of Visits Requested: 1     Ambulatory referral/consult to [facility]   Standing Status: Future   Referral Priority: Routine Referral Type: Consultation   Referral Reason: Specialty Services Required   Number of Visits Requested: 1     Ambulatory referral/consult to Outpatient Case Management   Referral Priority: Routine Referral Type: Consultation   Referral Reason: Specialty Services Required   Number of Visits Requested: 1     Notify your health care provider if you experience any of the following:  temperature >100.4     Notify your health care provider if you experience any of the following:  persistent nausea and vomiting or diarrhea     Notify your health care provider if you experience any of the following:  increased confusion or weakness     Activity as tolerated       Significant Diagnostic Studies: Labs:   BMP:   Recent Labs   Lab 02/08/23  0600   GLU 85      K 4.0      CO2 25   BUN 17   CREATININE 1.0   CALCIUM 9.3   MG 1.5*    and CBC   Recent Labs   Lab 02/08/23  0600   WBC 4.27   HGB 12.9   HCT 39.6   *       Pending Diagnostic Studies:     Procedure Component Value Units Date/Time    Stool Exam-Ova,Cysts,Parasites [[id number]] Collected: 02/07/23 1244    Order Status: Sent Lab Status: In process Updated: 02/07/23 1332    Specimen: Stool          Medications:  Reconciled Home Medications:      Medication List      START taking these medications    loperamide 2 mg capsule  Commonly known as: IMODIUM  Take 1 capsule (2 mg total) by mouth 4 (four) times daily as needed for Diarrhea.        CHANGE how you take these medications    QUEtiapine 50 MG tablet  Commonly known as:  SEROQUEL  Take 1 tablet (50 mg total) by mouth every evening.  What changed: additional instructions        CONTINUE taking these medications    biotin 1 mg tablet  Take 1,000 mcg by mouth 3 (three) times daily.     calcium-vitamin D3 500 mg-5 mcg (200 unit) per tablet  Commonly known as: OS-AYSHA 500 + D3  Take 1 tablet by mouth 2 (two) times daily with meals.     cholecalciferol (vitamin D3) 50 mcg (2,000 unit) Tab  Commonly known as: VITAMIN D3  Take 2,000 Units by mouth once daily.     donepeziL 5 MG Tbdl  Commonly known as: ARICEPT ODT  Take 5 mg by mouth nightly.     famotidine 20 MG tablet  Commonly known as: PEPCID  Take 1 tablet (20 mg total) by mouth 2 (two) times daily.     ferrous sulfate 325 mg (65 mg iron) Tab tablet  Commonly known as: FEOSOL  Take 325 mg by mouth daily with breakfast.     hydroCHLOROthiazide 25 MG tablet  Commonly known as: HYDRODIURIL  Take 1 tablet (25 mg total) by mouth every other day.     lisinopriL 10 MG tablet  Take 10 mg by mouth once daily.     meclizine 12.5 mg tablet  Commonly known as: ANTIVERT  Take 0.5 tablets (6.25 mg total) by mouth 2 (two) times daily as needed for Dizziness (1/2 tablet twice daily as needed for dizziness).     mupirocin 2 % ointment  Commonly known as: BACTROBAN  Apply topically 2 (two) times daily.     pantoprazole 40 MG tablet  Commonly known as: PROTONIX  Take 1 tablet (40 mg total) by mouth once daily.     sertraline 25 MG tablet  Commonly known as: ZOLOFT  Take 25 mg by mouth once daily.     tolterodine 2 MG Cp24  Commonly known as: DETROL LA  Take 1 capsule (2 mg total) by mouth once daily. For bladder     tramadol-acetaminophen 37.5-325 mg 37.5-325 mg Tab  Commonly known as: ULTRACET  Take 1 tablet by mouth 2 (two) times a day.     verapamiL 120 MG CR tablet  Commonly known as: CALAN-SR  Take 1 tablet (120 mg total) by mouth once daily.            Indwelling Lines/Drains at time of discharge:   Lines/Drains/Airways     None                 Time  spent on the discharge of patient: 39 minutes         Dav Klein MD  Department of Hospital Medicine  [facility]

## 2023-02-11 LAB — BACTERIA STL CULT: NORMAL

## 2023-02-13 LAB — O+P STL MICRO: NORMAL

## 2023-02-28 RX ORDER — MUPIROCIN 20 MG/G
OINTMENT TOPICAL 2 TIMES DAILY
Qty: 30 G | Refills: 2 | Status: SHIPPED | OUTPATIENT
Start: 2023-02-28 | End: 2023-10-05 | Stop reason: SDUPTHER

## 2023-03-01 ENCOUNTER — OUTPATIENT CASE MANAGEMENT (OUTPATIENT)
Dept: ADMINISTRATIVE | Facility: OTHER | Age: 88
End: 2023-03-01
Payer: MEDICARE

## 2023-03-01 NOTE — PROGRESS NOTES
Outpatient Care Management  Patient Does Not Consent    Patient: Angelique Hamilton  MRN:  [id number]  Date of Service:  3/1/2023  Completed by:  Virginia Raza RN    Chief Complaint   Patient presents with    OPCM Chart Review    OPCM Enrollment Call    Case Closure     Declines OPCM-letter mailed        Patient Summary

## 2023-03-01 NOTE — LETTER
March 1, 2023    Angelique Hamilton  [address] [address] [address]             [facility] [address] [place] [address] Dear :    I work with [facility] Outpatient Case Management Department. We received a referral to call you to discuss your medical history.These services are free of charge and are offered to [facility] patients who have recently been discharged from any of our facilities or who have complex medical conditions that may require the skill of a nurse to assist with management.         .      The Outpatient Case Management Department can be reached at [phone] from 8:00AM to 4:30 PM on Monday thru Friday. [facility] also has a program where a nurse is available 24/7 to answer questions or provide medical advice, their number is [phone].    Thanks,  Virginia Raza RN [facility]   Outpatient Care Management  [phone]

## 2023-03-07 ENCOUNTER — EXTERNAL HOME HEALTH (OUTPATIENT)
Dept: HOME HEALTH SERVICES | Facility: HOSPITAL | Age: 88
End: 2023-03-07
Payer: MEDICARE

## 2023-03-16 RX ORDER — TRAMADOL HYDROCHLORIDE AND ACETAMINOPHEN 37.5; 325 MG/1; MG/1
1 TABLET, FILM COATED ORAL 2 TIMES DAILY
Qty: 180 TABLET | Refills: 0 | Status: SHIPPED | OUTPATIENT
Start: 2023-03-16 | End: 2023-06-16 | Stop reason: SDUPTHER

## 2023-03-23 DIAGNOSIS — N32.81 OAB (OVERACTIVE BLADDER): ICD-10-CM

## 2023-03-23 RX ORDER — TOLTERODINE 2 MG/1
2 CAPSULE, EXTENDED RELEASE ORAL DAILY
Qty: 30 CAPSULE | Refills: 3 | Status: SHIPPED | OUTPATIENT
Start: 2023-03-23 | End: 2023-06-22 | Stop reason: SDUPTHER

## 2023-03-26 ENCOUNTER — PATIENT MESSAGE (OUTPATIENT)
Dept: FAMILY MEDICINE | Facility: CLINIC | Age: 88
End: 2023-03-26

## 2023-03-27 RX ORDER — PANTOPRAZOLE SODIUM 40 MG/1
40 TABLET, DELAYED RELEASE ORAL DAILY
Qty: 90 TABLET | Refills: 1 | Status: SHIPPED | OUTPATIENT
Start: 2023-03-27 | End: 2023-09-21 | Stop reason: SDUPTHER

## 2023-04-02 ENCOUNTER — HOSPITAL ENCOUNTER (EMERGENCY)
Facility: HOSPITAL | Age: 88
Discharge: HOME | End: 2023-04-02
Attending: EMERGENCY MEDICINE
Payer: MEDICARE

## 2023-04-02 ENCOUNTER — NURSE TRIAGE (OUTPATIENT)
Dept: ADMINISTRATIVE | Facility: CLINIC | Age: 88
End: 2023-04-02
Payer: MEDICARE

## 2023-04-02 VITALS
WEIGHT: 106 LBS | HEIGHT: 56 IN | BODY MASS INDEX: 23.84 KG/M2 | HEART RATE: 69 BPM | TEMPERATURE: 97 F | DIASTOLIC BLOOD PRESSURE: 77 MMHG | RESPIRATION RATE: 16 BRPM | OXYGEN SATURATION: 100 % | SYSTOLIC BLOOD PRESSURE: 164 MMHG

## 2023-04-02 DIAGNOSIS — R53.1 WEAKNESS: ICD-10-CM

## 2023-04-02 DIAGNOSIS — R19.7 ACUTE DIARRHEA: Primary | ICD-10-CM

## 2023-04-02 LAB
ALBUMIN SERPL BCP-MCNC: 3.8 G/DL (ref 3.5–5.2)
ALP SERPL-CCNC: 92 U/L (ref 55–135)
ALT SERPL W/O P-5'-P-CCNC: 13 U/L (ref 10–44)
ANION GAP SERPL CALC-SCNC: 8 MMOL/L (ref 8–16)
AST SERPL-CCNC: 24 U/L (ref 10–40)
BASOPHILS # BLD AUTO: 0.03 K/UL (ref 0–0.2)
BASOPHILS NFR BLD: 0.5 % (ref 0–1.9)
BILIRUB SERPL-MCNC: 0.8 MG/DL (ref 0.1–1)
BUN SERPL-MCNC: 29 MG/DL (ref 8–23)
CALCIUM SERPL-MCNC: 10.1 MG/DL (ref 8.7–10.5)
CHLORIDE SERPL-SCNC: 107 MMOL/L (ref 95–110)
CO2 SERPL-SCNC: 26 MMOL/L (ref 23–29)
CREAT SERPL-MCNC: 1.3 MG/DL (ref 0.5–1.4)
DIFFERENTIAL METHOD: ABNORMAL
EOSINOPHIL # BLD AUTO: 0 K/UL (ref 0–0.5)
EOSINOPHIL NFR BLD: 0.3 % (ref 0–8)
ERYTHROCYTE [DISTWIDTH] IN BLOOD BY AUTOMATED COUNT: 12.9 % (ref 11.5–14.5)
EST. GFR  (NO RACE VARIABLE): 39 ML/MIN/1.73 M^2
GLUCOSE SERPL-MCNC: 105 MG/DL (ref 70–110)
HCT VFR BLD AUTO: 38.2 % (ref 37–48.5)
HGB BLD-MCNC: 12.6 G/DL (ref 12–16)
IMM GRANULOCYTES # BLD AUTO: 0.01 K/UL (ref 0–0.04)
IMM GRANULOCYTES NFR BLD AUTO: 0.2 % (ref 0–0.5)
LYMPHOCYTES # BLD AUTO: 0.8 K/UL (ref 1–4.8)
LYMPHOCYTES NFR BLD: 12.2 % (ref 18–48)
MAGNESIUM SERPL-MCNC: 2 MG/DL (ref 1.6–2.6)
MCH RBC QN AUTO: 32.9 PG (ref 27–31)
MCHC RBC AUTO-ENTMCNC: 33 G/DL (ref 32–36)
MCV RBC AUTO: 100 FL (ref 82–98)
MONOCYTES # BLD AUTO: 0.4 K/UL (ref 0.3–1)
MONOCYTES NFR BLD: 6.5 % (ref 4–15)
NEUTROPHILS # BLD AUTO: 4.9 K/UL (ref 1.8–7.7)
NEUTROPHILS NFR BLD: 80.3 % (ref 38–73)
NRBC BLD-RTO: 0 /100 WBC
PLATELET # BLD AUTO: 112 K/UL (ref 150–450)
PMV BLD AUTO: 10.1 FL (ref 9.2–12.9)
POTASSIUM SERPL-SCNC: 4.6 MMOL/L (ref 3.5–5.1)
PROT SERPL-MCNC: 6.9 G/DL (ref 6–8.4)
RBC # BLD AUTO: 3.83 M/UL (ref 4–5.4)
SODIUM SERPL-SCNC: 141 MMOL/L (ref 136–145)
WBC # BLD AUTO: 6.13 K/UL (ref 3.9–12.7)

## 2023-04-02 PROCEDURE — 36415 COLL VENOUS BLD VENIPUNCTURE: CPT | Performed by: EMERGENCY MEDICINE

## 2023-04-02 PROCEDURE — 83735 ASSAY OF MAGNESIUM: CPT | Performed by: EMERGENCY MEDICINE

## 2023-04-02 PROCEDURE — 93010 ELECTROCARDIOGRAM REPORT: CPT | Mod: ,,, | Performed by: INTERNAL MEDICINE

## 2023-04-02 PROCEDURE — 80053 COMPREHEN METABOLIC PANEL: CPT | Performed by: EMERGENCY MEDICINE

## 2023-04-02 PROCEDURE — 99285 EMERGENCY DEPT VISIT HI MDM: CPT | Mod: 25

## 2023-04-02 PROCEDURE — 85025 COMPLETE CBC W/AUTO DIFF WBC: CPT | Performed by: EMERGENCY MEDICINE

## 2023-04-02 PROCEDURE — 93005 ELECTROCARDIOGRAM TRACING: CPT

## 2023-04-02 PROCEDURE — 93010 EKG 12-LEAD: ICD-10-PCS | Mod: ,,, | Performed by: INTERNAL MEDICINE

## 2023-04-03 NOTE — TELEPHONE ENCOUNTER
Pt's daughter states pt has been in the bathroom for three hours with continuous diarrhea that is black in color. She was previously hospitalized for a similar occurrence. Daughter has given one dose of loperamide about an hour and a half ago; advised not to give any more. Care advice is to go to ED now. Daughter voiced concern about getting patient to the ED. Recommended using towels in the car and a disposable brief if readily available. If symptoms worsen and patient is unable to transfer, daughter should call 911/ EMS.     Reason for Disposition   Black or tarry bowel movements (Exception: chronic-unchanged black-grey bowel movements AND is taking iron pills or Pepto-bismol)    Additional Information   Negative: Shock suspected (e.g., cold/pale/clammy skin, too weak to stand, low BP, rapid pulse)   Negative: Difficult to awaken or acting confused (e.g., disoriented, slurred speech)   Negative: Sounds like a life-threatening emergency to the triager   Negative: [1] SEVERE abdominal pain (e.g., excruciating) AND [2] present > 1 hour   Negative: [1] SEVERE abdominal pain AND [2] age > 60 years   Negative: [1] Blood in the stool AND [2] moderate or large amount of blood    Protocols used: Diarrhea-A-AH

## 2023-04-03 NOTE — ED PROVIDER NOTES
Encounter Date: 4/2/2023    SCRIBE #1 NOTE: IDaniel, am scribing for, and in the presence of,  Luther Skaggs MD.     History     Chief Complaint   Patient presents with    Diarrhea     Started today.      Time seen by provider: 8:28 PM on 04/02/2023    Angelique Hamilton is a 89 y.o. female who presents to the ED with an onset of black liquid diarrhea that began this morning, as well as cramp-like abdominal pain that resolved before arrival. The patient states she has spent most of the day sitting on the toilet. The daughter of the patient gave her imodium 2 hours ago and she has not had diarrhea since. The patient was seen at this ED 2 months ago and was told she had inflammation of her colon due to an infection. The patient states she had been doing fine until this morning. The patient denies chest pain, SOB, or any other symptoms at this time. The patient ate and drank this morning, but she has not eaten or drank since. The patient denies usage of antibiotics in the last 4 weeks. The patient did not see a GI doctor following her last ED visit for diarrhea. The patient and her daughter state they are unsure if the patient has a history of a-fib. PMHx of HTN, HLD, constipation, rectal prolapse, and IBS. PSHx of hysterectomy.    The history is provided by the patient and a relative.   Review of patient's allergies indicates:   Allergen Reactions    Antihistamines - alkylamine     Torrie [amlodipine-olmesartan]     Flexeril [cyclobenzaprine]      Hallucinations    Hydrocodone     Hydrocodone-acetaminophen Other (See Comments)    Simvastatin     Statins-hmg-coa reductase inhibitors      Past Medical History:   Diagnosis Date    Back problem     Constipation     History of fractured vertebra     Hyperlipidemia     Hypertension     IBS (irritable bowel syndrome)     Migraine headache     Osteoporosis     Rectal prolapse     Scoliosis     Vertigo      Past Surgical History:   Procedure Laterality Date    LEWIS       Back Pain    EYE SURGERY      Bilateral Cataracs    HYSTERECTOMY      OOPHORECTOMY      ROTATOR CUFF REPAIR      bialteral    TONSILLECTOMY       Family History   Problem Relation Age of Onset    Colon cancer Father     Breast cancer Mother 70    Hypertension Mother     Ovarian cancer Neg Hx      Social History     Tobacco Use    Smoking status: Never    Smokeless tobacco: Never   Substance Use Topics    Alcohol use: No    Drug use: No     Review of Systems   Constitutional:  Negative for fever.   HENT:  Negative for congestion.    Eyes:  Negative for visual disturbance.   Respiratory:  Negative for shortness of breath and wheezing.    Cardiovascular:  Negative for chest pain.   Gastrointestinal:  Positive for abdominal pain (resolved), blood in stool and diarrhea.   Genitourinary:  Negative for dysuria.   Musculoskeletal:  Negative for joint swelling.   Skin:  Negative for rash.   Neurological:  Negative for syncope.   Hematological:  Does not bruise/bleed easily.   Psychiatric/Behavioral:  Negative for confusion.      Physical Exam     Initial Vitals [04/02/23 1957]   BP Pulse Resp Temp SpO2   (!) 161/81 70 16 97 °F (36.1 °C) 97 %      MAP       --         Physical Exam    Nursing note and vitals reviewed.  Constitutional: Vital signs are normal. She appears well-nourished.   Elderly and frail appearing.   HENT:   Head: Normocephalic and atraumatic.   Mouth/Throat: Mucous membranes are dry.   Eyes: Conjunctivae and EOM are normal.   Neck: Neck supple. No thyroid mass present.   Chronic torticollis.   Normal range of motion.  Cardiovascular:  Normal rate, regular rhythm and normal heart sounds.     Exam reveals no gallop and no friction rub.       No murmur heard.  Pulmonary/Chest: Breath sounds normal. She has no wheezes. She has no rhonchi. She has no rales.   Abdominal: Abdomen is soft. Bowel sounds are normal. There is no abdominal tenderness.   Musculoskeletal:      Cervical back: Normal range of motion and  neck supple.     Neurological: She is alert and oriented to person, place, and time. She has normal strength. No cranial nerve deficit or sensory deficit.   Skin: Skin is warm and dry.   Skin tenting.   Psychiatric: She has a normal mood and affect. Her speech is normal. Cognition and memory are normal.       ED Course   Procedures  Labs Reviewed   CBC W/ AUTO DIFFERENTIAL - Abnormal; Notable for the following components:       Result Value    RBC 3.83 (*)      (*)     MCH 32.9 (*)     Platelets 112 (*)     Lymph # 0.8 (*)     Gran % 80.3 (*)     Lymph % 12.2 (*)     All other components within normal limits   COMPREHENSIVE METABOLIC PANEL - Abnormal; Notable for the following components:    BUN 29 (*)     eGFR 39 (*)     All other components within normal limits   MAGNESIUM     EKG Readings: (Independently Interpreted)   Normal sinus rhythm at rate of 68 bpm with normal axis and normal intervals. No acute ST segment changes.          Imaging Results              CT Abdomen Pelvis  Without Contrast (Final result)  Result time 04/02/23 21:53:40      Final result by Jacqueline Llanos MD (04/02/23 21:53:40)                   Impression:      No convincing acute abnormality involving the imaged abdomen or pelvis.    Resolution of prior noted distal colitis.    Persistent mild right hydronephrosis unchanged without ureterectasis and bilateral nephrolithiasis as well as bilateral simple renal cysts.    Bladder calculi .  No bladder mucosal thickening.    Large hiatal hernia.    Please see above for additional incidental nonacute findings.      Electronically signed by: Jacqueline Llanos  Date:    04/02/2023  Time:    21:53               Narrative:    EXAMINATION:  CT ABDOMEN PELVIS WITHOUT CONTRAST    CLINICAL HISTORY:  Diarrhea;    TECHNIQUE:  Low dose axial images, sagittal and coronal reformations were obtained from the lung bases to the pubic symphysis without oral or IV contrast.    COMPARISON:  CT abdomen  pelvis 02/07/2023    FINDINGS:  Abdomen:    - Lower thorax:Imaged heart is stable with dense aortic and mitral valve calcifications and coronary artery calcifications.  There is cardiomegaly and there is a large hiatal hernia containing the proximal stomach.  Posterior diaphragmatic hernias bilaterally also noted.    - Lung bases: No interstitial airspace opacities and no nodules.  Mild atelectasis noted.  There is no effusion.    Lack of IV contrast limits evaluation of the solid organs for focal lesions.    - Liver: No focal mass or hepatomegaly.    - Gallbladder: No CT evidence of cholecystitis or cholelithiasis.    - Bile Ducts: No evidence of intra or extra hepatic biliary ductal dilation.    - Spleen: Negative.    - Kidneys: Right kidney contains nonobstructing calculi largest measures 7 mm in the lower pole.  Stable mild hydronephrosis again noted without visualized ureterectasis.  Simple renal cysts bilaterally are again noted.  Left kidney is unremarkable for hydronephrosis or calculi.    - Adrenals: Unremarkable.    - Pancreas: No mass or peripancreatic fat stranding.    - Retroperitoneum:  No significant adenopathy.    - Vascular: No abdominal aortic aneurysm.  There is moderate atherosclerosis of the abdominal aorta as well as ectasia.  There is no aneurysm.    - Abdominal wall:  Unremarkable.    Pelvis:    There are calculi noted in the dependent bladder on the right and on the left.  The uterus is not conspicuous.  There is no pelvic mass, adenopathy, or free fluid.    Bowel/Mesentery:    There is no evidence of bowel obstruction or convincing mesenteric adenopathy or inflammation.  Prior noted inflammatory changes along the colon are no longer appreciated.  Scattered diverticuli are again noted in the distal colon.  There is no extraluminal free air or significant free fluid.  Appendix is not conspicuous.  There is no evidence of appendicitis.    Bones: There is no acute osseous abnormality and no  suspicious lytic or blastic lesion.  Kyphoplasty changes, along the imaged lumbosacral spine vertebrae again noted.  There is diffuse osteopenia and degenerative changes of the hips and SI joints bilaterally.                                       Medications - No data to display  Medical Decision Making:   History:   Old Medical Records: I decided to obtain old medical records.  Independently Interpreted Test(s):   I have ordered and independently interpreted EKG Reading(s) - see prior notes  Clinical Tests:   Lab Tests: Ordered and Reviewed  Radiological Study: Ordered and Reviewed  Medical Tests: Ordered and Reviewed  ED Management:  This patient was emergently assessed shortly after arrival.  Initial vital signs are significant for systolic hypertension but the patient has not taken her antihypertensive medications night.  She does appear mildly dehydrated on clinical exam and IV is established and she is provided bolus hydration.  CBC and CMP are obtained rule out evidence of progressing infection, inflammation, end-organ dysfunction, grave electrolyte abnormality or other dehydration signs.  These are all found to be largely within normal limits.  CT scan of the abdomen and pelvis without contrast was obtained to assess for evidence of recurrence or worsening of colitis that was seen on imaging within the past month.  This may help assess for the need for admission or further treatment related to infectious colitis including C diff colitis.  The scan is negative for convincing acute abnormality involving the imaged abdomen or pelvis.  Resolution of prior noted distal colitis is seen.  Other chronic findings including large hiatal hernia are present.  On final reassessment patient reports some improvement and she is educated about supportive care for acute diarrhea.  Her diarrhea has ceased after taking Imodium prior to arrival and she is not had recurrence here in the emergency department.  I think she is  stable for discharge and she and her daughter are educated about supportive care.  They are asked to slowly advance a bland diet and clear liquid hydration.  They are asked to have her follow-up with her primary care doctor as soon as possible to assess for expected improvement or to return to the emergency department immediately for any new, concerning, or worsening symptoms.  Patient and daughter were agreeable and she was discharged in stable condition.        Scribe Attestation:   Scribe #1: I performed the above scribed service and the documentation accurately describes the services I performed. I attest to the accuracy of the note.            I, Dr. Luther Skaggs, personally performed the services described in this documentation. All medical record entries made by the scribe were at my direction and in my presence.  I have reviewed the chart and agree that the record reflects my personal performance and is accurate and complete. Luther Skaggs MD.  4:25 AM 04/03/2023         Clinical Impression:   Final diagnoses:  [R53.1] Weakness  [R19.7] Acute diarrhea (Primary)        ED Disposition Condition    Discharge Stable          ED Prescriptions    None       Follow-up Information       Follow up With Specialties Details Why Contact Info    Ryan Hurtado MD Family Medicine, Home Health Services, Hospice Services Schedule an appointment as soon as possible for a visit   [address]  [address]  [place] [address]  [phone]      [facility] Gastroenterology Schedule an appointment as soon as possible for a visit   [address] [facility] [address]             Luther Skaggs MD  04/03/23 0420

## 2023-05-03 DIAGNOSIS — R42 VERTIGO: ICD-10-CM

## 2023-05-03 RX ORDER — MECLIZINE HCL 12.5 MG 12.5 MG/1
6.25 TABLET ORAL 2 TIMES DAILY PRN
Qty: 90 TABLET | Refills: 1 | Status: SHIPPED | OUTPATIENT
Start: 2023-05-03 | End: 2023-12-06 | Stop reason: SDUPTHER

## 2023-05-08 ENCOUNTER — PATIENT OUTREACH (OUTPATIENT)
Dept: HOME HEALTH SERVICES | Facility: HOSPITAL | Age: 88
End: 2023-05-08

## 2023-05-08 ENCOUNTER — EXTERNAL HOME HEALTH (OUTPATIENT)
Dept: HOME HEALTH SERVICES | Facility: HOSPITAL | Age: 88
End: 2023-05-08
Payer: MEDICARE

## 2023-05-15 PROBLEM — N17.9 AKI (ACUTE KIDNEY INJURY): Status: RESOLVED | Noted: 2023-02-08 | Resolved: 2023-05-15

## 2023-05-22 ENCOUNTER — HOSPITAL ENCOUNTER (INPATIENT)
Facility: HOSPITAL | Age: 88
LOS: 2 days | Discharge: HOME HEALTH | DRG: 195 | End: 2023-05-24
Attending: EMERGENCY MEDICINE | Admitting: HOSPITALIST
Payer: MEDICARE

## 2023-05-22 DIAGNOSIS — J18.9 COMMUNITY ACQUIRED PNEUMONIA OF LEFT LOWER LOBE OF LUNG: Primary | ICD-10-CM

## 2023-05-22 DIAGNOSIS — R07.9 CHEST PAIN: ICD-10-CM

## 2023-05-22 LAB
ALBUMIN SERPL BCP-MCNC: 3.6 G/DL (ref 3.5–5.2)
ALP SERPL-CCNC: 85 U/L (ref 55–135)
ALT SERPL W/O P-5'-P-CCNC: 12 U/L (ref 10–44)
ANION GAP SERPL CALC-SCNC: 8 MMOL/L (ref 8–16)
AST SERPL-CCNC: 22 U/L (ref 10–40)
BACTERIA #/AREA URNS HPF: ABNORMAL /HPF
BASOPHILS # BLD AUTO: 0.03 K/UL (ref 0–0.2)
BASOPHILS NFR BLD: 0.3 % (ref 0–1.9)
BILIRUB SERPL-MCNC: 1 MG/DL (ref 0.1–1)
BILIRUB UR QL STRIP: NEGATIVE
BUN SERPL-MCNC: 36 MG/DL (ref 8–23)
CALCIUM SERPL-MCNC: 10.7 MG/DL (ref 8.7–10.5)
CHLORIDE SERPL-SCNC: 104 MMOL/L (ref 95–110)
CLARITY UR: ABNORMAL
CO2 SERPL-SCNC: 28 MMOL/L (ref 23–29)
COLOR UR: YELLOW
CREAT SERPL-MCNC: 1.1 MG/DL (ref 0.5–1.4)
DIFFERENTIAL METHOD: ABNORMAL
EOSINOPHIL # BLD AUTO: 0 K/UL (ref 0–0.5)
EOSINOPHIL NFR BLD: 0.2 % (ref 0–8)
ERYTHROCYTE [DISTWIDTH] IN BLOOD BY AUTOMATED COUNT: 12.8 % (ref 11.5–14.5)
EST. GFR  (NO RACE VARIABLE): 48 ML/MIN/1.73 M^2
GLUCOSE SERPL-MCNC: 106 MG/DL (ref 70–110)
GLUCOSE UR QL STRIP: NEGATIVE
HCT VFR BLD AUTO: 36.4 % (ref 37–48.5)
HGB BLD-MCNC: 12.3 G/DL (ref 12–16)
HGB UR QL STRIP: ABNORMAL
IMM GRANULOCYTES # BLD AUTO: 0.04 K/UL (ref 0–0.04)
IMM GRANULOCYTES NFR BLD AUTO: 0.4 % (ref 0–0.5)
KETONES UR QL STRIP: NEGATIVE
LACTATE SERPL-SCNC: 0.9 MMOL/L (ref 0.5–2.2)
LACTATE SERPL-SCNC: 1.8 MMOL/L (ref 0.5–2.2)
LEUKOCYTE ESTERASE UR QL STRIP: NEGATIVE
LYMPHOCYTES # BLD AUTO: 0.5 K/UL (ref 1–4.8)
LYMPHOCYTES NFR BLD: 5.3 % (ref 18–48)
MCH RBC QN AUTO: 33.1 PG (ref 27–31)
MCHC RBC AUTO-ENTMCNC: 33.8 G/DL (ref 32–36)
MCV RBC AUTO: 98 FL (ref 82–98)
MICROSCOPIC COMMENT: ABNORMAL
MONOCYTES # BLD AUTO: 0.7 K/UL (ref 0.3–1)
MONOCYTES NFR BLD: 6.4 % (ref 4–15)
NEUTROPHILS # BLD AUTO: 9 K/UL (ref 1.8–7.7)
NEUTROPHILS NFR BLD: 87.4 % (ref 38–73)
NITRITE UR QL STRIP: NEGATIVE
NRBC BLD-RTO: 0 /100 WBC
PH UR STRIP: 7 [PH] (ref 5–8)
PLATELET # BLD AUTO: 122 K/UL (ref 150–450)
PMV BLD AUTO: 10.4 FL (ref 9.2–12.9)
POTASSIUM SERPL-SCNC: 4.1 MMOL/L (ref 3.5–5.1)
PROCALCITONIN SERPL IA-MCNC: 0.3 NG/ML
PROT SERPL-MCNC: 6.9 G/DL (ref 6–8.4)
PROT UR QL STRIP: NEGATIVE
RBC # BLD AUTO: 3.72 M/UL (ref 4–5.4)
RBC #/AREA URNS HPF: 12 /HPF (ref 0–4)
SODIUM SERPL-SCNC: 140 MMOL/L (ref 136–145)
SP GR UR STRIP: 1.01 (ref 1–1.03)
SQUAMOUS #/AREA URNS HPF: 4 /HPF
URN SPEC COLLECT METH UR: ABNORMAL
UROBILINOGEN UR STRIP-ACNC: NEGATIVE EU/DL
WBC # BLD AUTO: 10.23 K/UL (ref 3.9–12.7)
WBC #/AREA URNS HPF: 4 /HPF (ref 0–5)

## 2023-05-22 PROCEDURE — 93010 EKG 12-LEAD: ICD-10-PCS | Mod: ,,, | Performed by: INTERNAL MEDICINE

## 2023-05-22 PROCEDURE — 94761 N-INVAS EAR/PLS OXIMETRY MLT: CPT

## 2023-05-22 PROCEDURE — 12000002 HC ACUTE/MED SURGE SEMI-PRIVATE ROOM

## 2023-05-22 PROCEDURE — 63600175 PHARM REV CODE 636 W HCPCS: Performed by: EMERGENCY MEDICINE

## 2023-05-22 PROCEDURE — 81000 URINALYSIS NONAUTO W/SCOPE: CPT | Performed by: EMERGENCY MEDICINE

## 2023-05-22 PROCEDURE — 85025 COMPLETE CBC W/AUTO DIFF WBC: CPT | Performed by: EMERGENCY MEDICINE

## 2023-05-22 PROCEDURE — 80053 COMPREHEN METABOLIC PANEL: CPT | Performed by: EMERGENCY MEDICINE

## 2023-05-22 PROCEDURE — 36415 COLL VENOUS BLD VENIPUNCTURE: CPT | Performed by: EMERGENCY MEDICINE

## 2023-05-22 PROCEDURE — 94640 AIRWAY INHALATION TREATMENT: CPT

## 2023-05-22 PROCEDURE — 84145 PROCALCITONIN (PCT): CPT | Performed by: NURSE PRACTITIONER

## 2023-05-22 PROCEDURE — 99900035 HC TECH TIME PER 15 MIN (STAT)

## 2023-05-22 PROCEDURE — 27000221 HC OXYGEN, UP TO 24 HOURS

## 2023-05-22 PROCEDURE — 25000242 PHARM REV CODE 250 ALT 637 W/ HCPCS: Performed by: NURSE PRACTITIONER

## 2023-05-22 PROCEDURE — 96368 THER/DIAG CONCURRENT INF: CPT

## 2023-05-22 PROCEDURE — 87040 BLOOD CULTURE FOR BACTERIA: CPT | Performed by: EMERGENCY MEDICINE

## 2023-05-22 PROCEDURE — 63600175 PHARM REV CODE 636 W HCPCS: Performed by: NURSE PRACTITIONER

## 2023-05-22 PROCEDURE — 25000003 PHARM REV CODE 250: Performed by: NURSE PRACTITIONER

## 2023-05-22 PROCEDURE — 93005 ELECTROCARDIOGRAM TRACING: CPT

## 2023-05-22 PROCEDURE — 25000003 PHARM REV CODE 250: Performed by: EMERGENCY MEDICINE

## 2023-05-22 PROCEDURE — 93010 ELECTROCARDIOGRAM REPORT: CPT | Mod: ,,, | Performed by: INTERNAL MEDICINE

## 2023-05-22 PROCEDURE — 96365 THER/PROPH/DIAG IV INF INIT: CPT

## 2023-05-22 PROCEDURE — 83605 ASSAY OF LACTIC ACID: CPT | Performed by: EMERGENCY MEDICINE

## 2023-05-22 PROCEDURE — 94760 N-INVAS EAR/PLS OXIMETRY 1: CPT

## 2023-05-22 PROCEDURE — 99285 EMERGENCY DEPT VISIT HI MDM: CPT | Mod: 25

## 2023-05-22 RX ORDER — IBUPROFEN 200 MG
24 TABLET ORAL
Status: DISCONTINUED | OUTPATIENT
Start: 2023-05-22 | End: 2023-05-24 | Stop reason: HOSPADM

## 2023-05-22 RX ORDER — DONEPEZIL HYDROCHLORIDE 5 MG/1
10 TABLET, FILM COATED ORAL NIGHTLY
Status: DISCONTINUED | OUTPATIENT
Start: 2023-05-22 | End: 2023-05-24 | Stop reason: HOSPADM

## 2023-05-22 RX ORDER — BISACODYL 10 MG
10 SUPPOSITORY, RECTAL RECTAL DAILY PRN
Status: DISCONTINUED | OUTPATIENT
Start: 2023-05-22 | End: 2023-05-24 | Stop reason: HOSPADM

## 2023-05-22 RX ORDER — DONEPEZIL HYDROCHLORIDE 5 MG/1
5 TABLET, FILM COATED ORAL NIGHTLY
Status: DISCONTINUED | OUTPATIENT
Start: 2023-05-22 | End: 2023-05-22

## 2023-05-22 RX ORDER — LANOLIN ALCOHOL/MO/W.PET/CERES
800 CREAM (GRAM) TOPICAL
Status: DISCONTINUED | OUTPATIENT
Start: 2023-05-22 | End: 2023-05-24 | Stop reason: HOSPADM

## 2023-05-22 RX ORDER — ONDANSETRON 2 MG/ML
4 INJECTION INTRAMUSCULAR; INTRAVENOUS EVERY 8 HOURS PRN
Status: DISCONTINUED | OUTPATIENT
Start: 2023-05-22 | End: 2023-05-24 | Stop reason: HOSPADM

## 2023-05-22 RX ORDER — AMOXICILLIN 250 MG
1 CAPSULE ORAL 2 TIMES DAILY
Status: DISCONTINUED | OUTPATIENT
Start: 2023-05-22 | End: 2023-05-24 | Stop reason: HOSPADM

## 2023-05-22 RX ORDER — SODIUM CHLORIDE 0.9 % (FLUSH) 0.9 %
10 SYRINGE (ML) INJECTION
Status: CANCELLED | OUTPATIENT
Start: 2023-05-22

## 2023-05-22 RX ORDER — SODIUM,POTASSIUM PHOSPHATES 280-250MG
2 POWDER IN PACKET (EA) ORAL
Status: DISCONTINUED | OUTPATIENT
Start: 2023-05-22 | End: 2023-05-24 | Stop reason: HOSPADM

## 2023-05-22 RX ORDER — ACETAMINOPHEN 500 MG
1000 TABLET ORAL EVERY 6 HOURS PRN
Status: DISCONTINUED | OUTPATIENT
Start: 2023-05-22 | End: 2023-05-24 | Stop reason: HOSPADM

## 2023-05-22 RX ORDER — SERTRALINE HYDROCHLORIDE 25 MG/1
25 TABLET, FILM COATED ORAL DAILY
Status: DISCONTINUED | OUTPATIENT
Start: 2023-05-22 | End: 2023-05-24 | Stop reason: HOSPADM

## 2023-05-22 RX ORDER — IPRATROPIUM BROMIDE AND ALBUTEROL SULFATE 2.5; .5 MG/3ML; MG/3ML
3 SOLUTION RESPIRATORY (INHALATION)
Status: DISCONTINUED | OUTPATIENT
Start: 2023-05-22 | End: 2023-05-24 | Stop reason: HOSPADM

## 2023-05-22 RX ORDER — PANTOPRAZOLE SODIUM 40 MG/1
40 TABLET, DELAYED RELEASE ORAL DAILY
Status: DISCONTINUED | OUTPATIENT
Start: 2023-05-22 | End: 2023-05-24 | Stop reason: HOSPADM

## 2023-05-22 RX ORDER — TRAMADOL HYDROCHLORIDE 50 MG/1
50 TABLET ORAL EVERY 8 HOURS PRN
Status: DISCONTINUED | OUTPATIENT
Start: 2023-05-22 | End: 2023-05-24 | Stop reason: HOSPADM

## 2023-05-22 RX ORDER — NALOXONE HCL 0.4 MG/ML
0.02 VIAL (ML) INJECTION
Status: DISCONTINUED | OUTPATIENT
Start: 2023-05-22 | End: 2023-05-24 | Stop reason: HOSPADM

## 2023-05-22 RX ORDER — SODIUM CHLORIDE 9 MG/ML
INJECTION, SOLUTION INTRAVENOUS CONTINUOUS
Status: DISCONTINUED | OUTPATIENT
Start: 2023-05-22 | End: 2023-05-24 | Stop reason: HOSPADM

## 2023-05-22 RX ORDER — ENOXAPARIN SODIUM 100 MG/ML
30 INJECTION SUBCUTANEOUS EVERY 24 HOURS
Status: DISCONTINUED | OUTPATIENT
Start: 2023-05-22 | End: 2023-05-23

## 2023-05-22 RX ORDER — IBUPROFEN 200 MG
16 TABLET ORAL
Status: DISCONTINUED | OUTPATIENT
Start: 2023-05-22 | End: 2023-05-24 | Stop reason: HOSPADM

## 2023-05-22 RX ORDER — TALC
9 POWDER (GRAM) TOPICAL NIGHTLY PRN
Status: DISCONTINUED | OUTPATIENT
Start: 2023-05-22 | End: 2023-05-24 | Stop reason: HOSPADM

## 2023-05-22 RX ORDER — SODIUM CHLORIDE 0.9 % (FLUSH) 0.9 %
5 SYRINGE (ML) INJECTION
Status: DISCONTINUED | OUTPATIENT
Start: 2023-05-22 | End: 2023-05-24 | Stop reason: HOSPADM

## 2023-05-22 RX ORDER — GLUCAGON 1 MG
1 KIT INJECTION
Status: DISCONTINUED | OUTPATIENT
Start: 2023-05-22 | End: 2023-05-24 | Stop reason: HOSPADM

## 2023-05-22 RX ORDER — QUETIAPINE FUMARATE 25 MG/1
50 TABLET, FILM COATED ORAL NIGHTLY
Status: DISCONTINUED | OUTPATIENT
Start: 2023-05-22 | End: 2023-05-24 | Stop reason: HOSPADM

## 2023-05-22 RX ORDER — ACETAMINOPHEN 325 MG/1
650 TABLET ORAL EVERY 8 HOURS PRN
Status: DISCONTINUED | OUTPATIENT
Start: 2023-05-22 | End: 2023-05-24 | Stop reason: HOSPADM

## 2023-05-22 RX ADMIN — AZITHROMYCIN MONOHYDRATE 500 MG: 500 INJECTION, POWDER, LYOPHILIZED, FOR SOLUTION INTRAVENOUS at 09:05

## 2023-05-22 RX ADMIN — SODIUM CHLORIDE: 9 INJECTION, SOLUTION INTRAVENOUS at 09:05

## 2023-05-22 RX ADMIN — SODIUM CHLORIDE 500 ML: 9 INJECTION, SOLUTION INTRAVENOUS at 09:05

## 2023-05-22 RX ADMIN — CEFTRIAXONE SODIUM 2 G: 2 INJECTION, POWDER, FOR SOLUTION INTRAMUSCULAR; INTRAVENOUS at 09:05

## 2023-05-22 RX ADMIN — QUETIAPINE 50 MG: 25 TABLET ORAL at 09:05

## 2023-05-22 RX ADMIN — PANTOPRAZOLE SODIUM 40 MG: 40 TABLET, DELAYED RELEASE ORAL at 11:05

## 2023-05-22 RX ADMIN — IPRATROPIUM BROMIDE AND ALBUTEROL SULFATE 3 ML: .5; 3 SOLUTION RESPIRATORY (INHALATION) at 01:05

## 2023-05-22 RX ADMIN — ENOXAPARIN SODIUM 30 MG: 30 INJECTION SUBCUTANEOUS at 04:05

## 2023-05-22 RX ADMIN — SERTRALINE HYDROCHLORIDE 25 MG: 25 TABLET ORAL at 11:05

## 2023-05-22 RX ADMIN — IPRATROPIUM BROMIDE AND ALBUTEROL SULFATE 3 ML: .5; 3 SOLUTION RESPIRATORY (INHALATION) at 07:05

## 2023-05-22 RX ADMIN — DONEPEZIL HYDROCHLORIDE 10 MG: 5 TABLET ORAL at 09:05

## 2023-05-22 RX ADMIN — TRAMADOL HYDROCHLORIDE 50 MG: 50 TABLET, COATED ORAL at 04:05

## 2023-05-22 RX ADMIN — DOCUSATE SODIUM AND SENNOSIDES 1 TABLET: 8.6; 5 TABLET, FILM COATED ORAL at 09:05

## 2023-05-22 RX ADMIN — SODIUM CHLORIDE: 9 INJECTION, SOLUTION INTRAVENOUS at 11:05

## 2023-05-22 RX ADMIN — DOCUSATE SODIUM AND SENNOSIDES 1 TABLET: 8.6; 5 TABLET, FILM COATED ORAL at 11:05

## 2023-05-22 NOTE — CONSULTS
Consulted for wounds present on admit from home. Patient is noted with a stage 2 to her right posterior heel and moisture associated skin irritation to buttocks and sacral areas. Silver dressing applied to right posterior heel wound site and bilateral foam dressings applied to both heels. Triad to be applied to the buttocks and sacral areas every shift. Wound care will follow throughout hospital stay.       Right posterior heel wound stage 2 measures 2 cm x 1 cm x 0.2cm      Scar tissue to upper sacral area and redness from moisture to lower sacrum and bilateral buttocks.

## 2023-05-22 NOTE — ASSESSMENT & PLAN NOTE
Supplemental oxygen   IV antibiotics   Sputum for culture   Bronchodilators   Monitor respiratory status closely

## 2023-05-22 NOTE — PROGRESS NOTES
Nurses Note -- 4 Eyes      5/22/2023   1:08 PM      Skin assessed during: Admit      [] No Altered Skin Integrity Present    []Prevention Measures Documented      [x] Yes- Altered Skin Integrity Present or Discovered   [x] LDA Added if Not in Epic (Describe Wound)   [x] New Altered Skin Integrity was Present on Admit and Documented in LDA   [x] Wound Image Taken    Wound Care Consulted? Yes    Attending Nurse:  Jenni Martin RN     Second RN/Staff Member:  GABBY Mack

## 2023-05-22 NOTE — ASSESSMENT & PLAN NOTE
Creatine stable for now. BMP reviewed- noted Estimated Creatinine Clearance: 23.6 mL/min (based on SCr of 1.1 mg/dL). according to latest data. Monitor UOP and serial BMP and adjust therapy as needed. Renally dose meds.

## 2023-05-22 NOTE — ED PROVIDER NOTES
Encounter Date: 5/22/2023    SCRIBE #1 NOTE: I, Carol Lange am scribing for, and in the presence of,  Riccardo Gupta MD.     History     Chief Complaint   Patient presents with    Fever    Weakness     Time seen by provider: 8:36 AM on 05/22/2023    Angelique Hamilton is a 89 y.o. female who presents to the ED via EMS with an onset of generalized weakness and fever Tmax 102.4 °F that began today. History obtained from independent historian: Daughter-in-law states the patient was slouched in her chair this morning stating she was not able to move because she felt weak. Relative notes the patient was base-line yesterday, walking with her walker, and eating normal. Relative states the patient was complaining of abdominal pain this morning and a cough a few days ago. Patient denies current abdominal pain. EMS personnel state the patient's O2 was 92% on RA on arrival to the residence which improved to 98% on 2L. Patient's blood pressure was 131/63 en route, per EMS. The patient denies N/V, diarrhea, new rash, or any other symptoms at this time. PMHx of Alzheimer's Disease, HTN, HLD, scoliosis, osteoporosis, and colitis. No pertinent PSHx.       The history is provided by the patient, a relative and the EMS personnel. The history is limited by the condition of the patient.   Review of patient's allergies indicates:   Allergen Reactions    Antihistamines - alkylamine     Torrie [amlodipine-olmesartan]     Flexeril [cyclobenzaprine]      Hallucinations    Hydrocodone     Hydrocodone-acetaminophen Other (See Comments)    Simvastatin     Statins-hmg-coa reductase inhibitors      Past Medical History:   Diagnosis Date    Back problem     Constipation     History of fractured vertebra     Hyperlipidemia     Hypertension     IBS (irritable bowel syndrome)     Migraine headache     Osteoporosis     Rectal prolapse     Scoliosis     Vertigo      Past Surgical History:   Procedure Laterality Date    LEWIS      Back Pain    EYE SURGERY       Bilateral Cataracs    HYSTERECTOMY      OOPHORECTOMY      ROTATOR CUFF REPAIR      bialteral    TONSILLECTOMY       Family History   Problem Relation Age of Onset    Colon cancer Father     Breast cancer Mother 70    Hypertension Mother     Ovarian cancer Neg Hx      Social History     Tobacco Use    Smoking status: Never    Smokeless tobacco: Never   Substance Use Topics    Alcohol use: No    Drug use: No     Review of Systems   Constitutional:  Positive for fever.   HENT:  Negative for sore throat.    Respiratory:  Positive for cough. Negative for shortness of breath.    Cardiovascular:  Negative for chest pain.   Gastrointestinal:  Positive for abdominal pain (resolved). Negative for diarrhea, nausea and vomiting.   Genitourinary:  Negative for dysuria.   Musculoskeletal:  Negative for back pain.   Skin:  Negative for rash.   Neurological:  Positive for weakness (generalized).   Hematological:  Does not bruise/bleed easily.     Physical Exam     Initial Vitals [05/22/23 0822]   BP Pulse Resp Temp SpO2   133/65 85 20 100.1 °F (37.8 °C) (!) 91 %      MAP       --         Physical Exam    Nursing note and vitals reviewed.  Constitutional: She appears well-developed.  Non-toxic appearance.   HENT:   Head: Normocephalic and atraumatic.   Eyes: EOM are normal. Pupils are equal, round, and reactive to light.   Neck: Neck supple.   Cardiovascular:  Normal rate, regular rhythm and intact distal pulses.     Exam reveals no gallop and no friction rub.       Murmur heard.  Systolic murmur is present.  Systolic murmur heard loudest at the left apex.    Pulmonary/Chest: No respiratory distress. She has no decreased breath sounds. She has no wheezes. She has no rhonchi. She has rales in the left lower field.   Abdominal: Abdomen is soft. Bowel sounds are normal. She exhibits no distension. There is abdominal tenderness.   Mid-pelvic tenderness noted on exam.    Musculoskeletal:         General: Normal range of motion.       Cervical back: Neck supple.      Right lower leg: No edema.      Left lower leg: No edema.      Comments: Scoliosis of the cervical and upper thoracic spine noted on exam.      Neurological: She is alert and oriented to person, place, and time.   Skin: Skin is warm and dry.   Psychiatric: She has a normal mood and affect.       ED Course   Procedures  Labs Reviewed   CBC W/ AUTO DIFFERENTIAL - Abnormal; Notable for the following components:       Result Value    RBC 3.72 (*)     Hematocrit 36.4 (*)     MCH 33.1 (*)     Platelets 122 (*)     Gran # (ANC) 9.0 (*)     Lymph # 0.5 (*)     Gran % 87.4 (*)     Lymph % 5.3 (*)     All other components within normal limits   COMPREHENSIVE METABOLIC PANEL - Abnormal; Notable for the following components:    BUN 36 (*)     Calcium 10.7 (*)     eGFR 48 (*)     All other components within normal limits   URINALYSIS, REFLEX TO URINE CULTURE - Abnormal; Notable for the following components:    Appearance, UA Hazy (*)     Occult Blood UA 1+ (*)     All other components within normal limits    Narrative:     Specimen Source->Urine   URINALYSIS MICROSCOPIC - Abnormal; Notable for the following components:    RBC, UA 12 (*)     Bacteria Many (*)     All other components within normal limits    Narrative:     Specimen Source->Urine   CULTURE, BLOOD   CULTURE, BLOOD   LACTIC ACID, PLASMA   LACTIC ACID, PLASMA     EKG Readings: (Independently Interpreted)   Initial Reading: No STEMI. Rhythm: Normal Sinus Rhythm. Heart Rate: 78. Axis: Normal.   No ST segment elevation or depression.    ECG Results              EKG 12-lead (In process)  Result time 05/22/23 08:44:06      In process by Interface, [facility] (05/22/23 08:44:06)                   Narrative:    Test Reason : A41.9,    Vent. Rate : 078 BPM     Atrial Rate : 078 BPM     P-R Int : 158 ms          QRS Dur : 068 ms      QT Int : 394 ms       P-R-T Axes : 047 028 061 degrees     QTc Int : 449 ms    Normal sinus rhythm  Possible  Left atrial enlargement  Borderline Abnormal ECG  When compared with ECG of 02-APR-2023 20:47,  No significant change was found    Referred By: [email or web address]   SELF           Confirmed By:                                   Imaging Results              CT Abdomen Pelvis  Without Contrast (Final result)  Result time 05/22/23 11:20:09      Final result by Nichelle Porter MD (05/22/23 11:20:09)                   Impression:      Large amount of stool within the colon more so today than on the prior exam.    Left basilar consolidation suggesting pneumonia    Right renal stones and a stone in the right renal pelvis with mild right hydronephrosis.  The stone in the renal pelvis is new compared to the prior exam but corresponds as was seen on an earlier study of 02/07/2023 and the hydronephrosis does not appear significantly changed compared to the couple past exams.    Additional findings as detailed above.      Electronically signed by: Nichelle Porter MD  Date:    05/22/2023  Time:    11:20               Narrative:    EXAMINATION:  CT ABDOMEN PELVIS WITHOUT CONTRAST    CLINICAL HISTORY:  LLQ abdominal pain;Fever, likely pneumonia but demented patient with lower abdominal pain and also has a history of colitis;    TECHNIQUE:  Low dose axial images, sagittal and coronal reformations were obtained from the lung bases to the pubic symphysis.  No p.o. contrast    COMPARISON:  04/02/2023    FINDINGS:  Left basilar consolidation and atelectasis new compared to the prior study    Cardiomegaly as before    Liver and spleen are unremarkable in appearance.  The abdominal aorta tapers without aneurysmal dilatation.    Pancreas and adrenal glands as visualized unremarkable appearance.  No calcified stones in the gallbladder or CT findings of acute cholecystitis.  Small calcification along the anterior margin the gallbladder suggesting small granuloma in the liver not significantly changed no biliary duct dilatation    Large amount of  stool throughout the colon.  No appreciable bowel wall thickening or inflammatory change.    Punctate left renal stone without hydronephrosis, ureteral dilatation, opaque ureteral stone or ureteral obstruction evident on the left.  Free intraperitoneal air or fluid.    Appendix is not identified but no abnormal appendix or inflammatory changes appendiceal region is seen.    2 cm low-density left renal mass suggesting cyst less apparent on today's study with more image degradation from artifact but as visualized not significantly changed.  Punctate left renal stones suggested.  No left hydronephrosis, ureteral dilatation, opaque ureteral stone or ureteral obstruction evident    A few right renal cysts largest measuring 2.6 cm.  A few right renal stones largest measuring 3 mm.  Dilated right renal pelvis and mild caliceal dilatation.  4 mm stone in the right renal pelvis.  No ureteral dilatation or opaque ureteral stone.  Urinary bladder moderately distended at time of the exam and as on the prior exam calcifications along the posterior margin the bladder suggest bladder stones    Prior hysterectomy.  Adnexal region unremarkable in appearance    Kyphoplasty of compressed lumbar and visualized lower thoracic vertebrae.                                       X-Ray Chest AP Portable (Final result)  Result time 05/22/23 08:53:36      Final result by Nichelle Porter MD (05/22/23 08:53:36)                   Impression:      Infiltrate left mid and lower lung field consistent with pneumonia and new compared to the prior exam      Electronically signed by: Nichelle Porter MD  Date:    05/22/2023  Time:    08:53               Narrative:    EXAMINATION:  XR CHEST AP PORTABLE    CLINICAL HISTORY:  Sepsis;    TECHNIQUE:  Single frontal view of the chest was performed.    COMPARISON:  10/14/2022    FINDINGS:  Stable cardiomediastinal silhouette.  New infiltrate in the left perihilar and basilar region.  Right lung remains clear  of infiltrate.  No pleural effusion.    Kyphoplasty multiple vertebral bodies near the thoracolumbar junction and lumbar spine.                                       Medications   cefTRIAXone (ROCEPHIN) 2 g in dextrose 5 % in water (D5W) 5 % 50 mL IVPB (MB+) (0 g Intravenous Stopped 5/22/23 0948)   sodium chloride 0.9% bolus 500 mL 500 mL (0 mLs Intravenous Stopped 5/22/23 0948)   azithromycin (ZITHROMAX) 500 mg in dextrose 5 % (D5W) 250 mL IVPB (Vial-Mate) (0 mg Intravenous Stopped 5/22/23 1023)     Medical Decision Making:   History:   Old Medical Records: I decided to obtain old medical records.  Independently Interpreted Test(s):   I have ordered and independently interpreted EKG Reading(s) - see prior notes  Clinical Tests:   Lab Tests: Ordered and Reviewed  Radiological Study: Ordered and Reviewed  Medical Tests: Ordered and Reviewed        Scribe Attestation:   Scribe #1: I performed the above scribed service and the documentation accurately describes the services I performed. I attest to the accuracy of the note.      ED Course as of 05/22/23 1129   Mon May 22, 2023   0850 Patient has Alzheimer's and is a difficult historian.  Temperature is 100.1°.  I suspect she has an infectious process such as urinary tract infection or pneumonia however she also has some abdominal tenderness on exam..  She is not tachycardic or significantly tachypneic, but she could have leukocytosis and reported temperature earlier was 102.4.  I will treat her with broad-spectrum antibiotics with anticipation that she may be septic.  Will also order CT scan given abdominal tenderness and unreliable historian [JS]   0854 Patient has many bacteria but only 4 white blood cells.  I doubt this is truly indicative of urinary tract infection with negative nitrites and negative leukocytes.      My independent interpretation of the x-ray shows left lower lobe pneumonia. [JS]   1045 EKG independently interpreted by me as rate 78 normal sinus  rate rhythm axis left atrial enlargement no ST segment elevation or depression. [JS]   1128 Patient will be admitted to Internal Medicine for treatment of community-acquired pneumonia in a patient with who is 9 years old with comorbidities Alzheimer's generalized fatigue and weakness. [JS]      ED Course User Index  [JS] Riccardo Gupta MD               I, Dr. Riccardo Gupta personally performed the services described in this documentation. All medical record entries made by the scribe were at my direction and in my presence.  I have reviewed the chart and agree that the record reflects my personal performance and is accurate and complete. Riccardo Gupta MD.  11:29 AM 05/22/2023    DISCLAIMER: This note was prepared with Dragon NaturallySpeaking voice recognition transcription software. Garbled syntax, mangled pronouns, and other bizarre constructions may be attributed to that software system   Clinical Impression:   Final diagnoses:  [J18.9] Community acquired pneumonia of left lower lobe of lung (Primary)        ED Disposition Condition    Observation Stable                Riccardo Gupta MD  05/22/23 1127

## 2023-05-22 NOTE — SUBJECTIVE & OBJECTIVE
[address] [facility]  Department of Internal Medicine  Division of Pulmonary, Critical Care and Sleep Medicine  Office Note      Dear Zahira Liriano MD    We had the pleasure of seeing your patient, Ignacio Shepherd, in the [address] [facility] regarding her chronic asthmatic bronchitis. HISTORY OF PRESENT ILLNESS:    Ignacio Shepherd is a pleasant 79 y.o. female with a past medical history of chronic asthmatic bronchitis/COPD, HTN, headaches, chronic neck pain, depression and anxiety. Ms. Yuko Barton is being seen in the office today for routine follow up. She has been doing well and her breathing is the same. She is on Advair, Incruze, and Albuterol for chronic asthmatic bronchitis (ACOS syndrome). She received her Xolair infusion biweekly. She is compliant with her inhalers. She is using her rescue inhaler rarely. She denies wheezing, fever or chills. She denies CP, palpitations, lower extremity edema and syncope. She was in [place] to visit family and was there during the recent [address] She has a daughter, son and her parents both continue to live in [place] she try to get him to move the [place] but they do not like [place] because it is boring. Overall the other issue is mainly surrounding her Xolair infusions which continue to get delayed.   Although she has had no detriments from an asthma standpoint because of it we will continue to monitor closely      ALLERGIES:  No Known Allergies    PAST MEDICAL HISTORY:       Diagnosis Date    Anxiety     Asthma     Alph1 normal; IgE 400    Asthmatic bronchitis , chronic (HCC)     decreased DLCO    Bronchitis 02/2019    COPD (chronic obstructive pulmonary disease) ([address] 7/10/2013    Depression     Headache(784.0)     Hypertension     Neck pain     Neck pain, chronic     Parasomnia         MEDICATIONS:   Current Outpatient Past Medical History:   Diagnosis Date    Back problem     Constipation     History of fractured vertebra     Hyperlipidemia     Hypertension     IBS (irritable bowel syndrome)     Migraine headache     Osteoporosis     Rectal prolapse     Scoliosis     Vertigo        Past Surgical History:   Procedure Laterality Date    ELWIS      Back Pain    EYE SURGERY      Bilateral Cataracs    HYSTERECTOMY      OOPHORECTOMY      ROTATOR CUFF REPAIR      bialteral    TONSILLECTOMY         Review of patient's allergies indicates:   Allergen Reactions    Antihistamines - alkylamine     Torrie [amlodipine-olmesartan]     Flexeril [cyclobenzaprine]      Hallucinations    Hydrocodone     Hydrocodone-acetaminophen Other (See Comments)    Simvastatin     Statins-hmg-coa reductase inhibitors        No current facility-administered medications on file prior to encounter.     Current Outpatient Medications on File Prior to Encounter   Medication Sig    biotin 1 mg tablet Take 1,000 mcg by mouth 3 (three) times daily.    calcium-vitamin D3 (OS-AYSHA 500 + D3) 500 mg-5 mcg (200 unit) per tablet Take 1 tablet by mouth 2 (two) times daily with meals.    cholecalciferol, vitamin D3, (VITAMIN D3) 2,000 unit Tab Take 2,000 Units by mouth once daily.     donepeziL (ARICEPT ODT) 5 MG TbDL Take 5 mg by mouth nightly.    famotidine (PEPCID) 20 MG tablet Take 1 tablet (20 mg total) by mouth 2 (two) times daily.    ferrous sulfate (FEOSOL) 325 mg (65 mg iron) Tab tablet Take 325 mg by mouth daily with breakfast.    hydroCHLOROthiazide (HYDRODIURIL) 25 MG tablet Take 1 tablet (25 mg total) by mouth every other day.    lisinopriL 10 MG tablet Take 10 mg by mouth once daily.    meclizine (ANTIVERT) 12.5 mg tablet Take 0.5 tablets (6.25 mg total) by mouth 2 (two) times daily as needed for Dizziness (1/2 tablet twice daily as needed for dizziness).    mupirocin (BACTROBAN) 2 % ointment Apply topically 2 (two) times daily.    pantoprazole (PROTONIX) 40 MG tablet  Medications   Medication Sig Dispense Refill    Vitamin D (CHOLECALCIFEROL) 25 MCG (1000 UT) TABS tablet TAKE 2 TABLET BY MOUTH ONCE DAILY 60 tablet 0    diclofenac sodium 1 % GEL Apply 2 g topically 4 times daily 2 Tube 1    fluticasone-salmeterol (ADVAIR HFA) 115-21 MCG/ACT inhaler USE TWICE DAILY 12 g 3    Umeclidinium Bromide (INCRUSE ELLIPTA) 62.5 MCG/INH AEPB INHALE 1 PUFF INTO THE LUNGS DAILY 30 each 3    verapamil (CALAN SR) 240 MG extended release tablet TAKE 1 TABLET BY MOUTH EVERY EVENING 90 tablet 0    losartan (COZAAR) 100 MG tablet Take 1 tablet by mouth daily 90 tablet 0    metFORMIN (GLUCOPHAGE) 500 MG tablet TAKE 1 TABLET BY MOUTH DAILY 90 tablet 0    montelukast (SINGULAIR) 10 MG tablet TAKE 1 TABLET BY MOUTH EVERY DAY 90 tablet 0    chlorthalidone (HYGROTON) 25 MG tablet Take 1 tablet by mouth daily 90 tablet 1    aspirin (ASPIRIN LOW DOSE) 81 MG EC tablet TAKE 1 TABLET BY MOUTH EVERY DAY 90 tablet 1    albuterol sulfate  (90 Base) MCG/ACT inhaler Inhale 2 puffs into the lungs every 6 hours as needed for Wheezing 54 g 3    OMALIZUMAB 150 MG injection INJECT 300 MG SUBCUTANEOUSLY EVERY 2 WEEKS. 4 vial 9    fluticasone (FLONASE) 50 MCG/ACT nasal spray SHAKE LIQUID AND USE 1 SPRAY IN EACH NOSTRIL TWICE DAILY 3 Bottle 3    FREESTYLE LANCETS MISC USE AS DIRECTED ONCE DAILY 100 each 11    EPINEPHrine (EPIPEN) 0.3 MG/0.3ML SOAJ injection ADMINISTER 0.3 ML IN THE MUSCLE 1 TIME FOR 1 DOSE AS NEEDED AS DIRECTED FOR ALLERGIC REACTION 2 each 1    glucose blood VI test strips (FREESTYLE LITE) strip 1 each by Other route daily As needed. 100 strip 5     No current facility-administered medications for this visit. SOCIAL AND OCCUPATIONAL HEALTH:  The patient quit smoking 5 years ago. She smoked one pack per week for since the age of 12. There is no history of TB or TB exposure. There is no asbestos or silica dust exposure.   The patient reports no coal, foundry, [place] or [employer] Take 1 tablet (40 mg total) by mouth once daily.    QUEtiapine (SEROQUEL) 50 MG tablet Take 1 tablet (50 mg total) by mouth every evening. (Patient taking differently: Take 50 mg by mouth every evening. Take 2 tablets nightly)    sertraline (ZOLOFT) 25 MG tablet Take 25 mg by mouth once daily.    tolterodine (DETROL LA) 2 MG Cp24 Take 1 capsule (2 mg total) by mouth once daily. For bladder    tramadol-acetaminophen 37.5-325 mg (ULTRACET) 37.5-325 mg Tab Take 1 tablet by mouth 2 (two) times a day.    verapamiL (CALAN-SR) 120 MG CR tablet Take 1 tablet (120 mg total) by mouth once daily.     Family History       Problem Relation (Age of Onset)    Breast cancer Mother (70)    Colon cancer Father    Hypertension Mother          Tobacco Use    Smoking status: Never    Smokeless tobacco: Never   Substance and Sexual Activity    Alcohol use: No    Drug use: No    Sexual activity: Not Currently     Partners: Male     Birth control/protection: None     Review of Systems   Constitutional:  Positive for activity change, fatigue and fever. Negative for chills and diaphoresis.   HENT:  Negative for congestion and trouble swallowing.    Eyes:  Negative for visual disturbance.   Respiratory:  Positive for cough. Negative for shortness of breath and wheezing.    Cardiovascular:  Negative for chest pain, palpitations and leg swelling.   Gastrointestinal:  Positive for constipation. Negative for abdominal pain, nausea and vomiting.   Endocrine: Negative for polydipsia, polyphagia and polyuria.   Genitourinary:  Negative for difficulty urinating, dysuria and frequency.   Musculoskeletal:  Negative for arthralgias and joint swelling.   Skin:  Negative for color change and pallor.   Neurological:  Positive for weakness (generalized). Negative for dizziness, speech difficulty and headaches.   Hematological:  Does not bruise/bleed easily.   Psychiatric/Behavioral:  Positive for confusion (at baseline).    Objective:     Vital Signs (Most  Recent):  Temp: 98.8 °F (37.1 °C) (05/22/23 1052)  Pulse: 70 (05/22/23 1232)  Resp: 16 (05/22/23 1032)  BP: (!) 97/51 (05/22/23 1232)  SpO2: 98 % (05/22/23 1232) Vital Signs (24h Range):  Temp:  [98.8 °F (37.1 °C)-100.1 °F (37.8 °C)] 98.8 °F (37.1 °C)  Pulse:  [70-85] 70  Resp:  [16-20] 16  SpO2:  [91 %-98 %] 98 %  BP: ()/(51-65) 97/51     Weight: 45.4 kg (100 lb)  Body mass index is 22.42 kg/m².     Physical Exam  Constitutional:       General: She is not in acute distress.     Appearance: She is ill-appearing. She is not toxic-appearing or diaphoretic.   HENT:      Head: Normocephalic and atraumatic.      Mouth/Throat:      Mouth: Mucous membranes are moist.      Pharynx: Oropharynx is clear.   Eyes:      Extraocular Movements: Extraocular movements intact.      Conjunctiva/sclera: Conjunctivae normal.      Pupils: Pupils are equal, round, and reactive to light.   Cardiovascular:      Rate and Rhythm: Normal rate and regular rhythm.      Heart sounds: Murmur heard.   Pulmonary:      Effort: Pulmonary effort is normal. No respiratory distress.      Breath sounds: Normal breath sounds. No wheezing, rhonchi or rales.   Abdominal:      General: Abdomen is flat. Bowel sounds are normal. There is no distension.      Palpations: Abdomen is soft.      Tenderness: There is no abdominal tenderness. There is no guarding.   Musculoskeletal:         General: Normal range of motion.      Cervical back: Normal range of motion and neck supple.      Right lower leg: No edema.      Left lower leg: No edema.   Skin:     General: Skin is warm and dry.      Capillary Refill: Capillary refill takes less than 2 seconds.      Coloration: Skin is not jaundiced or pale.   Neurological:      General: No focal deficit present.      Mental Status: She is alert. Mental status is at baseline. She is disoriented.      Coordination: Coordination normal.      Comments: Oriented to self, birthday, place.  Unsure of date, doesn't know  mill exposure. There is no recent travel history noted. The patient denies a history of recreational or IV drug use. No hot tub exposure. The patient has no pets. Hobbies include TV and books. EtoH: The patient denies excessive alcohol intake. The patient reports no birds or exotic animals. Seperated. She has three children all girls non with asthma. She work in a school cafeteria and cleaning. Moved to the [address] [place] [address] one year ago from [address] [place] [address] Road:   Social History     Tobacco Use    Smoking status: Former Smoker     Packs/day: 0.25     Years: 6.00     Pack years: 1.50     Types: Cigarettes     Last attempt to quit: 5/10/2005     Years since quittin.7    Smokeless tobacco: Never Used   Substance Use Topics    Alcohol use: Yes     Alcohol/week: 0.0 standard drinks     Comment: occassional wine    Drug use: No       SURGICAL HISTORY:   Past Surgical History:   Procedure Laterality Date    COLONOSCOPY  9/21/15    FRACTURE SURGERY      left 4th digit       FAMILY HISTORY:   Family History   Problem Relation Age of Onset    Diabetes Mother     High Blood Pressure Mother     Arthritis Father     Diabetes Father     Heart Disease Father     High Blood Pressure Father     High Cholesterol Father     Stroke Father     Heart Surgery Father         cabg    Coronary Art Dis Father     High Blood Pressure Sister     High Blood Pressure Brother     Heart Disease Brother     High Cholesterol Brother     Coronary Art Dis Paternal Aunt     Heart Surgery Paternal Aunt     Heart Disease Paternal Aunt     High Blood Pressure Paternal Aunt       Family Status   Relation Name Status    Mother  Alive    Father   at age [de-identified]    Sister  Alive    Brother  Alive    [id number]: The patients health assessment form was reviewed. Constitutional: General health fair . The patient denies weight changes.     Head: Patient denies any history of trauma, convulsive president.    Psychiatric:         Mood and Affect: Mood normal.         Behavior: Behavior normal.            CRANIAL NERVES     CN III, IV, VI   Pupils are equal, round, and reactive to light.     Significant Labs: All pertinent labs within the past 24 hours have been reviewed.  CBC:   Recent Labs   Lab 05/22/23  0856   WBC 10.23   HGB 12.3   HCT 36.4*   *     CMP:   Recent Labs   Lab 05/22/23  0856      K 4.1      CO2 28      BUN 36*   CREATININE 1.1   CALCIUM 10.7*   PROT 6.9   ALBUMIN 3.6   BILITOT 1.0   ALKPHOS 85   AST 22   ALT 12   ANIONGAP 8     Lactic Acid:   Recent Labs   Lab 05/22/23  0856   LACTATE 0.9     Urine Studies:   Recent Labs   Lab 05/22/23  0835   COLORU Yellow   APPEARANCEUA Hazy*   PHUR 7.0   SPECGRAV 1.010   PROTEINUA Negative   GLUCUA Negative   KETONESU Negative   BILIRUBINUA Negative   OCCULTUA 1+*   NITRITE Negative   UROBILINOGEN Negative   LEUKOCYTESUR Negative   RBCUA 12*   WBCUA 4   BACTERIA Many*   SQUAMEPITHEL 4       Significant Imaging: I have reviewed all pertinent imaging results/findings within the past 24 hours.    CT ABDOMEN PELVIS WITHOUT CONTRAST     CLINICAL HISTORY:   LLQ abdominal pain;Fever, likely pneumonia but demented patient with lower abdominal pain and also has a history of colitis;     TECHNIQUE:   Low dose axial images, sagittal and coronal reformations were obtained from the lung bases to the pubic symphysis.  No p.o. contrast     COMPARISON:   04/02/2023     FINDINGS:   Left basilar consolidation and atelectasis new compared to the prior study     Cardiomegaly as before     Liver and spleen are unremarkable in appearance.  The abdominal aorta tapers without aneurysmal dilatation.     Pancreas and adrenal glands as visualized unremarkable appearance.  No calcified stones in the gallbladder or CT findings of acute cholecystitis.  Small calcification along the anterior margin the gallbladder suggesting small granuloma in the liver not  reports a history of arthritis & joint pains greatest in the hands. She denies loss of strength. Vitamin D Def (normalized DEXA Scan) 2016  Breasts: She denies history of masses, lumps, pain, or nipple changes. The patient denies a history of breat cancer. Mamogram done in 2011 (-)   Neurological: Patient denies vertigo. She denies twitching, convulsions, loss of consciousness. No memory problems. Psychological: Patient reports depression or anxiety. She denies obsessions, delusions, illusions. (+) hallucinations sometimes. (+) fear of heights. (+) terrible nightmares. Cancer: No history of lung, breast, colon or skin cancer. Endocrine: No history of goiter. No history of thyroid disorders. The patient denies a history of diabetes. menopausal   Hematopoietic:  She denies history of bleeding disorders or easy bruising. Integumentory: No skin lesion, rash, venous stasis or varicose veins. The patient denies melanoma or basal cell skin cancer. Rheumatic:  The patient denies polyarthritis or inflammatory joint disease. PHYSICAL EXAMINATION:   Vitals:    01/20/20 1451   BP: (!) 142/82   Pulse: 74   Resp: 20   Temp: 98 °F (36.7 °C)   SpO2: 96%   Weight: 178 lb 8 oz (81 kg)   Height: 5' 6\" (1.676 m)     Constitutional: A 79 y.o. female who is alert, oriented, cooperative and in no apparent distress. Head was normocephalic and atraumatic. EENT: EOMI RAVINDER. MMM. No icterus. No conjunctival injections. External canals are patent. No discharge was appreciated. Septum was midline, mucosa was without erythema, exudates or cobblestoning. No thrush. Neck: Supple without thyromegaly. No elevated JVP. Trachea was midline. No carotid bruits. Respiratory: Symmetrical without dullness to percussion. Clear to auscultation. No wheezes, rhonchi or rales. No egophony. Cardiovascular: Regular without murmur. No clicks, gallops or rubs. No heave. Pulses:  Equal bilaterally.     Abdomen: Rounded and soft significantly changed no biliary duct dilatation     Large amount of stool throughout the colon.  No appreciable bowel wall thickening or inflammatory change.     Punctate left renal stone without hydronephrosis, ureteral dilatation, opaque ureteral stone or ureteral obstruction evident on the left.  Free intraperitoneal air or fluid.     Appendix is not identified but no abnormal appendix or inflammatory changes appendiceal region is seen.     2 cm low-density left renal mass suggesting cyst less apparent on today's study with more image degradation from artifact but as visualized not significantly changed.  Punctate left renal stones suggested.  No left hydronephrosis, ureteral dilatation, opaque ureteral stone or ureteral obstruction evident     A few right renal cysts largest measuring 2.6 cm.  A few right renal stones largest measuring 3 mm.  Dilated right renal pelvis and mild caliceal dilatation.  4 mm stone in the right renal pelvis.  No ureteral dilatation or opaque ureteral stone.  Urinary bladder moderately distended at time of the exam and as on the prior exam calcifications along the posterior margin the bladder suggest bladder stones     Prior hysterectomy.  Adnexal region unremarkable in appearance     Kyphoplasty of compressed lumbar and visualized lower thoracic vertebrae.   Impression:    Large amount of stool within the colon more so today than on the prior exam.     Left basilar consolidation suggesting pneumonia     Right renal stones and a stone in the right renal pelvis with mild right hydronephrosis.  The stone in the renal pelvis is new compared to the prior exam but corresponds as was seen on an earlier study of 02/07/2023 and the hydronephrosis does not appear significantly changed compared to the couple past exams.     Additional findings as detailed above.       Electronically signed by: Nichelle Porter MD   Date: 05/22/2023       Time: 11:20 X-Ray Chest AP Portable [[id number]]Resulted:  05/22/23 0853 Order Status: CompletedUpdated: 05/22/23 0856 Narrative:  EXAMINATION:   XR CHEST AP PORTABLE     CLINICAL HISTORY:   Sepsis;     TECHNIQUE:   Single frontal view of the chest was performed.     COMPARISON:   10/14/2022     FINDINGS:   Stable cardiomediastinal silhouette.  New infiltrate in the left perihilar and basilar region.  Right lung remains clear of infiltrate.  No pleural effusion.     Kyphoplasty multiple vertebral bodies near the thoracolumbar junction and lumbar spine.   Impression:    Infiltrate left mid and lower lung field consistent with pneumonia and new compared to the prior exam       Electronically signed by: Nichelle Porter MD   Date: 05/22/2023   Time: 08:53    [facility]  [address] [place] [address]

## 2023-05-22 NOTE — PLAN OF CARE
[facility]  Initial Discharge Assessment       Primary Care Provider: Ryan Hurtado MD    Admission Diagnosis: Chest pain [R07.9]  Community acquired pneumonia of left lower lobe of lung [J18.9]    Admission Date: 5/22/2023    Assessment completed with pt and Daughter Sanford Adkins [phone] at bedside. She confirmed that pt lives with her and uses a home walker. Pt is active with HH services but she is unsure of the company name. Pt uses Dr. Hurtado as PCP and [facility] pharmacy. Pt has [employer] and [employer] insurance. Pts current discharge plan is home and daughter will provide transport. PT will need HH resumed. CM following for additional needs.   Expected Discharge Date:     Transition of Care Barriers: None    Payor: MEDICARE / Plan: MEDICARE PART A & B / Product Type: Government /     Extended Emergency Contact Information  Primary Emergency Contact: SANFORD ADKINS  Address: [address]            [place] [address] [place]  Mobile Phone: [phone]  Relation: Daughter  Preferred language: English   needed? No    Discharge Plan A: Home with family, Home Health  Discharge Plan B: Home      [id number] - [place], [place] - [address]  [address]  [place] [address]  Phone: [phone] Fax: [phone]      Initial Assessment (most recent)       Adult Discharge Assessment - 05/22/23 1508          Discharge Assessment    Assessment Type Discharge Planning Assessment     Confirmed/corrected address, phone number and insurance Yes     Confirmed Demographics Correct on Facesheet     Source of Information patient;family     Reason For Admission Chest pain     People in Home child(guy), adult     Facility Arrived From: Home     Do you expect to return to your current living situation? Yes     Do you have help at home or someone to help you manage your care at home? Yes     Who are your caregiver(s) and their phone number(s)? Daughter Sanford Adkins  [phone]     Prior to hospitilization cognitive status: Alert/Oriented     Current cognitive status: Alert/Oriented     Walking or Climbing Stairs ambulation difficulty, requires equipment     Mobility Management walker     Home Accessibility wheelchair accessible     Home Layout Able to live on 1st floor     Equipment Currently Used at Home walker, rolling     Readmission within 30 days? No     Patient currently being followed by outpatient case management? No     Do you currently have service(s) that help you manage your care at home? Yes     Name and Contact number of agency Home health     Is the pt/caregiver preference to resume services with current agency Yes     Do you take prescription medications? Yes     Do you have prescription coverage? Yes     Do you have any problems affording any of your prescribed medications? No     Is the patient taking medications as prescribed? yes     Who is going to help you get home at discharge? Daughter Karen [place] [phone]     How do you get to doctors appointments? family or friend will provide     Are you on dialysis? No     Do you take coumadin? No     Discharge Plan A Home with family;Home Health     Discharge Plan B Home     DME Needed Upon Discharge  none     Discharge Plan discussed with: Patient;Adult children     Transition of Care Barriers None        Physical Activity    On average, how many days per week do you engage in moderate to strenuous exercise (like a brisk walk)? Patient refused     On average, how many minutes do you engage in exercise at this level? Patient refused        Financial Resource Strain    How hard is it for you to pay for the very basics like food, housing, medical care, and heating? Not hard at all        Housing Stability    In the last 12 months, was there a time when you were not able to pay the mortgage or rent on time? No     In the last 12 months, was there a time when you did not have a steady place to sleep or slept in  a shelter (including now)? No        Transportation Needs    In the past 12 months, has lack of transportation kept you from medical appointments or from getting medications? No     In the past 12 months, has lack of transportation kept you from meetings, work, or from getting things needed for daily living? No        Food Insecurity    Within the past 12 months, you worried that your food would run out before you got the money to buy more. Never true     Within the past 12 months, the food you bought just didn't last and you didn't have money to get more. Never true        Stress    Do you feel stress - tense, restless, nervous, or anxious, or unable to sleep at night because your mind is troubled all the time - these days? Patient refused        Social Connections    In a typical week, how many times do you talk on the phone with family, friends, or neighbors? Patient refused     How often do you get together with friends or relatives? Patient refused     How often do you attend Scientology or [employer] services? Patient refused     Do you belong to any clubs or organizations such as Scientology groups, unions, fraternal or athletic groups, or school groups? Patient refused     How often do you attend meetings of the clubs or organizations you belong to? Patient refused     Are you , , , , never , or living with a partner? Patient refused        Alcohol Use    Q1: How often do you have a drink containing alcohol? Patient refused     Q2: How many drinks containing alcohol do you have on a typical day when you are drinking? Patient refused     Q3: How often do you have six or more drinks on one occasion? Patient refused        OTHER    Name(s) of People in Home Leta Hamilton [phone]

## 2023-05-22 NOTE — ASSESSMENT & PLAN NOTE
Chronic, controlled.  Latest blood pressure and vitals reviewed-   Temp:  [98.8 °F (37.1 °C)-100.1 °F (37.8 °C)]   Pulse:  [70-85]   Resp:  [16-20]   BP: ()/(51-65)   SpO2:  [91 %-98 %] .   Home meds for hypertension were reviewed and noted below.   Hypertension Medications             hydroCHLOROthiazide (HYDRODIURIL) 25 MG tablet Take 1 tablet (25 mg total) by mouth every other day.    lisinopriL 10 MG tablet Take 10 mg by mouth once daily.    verapamiL (CALAN-SR) 120 MG CR tablet Take 1 tablet (120 mg total) by mouth once daily.          While in the hospital, will manage blood pressure as follows; Adjust home antihypertensive regimen as follows- Holding antihypertensives for now secondary to infection and low blood pressures.  Resume once clinically appropriate.    Will utilize p.r.n. blood pressure medication only if patient's blood pressure greater than  180/110 and she develops symptoms such as worsening chest pain or shortness of breath.

## 2023-05-22 NOTE — PROGRESS NOTES
Pharmacist Renal Dose Adjustment Note    Angelique Hamilton is a 89 y.o. female being treated with the medication Lovenox    Patient Data:    Vital Signs (Most Recent):  Temp: 98.8 °F (37.1 °C) (05/22/23 1052)  Pulse: 72 (05/22/23 1103)  Resp: 16 (05/22/23 1032)  BP: (!) 103/57 (05/22/23 1102)  SpO2: 96 % (05/22/23 1103) Vital Signs (72h Range):  Temp:  [98.8 °F (37.1 °C)-100.1 °F (37.8 °C)]   Pulse:  [70-85]   Resp:  [16-20]   BP: ()/(55-65)   SpO2:  [91 %-97 %]      Recent Labs   Lab 05/22/23  0856   CREATININE 1.1     Serum creatinine: 1.1 mg/dL 05/22/23 0856  Estimated creatinine clearance: 23.6 mL/min    Medication:Lovenox dose: 40 mg frequency daily will be changed to medication:Lovenox dose: 30 mg frequency:daily per pharmacy protocol    Pharmacist's Name: Lydai Dubois  Pharmacist's Extension: [phone]

## 2023-05-22 NOTE — ASSESSMENT & PLAN NOTE
Dementia is controlled currently. Continue home dementia meds and non-pharmacologic interventions to prevent delirium (Activity during day, opening blinds, providing glasses/hearing aids, and up in chair during daytime). Use PRN anti-psychotics to prevent behavior of self harm during sundowning, and avoid narcotics and benzos unless absolutely necessary. PRN anti-psychotics prescribed to avoid self harm behaviors.

## 2023-05-22 NOTE — H&P
[facility] Medicine  History & Physical    Patient Name: Angelique Hamilton  MRN: [id number]  Patient Class: IP- Inpatient  Admission Date: 5/22/2023  Attending Physician: Megan Love MD   Primary Care Provider: Ryan Hurtado MD         Patient information was obtained from patient, relative(s), past medical records and ER records.     Subjective:     Principal Problem:Pneumonia    Chief Complaint:   Chief Complaint   Patient presents with    Fever    Weakness        HPI: Angelique Hamilton is a 89-year-old female with PMHx significant for HTN, dementia, HLD and irritable bowel syndrome.  She presented to the ED with an onset of cough and fever associated with fatigue and generalized weakness.  Patient febrile to 101° F at home.  Patient denies chest pain, shortness at breath, abdominal pain, nausea, vomiting, dysuria, or any other symptoms at this time.  Review of systems assisted by patient's daughter-in-law at bedside.  Chest x-ray and CT abdomen pelvis performed in ED revealed left-sided pneumonia.  Her oxygen saturations were noted to be in the low 90s on room air and improved to 96% on 2 L nasal cannula.  She was treated with IV antibiotics and IV hydration in ED. Patient will be admitted to the services of Hospital Medicine for further evaluation and treatment.      Past Medical History:   Diagnosis Date    Back problem     Constipation     History of fractured vertebra     Hyperlipidemia     Hypertension     IBS (irritable bowel syndrome)     Migraine headache     Osteoporosis     Rectal prolapse     Scoliosis     Vertigo        Past Surgical History:   Procedure Laterality Date    LEWIS      Back Pain    EYE SURGERY      Bilateral Cataracs    HYSTERECTOMY      OOPHORECTOMY      ROTATOR CUFF REPAIR      bialteral    TONSILLECTOMY         Review of patient's allergies indicates:   Allergen Reactions    Antihistamines - alkylamine     Torrie [amlodipine-olmesartan]      Flexeril [cyclobenzaprine]      Hallucinations    Hydrocodone     Hydrocodone-acetaminophen Other (See Comments)    Simvastatin     Statins-hmg-coa reductase inhibitors        No current facility-administered medications on file prior to encounter.     Current Outpatient Medications on File Prior to Encounter   Medication Sig    biotin 1 mg tablet Take 1,000 mcg by mouth 3 (three) times daily.    calcium-vitamin D3 (OS-AYSHA 500 + D3) 500 mg-5 mcg (200 unit) per tablet Take 1 tablet by mouth 2 (two) times daily with meals.    cholecalciferol, vitamin D3, (VITAMIN D3) 2,000 unit Tab Take 2,000 Units by mouth once daily.     donepeziL (ARICEPT ODT) 5 MG TbDL Take 5 mg by mouth nightly.    famotidine (PEPCID) 20 MG tablet Take 1 tablet (20 mg total) by mouth 2 (two) times daily.    ferrous sulfate (FEOSOL) 325 mg (65 mg iron) Tab tablet Take 325 mg by mouth daily with breakfast.    hydroCHLOROthiazide (HYDRODIURIL) 25 MG tablet Take 1 tablet (25 mg total) by mouth every other day.    lisinopriL 10 MG tablet Take 10 mg by mouth once daily.    meclizine (ANTIVERT) 12.5 mg tablet Take 0.5 tablets (6.25 mg total) by mouth 2 (two) times daily as needed for Dizziness (1/2 tablet twice daily as needed for dizziness).    mupirocin (BACTROBAN) 2 % ointment Apply topically 2 (two) times daily.    pantoprazole (PROTONIX) 40 MG tablet Take 1 tablet (40 mg total) by mouth once daily.    QUEtiapine (SEROQUEL) 50 MG tablet Take 1 tablet (50 mg total) by mouth every evening. (Patient taking differently: Take 50 mg by mouth every evening. Take 2 tablets nightly)    sertraline (ZOLOFT) 25 MG tablet Take 25 mg by mouth once daily.    tolterodine (DETROL LA) 2 MG Cp24 Take 1 capsule (2 mg total) by mouth once daily. For bladder    tramadol-acetaminophen 37.5-325 mg (ULTRACET) 37.5-325 mg Tab Take 1 tablet by mouth 2 (two) times a day.    verapamiL (CALAN-SR) 120 MG CR tablet Take 1 tablet (120 mg total) by mouth once  daily.     Family History       Problem Relation (Age of Onset)    Breast cancer Mother (70)    Colon cancer Father    Hypertension Mother          Tobacco Use    Smoking status: Never    Smokeless tobacco: Never   Substance and Sexual Activity    Alcohol use: No    Drug use: No    Sexual activity: Not Currently     Partners: Male     Birth control/protection: None     Review of Systems   Constitutional:  Positive for activity change, fatigue and fever. Negative for chills and diaphoresis.   HENT:  Negative for congestion and trouble swallowing.    Eyes:  Negative for visual disturbance.   Respiratory:  Positive for cough. Negative for shortness of breath and wheezing.    Cardiovascular:  Negative for chest pain, palpitations and leg swelling.   Gastrointestinal:  Positive for constipation. Negative for abdominal pain, nausea and vomiting.   Endocrine: Negative for polydipsia, polyphagia and polyuria.   Genitourinary:  Negative for difficulty urinating, dysuria and frequency.   Musculoskeletal:  Negative for arthralgias and joint swelling.   Skin:  Negative for color change and pallor.   Neurological:  Positive for weakness (generalized). Negative for dizziness, speech difficulty and headaches.   Hematological:  Does not bruise/bleed easily.   Psychiatric/Behavioral:  Positive for confusion (at baseline).    Objective:     Vital Signs (Most Recent):  Temp: 98.8 °F (37.1 °C) (05/22/23 1052)  Pulse: 70 (05/22/23 1232)  Resp: 16 (05/22/23 1032)  BP: (!) 97/51 (05/22/23 1232)  SpO2: 98 % (05/22/23 1232) Vital Signs (24h Range):  Temp:  [98.8 °F (37.1 °C)-100.1 °F (37.8 °C)] 98.8 °F (37.1 °C)  Pulse:  [70-85] 70  Resp:  [16-20] 16  SpO2:  [91 %-98 %] 98 %  BP: ()/(51-65) 97/51     Weight: 45.4 kg (100 lb)  Body mass index is 22.42 kg/m².     Physical Exam  Constitutional:       General: She is not in acute distress.     Appearance: She is ill-appearing. She is not toxic-appearing or diaphoretic.   HENT:       Head: Normocephalic and atraumatic.      Mouth/Throat:      Mouth: Mucous membranes are moist.      Pharynx: Oropharynx is clear.   Eyes:      Extraocular Movements: Extraocular movements intact.      Conjunctiva/sclera: Conjunctivae normal.      Pupils: Pupils are equal, round, and reactive to light.   Cardiovascular:      Rate and Rhythm: Normal rate and regular rhythm.      Heart sounds: Murmur heard.   Pulmonary:      Effort: Pulmonary effort is normal. No respiratory distress.      Breath sounds: Normal breath sounds. No wheezing, rhonchi or rales.   Abdominal:      General: Abdomen is flat. Bowel sounds are normal. There is no distension.      Palpations: Abdomen is soft.      Tenderness: There is no abdominal tenderness. There is no guarding.   Musculoskeletal:         General: Normal range of motion.      Cervical back: Normal range of motion and neck supple.      Right lower leg: No edema.      Left lower leg: No edema.   Skin:     General: Skin is warm and dry.      Capillary Refill: Capillary refill takes less than 2 seconds.      Coloration: Skin is not jaundiced or pale.   Neurological:      General: No focal deficit present.      Mental Status: She is alert. Mental status is at baseline. She is disoriented.      Coordination: Coordination normal.      Comments: Oriented to self, birthday, place.  Unsure of date, doesn't know president.    Psychiatric:         Mood and Affect: Mood normal.         Behavior: Behavior normal.            CRANIAL NERVES     CN III, IV, VI   Pupils are equal, round, and reactive to light.     Significant Labs: All pertinent labs within the past 24 hours have been reviewed.  CBC:   Recent Labs   Lab 05/22/23  0856   WBC 10.23   HGB 12.3   HCT 36.4*   *     CMP:   Recent Labs   Lab 05/22/23  0856      K 4.1      CO2 28      BUN 36*   CREATININE 1.1   CALCIUM 10.7*   PROT 6.9   ALBUMIN 3.6   BILITOT 1.0   ALKPHOS 85   AST 22   ALT 12   ANIONGAP 8      Lactic Acid:   Recent Labs   Lab 05/22/23  0856   LACTATE 0.9     Urine Studies:   Recent Labs   Lab 05/22/23  0835   COLORU Yellow   APPEARANCEUA Hazy*   PHUR 7.0   SPECGRAV 1.010   PROTEINUA Negative   GLUCUA Negative   KETONESU Negative   BILIRUBINUA Negative   OCCULTUA 1+*   NITRITE Negative   UROBILINOGEN Negative   LEUKOCYTESUR Negative   RBCUA 12*   WBCUA 4   BACTERIA Many*   SQUAMEPITHEL 4       Significant Imaging: I have reviewed all pertinent imaging results/findings within the past 24 hours.    CT ABDOMEN PELVIS WITHOUT CONTRAST     CLINICAL HISTORY:   LLQ abdominal pain;Fever, likely pneumonia but demented patient with lower abdominal pain and also has a history of colitis;     TECHNIQUE:   Low dose axial images, sagittal and coronal reformations were obtained from the lung bases to the pubic symphysis.  No p.o. contrast     COMPARISON:   04/02/2023     FINDINGS:   Left basilar consolidation and atelectasis new compared to the prior study     Cardiomegaly as before     Liver and spleen are unremarkable in appearance.  The abdominal aorta tapers without aneurysmal dilatation.     Pancreas and adrenal glands as visualized unremarkable appearance.  No calcified stones in the gallbladder or CT findings of acute cholecystitis.  Small calcification along the anterior margin the gallbladder suggesting small granuloma in the liver not significantly changed no biliary duct dilatation     Large amount of stool throughout the colon.  No appreciable bowel wall thickening or inflammatory change.     Punctate left renal stone without hydronephrosis, ureteral dilatation, opaque ureteral stone or ureteral obstruction evident on the left.  Free intraperitoneal air or fluid.     Appendix is not identified but no abnormal appendix or inflammatory changes appendiceal region is seen.     2 cm low-density left renal mass suggesting cyst less apparent on today's study with more image degradation from artifact but as  visualized not significantly changed.  Punctate left renal stones suggested.  No left hydronephrosis, ureteral dilatation, opaque ureteral stone or ureteral obstruction evident     A few right renal cysts largest measuring 2.6 cm.  A few right renal stones largest measuring 3 mm.  Dilated right renal pelvis and mild caliceal dilatation.  4 mm stone in the right renal pelvis.  No ureteral dilatation or opaque ureteral stone.  Urinary bladder moderately distended at time of the exam and as on the prior exam calcifications along the posterior margin the bladder suggest bladder stones     Prior hysterectomy.  Adnexal region unremarkable in appearance     Kyphoplasty of compressed lumbar and visualized lower thoracic vertebrae.   Impression:    Large amount of stool within the colon more so today than on the prior exam.     Left basilar consolidation suggesting pneumonia     Right renal stones and a stone in the right renal pelvis with mild right hydronephrosis.  The stone in the renal pelvis is new compared to the prior exam but corresponds as was seen on an earlier study of 02/07/2023 and the hydronephrosis does not appear significantly changed compared to the couple past exams.     Additional findings as detailed above.       Electronically signed by: Nichelle Porter MD   Date: 05/22/2023       Time: 11:20 X-Ray Chest AP Portable [[id number]]Resulted: 05/22/23 0853 Order Status: CompletedUpdated: 05/22/23 0856 Narrative:  EXAMINATION:   XR CHEST AP PORTABLE     CLINICAL HISTORY:   Sepsis;     TECHNIQUE:   Single frontal view of the chest was performed.     COMPARISON:   10/14/2022     FINDINGS:   Stable cardiomediastinal silhouette.  New infiltrate in the left perihilar and basilar region.  Right lung remains clear of infiltrate.  No pleural effusion.     Kyphoplasty multiple vertebral bodies near the thoracolumbar junction and lumbar spine.   Impression:    Infiltrate left mid and lower lung field consistent with  pneumonia and new compared to the prior exam       Electronically signed by: Nichelle Porter MD   Date: 05/22/2023   Time: 08:53     Assessment/Plan:     * Pneumonia  Supplemental oxygen   IV antibiotics   Sputum for culture   Bronchodilators   Monitor respiratory status closely         Mild vascular dementia without behavioral disturbance, psychotic disturbance, mood disturbance, or anxiety  Dementia is controlled currently. Continue home dementia meds and non-pharmacologic interventions to prevent delirium (Activity during day, opening blinds, providing glasses/hearing aids, and up in chair during daytime). Use PRN anti-psychotics to prevent behavior of self harm during sundowning, and avoid narcotics and benzos unless absolutely necessary. PRN anti-psychotics prescribed to avoid self harm behaviors.        Stage 3a chronic kidney disease  Creatine stable for now. BMP reviewed- noted Estimated Creatinine Clearance: 23.6 mL/min (based on SCr of 1.1 mg/dL). according to latest data. Monitor UOP and serial BMP and adjust therapy as needed. Renally dose meds.            Irritable bowel syndrome with both constipation and diarrhea  Chronic problem.  Constipation noted on CT abd pelvis.  Will initiate Senna.      HTN (hypertension)  Chronic, controlled.  Latest blood pressure and vitals reviewed-   Temp:  [98.8 °F (37.1 °C)-100.1 °F (37.8 °C)]   Pulse:  [70-85]   Resp:  [16-20]   BP: ()/(51-65)   SpO2:  [91 %-98 %] .   Home meds for hypertension were reviewed and noted below.   Hypertension Medications             hydroCHLOROthiazide (HYDRODIURIL) 25 MG tablet Take 1 tablet (25 mg total) by mouth every other day.    lisinopriL 10 MG tablet Take 10 mg by mouth once daily.    verapamiL (CALAN-SR) 120 MG CR tablet Take 1 tablet (120 mg total) by mouth once daily.          While in the hospital, will manage blood pressure as follows; Adjust home antihypertensive regimen as follows- Holding antihypertensives for now  secondary to infection and low blood pressures.  Resume once clinically appropriate.    Will utilize p.r.n. blood pressure medication only if patient's blood pressure greater than  180/110 and she develops symptoms such as worsening chest pain or shortness of breath.          VTE Risk Mitigation (From admission, onward)         Ordered     enoxaparin injection 30 mg  Daily         05/22/23 1142     IP VTE HIGH RISK PATIENT  Once         05/22/23 1142     Place sequential compression device  Until discontinued         05/22/23 1142                           Shahla Hilario NP  Department of Hospital Medicine  [facility] - Emergency Dept

## 2023-05-22 NOTE — ED NOTES
Patient to er 10 via [facility]- patient woke up this morning weak with a  102F fever per family.  Upon arrival, patient had 100.1F oral.

## 2023-05-22 NOTE — PT/OT/SLP PROGRESS
Physical Therapy      Patient Name:  Angelique Hamilton   MRN:  [id number]    Patient not seen today secondary to Patient unwilling to participate, Patient fatigue. Will follow-up 5/23/23.

## 2023-05-22 NOTE — ACP (ADVANCE CARE PLANNING)
Advance Care Planning     Date: 05/22/2023    Today a meeting took place: bedside    Patient Participation: Patient is unable to participate     Attendees (Name and  Relationship to patient):  Daughter in law Karen Hamilton.  Phone call to son Darnell Hamilton and daughter Karen Adams    Staff attendees (Name and  Role): Shahla Hilario NP    ACP Conversation (General): Understanding of current condition - discussed patient's wishes and what she would want if she were to experience sudden cardiac or respiratory arrest.  All children are in agreement that if their mother were to stop breathing, needed invasive ventilation, or her heart were to stop beating that she would prefer no heroic intervention and would prefer a natural and peaceful death. She would not want intubation, mechanical ventilation, or CPR.    Code Status: DNR; status confirmed/order placed in chart     ACP Documents: None        Length of ACP   conversation in minutes: 16 minutes

## 2023-05-22 NOTE — HPI
Angelique Hamilton is a 89-year-old female with PMHx significant for HTN, dementia, HLD and irritable bowel syndrome.  She presented to the ED with an onset of cough and fever associated with fatigue and generalized weakness.  Patient febrile to 101° F at home.  Patient denies chest pain, shortness at breath, abdominal pain, nausea, vomiting, dysuria, or any other symptoms at this time.  Review of systems assisted by patient's daughter-in-law at bedside.  Chest x-ray and CT abdomen pelvis performed in ED revealed left-sided pneumonia.  Her oxygen saturations were noted to be in the low 90s on room air and improved to 96% on 2 L nasal cannula.  She was treated with IV antibiotics and IV hydration in ED. Patient will be admitted to the services of Hospital Medicine for further evaluation and treatment.

## 2023-05-23 LAB
ALBUMIN SERPL BCP-MCNC: 2.7 G/DL (ref 3.5–5.2)
ALP SERPL-CCNC: 71 U/L (ref 55–135)
ALT SERPL W/O P-5'-P-CCNC: 13 U/L (ref 10–44)
ANION GAP SERPL CALC-SCNC: 6 MMOL/L (ref 8–16)
AST SERPL-CCNC: 17 U/L (ref 10–40)
BILIRUB SERPL-MCNC: 0.9 MG/DL (ref 0.1–1)
BUN SERPL-MCNC: 22 MG/DL (ref 8–23)
CALCIUM SERPL-MCNC: 9.2 MG/DL (ref 8.7–10.5)
CHLORIDE SERPL-SCNC: 109 MMOL/L (ref 95–110)
CO2 SERPL-SCNC: 26 MMOL/L (ref 23–29)
CREAT SERPL-MCNC: 0.8 MG/DL (ref 0.5–1.4)
ERYTHROCYTE [DISTWIDTH] IN BLOOD BY AUTOMATED COUNT: 13.1 % (ref 11.5–14.5)
EST. GFR  (NO RACE VARIABLE): >60 ML/MIN/1.73 M^2
GLUCOSE SERPL-MCNC: 95 MG/DL (ref 70–110)
HCT VFR BLD AUTO: 30 % (ref 37–48.5)
HGB BLD-MCNC: 9.9 G/DL (ref 12–16)
MAGNESIUM SERPL-MCNC: 1.7 MG/DL (ref 1.6–2.6)
MCH RBC QN AUTO: 32.9 PG (ref 27–31)
MCHC RBC AUTO-ENTMCNC: 33 G/DL (ref 32–36)
MCV RBC AUTO: 100 FL (ref 82–98)
PLATELET # BLD AUTO: 109 K/UL (ref 150–450)
PMV BLD AUTO: 10.5 FL (ref 9.2–12.9)
POTASSIUM SERPL-SCNC: 4.1 MMOL/L (ref 3.5–5.1)
PROT SERPL-MCNC: 5.4 G/DL (ref 6–8.4)
RBC # BLD AUTO: 3.01 M/UL (ref 4–5.4)
SODIUM SERPL-SCNC: 141 MMOL/L (ref 136–145)
WBC # BLD AUTO: 6.81 K/UL (ref 3.9–12.7)

## 2023-05-23 PROCEDURE — 97116 GAIT TRAINING THERAPY: CPT

## 2023-05-23 PROCEDURE — 25000003 PHARM REV CODE 250: Performed by: NURSE PRACTITIONER

## 2023-05-23 PROCEDURE — 97535 SELF CARE MNGMENT TRAINING: CPT

## 2023-05-23 PROCEDURE — 36415 COLL VENOUS BLD VENIPUNCTURE: CPT | Performed by: NURSE PRACTITIONER

## 2023-05-23 PROCEDURE — 80053 COMPREHEN METABOLIC PANEL: CPT | Performed by: NURSE PRACTITIONER

## 2023-05-23 PROCEDURE — 97165 OT EVAL LOW COMPLEX 30 MIN: CPT

## 2023-05-23 PROCEDURE — 97161 PT EVAL LOW COMPLEX 20 MIN: CPT

## 2023-05-23 PROCEDURE — 63600175 PHARM REV CODE 636 W HCPCS: Performed by: NURSE PRACTITIONER

## 2023-05-23 PROCEDURE — 94640 AIRWAY INHALATION TREATMENT: CPT

## 2023-05-23 PROCEDURE — 25000242 PHARM REV CODE 250 ALT 637 W/ HCPCS: Performed by: NURSE PRACTITIONER

## 2023-05-23 PROCEDURE — 85027 COMPLETE CBC AUTOMATED: CPT | Performed by: NURSE PRACTITIONER

## 2023-05-23 PROCEDURE — 12000002 HC ACUTE/MED SURGE SEMI-PRIVATE ROOM

## 2023-05-23 PROCEDURE — 94761 N-INVAS EAR/PLS OXIMETRY MLT: CPT

## 2023-05-23 PROCEDURE — 83735 ASSAY OF MAGNESIUM: CPT | Performed by: NURSE PRACTITIONER

## 2023-05-23 RX ORDER — LISINOPRIL 2.5 MG/1
5 TABLET ORAL DAILY
Status: DISCONTINUED | OUTPATIENT
Start: 2023-05-23 | End: 2023-05-24 | Stop reason: HOSPADM

## 2023-05-23 RX ADMIN — SODIUM CHLORIDE: 9 INJECTION, SOLUTION INTRAVENOUS at 06:05

## 2023-05-23 RX ADMIN — IPRATROPIUM BROMIDE AND ALBUTEROL SULFATE 3 ML: .5; 3 SOLUTION RESPIRATORY (INHALATION) at 08:05

## 2023-05-23 RX ADMIN — QUETIAPINE 50 MG: 25 TABLET ORAL at 08:05

## 2023-05-23 RX ADMIN — DONEPEZIL HYDROCHLORIDE 10 MG: 5 TABLET ORAL at 08:05

## 2023-05-23 RX ADMIN — IPRATROPIUM BROMIDE AND ALBUTEROL SULFATE 3 ML: .5; 3 SOLUTION RESPIRATORY (INHALATION) at 01:05

## 2023-05-23 RX ADMIN — CEFTRIAXONE 1 G: 1 INJECTION, POWDER, FOR SOLUTION INTRAMUSCULAR; INTRAVENOUS at 09:05

## 2023-05-23 RX ADMIN — DOCUSATE SODIUM AND SENNOSIDES 1 TABLET: 8.6; 5 TABLET, FILM COATED ORAL at 09:05

## 2023-05-23 RX ADMIN — AZITHROMYCIN MONOHYDRATE 500 MG: 500 INJECTION, POWDER, LYOPHILIZED, FOR SOLUTION INTRAVENOUS at 10:05

## 2023-05-23 RX ADMIN — SERTRALINE HYDROCHLORIDE 25 MG: 25 TABLET ORAL at 09:05

## 2023-05-23 RX ADMIN — PANTOPRAZOLE SODIUM 40 MG: 40 TABLET, DELAYED RELEASE ORAL at 09:05

## 2023-05-23 NOTE — PLAN OF CARE
Per MDR's , pt needs one more day of IV ABX. FRANKY adjusted     05/23/23 9237   Discharge Assessment   Assessment Type Discharge Planning Reassessment

## 2023-05-23 NOTE — CARE UPDATE
05/23/23 0843   Patient Assessment/Suction   Level of Consciousness (AVPU) alert   Respiratory Effort Normal;Unlabored   Expansion/Accessory Muscles/Retractions no use of accessory muscles;no retractions   All Lung Fields Breath Sounds Anterior:;Posterior:   DEREK Breath Sounds diminished   LLL Breath Sounds crackles   RUL Breath Sounds diminished   RML Breath Sounds diminished   RLL Breath Sounds crackles   Rhythm/Pattern, Respiratory unlabored;pattern regular   Cough Frequency no cough   PRE-TX-O2   Device (Oxygen Therapy) room air   SpO2 (!) 94 %   Pulse Oximetry Type Intermittent   $ Pulse Oximetry - Multiple Charge Pulse Oximetry - Multiple   Pulse 69   Resp 18   Aerosol Therapy   $ Aerosol Therapy Charges Aerosol Treatment   Daily Review of Necessity (SVN) completed   Respiratory Treatment Status (SVN) given   Treatment Route (SVN) mouthpiece;oxygen   Patient Position (SVN) HOB elevated   Post Treatment Assessment (SVN) breath sounds unchanged   Signs of Intolerance (SVN) none   Breath Sounds Post-Respiratory Treatment   Throughout All Fields Post-Treatment All Fields   Throughout All Fields Post-Treatment no change   Post-treatment Heart Rate (beats/min) 68   Post-treatment Resp Rate (breaths/min) 18

## 2023-05-23 NOTE — ASSESSMENT & PLAN NOTE
Supplemental oxygen   IV antibiotics   Sputum for culture   Bronchodilators   Monitor respiratory status closely   Procalcitonin 0.30 - repeat tomorrow am  PT/OT consult

## 2023-05-23 NOTE — PROGRESS NOTES
[facility] Hospital Medicine  Progress Note    Patient Name: Angelique Hamilton  MRN: [id number]  Patient Class: IP- Inpatient   Admission Date: 5/22/2023  Length of Stay: 1 days  Attending Physician: Megan Love MD  Primary Care Provider: Ryan Hurtado MD        Subjective:     Principal Problem:Pneumonia        HPI:  Angelique Hamilton is a 89-year-old female with PMHx significant for HTN, dementia, HLD and irritable bowel syndrome.  She presented to the ED with an onset of cough and fever associated with fatigue and generalized weakness.  Patient febrile to 101° F at home.  Patient denies chest pain, shortness at breath, abdominal pain, nausea, vomiting, dysuria, or any other symptoms at this time.  Review of systems assisted by patient's daughter-in-law at bedside.  Chest x-ray and CT abdomen pelvis performed in ED revealed left-sided pneumonia.  Her oxygen saturations were noted to be in the low 90s on room air and improved to 96% on 2 L nasal cannula.  She was treated with IV antibiotics and IV hydration in ED. Patient will be admitted to the services of Hospital Medicine for further evaluation and treatment.      Overview/Hospital Course:  No notes on file    Interval History:  Patient seen and examined.  Weaned to room air.  Looks and feels better today.  Appetite is better.  Daughter and nursing at bedside.  Discussed plan of care and answered all questions.    Review of Systems   Constitutional:  Positive for activity change, appetite change, chills, fatigue and fever.   Respiratory:  Positive for cough. Negative for shortness of breath.    Cardiovascular:  Negative for chest pain, palpitations and leg swelling.   Gastrointestinal:  Positive for constipation. Negative for abdominal pain, nausea and vomiting.   Genitourinary:  Negative for difficulty urinating, dysuria and frequency.   Musculoskeletal:  Positive for back pain (chronic).   Objective:     Vital Signs (Most Recent):  Temp:  98.4 °F (36.9 °C) (05/23/23 0818)  Pulse: 69 (05/23/23 0843)  Resp: 18 (05/23/23 0843)  BP: 115/62 (05/23/23 0818)  SpO2: (!) 94 % (05/23/23 0843) Vital Signs (24h Range):  Temp:  [97.6 °F (36.4 °C)-98.9 °F (37.2 °C)] 98.4 °F (36.9 °C)  Pulse:  [64-73] 69  Resp:  [16-18] 18  SpO2:  [91 %-98 %] 94 %  BP: ()/(51-64) 115/62     Weight: 45.4 kg (100 lb)  Body mass index is 22.42 kg/m².    Intake/Output Summary (Last 24 hours) at 5/23/2023 1030  Last data filed at 5/22/2023 2300  Gross per 24 hour   Intake 740 ml   Output 900 ml   Net -160 ml         Physical Exam  Constitutional:       General: She is not in acute distress.     Appearance: She is not toxic-appearing or diaphoretic.   Eyes:      Extraocular Movements: Extraocular movements intact.      Conjunctiva/sclera: Conjunctivae normal.      Pupils: Pupils are equal, round, and reactive to light.   Cardiovascular:      Rate and Rhythm: Normal rate and regular rhythm.      Heart sounds: Murmur heard.   Pulmonary:      Effort: Pulmonary effort is normal. No respiratory distress.      Breath sounds: No wheezing or rales.      Comments: Diminished left base  Abdominal:      General: Bowel sounds are normal. There is no distension.      Palpations: Abdomen is soft.      Tenderness: There is no abdominal tenderness. There is no guarding.   Skin:     General: Skin is warm and dry.      Coloration: Skin is not jaundiced.   Neurological:      Mental Status: She is alert.           Significant Labs: All pertinent labs within the past 24 hours have been reviewed.  CBC:   Recent Labs   Lab 05/22/23  0856 05/23/23 0458   WBC 10.23 6.81   HGB 12.3 9.9*   HCT 36.4* 30.0*   * 109*     CMP:   Recent Labs   Lab 05/22/23  0856 05/23/23 0458    141   K 4.1 4.1    109   CO2 28 26    95   BUN 36* 22   CREATININE 1.1 0.8   CALCIUM 10.7* 9.2   PROT 6.9 5.4*   ALBUMIN 3.6 2.7*   BILITOT 1.0 0.9   ALKPHOS 85 71   AST 22 17   ALT 12 13   ANIONGAP 8 6*      Lactic Acid:   Recent Labs   Lab 05/22/23  0856 05/22/23  1232   LACTATE 0.9 1.8       Significant Imaging: I have reviewed all pertinent imaging results/findings within the past 24 hours.      Assessment/Plan:      * Pneumonia  Supplemental oxygen   IV antibiotics   Sputum for culture   Bronchodilators   Monitor respiratory status closely   Procalcitonin 0.30 - repeat tomorrow am  PT/OT consult    Mild vascular dementia without behavioral disturbance, psychotic disturbance, mood disturbance, or anxiety  Dementia is controlled currently. Continue home dementia meds and non-pharmacologic interventions to prevent delirium (Activity during day, opening blinds, providing glasses/hearing aids, and up in chair during daytime). Use PRN anti-psychotics to prevent behavior of self harm during sundowning, and avoid narcotics and benzos unless absolutely necessary. PRN anti-psychotics prescribed to avoid self harm behaviors.        Stage 3a chronic kidney disease  Creatine stable for now. BMP reviewed- noted Estimated Creatinine Clearance: 32.4 mL/min (based on SCr of 0.8 mg/dL). according to latest data. Monitor UOP and serial BMP and adjust therapy as needed. Renally dose meds.            Irritable bowel syndrome with both constipation and diarrhea  Chronic problem.  Constipation noted on CT abd pelvis.  Will initiate Senna.      HTN (hypertension)  Chronic, controlled.  Latest blood pressure and vitals reviewed-   Temp:  [98.8 °F (37.1 °C)-100.1 °F (37.8 °C)]   Pulse:  [70-85]   Resp:  [16-20]   BP: ()/(51-65)   SpO2:  [91 %-98 %] .   Home meds for hypertension were reviewed and noted below.   Hypertension Medications             hydroCHLOROthiazide (HYDRODIURIL) 25 MG tablet Take 1 tablet (25 mg total) by mouth every other day.    lisinopriL 10 MG tablet Take 10 mg by mouth once daily.    verapamiL (CALAN-SR) 120 MG CR tablet Take 1 tablet (120 mg total) by mouth once daily.          While in the hospital, will  manage blood pressure as follows; Adjust home antihypertensive regimen as follows- Holding antihypertensives for now secondary to infection and low blood pressures.  Resume once clinically appropriate.    Will utilize p.r.n. blood pressure medication only if patient's blood pressure greater than  180/110 and she develops symptoms such as worsening chest pain or shortness of breath.          VTE Risk Mitigation (From admission, onward)         Ordered     IP VTE HIGH RISK PATIENT  Once         05/22/23 1142     Place sequential compression device  Until discontinued         05/22/23 1142                Discharge Planning   FRANKY:      Code Status: DNR   Is the patient medically ready for discharge?:     Reason for patient still in hospital (select all that apply): Patient trending condition, Treatment, PT / OT recommendations and Pending disposition  Discharge Plan A: Home with family, Home Health                  Shahla Hilario NP  Department of Hospital Medicine   [facility]

## 2023-05-23 NOTE — PLAN OF CARE
Problem: Physical Therapy  Goal: Physical Therapy Goal  Description: Goals to be met by: 23     Patient will increase functional independence with mobility by performin. Supine to sit with [place]  2. Sit to supine with [place]  3. Sit to stand transfer with [place]  4. Bed to chair transfer with Supervision using Rolling Walker  5. Gait  x 150 feet with Supervision using Rolling Walker.     Outcome: Ongoing, Progressing

## 2023-05-23 NOTE — ASSESSMENT & PLAN NOTE
Creatine stable for now. BMP reviewed- noted Estimated Creatinine Clearance: 32.4 mL/min (based on SCr of 0.8 mg/dL). according to latest data. Monitor UOP and serial BMP and adjust therapy as needed. Renally dose meds.

## 2023-05-23 NOTE — PT/OT/SLP EVAL
Occupational Therapy Evaluation and Treatment    Name: Angelique Hamilton  MRN: [id number]  Admitting Diagnosis: Pneumonia  Recent Surgery: * No surgery found *     Recommendations:     Discharge Recommendations: home health OT  Discharge Equipment Recommendations: none  Barriers to discharge: None    Assessment:     Angelique Hamilton is a 89 y.o. female with a medical diagnosis of Pneumonia. She presents with performance deficits affecting function including weakness, impaired endurance, impaired self care skills and impaired functional mobility.     Rehab Prognosis: Good; patient would benefit from acute OT services to address these deficits and reach maximum level of function.    Plan:     Patient to be seen 5 x/week to address the above listed problems via self-care/home management, therapeutic activities, therapeutic exercises  Plan of Care Expires: 06/06/23  Plan of Care Reviewed with: patient    Subjective     Chief Complaint: Fatigue  Patient Comments/Goals: Pt was agreeable to participate in OT.   Pain/Comfort:  Pain Rating 1: 0/10    Patients cultural, spiritual, Mormonism conflicts given the current situation: yes    Social History:  Living Environment: Patient lives with her son with elevator  Prior Level of Function: Prior to admission, patient was modified independent with ADLs using rollator for mobility and requires assistance with ADLs for bathing and LBD.  Equipment Used at Home: rollator, tub bench  DME owned (not currently used): bedside commode  Assistance Upon Discharge: family    Objective:     Communicated with nurse Wero prior to session. Patient found HOB elevated with PureWick and peripheral IV upon OT entry to room.    General Precautions: Standard, fall, [place]  Orthopedic Precautions: N/A   Braces: N/A    Respiratory Status: Room air    Occupational Performance    Gait belt applied - Yes    Bed Mobility:   Scooting to HOB in supine: stand by assistance  Scooting anteriorly to EOB to have both  feet on floor: stand by assistance  Supine to sit with stand by assistance  Sit to Supine with stand by assistance    Functional Mobility/Transfers:  Sit <> Stand Transfer with minimum assistance with rolling walker    Activities of Daily Living:  Grooming: Pt required set up assistance/SBA to brush her teeth for opening the toothpaste and applying to the toothbrush.   Upper Body Dressing: minimum assistance  Lower Body Dressing: maximal assistance  Toileting: total assistance    Cognitive/Visual Perceptual:  Cognitive/Psychosocial Skills: -     Oriented to: Person, Place, Time, Situation  -     Follows Commands/attention: Follows two-step commands  -     Communication: clear/fluent    Physical Exam:  Upper Extremity Range of Motion:  -       Right Upper Extremity: WFL  -       Left Upper Extremity: WFL  Upper Extremity Strength: -       Right Upper Extremity: shoulder flexion: 3+/5; elbow extension/flexion: 4/5  -       Left Upper Extremity: shoulder flexion: 3+/5; elbow extension/flexion: 4/5   Strength: -       Right Upper Extremity: 3+/5  -       Left Upper Extremity: 3+/5    AMPAC 6 Click ADL:  AMPAC Total Score: 16    Treatment & Education:  Therapist provided facilitation and instruction of proper body mechanics, energy conservation and fall prevention strategies during tasks listed above.  Patient educated on role of OT, POC and goals for therapy.  Pt sat EOB x 10 minutes with supervision.     Patient left HOB elevated with all lines intact, call button in reach, bed alarm on and patient's daughter in law present.    GOALS:   Multidisciplinary Problems       Occupational Therapy Goals          Problem: Occupational Therapy    Goal Priority Disciplines Outcome Interventions   Occupational Therapy Goal     OT, PT/OT Ongoing, Progressing    Description: Goals to be met by: 6/6/2023     Patient will increase functional independence with ADLs by performing:    UE Dressing with [place].  LE Dressing with  Modified [place].  Grooming with Modified [place].  Toileting from toilet with Supervision for hygiene and clothing management.   Toilet transfer to toilet with Supervision.                         History:     Past Medical History:   Diagnosis Date    Back problem     Constipation     History of fractured vertebra     Hyperlipidemia     Hypertension     IBS (irritable bowel syndrome)     Migraine headache     Osteoporosis     Rectal prolapse     Scoliosis     Vertigo          Past Surgical History:   Procedure Laterality Date    LEWIS      Back Pain    EYE SURGERY      Bilateral Cataracs    HYSTERECTOMY      OOPHORECTOMY      ROTATOR CUFF REPAIR      bialteral    TONSILLECTOMY         Time Tracking:     OT Date of Treatment: 05/23/23  OT Start Time: 1005  OT Stop Time: 1044  OT Total Time (min): 39 min    Billable Minutes: Evaluation 15 and Self Care/Home Management 24    5/23/2023

## 2023-05-23 NOTE — PLAN OF CARE
Problem: Occupational Therapy  Goal: Occupational Therapy Goal  Description: Goals to be met by: 6/6/2023     Patient will increase functional independence with ADLs by performing:    UE Dressing with [place].  LE Dressing with Modified [place].  Grooming with Modified [place].  Toileting from toilet with Supervision for hygiene and clothing management.   Toilet transfer to toilet with Supervision.    Outcome: Ongoing, Progressing     OT evaluation completed. POC established.

## 2023-05-23 NOTE — HOSPITAL COURSE
Patient was admitted for left lower lobe pneumonia.  She was placed on supplemental oxygen and IV antibiotics were initiated.  She was maintained on telemetry and her labs and vital signs were trended closely.  Overnight she was weaned from oxygen.  She participated with PT and OT who rec cont HH therapy. Pt felt well and desired to go home. She remained stable on room air. She was discharged to complete oral zithromax. Pt and caregiver at BS verbalized understanding of discharge instructions and return precautions.    PE:  AAO x 2, pleasant, NAD  Heart - RRR, no edema, 3/6 murmur  Lungs - CTA bilat, resp even unlabored  Abd - soft, nontender, normoactive BS

## 2023-05-23 NOTE — PLAN OF CARE
Problem: Adult Inpatient Plan of Care  Goal: Plan of Care Review  Outcome: Ongoing, Progressing  Goal: Patient-Specific Goal (Individualized)  Outcome: Ongoing, Progressing  Goal: Absence of Hospital-Acquired Illness or Injury  Outcome: Ongoing, Progressing  Goal: Optimal Comfort and Wellbeing  Outcome: Ongoing, Progressing  Goal: Readiness for Transition of Care  Outcome: Ongoing, Progressing     Problem: Infection (Pneumonia)  Goal: Resolution of Infection Signs and Symptoms  Outcome: Ongoing, Progressing     Problem: Skin Injury Risk Increased  Goal: Skin Health and Integrity  Outcome: Ongoing, Progressing     Problem: Fall Injury Risk  Goal: Absence of Fall and Fall-Related Injury  Outcome: Ongoing, Progressing   Plan of care reviewed with patient& daughter verbalized understanding.  IV fluids infusing as per orders. SR on telemetry. Remains free from falls, injury. Instructed to call for assistance as needed ,verbalized understanding. Bed in low & locked position. Call light in reach, bed alarm on .

## 2023-05-23 NOTE — DISCHARGE INSTRUCTIONS
Rest and drink adequate fluids. Take medications as prescribed. Finish all antibiotics.  Follow up with PCP within one week. Return to ED for any worsening symptoms or concerns.     Discharge Instructions, [facility]    Thank you for choosing [facility] for your medical care.     You were admitted to the hospital with Pneumonia.     Please note your discharge instructions, including diet/activity restrictions, follow-up appointments, and medication changes.  If you have any questions about your medical issues, prescriptions, or any other questions, please feel free to contact the [facility] Medicine Dept at [phone] and we will help.    If you are previously with Home health, outpatient PT/OT or under a therapy program, you are cleared to return to those programs.    Please direct all long term medication refills and follow up to your primary care provider, Ryan Hurtado MD  Thank you again for letting us take care of your health care needs.    Please note the following discharge instructions per your discharging provider -  Clarita Oropeza NP

## 2023-05-23 NOTE — PT/OT/SLP EVAL
Physical Therapy Evaluation    Patient Name:  Angelique Hamilton   MRN:  [id number]    Recommendations:     Discharge Recommendations: home, home health PT   Discharge Equipment Recommendations: none   Barriers to discharge: None    Assessment:     Angelique Hamilton is a 89 y.o. female admitted with a medical diagnosis of Pneumonia.  She presents with the following impairments/functional limitations: weakness, impaired endurance, impaired functional mobility, gait instability, impaired balance, decreased lower extremity function, decreased ROM .  Patient agreeable to PT treatment this morning.  Patient presented supine in bed and was able to transfer to sitting with SBA and stood with a RW and min assist.  Patient then ambulated x 80 feet RW min assist.    Rehab Prognosis: Good; patient would benefit from acute skilled PT services to address these deficits and reach maximum level of function.    Recent Surgery: * No surgery found *      Plan:     During this hospitalization, patient to be seen 6 x/week to address the identified rehab impairments via gait training, therapeutic activities, therapeutic exercises and progress toward the following goals:    Plan of Care Expires:  06/21/23    Subjective     Chief Complaint: fatigue  Patient/Family Comments/goals: go home  Pain/Comfort:       Patients cultural, spiritual, Gnosticism conflicts given the current situation:      Living Environment:  Currently lives with family in a 1 story home.  Prior to admission, patients level of function was modified independent.  Equipment used at home: walker, rolling, rollator.  DME owned (not currently used): none.  Upon discharge, patient will have assistance from family.    Objective:     Communicated with nurse prior to session.  Patient found supine with bed alarm, villarreal catheter, peripheral IV, telemetry  upon PT entry to room.    General Precautions: Standard, fall  Orthopedic Precautions:N/A   Braces:    Respiratory Status: Room  air    Exams:  RLE ROM: WFL  RLE Strength: Deficits: 4+/5 overall  LLE ROM: WFL  LLE Strength: Deficits: 4+/5 overall    Functional Mobility:  Bed Mobility:     Supine to Sit: stand by assistance  Transfers:     Sit to Stand:  minimum assistance with rolling walker  Gait: x 80 feet rW min assist      AM-PAC 6 CLICK MOBILITY  Total Score:18       Treatment & Education:  Gait training x 80 feet RW min assist and with slow gait pattern and a poor flexed posture    Patient left up in chair with call button in reach, nurse notified, and family present.    GOALS:   Multidisciplinary Problems       Physical Therapy Goals          Problem: Physical Therapy    Goal Priority Disciplines Outcome Goal Variances Interventions   Physical Therapy Goal     PT, PT/OT Ongoing, Progressing     Description: Goals to be met by: 23     Patient will increase functional independence with mobility by performin. Supine to sit with [place]  2. Sit to supine with [place]  3. Sit to stand transfer with [place]  4. Bed to chair transfer with Supervision using Rolling Walker  5. Gait  x 150 feet with Supervision using Rolling Walker.                          History:     Past Medical History:   Diagnosis Date    Back problem     Constipation     History of fractured vertebra     Hyperlipidemia     Hypertension     IBS (irritable bowel syndrome)     Migraine headache     Osteoporosis     Rectal prolapse     Scoliosis     Vertigo        Past Surgical History:   Procedure Laterality Date    LEWIS      Back Pain    EYE SURGERY      Bilateral Cataracs    HYSTERECTOMY      OOPHORECTOMY      ROTATOR CUFF REPAIR      bialteral    TONSILLECTOMY         Time Tracking:     PT Received On: 23  PT Start Time: 1051     PT Stop Time: 1120  PT Total Time (min): 29 min     Billable Minutes: Evaluation 20 and Gait Training 9      2023

## 2023-05-23 NOTE — SUBJECTIVE & OBJECTIVE
Interval History:  Patient seen and examined.  Weaned to room air.  Looks and feels better today.  Appetite is better.  Daughter and nursing at bedside.  Discussed plan of care and answered all questions.    Review of Systems   Constitutional:  Positive for activity change, appetite change, chills, fatigue and fever.   Respiratory:  Positive for cough. Negative for shortness of breath.    Cardiovascular:  Negative for chest pain, palpitations and leg swelling.   Gastrointestinal:  Positive for constipation. Negative for abdominal pain, nausea and vomiting.   Genitourinary:  Negative for difficulty urinating, dysuria and frequency.   Musculoskeletal:  Positive for back pain (chronic).   Objective:     Vital Signs (Most Recent):  Temp: 98.4 °F (36.9 °C) (05/23/23 0818)  Pulse: 69 (05/23/23 0843)  Resp: 18 (05/23/23 0843)  BP: 115/62 (05/23/23 0818)  SpO2: (!) 94 % (05/23/23 0843) Vital Signs (24h Range):  Temp:  [97.6 °F (36.4 °C)-98.9 °F (37.2 °C)] 98.4 °F (36.9 °C)  Pulse:  [64-73] 69  Resp:  [16-18] 18  SpO2:  [91 %-98 %] 94 %  BP: ()/(51-64) 115/62     Weight: 45.4 kg (100 lb)  Body mass index is 22.42 kg/m².    Intake/Output Summary (Last 24 hours) at 5/23/2023 1030  Last data filed at 5/22/2023 2300  Gross per 24 hour   Intake 740 ml   Output 900 ml   Net -160 ml         Physical Exam  Constitutional:       General: She is not in acute distress.     Appearance: She is not toxic-appearing or diaphoretic.   Eyes:      Extraocular Movements: Extraocular movements intact.      Conjunctiva/sclera: Conjunctivae normal.      Pupils: Pupils are equal, round, and reactive to light.   Cardiovascular:      Rate and Rhythm: Normal rate and regular rhythm.      Heart sounds: Murmur heard.   Pulmonary:      Effort: Pulmonary effort is normal. No respiratory distress.      Breath sounds: No wheezing or rales.      Comments: Diminished left base  Abdominal:      General: Bowel sounds are normal. There is no distension.       Palpations: Abdomen is soft.      Tenderness: There is no abdominal tenderness. There is no guarding.   Skin:     General: Skin is warm and dry.      Coloration: Skin is not jaundiced.   Neurological:      Mental Status: She is alert.           Significant Labs: All pertinent labs within the past 24 hours have been reviewed.  CBC:   Recent Labs   Lab 05/22/23  0856 05/23/23  0458   WBC 10.23 6.81   HGB 12.3 9.9*   HCT 36.4* 30.0*   * 109*     CMP:   Recent Labs   Lab 05/22/23  0856 05/23/23  0458    141   K 4.1 4.1    109   CO2 28 26    95   BUN 36* 22   CREATININE 1.1 0.8   CALCIUM 10.7* 9.2   PROT 6.9 5.4*   ALBUMIN 3.6 2.7*   BILITOT 1.0 0.9   ALKPHOS 85 71   AST 22 17   ALT 12 13   ANIONGAP 8 6*     Lactic Acid:   Recent Labs   Lab 05/22/23  0856 05/22/23  1232   LACTATE 0.9 1.8       Significant Imaging: I have reviewed all pertinent imaging results/findings within the past 24 hours.

## 2023-05-24 VITALS
WEIGHT: 100 LBS | DIASTOLIC BLOOD PRESSURE: 86 MMHG | BODY MASS INDEX: 22.5 KG/M2 | HEART RATE: 72 BPM | HEIGHT: 56 IN | TEMPERATURE: 99 F | OXYGEN SATURATION: 95 % | SYSTOLIC BLOOD PRESSURE: 149 MMHG | RESPIRATION RATE: 18 BRPM

## 2023-05-24 LAB
ALBUMIN SERPL BCP-MCNC: 2.8 G/DL (ref 3.5–5.2)
ALP SERPL-CCNC: 76 U/L (ref 55–135)
ALT SERPL W/O P-5'-P-CCNC: 13 U/L (ref 10–44)
ANION GAP SERPL CALC-SCNC: 7 MMOL/L (ref 8–16)
AST SERPL-CCNC: 21 U/L (ref 10–40)
BILIRUB SERPL-MCNC: 1 MG/DL (ref 0.1–1)
BUN SERPL-MCNC: 13 MG/DL (ref 8–23)
CALCIUM SERPL-MCNC: 9.4 MG/DL (ref 8.7–10.5)
CHLORIDE SERPL-SCNC: 108 MMOL/L (ref 95–110)
CO2 SERPL-SCNC: 24 MMOL/L (ref 23–29)
CREAT SERPL-MCNC: 0.7 MG/DL (ref 0.5–1.4)
ERYTHROCYTE [DISTWIDTH] IN BLOOD BY AUTOMATED COUNT: 12.7 % (ref 11.5–14.5)
EST. GFR  (NO RACE VARIABLE): >60 ML/MIN/1.73 M^2
GLUCOSE SERPL-MCNC: 99 MG/DL (ref 70–110)
HCT VFR BLD AUTO: 32.4 % (ref 37–48.5)
HGB BLD-MCNC: 11 G/DL (ref 12–16)
MAGNESIUM SERPL-MCNC: 1.8 MG/DL (ref 1.6–2.6)
MCH RBC QN AUTO: 32.8 PG (ref 27–31)
MCHC RBC AUTO-ENTMCNC: 34 G/DL (ref 32–36)
MCV RBC AUTO: 97 FL (ref 82–98)
PLATELET # BLD AUTO: 121 K/UL (ref 150–450)
PMV BLD AUTO: 10.6 FL (ref 9.2–12.9)
POTASSIUM SERPL-SCNC: 3.8 MMOL/L (ref 3.5–5.1)
PROCALCITONIN SERPL IA-MCNC: 0.24 NG/ML
PROT SERPL-MCNC: 5.8 G/DL (ref 6–8.4)
RBC # BLD AUTO: 3.35 M/UL (ref 4–5.4)
SODIUM SERPL-SCNC: 139 MMOL/L (ref 136–145)
WBC # BLD AUTO: 5.46 K/UL (ref 3.9–12.7)

## 2023-05-24 PROCEDURE — 85027 COMPLETE CBC AUTOMATED: CPT | Performed by: NURSE PRACTITIONER

## 2023-05-24 PROCEDURE — 80053 COMPREHEN METABOLIC PANEL: CPT | Performed by: NURSE PRACTITIONER

## 2023-05-24 PROCEDURE — 25000003 PHARM REV CODE 250: Performed by: NURSE PRACTITIONER

## 2023-05-24 PROCEDURE — 84145 PROCALCITONIN (PCT): CPT | Performed by: NURSE PRACTITIONER

## 2023-05-24 PROCEDURE — 94640 AIRWAY INHALATION TREATMENT: CPT

## 2023-05-24 PROCEDURE — 63600175 PHARM REV CODE 636 W HCPCS: Performed by: NURSE PRACTITIONER

## 2023-05-24 PROCEDURE — 94761 N-INVAS EAR/PLS OXIMETRY MLT: CPT

## 2023-05-24 PROCEDURE — 97116 GAIT TRAINING THERAPY: CPT | Mod: CQ

## 2023-05-24 PROCEDURE — 25000242 PHARM REV CODE 250 ALT 637 W/ HCPCS: Performed by: NURSE PRACTITIONER

## 2023-05-24 PROCEDURE — 83735 ASSAY OF MAGNESIUM: CPT | Performed by: NURSE PRACTITIONER

## 2023-05-24 RX ORDER — AZITHROMYCIN 250 MG/1
TABLET, FILM COATED ORAL
Qty: 6 TABLET | Refills: 0 | Status: SHIPPED | OUTPATIENT
Start: 2023-05-24 | End: 2023-05-29

## 2023-05-24 RX ADMIN — AZITHROMYCIN MONOHYDRATE 500 MG: 500 INJECTION, POWDER, LYOPHILIZED, FOR SOLUTION INTRAVENOUS at 10:05

## 2023-05-24 RX ADMIN — CEFTRIAXONE 1 G: 1 INJECTION, POWDER, FOR SOLUTION INTRAMUSCULAR; INTRAVENOUS at 08:05

## 2023-05-24 RX ADMIN — SODIUM CHLORIDE: 9 INJECTION, SOLUTION INTRAVENOUS at 04:05

## 2023-05-24 RX ADMIN — SERTRALINE HYDROCHLORIDE 25 MG: 25 TABLET ORAL at 08:05

## 2023-05-24 RX ADMIN — DOCUSATE SODIUM AND SENNOSIDES 1 TABLET: 8.6; 5 TABLET, FILM COATED ORAL at 08:05

## 2023-05-24 RX ADMIN — IPRATROPIUM BROMIDE AND ALBUTEROL SULFATE 3 ML: .5; 3 SOLUTION RESPIRATORY (INHALATION) at 07:05

## 2023-05-24 RX ADMIN — LISINOPRIL 5 MG: 2.5 TABLET ORAL at 08:05

## 2023-05-24 RX ADMIN — PANTOPRAZOLE SODIUM 40 MG: 40 TABLET, DELAYED RELEASE ORAL at 08:05

## 2023-05-24 NOTE — CARE UPDATE
05/24/23 0734   Patient Assessment/Suction   Level of Consciousness (AVPU) alert   Respiratory Effort Normal;Unlabored   Expansion/Accessory Muscles/Retractions expansion symmetric;no use of accessory muscles   All Lung Fields Breath Sounds Anterior:   DEREK Breath Sounds clear   LLL Breath Sounds clear   RUL Breath Sounds diminished   RML Breath Sounds diminished   RLL Breath Sounds diminished   Rhythm/Pattern, Respiratory pattern regular;unlabored   Cough Frequency infrequent   Cough Type nonproductive   PRE-TX-O2   Device (Oxygen Therapy) room air   SpO2 (!) 94 %   Pulse Oximetry Type Intermittent   $ Pulse Oximetry - Multiple Charge Pulse Oximetry - Multiple   Pulse 72   Resp 17   Aerosol Therapy   $ Aerosol Therapy Charges Aerosol Treatment   Respiratory Treatment Status (SVN) given   Treatment Route (SVN) mouthpiece   Patient Position (SVN) HOB elevated   Post Treatment Assessment (SVN) breath sounds unchanged   Signs of Intolerance (SVN) none   Breath Sounds Post-Respiratory Treatment   Throughout All Fields Post-Treatment All Fields   Throughout All Fields Post-Treatment aeration increased   Post-treatment Heart Rate (beats/min) 73   Post-treatment Resp Rate (breaths/min) 18

## 2023-05-24 NOTE — DISCHARGE SUMMARY
[facility] Medicine  Discharge Summary      Patient Name: Angelique Hamilton  MRN: [id number]  MILAN: [id number]  Patient Class: IP- Inpatient  Admission Date: 5/22/2023  Hospital Length of Stay: 2 days  Discharge Date and Time: 5/24/2023 12:29 PM  Attending Physician: No att. providers found   Discharging Provider: Clarita Oropeza NP  Primary Care Provider: Ryan Hurtado MD    Primary Care Team: Networked reference to record PCT     HPI:   Angelique Hamilton is a 89-year-old female with PMHx significant for HTN, dementia, HLD and irritable bowel syndrome.  She presented to the ED with an onset of cough and fever associated with fatigue and generalized weakness.  Patient febrile to 101° F at home.  Patient denies chest pain, shortness at breath, abdominal pain, nausea, vomiting, dysuria, or any other symptoms at this time.  Review of systems assisted by patient's daughter-in-law at bedside.  Chest x-ray and CT abdomen pelvis performed in ED revealed left-sided pneumonia.  Her oxygen saturations were noted to be in the low 90s on room air and improved to 96% on 2 L nasal cannula.  She was treated with IV antibiotics and IV hydration in ED. Patient will be admitted to the services of Hospital Medicine for further evaluation and treatment.      * No surgery found *      Hospital Course:   Patient was admitted for left lower lobe pneumonia.  She was placed on supplemental oxygen and IV antibiotics were initiated.  She was maintained on telemetry and her labs and vital signs were trended closely.  Overnight she was weaned from oxygen.  She participated with PT and OT who rec cont HH therapy. Pt felt well and desired to go home. She remained stable on room air. She was discharged to complete oral zithromax. Pt and caregiver at  verbalized understanding of discharge instructions and return precautions.    PE:  AAO x 2, pleasant, NAD  Heart - RRR, no edema, 3/6 murmur  Lungs - CTA bilat, resp  even unlabored  Abd - soft, nontender, normoactive BS         Goals of Care Treatment Preferences:  Code Status: DNR      Consults:     No new Assessment & Plan notes have been filed under this hospital service since the last note was generated.  Service: Hospital Medicine    Final Active Diagnoses:    Diagnosis Date Noted POA    PRINCIPAL PROBLEM:  Pneumonia [J18.9] 05/22/2023 Yes    Mild vascular dementia without behavioral disturbance, psychotic disturbance, mood disturbance, or anxiety [F01.A0] 01/12/2023 Yes    Stage 3a chronic kidney disease [N18.31] 03/12/2021 Yes    Irritable bowel syndrome with both constipation and diarrhea [K58.2] 10/25/2019 Yes    HTN (hypertension) [I10] 06/14/2016 Yes      Problems Resolved During this Admission:       Discharged Condition: good    Disposition: Home-Health Care [facility]    Follow Up:   Follow-up Information     Ryan Hurtado MD Follow up in 1 week(s).    Specialties: Family Medicine, Home Health Services, Hospice Services  Why: hospital follow up  Contact information:  [address]  [address]  [place] [address]  [phone]                       Patient Instructions:      Ambulatory referral/consult to Home Health   Standing Status: Future   Referral Priority: Routine Referral Type: Home Health Care   Referral Reason: Specialty Services Required   Requested Specialty: Home Health Services   Number of Visits Requested: 1     Diet Cardiac     Notify your health care provider if you experience any of the following:  temperature >100.4     Notify your health care provider if you experience any of the following:  persistent nausea and vomiting or diarrhea     Notify your health care provider if you experience any of the following:  severe uncontrolled pain     Notify your health care provider if you experience any of the following:  difficulty breathing or increased cough     Notify your health care provider if you experience any of the  following:  persistent dizziness, light-headedness, or visual disturbances     Notify your health care provider if you experience any of the following:  increased confusion or weakness     SUBSEQUENT HOME HEALTH ORDERS     Order Specific Question Answer Comments   What Home Health Agency is the patient currently using? Other/External      Activity as tolerated       Significant Diagnostic Studies: Labs:   CMP   Recent Labs   Lab 05/23/23 0458 05/24/23 0518    139   K 4.1 3.8    108   CO2 26 24   GLU 95 99   BUN 22 13   CREATININE 0.8 0.7   CALCIUM 9.2 9.4   PROT 5.4* 5.8*   ALBUMIN 2.7* 2.8*   BILITOT 0.9 1.0   ALKPHOS 71 76   AST 17 21   ALT 13 13   ANIONGAP 6* 7*   , CBC   Recent Labs   Lab 05/23/23 0458 05/24/23 0518   WBC 6.81 5.46   HGB 9.9* 11.0*   HCT 30.0* 32.4*   * 121*    and All labs within the past 24 hours have been reviewed    Pending Diagnostic Studies:     None         Medications:  Reconciled Home Medications:      Medication List      START taking these medications    azithromycin 250 MG tablet  Commonly known as: Z-REBECCA  Take 2 tablets by mouth on day 1; Take 1 tablet by mouth on days 2-5        CHANGE how you take these medications    QUEtiapine 50 MG tablet  Commonly known as: SEROQUEL  Take 1 tablet (50 mg total) by mouth every evening.  What changed: additional instructions        CONTINUE taking these medications    biotin 1 mg tablet  Take 1,000 mcg by mouth 3 (three) times daily.     calcium-vitamin D3 500 mg-5 mcg (200 unit) per tablet  Commonly known as: OS-AYSHA 500 + D3  Take 1 tablet by mouth 2 (two) times daily with meals.     cholecalciferol (vitamin D3) 50 mcg (2,000 unit) Tab  Commonly known as: VITAMIN D3  Take 2,000 Units by mouth once daily.     donepeziL 5 MG Tbdl  Commonly known as: ARICEPT ODT  Take 5 mg by mouth nightly.     famotidine 20 MG tablet  Commonly known as: PEPCID  Take 1 tablet (20 mg total) by mouth 2 (two) times daily.     ferrous sulfate  325 mg (65 mg iron) Tab tablet  Commonly known as: FEOSOL  Take 325 mg by mouth daily with breakfast.     hydroCHLOROthiazide 25 MG tablet  Commonly known as: HYDRODIURIL  Take 1 tablet (25 mg total) by mouth every other day.     lisinopriL 10 MG tablet  Take 5 mg by mouth once daily.     meclizine 12.5 mg tablet  Commonly known as: ANTIVERT  Take 0.5 tablets (6.25 mg total) by mouth 2 (two) times daily as needed for Dizziness (1/2 tablet twice daily as needed for dizziness).     mupirocin 2 % ointment  Commonly known as: BACTROBAN  Apply topically 2 (two) times daily.     pantoprazole 40 MG tablet  Commonly known as: PROTONIX  Take 1 tablet (40 mg total) by mouth once daily.     sertraline 25 MG tablet  Commonly known as: ZOLOFT  Take 25 mg by mouth once daily.     tolterodine 2 MG Cp24  Commonly known as: DETROL LA  Take 1 capsule (2 mg total) by mouth once daily. For bladder     tramadol-acetaminophen 37.5-325 mg 37.5-325 mg Tab  Commonly known as: ULTRACET  Take 1 tablet by mouth 2 (two) times a day.     verapamiL 120 MG CR tablet  Commonly known as: CALAN-SR  Take 1 tablet (120 mg total) by mouth once daily.            Indwelling Lines/Drains at time of discharge:   Lines/Drains/Airways     None                 Time spent on the discharge of patient: 46 minutes         Clarita Oropeza NP  Department of Hospital Medicine  [facility]

## 2023-05-24 NOTE — PLAN OF CARE
Problem: Physical Therapy  Goal: Physical Therapy Goal  Description: Goals to be met by: 23     Patient will increase functional independence with mobility by performin. Supine to sit with [place]  2. Sit to supine with [place]  3. Sit to stand transfer with [place]  4. Bed to chair transfer with Supervision using Rolling Walker  5. Gait  x 150 feet with Supervision using Rolling Walker.     Outcome: Ongoing, Progressing   Ambulate with rollator and assistance for safety.

## 2023-05-24 NOTE — PLAN OF CARE
Pt cleared for discharge from case management  Sent email to [facility] schedulers requesting HFU appt and asked that they call pt with appt.     05/24/23 0982   Final Note   Assessment Type Final Discharge Note   Anticipated Discharge Disposition Home-Health   What phone number can be called within the next 1-3 days to see how you are doing after discharge?   ([phone])   Post-Acute Status   Post-Acute Authorization Home Health   Home Health Status Referrals Sent

## 2023-05-24 NOTE — PHYSICIAN QUERY
PT Name: Angelique Hamilton  MR #: [id number]     DOCUMENTATION CLARIFICATION     CDS: John Wells RN CCDS               Contact information: [email or web address]    This form is a permanent document in the medical record.     Query Date: May 24, 2023    By submitting this query, we are merely seeking further clarification of documentation.  Please utilize your independent clinical judgment when addressing the question(s) below.    The Medical Record contains the following:   Indicators   Supporting Clinical Findings Location in Medical Record    Non-blanchable erythema/redness      Ulcer/Injury/Skin Breakdown      Deep Tissue Injury       x Wound care consult Consulted for wounds present on admit from home. Patient is noted with a stage 2 to her right posterior heel and moisture associated skin irritation to buttocks and sacral areas.     Right posterior heel wound stage 2 measures 2 cm x 1 cm x 0.2cm      Scar tissue to upper sacral area and redness from moisture to lower sacrum and bilateral buttocks.   5/22/2023 Wound care consult     x Acute/Chronic Illness Pneumonia  Mild vascular dementia without behavioral disturbance, psychotic disturbance, mood disturbance, or anxiety  Irritable bowel syndrome with both constipation and diarrhea 5/22/2023 H&P     x Medication/Treatment Silver dressing applied to right posterior heel wound site and bilateral foam dressings applied to both heels. Triad to be applied to the buttocks and sacral areas every shift. Wound care will follow throughout hospital stay.  5/22/2023 Wound care consult     x Other She presents with the following impairments/functional limitations: weakness, impaired endurance, impaired functional mobility, gait instability, impaired balance, decreased lower extremity function, decreased ROM. Patient presented supine in bed and was able to transfer to sitting with SBA and stood with a RW and min assist.  Patient then ambulated x 80 feet RW min assist.  Prior to admission, patients level of function was modified independent.      Fer risk score 14-16 5/23/2023 PT evaluation              5/22/2023 Nursing assessment      The clinical guidelines noted are only a system guideline. It does not replace the providers clinical judgment.    Per the National Pressure Injury Advisory Panel:   A pressure injury is localized damage to the skin and underlying soft tissue usually over a bony prominence or related to a medical or other device. The injury can present as intact skin or an open ulcer and may be painful. The injury occurs as a result of intense and/or prolonged pressure or pressure in combination with shear. The tolerance of soft tissue for pressure and shear may also be affected by microclimate, nutrition, perfusion, co-morbidities and condition of the soft tissue.       Stage 1 Pressure Injury:  Intact skin with a localized area of non-blanchable erythema, which may appear differently in darkly pigmented skin. Color changes do not include purple or maroon discoloration; these may indicate deep tissue pressure injury.    Stage 2 Pressure Injury:  Partial-thickness loss of skin with exposed dermis. The wound bed is viable, pink or red, moist, and may also present as an intact or ruptured serum-filled blister.    Stage 3 Pressure Injury:  Full-thickness loss of skin, in which adipose (fat) is visible in the ulcer and granulation tissue and epibole (rolled wound edges) are often present. Slough and/or eschar may be visible. Undermining and tunneling may occur.    Stage 4 Pressure Injury:  Full-thickness skin and tissue loss with exposed or directly palpable fascia, muscle, tendon, ligament, cartilage or bone in the ulcer. Slough and/or eschar may be visible. Epibole (rolled edges), undermining and/or tunneling often occur.    Unstageable Pressure Injury:  Full-thickness skin and tissue loss in which the extent of tissue damage within the ulcer cannot be  confirmed because it is obscured by slough or eschar. If slough or eschar is removed, a Stage 3 or Stage 4 pressure injury will be revealed.    Deep Tissue Pressure Injury:  Intact or non-intact skin with localized area of persistent non-blanchable deep red, maroon, purple discoloration or epidermal separation revealing a dark wound bed or blood filled blister. This injury results from intense and/or prolonged pressure and shear forces at the bone-muscle interface. The wound may evolve rapidly to reveal the actual extent of tissue injury, or may resolve without tissue loss. If necrotic tissue, subcutaneous tissue, granulation tissue, fascia, muscle or other underlying structures are visible, this indicates a full thickness pressure injury (Unstageable, Stage 3 or Stage 4). Do not use DTPI to describe vascular, traumatic, neuropathic, or dermatologic conditions.       Provider, please provide the integumentary diagnosis related to the documentation of Right Heel, Sacrum, and Bilateral Buttocks:     Right Heel    [  x ] Pressure Injury/Decubitus Ulcer, Stage 2   [   ] Other Integumentary Diagnosis (please specify):______________   [  ] Clinically Undetermined     Sacrum    [  x ] Moisture associated dermatitis   [   ] Other Integumentary Diagnosis (please specify):______________   [  ] Clinically Undetermined     Right Buttock    [   ] Moisture associated dermatitis   [   ] Other Integumentary Diagnosis (please specify):______________   [  ] Clinically Undetermined     Left Buttock    [   ] Moisture associated dermatitis   [   ] Other Integumentary Diagnosis (please specify):______________   [  ] Clinically Undetermined       Please document in your progress notes daily for the duration of treatment until resolved and include in your discharge summary.    Reference:    BURAK Maddox., NATHALY Mason., Goldberg, M., LEIGH Martinez., BURAK Long, & SANDY Jenkins (2016). Revised National Pressure Ulcer Advisory Panel Pressure  Injury Staging System: Revised Pressure Injury Staging System. J Wound Ostomy Continence Nurs, 43(6), 585-597. doi:10.1097/won.9335407657152525    Form No.[id number]

## 2023-05-24 NOTE — PT/OT/SLP PROGRESS
Physical Therapy Treatment    Patient Name:  Angelique Hamilton   MRN:  [id number]    Recommendations:     Discharge Recommendations: home, home health PT  Discharge Equipment Recommendations: none  Barriers to discharge: None    Assessment:     Angelique Hamilton is a 89 y.o. female admitted with a medical diagnosis of Pneumonia.  She presents with the following impairments/functional limitations: weakness, impaired endurance, impaired self care skills, impaired functional mobility, gait instability . Awake, alert, supine in bed with caregiver present.  Requires A for safety with mobility . Transferred EOB with CGA. Sit > stand with rollator,  Min A and verbal cues for hand placement. Ambulated 80' with rollator and Min A. Returned to room to bed.     Rehab Prognosis: Good; patient would benefit from acute skilled PT services to address these deficits and reach maximum level of function.    Recent Surgery: * No surgery found *      Plan:     During this hospitalization, patient to be seen 6 x/week to address the identified rehab impairments via gait training, therapeutic activities, therapeutic exercises and progress toward the following goals:    Plan of Care Expires:  06/21/23    Subjective     Chief Complaint: none stated  Patient/Family Comments/goals: to return home  Pain/Comfort:  Pain Rating 1: 0/10      Objective:     Communicated with nurse Conteh prior to session.  Patient found supine with bed alarm upon PT entry to room.     General Precautions: Standard, fall  Orthopedic Precautions: N/A  Braces: N/A  Respiratory Status: Room air     Functional Mobility:  Bed Mobility:     Supine to Sit: contact guard assistance  Sit to Supine: contact guard assistance  Transfers:     Sit to Stand:  minimum assistance with 4 wheeled walker  Gait: 80' with rollator and Min A.      AM-PAC 6 CLICK MOBILITY          Treatment & Education:  Ambulated with rolllator and assistance for safety.     Patient left HOB elevated with all  lines intact, call button in reach, bed alarm on, nurse Wero notified, and caregiver present..    GOALS:   Multidisciplinary Problems       Physical Therapy Goals          Problem: Physical Therapy    Goal Priority Disciplines Outcome Goal Variances Interventions   Physical Therapy Goal     PT, PT/OT Ongoing, Progressing     Description: Goals to be met by: 23     Patient will increase functional independence with mobility by performin. Supine to sit with [place]  2. Sit to supine with [place]  3. Sit to stand transfer with [place]  4. Bed to chair transfer with Supervision using Rolling Walker  5. Gait  x 150 feet with Supervision using Rolling Walker.                          Time Tracking:     PT Received On: 23  PT Start Time: 1000     PT Stop Time: 1015  PT Total Time (min): 15 min     Billable Minutes: Gait Training 15min    Treatment Type: Treatment  PT/PTA: PTA     Number of PTA visits since last PT visit: 2023

## 2023-05-24 NOTE — NURSING
IV and tele removed. Orders reviewed.  Prescription sent to pharmacy. Pt escorted to car via w/c.pt d/c home

## 2023-05-25 ENCOUNTER — PATIENT OUTREACH (OUTPATIENT)
Dept: FAMILY MEDICINE | Facility: CLINIC | Age: 88
End: 2023-05-25

## 2023-05-25 ENCOUNTER — PATIENT OUTREACH (OUTPATIENT)
Dept: ADMINISTRATIVE | Facility: CLINIC | Age: 88
End: 2023-05-25
Payer: MEDICARE

## 2023-05-25 ENCOUNTER — TELEPHONE (OUTPATIENT)
Dept: FAMILY MEDICINE | Facility: CLINIC | Age: 88
End: 2023-05-25

## 2023-05-25 DIAGNOSIS — J18.9 SYMPTOMS OF PNEUMONIA: Primary | ICD-10-CM

## 2023-05-25 NOTE — TELEPHONE ENCOUNTER
----- Message from Vanessa Salomon LPN sent at 5/23/2023  5:36 PM CDT -----  Regarding: HFU  Call patient - needs post-hospital phone call within 2 business days and hospital follow up visit scheduled within 7-14 days.    Expected discharge- 5/24/23

## 2023-05-25 NOTE — PROGRESS NOTES
C3 nurse spoke with Angelique Jones)  for a TCC post hospital discharge follow up call. The patient does not have a scheduled HOSFU appointment with Ryan Hurtado MD  within 5-7 days post hospital discharge date 05/24/2023 C3 nurse was unable to schedule HOSFU appointment in [facility].  Patient prefer NP home visit, referral sent for scheduling.

## 2023-05-25 NOTE — PROGRESS NOTES
Discharge Information     Discharge Date:   5/24/2023    Primary Discharge Diagnosis:  Pneumonia       Discharge Summary:  Reviewed      Medication & Order Review     Were medication changes made or new medications added?   Yes    If so, has the patient filled the prescriptions?  Yes     Was Home Health ordered? Yes    If so, has Home Health contacted patient and/or initiated services?  Yes    Name of Home Health Agency?  [facility]     Durable Medical Equipment ordered?  No     If so, has the DME provider contacted patient and delivered equipment?  N/A    Follow Up               Any problems since discharge? No    How is the patient feeling since returning home? Have you set up recommended follow up appointments?  (cardiology, surgery, etc.)    Schedule Hospital Follow-up appointment within 7-14 days (preferably 7).      Notes:  Spoke to pts daughter Karen and she stated pt went to the ER for pneumonia. Karen stated the pt was going okay but today she started having diarrhea but while in the hospital she was getting stool softners. Offered HFU appointment, Karen stated it is very hard for the pt to get in and out of the house and they are having a NP come to the house to see her. Karen did not know NP's Name. Karen stated  came out to see the pt today.            Jory Persaud

## 2023-05-27 LAB
BACTERIA BLD CULT: NORMAL
BACTERIA BLD CULT: NORMAL

## 2023-05-29 ENCOUNTER — DOCUMENT SCAN (OUTPATIENT)
Dept: HOME HEALTH SERVICES | Facility: HOSPITAL | Age: 88
End: 2023-05-29
Payer: MEDICARE

## 2023-06-05 ENCOUNTER — DOCUMENT SCAN (OUTPATIENT)
Dept: HOME HEALTH SERVICES | Facility: HOSPITAL | Age: 88
End: 2023-06-05
Payer: MEDICARE

## 2023-06-06 ENCOUNTER — OFFICE VISIT (OUTPATIENT)
Dept: HOME HEALTH SERVICES | Facility: CLINIC | Age: 88
End: 2023-06-06
Payer: MEDICARE

## 2023-06-06 VITALS
SYSTOLIC BLOOD PRESSURE: 124 MMHG | OXYGEN SATURATION: 97 % | TEMPERATURE: 98 F | RESPIRATION RATE: 16 BRPM | DIASTOLIC BLOOD PRESSURE: 62 MMHG | HEART RATE: 72 BPM

## 2023-06-06 DIAGNOSIS — D69.6 THROMBOCYTOPENIA, UNSPECIFIED: Primary | ICD-10-CM

## 2023-06-06 DIAGNOSIS — I10 PRIMARY HYPERTENSION: ICD-10-CM

## 2023-06-06 DIAGNOSIS — Z87.01 HISTORY OF PNEUMONIA: ICD-10-CM

## 2023-06-06 PROCEDURE — 99350 HOME/RES VST EST HIGH MDM 60: CPT | Mod: S$GLB,,, | Performed by: NURSE PRACTITIONER

## 2023-06-06 PROCEDURE — 99350 PR HOME VISIT,ESTAB PATIENT,LEVEL IV: ICD-10-PCS | Mod: S$GLB,,, | Performed by: NURSE PRACTITIONER

## 2023-06-06 NOTE — PROGRESS NOTES
[facility]  Transition of Care Home Visit    Visit Date: 6/6/2023  Encounter Provider: Luna Duarte   PCP:  Ryan Hurtado MD    PRESENTING HISTORY      Patient ID: Angelique Hamilton is a 89 y.o. female.    Consult Requested By:  Dr. Gabo Stafford  Reason for Consult:  Hospital Follow Up.    Angelique is being seen at home due to being seen at home due to physical debility that presents a taxing effort to leave the home, to mitigate high risk of hospital readmission and/or due to the limited availability of reliable or safe options for transportation to the point of access to the provider. Prior to treatment on this visit the chart was reviewed and patient verbal consent was obtained.      Chief Complaint: Transitional Care        History of Present Illness: Ms. Angelique Hamilton is a 89 y.o. female who was recently admitted to the hospital.    Admit: 5/22/23  Discharge: 5/24/23  Hospital Course:   Patient was admitted for left lower lobe pneumonia.  She was placed on supplemental oxygen and IV antibiotics were initiated.  She was maintained on telemetry and her labs and vital signs were trended closely.  Overnight she was weaned from oxygen.  She participated with PT and OT who rec cont HH therapy. Pt felt well and desired to go home. She remained stable on room air. She was discharged to complete oral zithromax. Pt and caregiver at  verbalized understanding of discharge instructions and return precautions.  ___________________________________________________________________    Today:    HPI:  Pt is being seen today in her home for hospital followup. See hospital course.     is awake and alert. She is found watching TV. She is pleasant but confused. Cannot recall being in hospital or she was hospitalized. Her daughter in law is present and reports her symptoms from pneumonia have resolved. She is in her usual state of health today. She has completed her antibiotics.  She is compliant with her  medications, appetite is good.  No recent falls         Review of Systems   Constitutional:  Negative for activity change, fatigue and fever.   HENT: Negative.     Eyes: Negative.    Respiratory:  Negative for chest tightness.    Cardiovascular: Negative.  Negative for leg swelling.   Gastrointestinal: Negative.    Endocrine: Negative.    Genitourinary: Negative.    Musculoskeletal:  Positive for gait problem.   Skin: Negative.    Allergic/Immunologic: Negative.    Hematological: Negative.    Psychiatric/Behavioral:  Positive for confusion. Negative for agitation.    All other systems reviewed and are negative.    Assessments:  Environmental: lives with son, open paths, adequate light and temp  Functional Status: requires assistance  Safety: fall risk  Nutritional: adequate available  Home Health/DME/Supplies: [facility]    PAST HISTORY:     Past Medical History:   Diagnosis Date    Back problem     Constipation     History of fractured vertebra     Hyperlipidemia     Hypertension     IBS (irritable bowel syndrome)     Migraine headache     Osteoporosis     Rectal prolapse     Scoliosis     Vertigo        Past Surgical History:   Procedure Laterality Date    LEWIS      Back Pain    EYE SURGERY      Bilateral Cataracs    HYSTERECTOMY      OOPHORECTOMY      ROTATOR CUFF REPAIR      bialteral    TONSILLECTOMY         Family History   Problem Relation Age of Onset    Colon cancer Father     Breast cancer Mother 70    Hypertension Mother     Ovarian cancer Neg Hx        Social History     Socioeconomic History    Marital status:    Tobacco Use    Smoking status: Never    Smokeless tobacco: Never   Substance and Sexual Activity    Alcohol use: No    Drug use: No    Sexual activity: Not Currently     Partners: Male     Birth control/protection: None     Social Determinants of Health     Financial Resource Strain: Low Risk     Difficulty of Paying Living Expenses: Not hard at all   Food Insecurity: No Food  Insecurity    Worried About Running Out of Food in the Last Year: Never true    Ran Out of Food in the Last Year: Never true   Transportation Needs: No Transportation Needs    Lack of Transportation (Medical): No    Lack of Transportation (Non-Medical): No   Physical Activity: Unknown    Days of Exercise per Week: Patient refused    Minutes of Exercise per Session: Patient refused   Stress: Unknown    Feeling of Stress : Patient refused   Social Connections: Unknown    Frequency of Communication with Friends and Family: Patient refused    Frequency of Social Gatherings with Friends and Family: Patient refused    Attends [employer] Services: Patient refused    Active Member of Clubs or Organizations: Patient refused    Attends Club or Organization Meetings: Patient refused    Marital Status: Patient refused   Housing Stability: Low Risk     Unable to Pay for Housing in the Last Year: No    Number of Places Lived in the Last Year: 1    Unstable Housing in the Last Year: No       MEDICATIONS & ALLERGIES:     Current Outpatient Medications on File Prior to Visit   Medication Sig Dispense Refill    biotin 1 mg tablet Take 1,000 mcg by mouth 3 (three) times daily.      calcium-vitamin D3 (OS-AYSHA 500 + D3) 500 mg-5 mcg (200 unit) per tablet Take 1 tablet by mouth 2 (two) times daily with meals.      cholecalciferol, vitamin D3, (VITAMIN D3) 2,000 unit Tab Take 2,000 Units by mouth once daily.       donepeziL (ARICEPT ODT) 5 MG TbDL Take 5 mg by mouth nightly.      famotidine (PEPCID) 20 MG tablet Take 1 tablet (20 mg total) by mouth 2 (two) times daily. 180 tablet 1    ferrous sulfate (FEOSOL) 325 mg (65 mg iron) Tab tablet Take 325 mg by mouth daily with breakfast.      hydroCHLOROthiazide (HYDRODIURIL) 25 MG tablet Take 1 tablet (25 mg total) by mouth every other day. 45 tablet 1    lisinopriL 10 MG tablet Take 5 mg by mouth once daily.      meclizine (ANTIVERT) 12.5 mg tablet Take 0.5 tablets (6.25 mg total) by mouth 2  (two) times daily as needed for Dizziness (1/2 tablet twice daily as needed for dizziness). 90 tablet 1    mupirocin (BACTROBAN) 2 % ointment Apply topically 2 (two) times daily. 30 g 2    pantoprazole (PROTONIX) 40 MG tablet Take 1 tablet (40 mg total) by mouth once daily. 90 tablet 1    QUEtiapine (SEROQUEL) 50 MG tablet Take 1 tablet (50 mg total) by mouth every evening. (Patient taking differently: Take 50 mg by mouth every evening. Take 2 tablets nightly) 30 tablet 5    sertraline (ZOLOFT) 25 MG tablet Take 25 mg by mouth once daily.      tolterodine (DETROL LA) 2 MG Cp24 Take 1 capsule (2 mg total) by mouth once daily. For bladder 30 capsule 3    tramadol-acetaminophen 37.5-325 mg (ULTRACET) 37.5-325 mg Tab Take 1 tablet by mouth 2 (two) times a day. 180 tablet 0    verapamiL (CALAN-SR) 120 MG CR tablet Take 1 tablet (120 mg total) by mouth once daily. 90 tablet 3     No current facility-administered medications on file prior to visit.        Review of patient's allergies indicates:   Allergen Reactions    Antihistamines - alkylamine     Torrie [amlodipine-olmesartan]     Flexeril [cyclobenzaprine]      Hallucinations    Hydrocodone     Hydrocodone-acetaminophen Other (See Comments)    Simvastatin     Statins-hmg-coa reductase inhibitors        OBJECTIVE:     Vital Signs:  Vitals:    06/06/23 1016   BP: 124/62   Pulse: 72   Resp: 16   Temp: 97.8 °F (36.6 °C)     There is no height or weight on file to calculate BMI.     Physical Exam:  Physical Exam    Laboratory  Lab Results   Component Value Date    WBC 5.46 05/24/2023    HGB 11.0 (L) 05/24/2023    HCT 32.4 (L) 05/24/2023    MCV 97 05/24/2023     (L) 05/24/2023     Lab Results   Component Value Date    INR 1.0 06/13/2016     No results found for: HGBA1C  No results for input(s): POCTGLUCOSE in the last 72 hours.    Diagnostic Results:      TRANSITION OF CARE:     [facility] On Call Contact Note: 5/25/23    Family and/or Caretaker present at visit?   Yes.  Diagnostic tests reviewed/disposition: No diagnosic tests pending after this hospitalization.  Disease/illness education:   Home health/community services discussion/referrals: [facility].   Establishment or re-establishment of referral orders for community resources: No other necessary community resources.   Discussion with other health care providers: No discussion with other health care providers necessary.     Transition of Care Visit:  I have reviewed and updated the history and problem list.  I have reconciled the medication list.  I have discussed the hospitalization and current medical issues, prognosis and plans with the patient/family.  I  spent more than 50% of time discussing the care with the patient/family.  Total Face-to-Face Encounter: 60 minutes.    Medications Reconciliation:   I have reconciled the patient's home medications and discharge medications with the patient/family. I have updated all changes.  Refer to After-Visit Medication List.    ASSESSMENT & PLAN:     HIGH RISK CONDITION(S):      Problem List Items Addressed This Visit          Pulmonary    History of pneumonia    Current Assessment & Plan     Recent hospitalization for pneumonia  Antibiotics completed  All symptoms resolved            Cardiac/Vascular    HTN (hypertension)    Current Assessment & Plan     B/P at goal today  HCTZ and Lisinopril in place.  Continue current regimen  Monitor              Hematology    Thrombocytopenia, unspecified - Primary    Current Assessment & Plan     Chronic low Plt count.  Purpura noted to extremities  Monitor             Were controlled substances prescribed?  No    Instructions for the patient:  - Continue all medications, treatments and therapies as ordered.   - Follow all instructions, recommendations as discussed.  - Maintain Safety Precautions at all times.  - Attend all medical appointments as scheduled.  - For worsening symptoms: call Primary Care Physician or Nurse Practitioner.  - For  emergencies, call 911 or immediately report to the nearest emergency room.  - Limit Risks of environmental exposure to coronavirus/COVID-19 as discussed including: social distancing, hand hygiene, and limiting departures from the home for necessities only.     Questions elicited and answered.  Contact information provided for any changes in condition or concerns    Scheduled Follow-up :  Future Appointments   Date Time Provider Department Center   7/3/2023  2:00 PM [facility]ado [facility]       After Visit Medication List :     Medication List            Accurate as of June 6, 2023  4:20 PM. If you have any questions, ask your nurse or doctor.                CHANGE how you take these medications      QUEtiapine 50 MG tablet  Commonly known as: SEROQUEL  Take 1 tablet (50 mg total) by mouth every evening.  What changed: additional instructions            CONTINUE taking these medications      biotin 1 mg tablet     calcium-vitamin D3 500 mg-5 mcg (200 unit) per tablet  Commonly known as: OS-AYSHA 500 + D3     cholecalciferol (vitamin D3) 50 mcg (2,000 unit) Tab  Commonly known as: VITAMIN D3     donepeziL 5 MG Tbdl  Commonly known as: ARICEPT ODT     famotidine 20 MG tablet  Commonly known as: PEPCID  Take 1 tablet (20 mg total) by mouth 2 (two) times daily.     ferrous sulfate 325 mg (65 mg iron) Tab tablet  Commonly known as: FEOSOL     hydroCHLOROthiazide 25 MG tablet  Commonly known as: HYDRODIURIL  Take 1 tablet (25 mg total) by mouth every other day.     lisinopriL 10 MG tablet     meclizine 12.5 mg tablet  Commonly known as: ANTIVERT  Take 0.5 tablets (6.25 mg total) by mouth 2 (two) times daily as needed for Dizziness (1/2 tablet twice daily as needed for dizziness).     mupirocin 2 % ointment  Commonly known as: BACTROBAN  Apply topically 2 (two) times daily.     pantoprazole 40 MG tablet  Commonly known as: PROTONIX  Take 1 tablet (40 mg total) by mouth once daily.     sertraline 25 MG  tablet  Commonly known as: ZOLOFT     tolterodine 2 MG Cp24  Commonly known as: DETROL LA  Take 1 capsule (2 mg total) by mouth once daily. For bladder     tramadol-acetaminophen 37.5-325 mg 37.5-325 mg Tab  Commonly known as: ULTRACET  Take 1 tablet by mouth 2 (two) times a day.     verapamiL 120 MG CR tablet  Commonly known as: CALAN-SR  Take 1 tablet (120 mg total) by mouth once daily.              Signature: Luna Duarte NP

## 2023-06-09 PROCEDURE — G0179 MD RECERTIFICATION HHA PT: HCPCS | Mod: ,,, | Performed by: FAMILY MEDICINE

## 2023-06-09 PROCEDURE — G0179 PR HOME HEALTH MD RECERTIFICATION: ICD-10-PCS | Mod: ,,, | Performed by: FAMILY MEDICINE

## 2023-06-16 ENCOUNTER — EXTERNAL HOME HEALTH (OUTPATIENT)
Dept: HOME HEALTH SERVICES | Facility: HOSPITAL | Age: 88
End: 2023-06-16
Payer: MEDICARE

## 2023-06-19 RX ORDER — FAMOTIDINE 20 MG/1
20 TABLET, FILM COATED ORAL 2 TIMES DAILY
Qty: 180 TABLET | Refills: 1 | Status: SHIPPED | OUTPATIENT
Start: 2023-06-19 | End: 2023-10-05 | Stop reason: SDUPTHER

## 2023-06-19 RX ORDER — TRAMADOL HYDROCHLORIDE AND ACETAMINOPHEN 37.5; 325 MG/1; MG/1
1 TABLET, FILM COATED ORAL 2 TIMES DAILY
Qty: 180 TABLET | Refills: 0 | Status: SHIPPED | OUTPATIENT
Start: 2023-06-19 | End: 2023-09-21 | Stop reason: SDUPTHER

## 2023-06-22 DIAGNOSIS — N32.81 OAB (OVERACTIVE BLADDER): ICD-10-CM

## 2023-06-22 RX ORDER — TOLTERODINE 2 MG/1
2 CAPSULE, EXTENDED RELEASE ORAL DAILY
Qty: 90 CAPSULE | Refills: 1 | Status: SHIPPED | OUTPATIENT
Start: 2023-06-22 | End: 2023-10-05 | Stop reason: SDUPTHER

## 2023-07-03 ENCOUNTER — TELEPHONE (OUTPATIENT)
Dept: FAMILY MEDICINE | Facility: CLINIC | Age: 88
End: 2023-07-03

## 2023-07-03 ENCOUNTER — OFFICE VISIT (OUTPATIENT)
Dept: FAMILY MEDICINE | Facility: CLINIC | Age: 88
End: 2023-07-03
Payer: MEDICARE

## 2023-07-03 VITALS
DIASTOLIC BLOOD PRESSURE: 80 MMHG | SYSTOLIC BLOOD PRESSURE: 128 MMHG | HEIGHT: 55 IN | BODY MASS INDEX: 22.45 KG/M2 | WEIGHT: 97 LBS | HEART RATE: 69 BPM

## 2023-07-03 DIAGNOSIS — Z87.01 HISTORY OF PNEUMONIA: ICD-10-CM

## 2023-07-03 DIAGNOSIS — N18.31 STAGE 3A CHRONIC KIDNEY DISEASE: ICD-10-CM

## 2023-07-03 DIAGNOSIS — M41.9 KYPHOSCOLIOSIS: ICD-10-CM

## 2023-07-03 DIAGNOSIS — E78.2 MIXED HYPERLIPIDEMIA: ICD-10-CM

## 2023-07-03 DIAGNOSIS — I35.0 AORTIC STENOSIS WITH TRILEAFLET VALVE: Primary | ICD-10-CM

## 2023-07-03 DIAGNOSIS — F01.A0 MILD VASCULAR DEMENTIA WITHOUT BEHAVIORAL DISTURBANCE, PSYCHOTIC DISTURBANCE, MOOD DISTURBANCE, OR ANXIETY: ICD-10-CM

## 2023-07-03 DIAGNOSIS — I10 PRIMARY HYPERTENSION: ICD-10-CM

## 2023-07-03 PROCEDURE — 99214 PR OFFICE/OUTPT VISIT, EST, LEVL IV, 30-39 MIN: ICD-10-PCS | Mod: S$GLB,,, | Performed by: FAMILY MEDICINE

## 2023-07-03 PROCEDURE — 99214 OFFICE O/P EST MOD 30 MIN: CPT | Mod: S$GLB,,, | Performed by: FAMILY MEDICINE

## 2023-07-03 NOTE — PROGRESS NOTES
SUBJECTIVE:    Patient ID: Angelique Hamilton is a 89 y.o. female.    Chief Complaint: Follow-up (Dental problem and left ear discomfort, no bottles, abc  )     89-year-old female brought in with her daughter.  Patient is eating well and eats 2 meals per day plus snacks.  She does not complain of shortness a breath or chest pain at this time.      Episode of fecal impaction back in May of 2022.  She was admitted to the hospital at that time for left lower lobe pneumonia.  Pneumonia has now resolved.  She has no cough or shortness of breath.  For constipation she takes oral stool softener Colace every other day.     History of aortic stenosis, hypertension followed by Cardiology Dr. Julio Cesar Barreto.      History of urinary frequency but no incontinence.  No nocturia while taking Detrol.  She sleeps well at night.      Right buccal lesion -caused by accidentally biting, now using Orajel rinse and gel.    Milk of  Magnesia for coating.     Ambulates with a Rollator at home.  No recent falls.  Osteoarthritis -her neck tilt to the left     Vascular dementia -poor short-term memory.  Family checks on her daily.      Admission on 05/22/2023, Discharged on 05/24/2023   Component Date Value Ref Range Status    Blood Culture, Routine 05/22/2023 No growth after 5 days.   Final    Blood Culture, Routine 05/22/2023 No growth after 5 days.   Final    WBC 05/22/2023 10.23  3.90 - 12.70 K/uL Final    RBC 05/22/2023 3.72 (L)  4.00 - 5.40 M/uL Final    Hemoglobin 05/22/2023 12.3  12.0 - 16.0 g/dL Final    Hematocrit 05/22/2023 36.4 (L)  37.0 - 48.5 % Final    MCV 05/22/2023 98  82 - 98 fL Final    MCH 05/22/2023 33.1 (H)  27.0 - 31.0 pg Final    MCHC 05/22/2023 33.8  32.0 - 36.0 g/dL Final    RDW 05/22/2023 12.8  11.5 - 14.5 % Final    Platelets 05/22/2023 122 (L)  150 - 450 K/uL Final    MPV 05/22/2023 10.4  9.2 - 12.9 fL Final    Immature Granulocytes 05/22/2023 0.4  0.0 - 0.5 % Final    Gran # (ANC) 05/22/2023 9.0 (H)  1.8 - 7.7  K/uL Final    Immature Grans (Abs) 05/22/2023 0.04  0.00 - 0.04 K/uL Final    Lymph # 05/22/2023 0.5 (L)  1.0 - 4.8 K/uL Final    Mono # 05/22/2023 0.7  0.3 - 1.0 K/uL Final    Eos # 05/22/2023 0.0  0.0 - 0.5 K/uL Final    Baso # 05/22/2023 0.03  0.00 - 0.20 K/uL Final    nRBC 05/22/2023 0  0 /100 WBC Final    Gran % 05/22/2023 87.4 (H)  38.0 - 73.0 % Final    Lymph % 05/22/2023 5.3 (L)  18.0 - 48.0 % Final    Mono % 05/22/2023 6.4  4.0 - 15.0 % Final    Eosinophil % 05/22/2023 0.2  0.0 - 8.0 % Final    Basophil % 05/22/2023 0.3  0.0 - 1.9 % Final    Differential Method 05/22/2023 Automated   Final    Sodium 05/22/2023 140  136 - 145 mmol/L Final    Potassium 05/22/2023 4.1  3.5 - 5.1 mmol/L Final    Chloride 05/22/2023 104  95 - 110 mmol/L Final    CO2 05/22/2023 28  23 - 29 mmol/L Final    Glucose 05/22/2023 106  70 - 110 mg/dL Final    BUN 05/22/2023 36 (H)  8 - 23 mg/dL Final    Creatinine 05/22/2023 1.1  0.5 - 1.4 mg/dL Final    Calcium 05/22/2023 10.7 (H)  8.7 - 10.5 mg/dL Final    Total Protein 05/22/2023 6.9  6.0 - 8.4 g/dL Final    Albumin 05/22/2023 3.6  3.5 - 5.2 g/dL Final    Total Bilirubin 05/22/2023 1.0  0.1 - 1.0 mg/dL Final    Alkaline Phosphatase 05/22/2023 85  55 - 135 U/L Final    AST 05/22/2023 22  10 - 40 U/L Final    ALT 05/22/2023 12  10 - 44 U/L Final    Anion Gap 05/22/2023 8  8 - 16 mmol/L Final    eGFR 05/22/2023 48 (A)  >60 mL/min/1.73 m^2 Final    Lactate (Lactic Acid) 05/22/2023 0.9  0.5 - 2.2 mmol/L Final    Specimen UA 05/22/2023 Urine, Clean Catch   Final    Color, UA 05/22/2023 Yellow  Yellow, Straw, Enma Final    Appearance, UA 05/22/2023 Hazy (A)  Clear Final    pH, UA 05/22/2023 7.0  5.0 - 8.0 Final    Specific Gravity, UA 05/22/2023 1.010  1.005 - 1.030 Final    Protein, UA 05/22/2023 Negative  Negative Final    Glucose, UA 05/22/2023 Negative  Negative Final    Ketones, UA 05/22/2023 Negative  Negative Final    Bilirubin (UA) 05/22/2023 Negative  Negative Final    Occult  Blood UA 05/22/2023 1+ (A)  Negative Final    Nitrite, UA 05/22/2023 Negative  Negative Final    Urobilinogen, UA 05/22/2023 Negative  <2.0 EU/dL Final    Leukocytes, UA 05/22/2023 Negative  Negative Final    RBC, UA 05/22/2023 12 (H)  0 - 4 /hpf Final    WBC, UA 05/22/2023 4  0 - 5 /hpf Final    Bacteria 05/22/2023 Many (A)  None-Occ /hpf Final    Squam Epithel, UA 05/22/2023 4  /hpf Final    Microscopic Comment 05/22/2023 SEE COMMENT   Final    Lactate (Lactic Acid) 05/22/2023 1.8  0.5 - 2.2 mmol/L Final    Procalcitonin 05/22/2023 0.30 (H)  <0.25 ng/mL Final    Sodium 05/23/2023 141  136 - 145 mmol/L Final    Potassium 05/23/2023 4.1  3.5 - 5.1 mmol/L Final    Chloride 05/23/2023 109  95 - 110 mmol/L Final    CO2 05/23/2023 26  23 - 29 mmol/L Final    Glucose 05/23/2023 95  70 - 110 mg/dL Final    BUN 05/23/2023 22  8 - 23 mg/dL Final    Creatinine 05/23/2023 0.8  0.5 - 1.4 mg/dL Final    Calcium 05/23/2023 9.2  8.7 - 10.5 mg/dL Final    Total Protein 05/23/2023 5.4 (L)  6.0 - 8.4 g/dL Final    Albumin 05/23/2023 2.7 (L)  3.5 - 5.2 g/dL Final    Total Bilirubin 05/23/2023 0.9  0.1 - 1.0 mg/dL Final    Alkaline Phosphatase 05/23/2023 71  55 - 135 U/L Final    AST 05/23/2023 17  10 - 40 U/L Final    ALT 05/23/2023 13  10 - 44 U/L Final    Anion Gap 05/23/2023 6 (L)  8 - 16 mmol/L Final    eGFR 05/23/2023 >60  >60 mL/min/1.73 m^2 Final    Magnesium 05/23/2023 1.7  1.6 - 2.6 mg/dL Final    WBC 05/23/2023 6.81  3.90 - 12.70 K/uL Final    RBC 05/23/2023 3.01 (L)  4.00 - 5.40 M/uL Final    Hemoglobin 05/23/2023 9.9 (L)  12.0 - 16.0 g/dL Final    Hematocrit 05/23/2023 30.0 (L)  37.0 - 48.5 % Final    MCV 05/23/2023 100 (H)  82 - 98 fL Final    MCH 05/23/2023 32.9 (H)  27.0 - 31.0 pg Final    MCHC 05/23/2023 33.0  32.0 - 36.0 g/dL Final    RDW 05/23/2023 13.1  11.5 - 14.5 % Final    Platelets 05/23/2023 109 (L)  150 - 450 K/uL Final    MPV 05/23/2023 10.5  9.2 - 12.9 fL Final    Sodium 05/24/2023 139  136 - 145 mmol/L  Final    Potassium 05/24/2023 3.8  3.5 - 5.1 mmol/L Final    Chloride 05/24/2023 108  95 - 110 mmol/L Final    CO2 05/24/2023 24  23 - 29 mmol/L Final    Glucose 05/24/2023 99  70 - 110 mg/dL Final    BUN 05/24/2023 13  8 - 23 mg/dL Final    Creatinine 05/24/2023 0.7  0.5 - 1.4 mg/dL Final    Calcium 05/24/2023 9.4  8.7 - 10.5 mg/dL Final    Total Protein 05/24/2023 5.8 (L)  6.0 - 8.4 g/dL Final    Albumin 05/24/2023 2.8 (L)  3.5 - 5.2 g/dL Final    Total Bilirubin 05/24/2023 1.0  0.1 - 1.0 mg/dL Final    Alkaline Phosphatase 05/24/2023 76  55 - 135 U/L Final    AST 05/24/2023 21  10 - 40 U/L Final    ALT 05/24/2023 13  10 - 44 U/L Final    Anion Gap 05/24/2023 7 (L)  8 - 16 mmol/L Final    eGFR 05/24/2023 >60  >60 mL/min/1.73 m^2 Final    Magnesium 05/24/2023 1.8  1.6 - 2.6 mg/dL Final    WBC 05/24/2023 5.46  3.90 - 12.70 K/uL Final    RBC 05/24/2023 3.35 (L)  4.00 - 5.40 M/uL Final    Hemoglobin 05/24/2023 11.0 (L)  12.0 - 16.0 g/dL Final    Hematocrit 05/24/2023 32.4 (L)  37.0 - 48.5 % Final    MCV 05/24/2023 97  82 - 98 fL Final    MCH 05/24/2023 32.8 (H)  27.0 - 31.0 pg Final    MCHC 05/24/2023 34.0  32.0 - 36.0 g/dL Final    RDW 05/24/2023 12.7  11.5 - 14.5 % Final    Platelets 05/24/2023 121 (L)  150 - 450 K/uL Final    MPV 05/24/2023 10.6  9.2 - 12.9 fL Final    Procalcitonin 05/24/2023 0.24  <0.25 ng/mL Final   Admission on 04/02/2023, Discharged on 04/02/2023   Component Date Value Ref Range Status    WBC 04/02/2023 6.13  3.90 - 12.70 K/uL Final    RBC 04/02/2023 3.83 (L)  4.00 - 5.40 M/uL Final    Hemoglobin 04/02/2023 12.6  12.0 - 16.0 g/dL Final    Hematocrit 04/02/2023 38.2  37.0 - 48.5 % Final    MCV 04/02/2023 100 (H)  82 - 98 fL Final    MCH 04/02/2023 32.9 (H)  27.0 - 31.0 pg Final    MCHC 04/02/2023 33.0  32.0 - 36.0 g/dL Final    RDW 04/02/2023 12.9  11.5 - 14.5 % Final    Platelets 04/02/2023 112 (L)  150 - 450 K/uL Final    MPV 04/02/2023 10.1  9.2 - 12.9 fL Final    Immature Granulocytes  04/02/2023 0.2  0.0 - 0.5 % Final    Gran # (ANC) 04/02/2023 4.9  1.8 - 7.7 K/uL Final    Immature Grans (Abs) 04/02/2023 0.01  0.00 - 0.04 K/uL Final    Lymph # 04/02/2023 0.8 (L)  1.0 - 4.8 K/uL Final    Mono # 04/02/2023 0.4  0.3 - 1.0 K/uL Final    Eos # 04/02/2023 0.0  0.0 - 0.5 K/uL Final    Baso # 04/02/2023 0.03  0.00 - 0.20 K/uL Final    nRBC 04/02/2023 0  0 /100 WBC Final    Gran % 04/02/2023 80.3 (H)  38.0 - 73.0 % Final    Lymph % 04/02/2023 12.2 (L)  18.0 - 48.0 % Final    Mono % 04/02/2023 6.5  4.0 - 15.0 % Final    Eosinophil % 04/02/2023 0.3  0.0 - 8.0 % Final    Basophil % 04/02/2023 0.5  0.0 - 1.9 % Final    Differential Method 04/02/2023 Automated   Final    Sodium 04/02/2023 141  136 - 145 mmol/L Final    Potassium 04/02/2023 4.6  3.5 - 5.1 mmol/L Final    Chloride 04/02/2023 107  95 - 110 mmol/L Final    CO2 04/02/2023 26  23 - 29 mmol/L Final    Glucose 04/02/2023 105  70 - 110 mg/dL Final    BUN 04/02/2023 29 (H)  8 - 23 mg/dL Final    Creatinine 04/02/2023 1.3  0.5 - 1.4 mg/dL Final    Calcium 04/02/2023 10.1  8.7 - 10.5 mg/dL Final    Total Protein 04/02/2023 6.9  6.0 - 8.4 g/dL Final    Albumin 04/02/2023 3.8  3.5 - 5.2 g/dL Final    Total Bilirubin 04/02/2023 0.8  0.1 - 1.0 mg/dL Final    Alkaline Phosphatase 04/02/2023 92  55 - 135 U/L Final    AST 04/02/2023 24  10 - 40 U/L Final    ALT 04/02/2023 13  10 - 44 U/L Final    Anion Gap 04/02/2023 8  8 - 16 mmol/L Final    eGFR 04/02/2023 39 (A)  >60 mL/min/1.73 m^2 Final    Magnesium 04/02/2023 2.0  1.6 - 2.6 mg/dL Final   Admission on 02/07/2023, Discharged on 02/08/2023   Component Date Value Ref Range Status    WBC 02/07/2023 8.35  3.90 - 12.70 K/uL Final    RBC 02/07/2023 4.06  4.00 - 5.40 M/uL Final    Hemoglobin 02/07/2023 13.1  12.0 - 16.0 g/dL Final    Hematocrit 02/07/2023 39.6  37.0 - 48.5 % Final    MCV 02/07/2023 98  82 - 98 fL Final    MCH 02/07/2023 32.3 (H)  27.0 - 31.0 pg Final    MCHC 02/07/2023 33.1  32.0 - 36.0 g/dL Final     RDW 02/07/2023 12.5  11.5 - 14.5 % Final    Platelets 02/07/2023 134 (L)  150 - 450 K/uL Final    MPV 02/07/2023 10.0  9.2 - 12.9 fL Final    Immature Granulocytes 02/07/2023 0.5  0.0 - 0.5 % Final    Gran # (ANC) 02/07/2023 7.5  1.8 - 7.7 K/uL Final    Immature Grans (Abs) 02/07/2023 0.04  0.00 - 0.04 K/uL Final    Lymph # 02/07/2023 0.4 (L)  1.0 - 4.8 K/uL Final    Mono # 02/07/2023 0.3  0.3 - 1.0 K/uL Final    Eos # 02/07/2023 0.1  0.0 - 0.5 K/uL Final    Baso # 02/07/2023 0.03  0.00 - 0.20 K/uL Final    nRBC 02/07/2023 0  0 /100 WBC Final    Gran % 02/07/2023 89.1 (H)  38.0 - 73.0 % Final    Lymph % 02/07/2023 5.3 (L)  18.0 - 48.0 % Final    Mono % 02/07/2023 3.6 (L)  4.0 - 15.0 % Final    Eosinophil % 02/07/2023 1.1  0.0 - 8.0 % Final    Basophil % 02/07/2023 0.4  0.0 - 1.9 % Final    Differential Method 02/07/2023 Automated   Final    Sodium 02/07/2023 138  136 - 145 mmol/L Final    Potassium 02/07/2023 4.1  3.5 - 5.1 mmol/L Final    Chloride 02/07/2023 103  95 - 110 mmol/L Final    CO2 02/07/2023 25  23 - 29 mmol/L Final    Glucose 02/07/2023 139 (H)  70 - 110 mg/dL Final    BUN 02/07/2023 23  8 - 23 mg/dL Final    Creatinine 02/07/2023 1.5 (H)  0.5 - 1.4 mg/dL Final    Calcium 02/07/2023 10.3  8.7 - 10.5 mg/dL Final    Total Protein 02/07/2023 7.0  6.0 - 8.4 g/dL Final    Albumin 02/07/2023 3.8  3.5 - 5.2 g/dL Final    Total Bilirubin 02/07/2023 1.1 (H)  0.1 - 1.0 mg/dL Final    Alkaline Phosphatase 02/07/2023 113  55 - 135 U/L Final    AST 02/07/2023 20  10 - 40 U/L Final    ALT 02/07/2023 10  10 - 44 U/L Final    Anion Gap 02/07/2023 10  8 - 16 mmol/L Final    eGFR 02/07/2023 33 (A)  >60 mL/min/1.73 m^2 Final    Lipase 02/07/2023 25  4 - 60 U/L Final    Specimen UA 02/07/2023 Urine, Catheterized   Final    Color, UA 02/07/2023 Yellow  Yellow, Straw, Enma Final    Appearance, UA 02/07/2023 Clear  Clear Final    pH, UA 02/07/2023 6.0  5.0 - 8.0 Final    Specific Gravity, UA 02/07/2023 1.010  1.005 -  1.030 Final    Protein, UA 02/07/2023 Trace (A)  Negative Final    Glucose, UA 02/07/2023 Negative  Negative Final    Ketones, UA 02/07/2023 Negative  Negative Final    Bilirubin (UA) 02/07/2023 Negative  Negative Final    Occult Blood UA 02/07/2023 1+ (A)  Negative Final    Nitrite, UA 02/07/2023 Negative  Negative Final    Urobilinogen, UA 02/07/2023 Negative  <2.0 EU/dL Final    Leukocytes, UA 02/07/2023 Negative  Negative Final    Occult Blood 02/07/2023 Negative  Negative Final    Stool WBC 02/07/2023 No neutrophils seen  No neutrophils seen Final    Rotavirus 02/07/2023 Negative  Negative Final    Giardia Antigen - EIA 02/07/2023 Negative  Negative Final    Cryptosporidium Antigen 02/07/2023 Negative  Negative Final    Stool Exam-Ova,Cysts,Parasites 02/07/2023 FINAL 02/13/2023 1101   Final    C. diff Antigen 02/07/2023 Negative  Negative Final    C difficile Toxins A+B, EIA 02/07/2023 Negative  Negative Final    Stool Culture 02/07/2023 No Salmonella,Shigella,Vibrio,Campylobacter,Yersinia isolated.   Final    Shiga Toxin 1 E.coli 02/07/2023 Negative   Final    Shiga Toxin 2 E.coli 02/07/2023 Negative   Final    RBC, UA 02/07/2023 10 (H)  0 - 4 /hpf Final    Bacteria 02/07/2023 Few (A)  None-Occ /hpf Final    Microscopic Comment 02/07/2023 SEE COMMENT   Final    Magnesium 02/08/2023 1.5 (L)  1.6 - 2.6 mg/dL Final    Phosphorus 02/08/2023 2.7  2.7 - 4.5 mg/dL Final    Sodium 02/08/2023 141  136 - 145 mmol/L Final    Potassium 02/08/2023 4.0  3.5 - 5.1 mmol/L Final    Chloride 02/08/2023 108  95 - 110 mmol/L Final    CO2 02/08/2023 25  23 - 29 mmol/L Final    Glucose 02/08/2023 85  70 - 110 mg/dL Final    BUN 02/08/2023 17  8 - 23 mg/dL Final    Creatinine 02/08/2023 1.0  0.5 - 1.4 mg/dL Final    Calcium 02/08/2023 9.3  8.7 - 10.5 mg/dL Final    Total Protein 02/08/2023 6.3  6.0 - 8.4 g/dL Final    Albumin 02/08/2023 3.3 (L)  3.5 - 5.2 g/dL Final    Total Bilirubin 02/08/2023 1.1 (H)  0.1 - 1.0 mg/dL Final     Alkaline Phosphatase 02/08/2023 92  55 - 135 U/L Final    AST 02/08/2023 23  10 - 40 U/L Final    ALT 02/08/2023 9 (L)  10 - 44 U/L Final    Anion Gap 02/08/2023 8  8 - 16 mmol/L Final    eGFR 02/08/2023 54 (A)  >60 mL/min/1.73 m^2 Final    WBC 02/08/2023 4.27  3.90 - 12.70 K/uL Final    RBC 02/08/2023 4.06  4.00 - 5.40 M/uL Final    Hemoglobin 02/08/2023 12.9  12.0 - 16.0 g/dL Final    Hematocrit 02/08/2023 39.6  37.0 - 48.5 % Final    MCV 02/08/2023 98  82 - 98 fL Final    MCH 02/08/2023 31.8 (H)  27.0 - 31.0 pg Final    MCHC 02/08/2023 32.6  32.0 - 36.0 g/dL Final    RDW 02/08/2023 12.5  11.5 - 14.5 % Final    Platelets 02/08/2023 120 (L)  150 - 450 K/uL Final    MPV 02/08/2023 10.4  9.2 - 12.9 fL Final    Immature Granulocytes 02/08/2023 0.2  0.0 - 0.5 % Final    Gran # (ANC) 02/08/2023 2.8  1.8 - 7.7 K/uL Final    Immature Grans (Abs) 02/08/2023 0.01  0.00 - 0.04 K/uL Final    Lymph # 02/08/2023 0.9 (L)  1.0 - 4.8 K/uL Final    Mono # 02/08/2023 0.4  0.3 - 1.0 K/uL Final    Eos # 02/08/2023 0.1  0.0 - 0.5 K/uL Final    Baso # 02/08/2023 0.03  0.00 - 0.20 K/uL Final    nRBC 02/08/2023 0  0 /100 WBC Final    Gran % 02/08/2023 65.6  38.0 - 73.0 % Final    Lymph % 02/08/2023 21.8  18.0 - 48.0 % Final    Mono % 02/08/2023 9.1  4.0 - 15.0 % Final    Eosinophil % 02/08/2023 2.6  0.0 - 8.0 % Final    Basophil % 02/08/2023 0.7  0.0 - 1.9 % Final    Differential Method 02/08/2023 Automated   Final       Past Medical History:   Diagnosis Date    Back problem     Constipation     History of fractured vertebra     Hyperlipidemia     Hypertension     IBS (irritable bowel syndrome)     Migraine headache     Osteoporosis     Rectal prolapse     Scoliosis     Vertigo      Social History     Socioeconomic History    Marital status:    Tobacco Use    Smoking status: Never    Smokeless tobacco: Never   Substance and Sexual Activity    Alcohol use: No    Drug use: No    Sexual activity: Not Currently     Partners: Male      Birth control/protection: None     Social Determinants of Health     Financial Resource Strain: Unknown    Difficulty of Paying Living Expenses: Patient refused   Food Insecurity: Unknown    Worried About Running Out of Food in the Last Year: Patient refused    Ran Out of Food in the Last Year: Patient refused   Transportation Needs: Unknown    Lack of Transportation (Medical): Patient refused    Lack of Transportation (Non-Medical): Patient refused   Physical Activity: Inactive    Days of Exercise per Week: 0 days    Minutes of Exercise per Session: 0 min   Stress: Unknown    Feeling of Stress : Patient refused   Social Connections: Unknown    Frequency of Communication with Friends and Family: Patient refused    Frequency of Social Gatherings with Friends and Family: Patient refused    Attends [employer]ist Services: Patient refused    Active Member of Clubs or Organizations: Patient refused    Attends Club or Organization Meetings: Patient refused    Marital Status: Patient refused   Housing Stability: Unknown    Unable to Pay for Housing in the Last Year: Patient refused    Number of Places Lived in the Last Year: 1    Unstable Housing in the Last Year: Patient refused     Past Surgical History:   Procedure Laterality Date    LEWIS      Back Pain    EYE SURGERY      Bilateral Cataracs    HYSTERECTOMY      OOPHORECTOMY      ROTATOR CUFF REPAIR      bialteral    TONSILLECTOMY       Family History   Problem Relation Age of Onset    Colon cancer Father     Breast cancer Mother 70    Hypertension Mother     Ovarian cancer Neg Hx        Review of patient's allergies indicates:   Allergen Reactions    Antihistamines - alkylamine     Torrie [amlodipine-olmesartan]     Flexeril [cyclobenzaprine]      Hallucinations    Hydrocodone     Hydrocodone-acetaminophen Other (See Comments)    Simvastatin     Statins-hmg-coa reductase inhibitors        Current Outpatient Medications:     biotin 1 mg tablet, Take 1,000 mcg by mouth 3  (three) times daily., Disp: , Rfl:     calcium-vitamin D3 (OS-AYSHA 500 + D3) 500 mg-5 mcg (200 unit) per tablet, Take 1 tablet by mouth 2 (two) times daily with meals., Disp: , Rfl:     cholecalciferol, vitamin D3, (VITAMIN D3) 2,000 unit Tab, Take 2,000 Units by mouth once daily. , Disp: , Rfl:     donepeziL (ARICEPT ODT) 5 MG TbDL, Take 5 mg by mouth nightly., Disp: , Rfl:     famotidine (PEPCID) 20 MG tablet, Take 1 tablet (20 mg total) by mouth 2 (two) times daily., Disp: 180 tablet, Rfl: 1    ferrous sulfate (FEOSOL) 325 mg (65 mg iron) Tab tablet, Take 325 mg by mouth daily with breakfast., Disp: , Rfl:     hydroCHLOROthiazide (HYDRODIURIL) 25 MG tablet, Take 1 tablet (25 mg total) by mouth every other day., Disp: 45 tablet, Rfl: 1    lisinopriL 10 MG tablet, Take 5 mg by mouth once daily., Disp: , Rfl:     meclizine (ANTIVERT) 12.5 mg tablet, Take 0.5 tablets (6.25 mg total) by mouth 2 (two) times daily as needed for Dizziness (1/2 tablet twice daily as needed for dizziness)., Disp: 90 tablet, Rfl: 1    mupirocin (BACTROBAN) 2 % ointment, Apply topically 2 (two) times daily., Disp: 30 g, Rfl: 2    pantoprazole (PROTONIX) 40 MG tablet, Take 1 tablet (40 mg total) by mouth once daily., Disp: 90 tablet, Rfl: 1    QUEtiapine (SEROQUEL) 50 MG tablet, Take 1 tablet (50 mg total) by mouth every evening. (Patient taking differently: Take 100 mg by mouth every evening. Take 2 tablets nightly), Disp: 30 tablet, Rfl: 5    sertraline (ZOLOFT) 25 MG tablet, Take 25 mg by mouth once daily., Disp: , Rfl:     tolterodine (DETROL LA) 2 MG Cp24, Take 1 capsule (2 mg total) by mouth once daily. For bladder, Disp: 90 capsule, Rfl: 1    tramadol-acetaminophen 37.5-325 mg (ULTRACET) 37.5-325 mg Tab, Take 1 tablet by mouth 2 (two) times a day., Disp: 180 tablet, Rfl: 0    verapamiL (CALAN-SR) 120 MG CR tablet, Take 1 tablet (120 mg total) by mouth once daily., Disp: 90 tablet, Rfl: 3    Review of Systems   Constitutional:  Negative  "for appetite change, chills, fatigue, fever and unexpected weight change.   HENT:  Negative for ear discharge and ear pain.         Mouth lesion due to dental trauma   Eyes:  Negative for pain, discharge and visual disturbance.   Respiratory:  Negative for apnea, cough, shortness of breath and wheezing.    Cardiovascular:  Negative for chest pain, palpitations and leg swelling.   Gastrointestinal:  Negative for abdominal pain, blood in stool, constipation, diarrhea, nausea, vomiting and reflux.   Endocrine: Negative for cold intolerance, heat intolerance and polydipsia.   Genitourinary:  Negative for bladder incontinence, dysuria, hematuria, nocturia and urgency.   Musculoskeletal:  Positive for arthralgias, back pain and gait problem. Negative for joint swelling and myalgias.   Neurological:  Negative for dizziness, seizures and numbness.   Psychiatric/Behavioral:  Negative for behavioral problems and hallucinations. The patient is not nervous/anxious.          Objective:      Vitals:    07/03/23 1402   BP: 128/80   Pulse: 69   Weight: 44 kg (97 lb)   Height: 4' 7" (1.397 m)     Physical Exam  Vitals and nursing note reviewed.   Constitutional:       General: She is not in acute distress.     Appearance: Normal appearance. She is well-developed. She is not toxic-appearing.   HENT:      Head: Normocephalic and atraumatic.      Right Ear: Tympanic membrane and external ear normal.      Left Ear: Tympanic membrane and external ear normal.      Nose: Nose normal.      Mouth/Throat:      Pharynx: Oropharynx is clear.   Eyes:      Pupils: Pupils are equal, round, and reactive to light.   Neck:      Thyroid: No thyromegaly.      Vascular: No carotid bruit.   Cardiovascular:      Rate and Rhythm: Normal rate and regular rhythm.      Heart sounds: Murmur (gr 4/6 syst murmer) heard.   Pulmonary:      Effort: Pulmonary effort is normal.      Breath sounds: Normal breath sounds. No wheezing or rales.   Abdominal:      General: " Bowel sounds are normal. There is no distension.      Palpations: Abdomen is soft.      Tenderness: There is no abdominal tenderness.   Musculoskeletal:         General: No tenderness or deformity. Normal range of motion.      Cervical back: Normal range of motion and neck supple.      Lumbar back: Normal. No spasms.      Comments: Bends 90 degrees at  waist,neck tilts to left, shoulders and knees good  rom   Lymphadenopathy:      Cervical: No cervical adenopathy.   Skin:     General: Skin is warm and dry.      Findings: No rash.   Neurological:      Mental Status: She is alert and oriented to person, place, and time. Mental status is at baseline.      Cranial Nerves: No cranial nerve deficit.      Coordination: Coordination normal.   Psychiatric:         Mood and Affect: Mood normal.         Behavior: Behavior normal.         Thought Content: Thought content normal.         Judgment: Judgment normal.         Assessment:       1. Aortic stenosis with trileaflet valve    2. Kyphoscoliosis    3. History of pneumonia    4. Primary hypertension    5. Mixed hyperlipidemia    6. Stage 3a chronic kidney disease    7. Mild vascular dementia without behavioral disturbance, psychotic disturbance, mood disturbance, or anxiety         Plan:       Aortic stenosis with trileaflet valve     patient has aortic stenosis but is not a great surgical candidate  Kyphoscoliosis   Ambulating with a Rollator at this time  History of pneumonia   Pneumonia resolved  Primary hypertension   Blood pressure well controlled  Mixed hyperlipidemia   Lab work in 3 months  Stage 3a chronic kidney disease    Mild vascular dementia without behavioral disturbance, psychotic disturbance, mood disturbance, or anxiety      Follow up in about 3 months (around 10/3/2023), or Dr hurtado  Aortic  stenosis/ dementia.        7/3/2023 Ryan Hurtado

## 2023-07-03 NOTE — TELEPHONE ENCOUNTER
----- Message from Je Garcia sent at 7/3/2023  2:33 PM CDT -----  Pt was seen today and needs a 3 month f/u appt.  [phone]

## 2023-08-03 ENCOUNTER — PATIENT MESSAGE (OUTPATIENT)
Dept: FAMILY MEDICINE | Facility: CLINIC | Age: 88
End: 2023-08-03

## 2023-08-04 ENCOUNTER — PATIENT MESSAGE (OUTPATIENT)
Dept: FAMILY MEDICINE | Facility: CLINIC | Age: 88
End: 2023-08-04

## 2023-08-04 ENCOUNTER — TELEPHONE (OUTPATIENT)
Dept: FAMILY MEDICINE | Facility: CLINIC | Age: 88
End: 2023-08-04
Payer: MEDICARE

## 2023-08-04 DIAGNOSIS — R39.9 UTI SYMPTOMS: Primary | ICD-10-CM

## 2023-08-04 LAB
BILIRUB UR QL STRIP: NEGATIVE
GLUCOSE UR QL STRIP: NEGATIVE
KETONES UR QL STRIP: NEGATIVE
LEUKOCYTE ESTERASE UR QL STRIP: NEGATIVE
PH, POC UA: 6
POC BLOOD, URINE: NEGATIVE
POC NITRATES, URINE: NEGATIVE
PROT UR QL STRIP: NEGATIVE
SP GR UR STRIP: 1.01 (ref 1–1.03)
UROBILINOGEN UR STRIP-ACNC: 0.2 (ref 0.1–1.1)

## 2023-08-04 PROCEDURE — 81003 POCT URINALYSIS, DIPSTICK, AUTOMATED, W/O SCOPE: ICD-10-PCS | Mod: QW,S$GLB,, | Performed by: FAMILY MEDICINE

## 2023-08-04 PROCEDURE — 81003 URINALYSIS AUTO W/O SCOPE: CPT | Mod: QW,S$GLB,, | Performed by: FAMILY MEDICINE

## 2023-08-06 LAB — BACTERIA UR CULT: ABNORMAL

## 2023-08-07 ENCOUNTER — TELEPHONE (OUTPATIENT)
Dept: FAMILY MEDICINE | Facility: CLINIC | Age: 88
End: 2023-08-07

## 2023-08-07 DIAGNOSIS — N39.0 UTI (URINARY TRACT INFECTION): Primary | ICD-10-CM

## 2023-08-07 RX ORDER — CIPROFLOXACIN 500 MG/1
500 TABLET ORAL 2 TIMES DAILY
Qty: 10 TABLET | Refills: 0 | Status: SHIPPED | OUTPATIENT
Start: 2023-08-07 | End: 2023-08-12

## 2023-08-07 NOTE — TELEPHONE ENCOUNTER
Spoke to Karen, daughter, with result verbatim per Kevin. Verbalized understanding that antibiotic was being sent in for UTI. Allergies and pharmacy verified. Order pended.

## 2023-08-07 NOTE — TELEPHONE ENCOUNTER
----- Message from Catracho Dwyer PA-C sent at 8/7/2023  6:58 AM CDT -----  Does have significant urinary tract infection that needs to be treated with Cipro 500 p.o. b.i.d. for 5 days.  Please let patient know that we will send this in and then load for me to send

## 2023-08-21 PROBLEM — J18.9 PNEUMONIA: Status: RESOLVED | Noted: 2023-05-22 | Resolved: 2023-08-21

## 2023-09-21 ENCOUNTER — TELEPHONE (OUTPATIENT)
Dept: FAMILY MEDICINE | Facility: CLINIC | Age: 88
End: 2023-09-21

## 2023-10-05 ENCOUNTER — OFFICE VISIT (OUTPATIENT)
Dept: FAMILY MEDICINE | Facility: CLINIC | Age: 88
End: 2023-10-05
Attending: FAMILY MEDICINE
Payer: MEDICARE

## 2023-10-05 VITALS — HEART RATE: 63 BPM | DIASTOLIC BLOOD PRESSURE: 70 MMHG | SYSTOLIC BLOOD PRESSURE: 130 MMHG

## 2023-10-05 DIAGNOSIS — I10 PRIMARY HYPERTENSION: ICD-10-CM

## 2023-10-05 DIAGNOSIS — I10 ESSENTIAL HYPERTENSION: Primary | ICD-10-CM

## 2023-10-05 DIAGNOSIS — E78.2 MIXED HYPERLIPIDEMIA: ICD-10-CM

## 2023-10-05 DIAGNOSIS — N18.31 STAGE 3A CHRONIC KIDNEY DISEASE: ICD-10-CM

## 2023-10-05 DIAGNOSIS — K58.2 IRRITABLE BOWEL SYNDROME WITH BOTH CONSTIPATION AND DIARRHEA: ICD-10-CM

## 2023-10-05 DIAGNOSIS — N32.81 OAB (OVERACTIVE BLADDER): ICD-10-CM

## 2023-10-05 DIAGNOSIS — E44.1 MILD PROTEIN-CALORIE MALNUTRITION: ICD-10-CM

## 2023-10-05 DIAGNOSIS — G40.909 NONINTRACTABLE EPILEPSY WITHOUT STATUS EPILEPTICUS, UNSPECIFIED EPILEPSY TYPE: ICD-10-CM

## 2023-10-05 DIAGNOSIS — M41.9 KYPHOSCOLIOSIS: ICD-10-CM

## 2023-10-05 DIAGNOSIS — Z23 NEED FOR INFLUENZA VACCINATION: ICD-10-CM

## 2023-10-05 DIAGNOSIS — F01.A0 MILD VASCULAR DEMENTIA WITHOUT BEHAVIORAL DISTURBANCE, PSYCHOTIC DISTURBANCE, MOOD DISTURBANCE, OR ANXIETY: ICD-10-CM

## 2023-10-05 DIAGNOSIS — I35.0 AORTIC STENOSIS WITH TRILEAFLET VALVE: ICD-10-CM

## 2023-10-05 PROCEDURE — 99214 PR OFFICE/OUTPT VISIT, EST, LEVL IV, 30-39 MIN: ICD-10-PCS | Mod: S$GLB,,, | Performed by: FAMILY MEDICINE

## 2023-10-05 PROCEDURE — G0008 FLU VACCINE - QUADRIVALENT - ADJUVANTED: ICD-10-PCS | Mod: S$GLB,,, | Performed by: FAMILY MEDICINE

## 2023-10-05 PROCEDURE — 99214 OFFICE O/P EST MOD 30 MIN: CPT | Mod: S$GLB,,, | Performed by: FAMILY MEDICINE

## 2023-10-05 PROCEDURE — 90694 FLU VACCINE - QUADRIVALENT - ADJUVANTED: ICD-10-PCS | Mod: S$GLB,,, | Performed by: FAMILY MEDICINE

## 2023-10-05 PROCEDURE — 90694 VACC AIIV4 NO PRSRV 0.5ML IM: CPT | Mod: S$GLB,,, | Performed by: FAMILY MEDICINE

## 2023-10-05 PROCEDURE — G0008 ADMIN INFLUENZA VIRUS VAC: HCPCS | Mod: S$GLB,,, | Performed by: FAMILY MEDICINE

## 2023-10-05 RX ORDER — VERAPAMIL HYDROCHLORIDE 120 MG/1
120 TABLET, FILM COATED, EXTENDED RELEASE ORAL DAILY
Qty: 90 TABLET | Refills: 3 | Status: SHIPPED | OUTPATIENT
Start: 2023-10-05

## 2023-10-05 RX ORDER — FAMOTIDINE 20 MG/1
20 TABLET, FILM COATED ORAL 2 TIMES DAILY
Qty: 180 TABLET | Refills: 3 | Status: ON HOLD | OUTPATIENT
Start: 2023-10-05 | End: 2024-02-01 | Stop reason: HOSPADM

## 2023-10-05 RX ORDER — TOLTERODINE 2 MG/1
2 CAPSULE, EXTENDED RELEASE ORAL DAILY
Qty: 90 CAPSULE | Refills: 3 | Status: SHIPPED | OUTPATIENT
Start: 2023-10-05 | End: 2024-10-04

## 2023-10-05 RX ORDER — MUPIROCIN 20 MG/G
OINTMENT TOPICAL 2 TIMES DAILY
Qty: 30 G | Refills: 2 | Status: SHIPPED | OUTPATIENT
Start: 2023-10-05 | End: 2023-10-26 | Stop reason: SDUPTHER

## 2023-10-07 LAB
ALBUMIN SERPL-MCNC: 3.9 G/DL (ref 3.6–5.1)
ALBUMIN/GLOB SERPL: 1.3 (CALC) (ref 1–2.5)
ALP SERPL-CCNC: 104 U/L (ref 37–153)
ALT SERPL-CCNC: 21 U/L (ref 6–29)
AMORPH SED URNS QL MICRO: ABNORMAL /HPF
APPEARANCE UR: ABNORMAL
AST SERPL-CCNC: 28 U/L (ref 10–35)
BACTERIA #/AREA URNS HPF: ABNORMAL /HPF
BACTERIA UR CULT: ABNORMAL
BACTERIA UR CULT: ABNORMAL
BASOPHILS # BLD AUTO: 32 CELLS/UL (ref 0–200)
BASOPHILS NFR BLD AUTO: 0.7 %
BILIRUB SERPL-MCNC: 0.8 MG/DL (ref 0.2–1.2)
BILIRUB UR QL STRIP: NEGATIVE
BUN SERPL-MCNC: 21 MG/DL (ref 7–25)
BUN/CREAT SERPL: 19 (CALC) (ref 6–22)
CALCIUM SERPL-MCNC: 10.3 MG/DL (ref 8.6–10.4)
CAOX CRY #/AREA URNS HPF: ABNORMAL /HPF
CHLORIDE SERPL-SCNC: 104 MMOL/L (ref 98–110)
CHOLEST SERPL-MCNC: 206 MG/DL
CHOLEST/HDLC SERPL: 2.8 (CALC)
CO2 SERPL-SCNC: 32 MMOL/L (ref 20–32)
COLOR UR: YELLOW
CREAT SERPL-MCNC: 1.11 MG/DL (ref 0.6–0.95)
EGFR: 47 ML/MIN/1.73M2
EOSINOPHIL # BLD AUTO: 149 CELLS/UL (ref 15–500)
EOSINOPHIL NFR BLD AUTO: 3.3 %
ERYTHROCYTE [DISTWIDTH] IN BLOOD BY AUTOMATED COUNT: 11.9 % (ref 11–15)
GLOBULIN SER CALC-MCNC: 2.9 G/DL (CALC) (ref 1.9–3.7)
GLUCOSE SERPL-MCNC: 83 MG/DL (ref 65–99)
GLUCOSE UR QL STRIP: NEGATIVE
HCT VFR BLD AUTO: 38.5 % (ref 35–45)
HDLC SERPL-MCNC: 74 MG/DL
HGB BLD-MCNC: 13.1 G/DL (ref 11.7–15.5)
HGB UR QL STRIP: NEGATIVE
HYALINE CASTS #/AREA URNS LPF: ABNORMAL /LPF
KETONES UR QL STRIP: NEGATIVE
LDLC SERPL CALC-MCNC: 107 MG/DL (CALC)
LEUKOCYTE ESTERASE UR QL STRIP: ABNORMAL
LYMPHOCYTES # BLD AUTO: 1287 CELLS/UL (ref 850–3900)
LYMPHOCYTES NFR BLD AUTO: 28.6 %
MCH RBC QN AUTO: 32.9 PG (ref 27–33)
MCHC RBC AUTO-ENTMCNC: 34 G/DL (ref 32–36)
MCV RBC AUTO: 96.7 FL (ref 80–100)
MONOCYTES # BLD AUTO: 311 CELLS/UL (ref 200–950)
MONOCYTES NFR BLD AUTO: 6.9 %
NEUTROPHILS # BLD AUTO: 2723 CELLS/UL (ref 1500–7800)
NEUTROPHILS NFR BLD AUTO: 60.5 %
NITRITE UR QL STRIP: NEGATIVE
NONHDLC SERPL-MCNC: 132 MG/DL (CALC)
PH UR STRIP: 7.5 [PH] (ref 5–8)
PLATELET # BLD AUTO: 125 THOUSAND/UL (ref 140–400)
PLATELET BLD QL SMEAR: ABNORMAL
PMV BLD REES-ECKER: 10.6 FL (ref 7.5–12.5)
POTASSIUM SERPL-SCNC: 4.3 MMOL/L (ref 3.5–5.3)
PROT SERPL-MCNC: 6.8 G/DL (ref 6.1–8.1)
PROT UR QL STRIP: NEGATIVE
RBC # BLD AUTO: 3.98 MILLION/UL (ref 3.8–5.1)
RBC #/AREA URNS HPF: ABNORMAL /HPF
SERVICE CMNT-IMP: ABNORMAL
SODIUM SERPL-SCNC: 144 MMOL/L (ref 135–146)
SP GR UR STRIP: 1.02 (ref 1–1.03)
SQUAMOUS #/AREA URNS HPF: ABNORMAL /HPF
TRIGL SERPL-MCNC: 141 MG/DL
TSH SERPL-ACNC: 2.35 MIU/L (ref 0.4–4.5)
WBC # BLD AUTO: 4.5 THOUSAND/UL (ref 3.8–10.8)
WBC #/AREA URNS HPF: ABNORMAL /HPF

## 2023-10-08 NOTE — PROGRESS NOTES
SUBJECTIVE:    Patient ID: Angelique Hamilton is a 89 y.o. female.    Chief Complaint: Follow-up (Multiple questions, home health, vaccines,right knee pain, abc )    89-year-old female is cared for by her daughter.  Patient feels weak for several months.  She sleeps in a recliner chair.  She needs assistance with getting up on her walker and getting to the bathroom.  No recent falls.    Drooling-not responding to glycopyrrolate b.i.d.    She has a light appetite, 2 meals per day.  No diarrhea or constipation.  She does take stool softeners 3 times a week.  No recent UTIs.  Seems to sleep well.  Wears depends at night for incontinence.    Dr. Baum neurology-history of epilepsy without status epilepticus.  No recent seizures.  Her  neck leans to the left.    Arthritis-walks with a walker short distances but in a transport chair for long distances.  Ultracet b.i.d. seems to help arthritis pain.            Telephone on 08/04/2023   Component Date Value Ref Range Status    POC Blood, Urine 08/04/2023 Negative  Negative Final    POC Bilirubin, Urine 08/04/2023 Negative  Negative Final    POC Urobilinogen, Urine 08/04/2023 0.2  0.1 - 1.1 Final    POC Ketones, Urine 08/04/2023 Negative  Negative Final    POC Protein, Urine 08/04/2023 Negative  Negative Final    POC Nitrates, Urine 08/04/2023 Negative  Negative Final    POC Glucose, Urine 08/04/2023 Negative  Negative Final    pH, UA 08/04/2023 6.0   Final    POC Specific Gravity, Urine 08/04/2023 1.015  1.003 - 1.029 Final    POC Leukocytes, Urine 08/04/2023 Negative  Negative Final    Urine Culture, Routine 08/04/2023  (A)   Final   Admission on 05/22/2023, Discharged on 05/24/2023   Component Date Value Ref Range Status    Blood Culture, Routine 05/22/2023 No growth after 5 days.   Final    Blood Culture, Routine 05/22/2023 No growth after 5 days.   Final    WBC 05/22/2023 10.23  3.90 - 12.70 K/uL Final    RBC 05/22/2023 3.72 (L)  4.00 - 5.40 M/uL Final    Hemoglobin  05/22/2023 12.3  12.0 - 16.0 g/dL Final    Hematocrit 05/22/2023 36.4 (L)  37.0 - 48.5 % Final    MCV 05/22/2023 98  82 - 98 fL Final    MCH 05/22/2023 33.1 (H)  27.0 - 31.0 pg Final    MCHC 05/22/2023 33.8  32.0 - 36.0 g/dL Final    RDW 05/22/2023 12.8  11.5 - 14.5 % Final    Platelets 05/22/2023 122 (L)  150 - 450 K/uL Final    MPV 05/22/2023 10.4  9.2 - 12.9 fL Final    Immature Granulocytes 05/22/2023 0.4  0.0 - 0.5 % Final    Gran # (ANC) 05/22/2023 9.0 (H)  1.8 - 7.7 K/uL Final    Immature Grans (Abs) 05/22/2023 0.04  0.00 - 0.04 K/uL Final    Lymph # 05/22/2023 0.5 (L)  1.0 - 4.8 K/uL Final    Mono # 05/22/2023 0.7  0.3 - 1.0 K/uL Final    Eos # 05/22/2023 0.0  0.0 - 0.5 K/uL Final    Baso # 05/22/2023 0.03  0.00 - 0.20 K/uL Final    nRBC 05/22/2023 0  0 /100 WBC Final    Gran % 05/22/2023 87.4 (H)  38.0 - 73.0 % Final    Lymph % 05/22/2023 5.3 (L)  18.0 - 48.0 % Final    Mono % 05/22/2023 6.4  4.0 - 15.0 % Final    Eosinophil % 05/22/2023 0.2  0.0 - 8.0 % Final    Basophil % 05/22/2023 0.3  0.0 - 1.9 % Final    Differential Method 05/22/2023 Automated   Final    Sodium 05/22/2023 140  136 - 145 mmol/L Final    Potassium 05/22/2023 4.1  3.5 - 5.1 mmol/L Final    Chloride 05/22/2023 104  95 - 110 mmol/L Final    CO2 05/22/2023 28  23 - 29 mmol/L Final    Glucose 05/22/2023 106  70 - 110 mg/dL Final    BUN 05/22/2023 36 (H)  8 - 23 mg/dL Final    Creatinine 05/22/2023 1.1  0.5 - 1.4 mg/dL Final    Calcium 05/22/2023 10.7 (H)  8.7 - 10.5 mg/dL Final    Total Protein 05/22/2023 6.9  6.0 - 8.4 g/dL Final    Albumin 05/22/2023 3.6  3.5 - 5.2 g/dL Final    Total Bilirubin 05/22/2023 1.0  0.1 - 1.0 mg/dL Final    Alkaline Phosphatase 05/22/2023 85  55 - 135 U/L Final    AST 05/22/2023 22  10 - 40 U/L Final    ALT 05/22/2023 12  10 - 44 U/L Final    Anion Gap 05/22/2023 8  8 - 16 mmol/L Final    eGFR 05/22/2023 48 (A)  >60 mL/min/1.73 m^2 Final    Lactate (Lactic Acid) 05/22/2023 0.9  0.5 - 2.2 mmol/L Final     Specimen UA 05/22/2023 Urine, Clean Catch   Final    Color, UA 05/22/2023 Yellow  Yellow, Straw, Enma Final    Appearance, UA 05/22/2023 Hazy (A)  Clear Final    pH, UA 05/22/2023 7.0  5.0 - 8.0 Final    Specific Gravity, UA 05/22/2023 1.010  1.005 - 1.030 Final    Protein, UA 05/22/2023 Negative  Negative Final    Glucose, UA 05/22/2023 Negative  Negative Final    Ketones, UA 05/22/2023 Negative  Negative Final    Bilirubin (UA) 05/22/2023 Negative  Negative Final    Occult Blood UA 05/22/2023 1+ (A)  Negative Final    Nitrite, UA 05/22/2023 Negative  Negative Final    Urobilinogen, UA 05/22/2023 Negative  <2.0 EU/dL Final    Leukocytes, UA 05/22/2023 Negative  Negative Final    RBC, UA 05/22/2023 12 (H)  0 - 4 /hpf Final    WBC, UA 05/22/2023 4  0 - 5 /hpf Final    Bacteria 05/22/2023 Many (A)  None-Occ /hpf Final    Squam Epithel, UA 05/22/2023 4  /hpf Final    Microscopic Comment 05/22/2023 SEE COMMENT   Final    Lactate (Lactic Acid) 05/22/2023 1.8  0.5 - 2.2 mmol/L Final    Procalcitonin 05/22/2023 0.30 (H)  <0.25 ng/mL Final    Sodium 05/23/2023 141  136 - 145 mmol/L Final    Potassium 05/23/2023 4.1  3.5 - 5.1 mmol/L Final    Chloride 05/23/2023 109  95 - 110 mmol/L Final    CO2 05/23/2023 26  23 - 29 mmol/L Final    Glucose 05/23/2023 95  70 - 110 mg/dL Final    BUN 05/23/2023 22  8 - 23 mg/dL Final    Creatinine 05/23/2023 0.8  0.5 - 1.4 mg/dL Final    Calcium 05/23/2023 9.2  8.7 - 10.5 mg/dL Final    Total Protein 05/23/2023 5.4 (L)  6.0 - 8.4 g/dL Final    Albumin 05/23/2023 2.7 (L)  3.5 - 5.2 g/dL Final    Total Bilirubin 05/23/2023 0.9  0.1 - 1.0 mg/dL Final    Alkaline Phosphatase 05/23/2023 71  55 - 135 U/L Final    AST 05/23/2023 17  10 - 40 U/L Final    ALT 05/23/2023 13  10 - 44 U/L Final    Anion Gap 05/23/2023 6 (L)  8 - 16 mmol/L Final    eGFR 05/23/2023 >60  >60 mL/min/1.73 m^2 Final    Magnesium 05/23/2023 1.7  1.6 - 2.6 mg/dL Final    WBC 05/23/2023 6.81  3.90 - 12.70 K/uL Final    RBC  05/23/2023 3.01 (L)  4.00 - 5.40 M/uL Final    Hemoglobin 05/23/2023 9.9 (L)  12.0 - 16.0 g/dL Final    Hematocrit 05/23/2023 30.0 (L)  37.0 - 48.5 % Final    MCV 05/23/2023 100 (H)  82 - 98 fL Final    MCH 05/23/2023 32.9 (H)  27.0 - 31.0 pg Final    MCHC 05/23/2023 33.0  32.0 - 36.0 g/dL Final    RDW 05/23/2023 13.1  11.5 - 14.5 % Final    Platelets 05/23/2023 109 (L)  150 - 450 K/uL Final    MPV 05/23/2023 10.5  9.2 - 12.9 fL Final    Sodium 05/24/2023 139  136 - 145 mmol/L Final    Potassium 05/24/2023 3.8  3.5 - 5.1 mmol/L Final    Chloride 05/24/2023 108  95 - 110 mmol/L Final    CO2 05/24/2023 24  23 - 29 mmol/L Final    Glucose 05/24/2023 99  70 - 110 mg/dL Final    BUN 05/24/2023 13  8 - 23 mg/dL Final    Creatinine 05/24/2023 0.7  0.5 - 1.4 mg/dL Final    Calcium 05/24/2023 9.4  8.7 - 10.5 mg/dL Final    Total Protein 05/24/2023 5.8 (L)  6.0 - 8.4 g/dL Final    Albumin 05/24/2023 2.8 (L)  3.5 - 5.2 g/dL Final    Total Bilirubin 05/24/2023 1.0  0.1 - 1.0 mg/dL Final    Alkaline Phosphatase 05/24/2023 76  55 - 135 U/L Final    AST 05/24/2023 21  10 - 40 U/L Final    ALT 05/24/2023 13  10 - 44 U/L Final    Anion Gap 05/24/2023 7 (L)  8 - 16 mmol/L Final    eGFR 05/24/2023 >60  >60 mL/min/1.73 m^2 Final    Magnesium 05/24/2023 1.8  1.6 - 2.6 mg/dL Final    WBC 05/24/2023 5.46  3.90 - 12.70 K/uL Final    RBC 05/24/2023 3.35 (L)  4.00 - 5.40 M/uL Final    Hemoglobin 05/24/2023 11.0 (L)  12.0 - 16.0 g/dL Final    Hematocrit 05/24/2023 32.4 (L)  37.0 - 48.5 % Final    MCV 05/24/2023 97  82 - 98 fL Final    MCH 05/24/2023 32.8 (H)  27.0 - 31.0 pg Final    MCHC 05/24/2023 34.0  32.0 - 36.0 g/dL Final    RDW 05/24/2023 12.7  11.5 - 14.5 % Final    Platelets 05/24/2023 121 (L)  150 - 450 K/uL Final    MPV 05/24/2023 10.6  9.2 - 12.9 fL Final    Procalcitonin 05/24/2023 0.24  <0.25 ng/mL Final       Past Medical History:   Diagnosis Date    Back problem     Constipation     History of fractured vertebra      Hyperlipidemia     Hypertension     IBS (irritable bowel syndrome)     Migraine headache     Osteoporosis     Rectal prolapse     Scoliosis     Vertigo      Social History     Socioeconomic History    Marital status:    Tobacco Use    Smoking status: Never    Smokeless tobacco: Never   Substance and Sexual Activity    Alcohol use: No    Drug use: No    Sexual activity: Not Currently     Partners: Male     Birth control/protection: None     Social Determinants of Health     Financial Resource Strain: Unknown (9/30/2023)    Overall Financial Resource Strain (CARDIA)     Difficulty of Paying Living Expenses: Patient refused   Food Insecurity: Unknown (9/30/2023)    Hunger Vital Sign     Worried About Running Out of Food in the Last Year: Patient refused     Ran Out of Food in the Last Year: Patient refused   Transportation Needs: Unknown (9/30/2023)    PRAPARE - Transportation     Lack of Transportation (Medical): Patient refused     Lack of Transportation (Non-Medical): Patient refused   Physical Activity: Unknown (9/30/2023)    Exercise Vital Sign     Days of Exercise per Week: Patient refused     Minutes of Exercise per Session: 0 min   Stress: No Stress Concern Present (9/30/2023)    [employer] - Occupational Stress Questionnaire     Feeling of Stress : Only a little   Social Connections: Unknown (9/30/2023)    Social Connection and Isolation Panel [NHANES]     Frequency of Communication with Friends and Family: Once a week     Frequency of Social Gatherings with Friends and Family: Once a week     Attends Congregational Services: Patient refused     Active Member of Clubs or Organizations: No     Attends Club or Organization Meetings: Never     Marital Status:    Housing Stability: Unknown (9/30/2023)    Housing Stability Vital Sign     Unable to Pay for Housing in the Last Year: Patient refused     Number of Places Lived in the Last Year: 1     Unstable Housing in the Last  Year: Patient refused     Past Surgical History:   Procedure Laterality Date    LEWIS      Back Pain    EYE SURGERY      Bilateral Cataracs    HYSTERECTOMY      OOPHORECTOMY      ROTATOR CUFF REPAIR      bialteral    TONSILLECTOMY       Family History   Problem Relation Age of Onset    Colon cancer Father     Breast cancer Mother 70    Hypertension Mother     Ovarian cancer Neg Hx        The CVD Risk score (Amrik, et al., 2008) failed to calculate for the following reasons:    The 2008 CVD risk score is only valid for ages 30 to 74    All of your core healthy metrics are met.      Review of patient's allergies indicates:   Allergen Reactions    Antihistamines - alkylamine     Torrie [amlodipine-olmesartan]     Flexeril [cyclobenzaprine]      Hallucinations    Hydrocodone     Hydrocodone-acetaminophen Other (See Comments)    Simvastatin     Statins-hmg-coa reductase inhibitors        Current Outpatient Medications:     biotin 1 mg tablet, Take 1,000 mcg by mouth 3 (three) times daily., Disp: , Rfl:     calcium-vitamin D3 (OS-AYSHA 500 + D3) 500 mg-5 mcg (200 unit) per tablet, Take 1 tablet by mouth 2 (two) times daily with meals., Disp: , Rfl:     cholecalciferol, vitamin D3, (VITAMIN D3) 2,000 unit Tab, Take 2,000 Units by mouth once daily. , Disp: , Rfl:     donepeziL (ARICEPT ODT) 5 MG TbDL, Take 5 mg by mouth nightly., Disp: , Rfl:     ferrous sulfate (FEOSOL) 325 mg (65 mg iron) Tab tablet, Take 325 mg by mouth daily with breakfast., Disp: , Rfl:     hydroCHLOROthiazide (HYDRODIURIL) 25 MG tablet, Take 1 tablet (25 mg total) by mouth every other day., Disp: 45 tablet, Rfl: 1    lisinopriL 10 MG tablet, Take 5 mg by mouth once daily., Disp: , Rfl:     meclizine (ANTIVERT) 12.5 mg tablet, Take 0.5 tablets (6.25 mg total) by mouth 2 (two) times daily as needed for Dizziness (1/2 tablet twice daily as needed for dizziness)., Disp: 90 tablet, Rfl: 1    pantoprazole (PROTONIX) 40 MG tablet, Take 1 tablet (40 mg total)  by mouth once daily., Disp: 90 tablet, Rfl: 1    sertraline (ZOLOFT) 25 MG tablet, Take 25 mg by mouth once daily., Disp: , Rfl:     tramadol-acetaminophen 37.5-325 mg (ULTRACET) 37.5-325 mg Tab, Take 1 tablet by mouth 2 (two) times a day., Disp: 180 tablet, Rfl: 0    famotidine (PEPCID) 20 MG tablet, Take 1 tablet (20 mg total) by mouth 2 (two) times daily., Disp: 180 tablet, Rfl: 3    mupirocin (BACTROBAN) 2 % ointment, Apply topically 2 (two) times daily., Disp: 30 g, Rfl: 2    QUEtiapine (SEROQUEL) 50 MG tablet, Take 1 tablet (50 mg total) by mouth every evening. (Patient taking differently: Take 100 mg by mouth every evening. Take 2 tablets nightly), Disp: 30 tablet, Rfl: 5    tolterodine (DETROL LA) 2 MG Cp24, Take 1 capsule (2 mg total) by mouth once daily. For bladder, Disp: 90 capsule, Rfl: 3    verapamiL (CALAN-SR) 120 MG CR tablet, Take 1 tablet (120 mg total) by mouth once daily., Disp: 90 tablet, Rfl: 3    Review of Systems   Constitutional:  Negative for appetite change, chills, fatigue, fever and unexpected weight change.   HENT:  Negative for ear discharge and ear pain.    Eyes:  Negative for pain, discharge and visual disturbance.   Respiratory:  Negative for apnea, cough, shortness of breath and wheezing.    Cardiovascular:  Negative for chest pain, palpitations and leg swelling.   Gastrointestinal:  Negative for abdominal pain, blood in stool, constipation, diarrhea, nausea, vomiting and reflux.   Endocrine: Negative for cold intolerance, heat intolerance and polydipsia.   Genitourinary:  Positive for bladder incontinence. Negative for dysuria, hematuria, nocturia and urgency.   Musculoskeletal:  Positive for arthralgias, back pain and gait problem. Negative for joint swelling and myalgias.   Neurological:  Negative for dizziness, seizures and numbness.   Psychiatric/Behavioral:  Negative for behavioral problems and hallucinations. The patient is not nervous/anxious.            Objective:       Vitals:    10/05/23 1130   BP: 130/70   Pulse: 63     Physical Exam  Vitals and nursing note reviewed.   Constitutional:       General: She is not in acute distress.     Appearance: Normal appearance. She is well-developed. She is not toxic-appearing.      Comments: Frail elderly female in a transport chair.   HENT:      Head: Normocephalic and atraumatic.      Right Ear: Tympanic membrane and external ear normal.      Left Ear: Tympanic membrane and external ear normal.      Nose: Nose normal.      Mouth/Throat:      Pharynx: Oropharynx is clear.   Eyes:      Pupils: Pupils are equal, round, and reactive to light.   Neck:      Thyroid: No thyromegaly.      Vascular: No carotid bruit.      Comments: Her neck leans to the left in a sitting position  Cardiovascular:      Rate and Rhythm: Normal rate and regular rhythm.      Heart sounds: Murmur (grade 3/6 systolic murmur) heard.   Pulmonary:      Effort: Pulmonary effort is normal.      Breath sounds: Normal breath sounds. No wheezing or rales.   Abdominal:      General: Bowel sounds are normal. There is no distension.      Palpations: Abdomen is soft.      Tenderness: There is no abdominal tenderness.   Musculoskeletal:         General: No tenderness or deformity. Normal range of motion.      Cervical back: Normal range of motion and neck supple.      Lumbar back: Normal. No spasms.      Comments: Shoulders have good range of motion, knees are crepitant.  No pitting edema to lower extremities.   Lymphadenopathy:      Cervical: No cervical adenopathy.   Skin:     General: Skin is warm and dry.      Findings: No rash.   Neurological:      Mental Status: She is alert and oriented to person, place, and time.      Cranial Nerves: No cranial nerve deficit.      Motor: Weakness present.      Coordination: Coordination normal.      Gait: Gait abnormal.   Psychiatric:         Mood and Affect: Mood normal.         Behavior: Behavior normal.         Thought Content: Thought  content normal.         Judgment: Judgment normal.           Assessment:       1. Essential hypertension    2. OAB (overactive bladder)    3. Need for influenza vaccination    4. Kyphoscoliosis    5. Mild vascular dementia without behavioral disturbance, psychotic disturbance, mood disturbance, or anxiety    6. 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 Aortic stenosis with trileaflet valve    8. Primary hypertension    9. Mixed hyperlipidemia    10. Stage 3a chronic kidney disease    11. Mild protein-calorie malnutrition    12. Irritable bowel syndrome with both constipation and diarrhea         Plan:       Essential hypertension  Comments:  BP well controlled  Orders:  -     verapamiL (CALAN-SR) 120 MG CR tablet; Take 1 tablet (120 mg total) by mouth once daily.  Dispense: 90 tablet; Refill: 3    OAB (overactive bladder)  Comments:  Mybetriq was effective but too expensive. Patient failed oxybutynin in the past.   Orders:  -     tolterodine (DETROL LA) 2 MG Cp24; Take 1 capsule (2 mg total) by mouth once daily. For bladder  Dispense: 90 capsule; Refill: 3    Need for influenza vaccination  -     Influenza (FLUAD) - Quadrivalent (Adjuvanted) *Preferred* (65+) (PF)  Flu vaccine today consider COVID vaccine later this fall.  Kyphoscoliosis    Mild vascular dementia without behavioral disturbance, psychotic disturbance, mood disturbance, or anxiety  Patient getting good care at home with her family.  Nonintractable epilepsy without status epilepticus, unspecified epilepsy type  Stable, continue following with Dr. Baum  Aortic stenosis with trileaflet valve  Grade 3/6 systolic murmur, no surgical intervention desired  Primary hypertension    Mixed hyperlipidemia  Lab work to be ordered today  Stage 3a chronic kidney disease    Mild protein-calorie malnutrition    Irritable bowel syndrome with both constipation and diarrhea    Other orders  -     famotidine (PEPCID) 20 MG  tablet; Take 1 tablet (20 mg total) by mouth 2 (two) times daily.  Dispense: 180 tablet; Refill: 3  -     mupirocin (BACTROBAN) 2 % ointment; Apply topically 2 (two) times daily.  Dispense: 30 g; Refill: 2      Follow up in about 6 months (around 4/5/2024).        10/8/2023 Ryan Hurtado

## 2023-10-09 ENCOUNTER — TELEPHONE (OUTPATIENT)
Dept: FAMILY MEDICINE | Facility: CLINIC | Age: 88
End: 2023-10-09

## 2023-10-09 NOTE — TELEPHONE ENCOUNTER
Spoke with pt's daughter Karen in regards to recent lab results. Verbalized per Dr. Hurtado that pt's CBC shows she is no longer anemic, hematocrit is up to 38.5. Sugar, kidneys, liver and thyroid look excellent. Cholesterol has improved down to 206. Urinalysis shows no significant infection.  Encourage more water drinking and continue current medications. Karen acknowledge understanding.

## 2023-10-09 NOTE — PROGRESS NOTES
Call patient's daughter.  Patient's CBC shows she is no longer anemic hematocrit is up to 38.5.  Sugar kidneys liver and thyroid look excellent.  Cholesterol has improved down to 206.  Urinalysis shows no significant infection.  Encourage more water drinking and continue current medications.

## 2023-10-09 NOTE — TELEPHONE ENCOUNTER
----- Message from Ryan Hurtado MD sent at 10/8/2023  7:36 PM CDT -----  Call patient's daughter.  Patient's CBC shows she is no longer anemic hematocrit is up to 38.5.  Sugar kidneys liver and thyroid look excellent.  Cholesterol has improved down to 206.  Urinalysis shows no significant infection.  Encourage more water drinking and continue current medications.

## 2023-10-26 ENCOUNTER — PATIENT MESSAGE (OUTPATIENT)
Dept: FAMILY MEDICINE | Facility: CLINIC | Age: 88
End: 2023-10-26

## 2023-10-26 RX ORDER — MUPIROCIN 20 MG/G
OINTMENT TOPICAL 2 TIMES DAILY
Qty: 30 G | Refills: 2 | Status: ON HOLD | OUTPATIENT
Start: 2023-10-26 | End: 2024-02-01 | Stop reason: HOSPADM

## 2023-10-31 ENCOUNTER — TELEPHONE (OUTPATIENT)
Dept: FAMILY MEDICINE | Facility: CLINIC | Age: 88
End: 2023-10-31

## 2023-11-25 ENCOUNTER — HOSPITAL ENCOUNTER (OUTPATIENT)
Facility: HOSPITAL | Age: 88
Discharge: HOME | End: 2023-11-26
Attending: EMERGENCY MEDICINE | Admitting: INTERNAL MEDICINE
Payer: MEDICARE

## 2023-11-25 DIAGNOSIS — I10 ESSENTIAL HYPERTENSION: ICD-10-CM

## 2023-11-25 DIAGNOSIS — E86.0 DEHYDRATION: ICD-10-CM

## 2023-11-25 DIAGNOSIS — E83.52 HYPERCALCEMIA: Primary | ICD-10-CM

## 2023-11-25 DIAGNOSIS — R07.9 CHEST PAIN: ICD-10-CM

## 2023-11-25 DIAGNOSIS — R53.1 GENERALIZED WEAKNESS: ICD-10-CM

## 2023-11-25 LAB
ALBUMIN SERPL BCP-MCNC: 4.1 G/DL (ref 3.5–5.2)
ALP SERPL-CCNC: 102 U/L (ref 55–135)
ALT SERPL W/O P-5'-P-CCNC: 7 U/L (ref 10–44)
ANION GAP SERPL CALC-SCNC: 12 MMOL/L (ref 8–16)
AST SERPL-CCNC: 19 U/L (ref 10–40)
BACTERIA #/AREA URNS HPF: NEGATIVE /HPF
BASOPHILS # BLD AUTO: 0.03 K/UL (ref 0–0.2)
BASOPHILS NFR BLD: 0.3 % (ref 0–1.9)
BILIRUB SERPL-MCNC: 1 MG/DL (ref 0.1–1)
BILIRUB UR QL STRIP: NEGATIVE
BNP SERPL-MCNC: 344 PG/ML (ref 0–99)
BUN SERPL-MCNC: 18 MG/DL (ref 8–23)
CALCIUM SERPL-MCNC: 11.6 MG/DL (ref 8.7–10.5)
CHLORIDE SERPL-SCNC: 105 MMOL/L (ref 95–110)
CLARITY UR: ABNORMAL
CO2 SERPL-SCNC: 26 MMOL/L (ref 23–29)
COLOR UR: YELLOW
CREAT SERPL-MCNC: 1.1 MG/DL (ref 0.5–1.4)
DIFFERENTIAL METHOD: ABNORMAL
EOSINOPHIL # BLD AUTO: 0 K/UL (ref 0–0.5)
EOSINOPHIL NFR BLD: 0.1 % (ref 0–8)
ERYTHROCYTE [DISTWIDTH] IN BLOOD BY AUTOMATED COUNT: 13.1 % (ref 11.5–14.5)
EST. GFR  (NO RACE VARIABLE): 47.7 ML/MIN/1.73 M^2
GLUCOSE SERPL-MCNC: 136 MG/DL (ref 70–110)
GLUCOSE UR QL STRIP: NEGATIVE
GROUP A STREP, MOLECULAR: NEGATIVE
HCT VFR BLD AUTO: 41.3 % (ref 37–48.5)
HGB BLD-MCNC: 14 G/DL (ref 12–16)
HGB UR QL STRIP: ABNORMAL
HYALINE CASTS #/AREA URNS LPF: 23 /LPF
IMM GRANULOCYTES # BLD AUTO: 0.05 K/UL (ref 0–0.04)
IMM GRANULOCYTES NFR BLD AUTO: 0.6 % (ref 0–0.5)
INFLUENZA A, MOLECULAR: NEGATIVE
INFLUENZA B, MOLECULAR: NEGATIVE
KETONES UR QL STRIP: NEGATIVE
LACTATE SERPL-SCNC: 1.8 MMOL/L (ref 0.5–1.9)
LEUKOCYTE ESTERASE UR QL STRIP: ABNORMAL
LYMPHOCYTES # BLD AUTO: 0.9 K/UL (ref 1–4.8)
LYMPHOCYTES NFR BLD: 10.2 % (ref 18–48)
MAGNESIUM SERPL-MCNC: 1.7 MG/DL (ref 1.6–2.6)
MCH RBC QN AUTO: 33.3 PG (ref 27–31)
MCHC RBC AUTO-ENTMCNC: 33.9 G/DL (ref 32–36)
MCV RBC AUTO: 98 FL (ref 82–98)
MICROSCOPIC COMMENT: ABNORMAL
MONOCYTES # BLD AUTO: 0.5 K/UL (ref 0.3–1)
MONOCYTES NFR BLD: 5.4 % (ref 4–15)
NEUTROPHILS # BLD AUTO: 7.6 K/UL (ref 1.8–7.7)
NEUTROPHILS NFR BLD: 83.4 % (ref 38–73)
NITRITE UR QL STRIP: NEGATIVE
NRBC BLD-RTO: 0 /100 WBC
PH UR STRIP: 7 [PH] (ref 5–8)
PLATELET # BLD AUTO: 128 K/UL (ref 150–450)
PMV BLD AUTO: 10.1 FL (ref 9.2–12.9)
POTASSIUM SERPL-SCNC: 4 MMOL/L (ref 3.5–5.1)
PROCALCITONIN SERPL IA-MCNC: <0.05 NG/ML (ref 0–0.5)
PROT SERPL-MCNC: 7.2 G/DL (ref 6–8.4)
PROT UR QL STRIP: ABNORMAL
RBC # BLD AUTO: 4.2 M/UL (ref 4–5.4)
RBC #/AREA URNS HPF: 11 /HPF (ref 0–4)
SARS-COV-2 RDRP RESP QL NAA+PROBE: NEGATIVE
SODIUM SERPL-SCNC: 143 MMOL/L (ref 136–145)
SP GR UR STRIP: 1.02 (ref 1–1.03)
SPECIMEN SOURCE: NORMAL
SQUAMOUS #/AREA URNS HPF: 6 /HPF
TROPONIN I SERPL HS-MCNC: 10.9 PG/ML (ref 0–14.9)
TROPONIN I SERPL HS-MCNC: 11.8 PG/ML (ref 0–14.9)
URN SPEC COLLECT METH UR: ABNORMAL
UROBILINOGEN UR STRIP-ACNC: ABNORMAL EU/DL
WBC # BLD AUTO: 9.08 K/UL (ref 3.9–12.7)
WBC #/AREA URNS HPF: 7 /HPF (ref 0–5)

## 2023-11-25 PROCEDURE — 96361 HYDRATE IV INFUSION ADD-ON: CPT

## 2023-11-25 PROCEDURE — 87502 INFLUENZA DNA AMP PROBE: CPT | Performed by: NURSE PRACTITIONER

## 2023-11-25 PROCEDURE — 81001 URINALYSIS AUTO W/SCOPE: CPT | Performed by: EMERGENCY MEDICINE

## 2023-11-25 PROCEDURE — 99285 EMERGENCY DEPT VISIT HI MDM: CPT | Mod: 25

## 2023-11-25 PROCEDURE — 83735 ASSAY OF MAGNESIUM: CPT | Performed by: NURSE PRACTITIONER

## 2023-11-25 PROCEDURE — 85025 COMPLETE CBC W/AUTO DIFF WBC: CPT | Performed by: NURSE PRACTITIONER

## 2023-11-25 PROCEDURE — 25000003 PHARM REV CODE 250: Performed by: EMERGENCY MEDICINE

## 2023-11-25 PROCEDURE — 87651 STREP A DNA AMP PROBE: CPT | Performed by: NURSE PRACTITIONER

## 2023-11-25 PROCEDURE — 96360 HYDRATION IV INFUSION INIT: CPT

## 2023-11-25 PROCEDURE — 93005 ELECTROCARDIOGRAM TRACING: CPT | Performed by: GENERAL PRACTICE

## 2023-11-25 PROCEDURE — U0002 COVID-19 LAB TEST NON-CDC: HCPCS | Performed by: NURSE PRACTITIONER

## 2023-11-25 PROCEDURE — 87040 BLOOD CULTURE FOR BACTERIA: CPT | Performed by: EMERGENCY MEDICINE

## 2023-11-25 PROCEDURE — 84145 PROCALCITONIN (PCT): CPT | Performed by: EMERGENCY MEDICINE

## 2023-11-25 PROCEDURE — 84484 ASSAY OF TROPONIN QUANT: CPT | Performed by: NURSE PRACTITIONER

## 2023-11-25 PROCEDURE — 93010 EKG 12-LEAD: ICD-10-PCS | Mod: ,,, | Performed by: GENERAL PRACTICE

## 2023-11-25 PROCEDURE — 80053 COMPREHEN METABOLIC PANEL: CPT | Performed by: NURSE PRACTITIONER

## 2023-11-25 PROCEDURE — 83880 ASSAY OF NATRIURETIC PEPTIDE: CPT | Performed by: NURSE PRACTITIONER

## 2023-11-25 PROCEDURE — 36415 COLL VENOUS BLD VENIPUNCTURE: CPT | Performed by: EMERGENCY MEDICINE

## 2023-11-25 PROCEDURE — 93010 ELECTROCARDIOGRAM REPORT: CPT | Mod: ,,, | Performed by: GENERAL PRACTICE

## 2023-11-25 PROCEDURE — 83605 ASSAY OF LACTIC ACID: CPT | Performed by: EMERGENCY MEDICINE

## 2023-11-25 RX ORDER — GLYCOPYRROLATE 1 MG/1
1 TABLET ORAL 2 TIMES DAILY
COMMUNITY
Start: 2023-11-13

## 2023-11-25 RX ORDER — QUETIAPINE FUMARATE 200 MG/1
200 TABLET, FILM COATED ORAL NIGHTLY
COMMUNITY

## 2023-11-25 RX ORDER — LISINOPRIL 5 MG/1
5 TABLET ORAL DAILY
Status: ON HOLD | COMMUNITY
End: 2024-02-06 | Stop reason: HOSPADM

## 2023-11-25 RX ORDER — DONEPEZIL HYDROCHLORIDE 10 MG/1
10 TABLET, FILM COATED ORAL NIGHTLY
COMMUNITY
Start: 2023-11-03

## 2023-11-25 RX ADMIN — SODIUM CHLORIDE 1416 ML: 0.9 INJECTION, SOLUTION INTRAVENOUS at 05:11

## 2023-11-25 NOTE — ED PROVIDER NOTES
Encounter Date: 11/25/2023       History     Chief Complaint   Patient presents with    GEN WEAKNESS     TODAY    Cough     ONSET LASTNITE-     89-year-old female with past medical history of hypertension, hyperlipidemia, scoliosis, IBS, presents emergency department with generalized malaise and fatigue.  Reported patient normally walks with a walker.  Patient was unable to stand or get out of bed.  She is been extremely weak and fatigued today.  Reported that she has no energy no appetite.  Reported a cough since last night, dry, sometimes with clear sputum.  No reported fever.  No reported myalgias.  No reported shortness of breath or any chest pain or tightness.  Patient has not had any abdominal pain vomiting or any diarrhea.  No reported burning or pain with urination or any frequency.  No sick contacts.      Review of patient's allergies indicates:   Allergen Reactions    Antihistamines - alkylamine     Torrie [amlodipine-olmesartan]     Flexeril [cyclobenzaprine]      Hallucinations    Hydrocodone     Hydrocodone-acetaminophen Other (See Comments)    Simvastatin     Statins-hmg-coa reductase inhibitors      Past Medical History:   Diagnosis Date    Back problem     Constipation     History of fractured vertebra     Hyperlipidemia     Hypertension     IBS (irritable bowel syndrome)     Migraine headache     Osteoporosis     Rectal prolapse     Scoliosis     Vertigo      Past Surgical History:   Procedure Laterality Date    LEWIS      Back Pain    EYE SURGERY      Bilateral Cataracs    HYSTERECTOMY      OOPHORECTOMY      ROTATOR CUFF REPAIR      bialteral    TONSILLECTOMY       Family History   Problem Relation Age of Onset    Colon cancer Father     Breast cancer Mother 70    Hypertension Mother     Ovarian cancer Neg Hx      Social History     Tobacco Use    Smoking status: Never    Smokeless tobacco: Never   Substance Use Topics    Alcohol use: No    Drug use: No     Review of Systems   Constitutional:  Positive  for activity change, appetite change and fatigue. Negative for chills and fever.   HENT:  Negative for sore throat.    Respiratory:  Negative for shortness of breath.    Cardiovascular:  Negative for chest pain and palpitations.   Gastrointestinal:  Negative for abdominal pain, diarrhea, nausea and vomiting.   Genitourinary:  Negative for dysuria and flank pain.   Musculoskeletal:  Negative for back pain.   Skin:  Negative for rash.   Neurological:  Positive for weakness (generalized). Negative for seizures and headaches.   Hematological:  Does not bruise/bleed easily.   All other systems reviewed and are negative.      Physical Exam     Initial Vitals   BP Pulse Resp Temp SpO2   11/25/23 1700 11/25/23 1655 11/25/23 1655 11/25/23 1655 11/25/23 1655   (!) 174/85 86 20 98.1 °F (36.7 °C) 95 %      MAP       --                Physical Exam    Nursing note and vitals reviewed.  Constitutional: She appears well-developed and well-nourished. No distress.   HENT:   Head: Normocephalic and atraumatic.   Mouth/Throat: No oropharyngeal exudate.   Eyes: Conjunctivae and EOM are normal. Pupils are equal, round, and reactive to light.   Neck: Neck supple. No tracheal deviation present.   Normal range of motion.  Cardiovascular:  Normal rate, regular rhythm, normal heart sounds and intact distal pulses.           No murmur heard.  Pulmonary/Chest: Breath sounds normal. No stridor. No respiratory distress. She has no wheezes. She has no rhonchi. She has no rales.   Abdominal: Abdomen is soft. She exhibits no distension. There is no abdominal tenderness. There is no rebound and no guarding.   Musculoskeletal:         General: No tenderness or edema. Normal range of motion.      Cervical back: Normal range of motion and neck supple.      Comments: Severe scoliosis noted with left rotation of the neck.     Neurological: She is alert and oriented to person, place, and time. She has normal strength. No cranial nerve deficit or sensory  deficit.   Generalized weakness.  No focal weakness.   Skin: Skin is warm and dry. Capillary refill takes less than 2 seconds. No rash noted. No erythema. No pallor.   Psychiatric: She has a normal mood and affect. Her behavior is normal. Judgment and thought content normal.         ED Course   Procedures  Labs Reviewed   CBC W/ AUTO DIFFERENTIAL - Abnormal; Notable for the following components:       Result Value    MCH 33.3 (*)     Platelets 128 (*)     Immature Granulocytes 0.6 (*)     Immature Grans (Abs) 0.05 (*)     Lymph # 0.9 (*)     Gran % 83.4 (*)     Lymph % 10.2 (*)     All other components within normal limits   COMPREHENSIVE METABOLIC PANEL - Abnormal; Notable for the following components:    Glucose 136 (*)     Calcium 11.6 (*)     ALT 7 (*)     eGFR 47.7 (*)     All other components within normal limits   B-TYPE NATRIURETIC PEPTIDE - Abnormal; Notable for the following components:     (*)     All other components within normal limits   URINALYSIS, REFLEX TO URINE CULTURE - Abnormal; Notable for the following components:    Appearance, UA Hazy (*)     Protein, UA 1+ (*)     Occult Blood UA 1+ (*)     Urobilinogen, UA 2.0-3.0 (*)     Leukocytes, UA Trace (*)     All other components within normal limits    Narrative:     Specimen Source->Urine   URINALYSIS MICROSCOPIC - Abnormal; Notable for the following components:    RBC, UA 11 (*)     WBC, UA 7 (*)     Hyaline Casts, UA 23 (*)     All other components within normal limits    Narrative:     Specimen Source->Urine   GROUP A STREP, MOLECULAR   CULTURE, BLOOD   CULTURE, BLOOD   SARS-COV-2 RNA AMPLIFICATION, QUAL   INFLUENZA A AND B ANTIGEN    Narrative:     Specimen Source->Nasopharyngeal Swab   MAGNESIUM   TROPONIN I HIGH SENSITIVITY   TROPONIN I HIGH SENSITIVITY   LACTIC ACID, PLASMA   PROCALCITONIN   LACTIC ACID, PLASMA          Results for orders placed or performed during the hospital encounter of 11/25/23   Group A Strep, Molecular     Specimen: Throat   Result Value Ref Range    Group A Strep, Molecular Negative Negative   COVID-19 Rapid Screening   Result Value Ref Range    SARS-CoV-2 RNA, Amplification, Qual Negative Negative   Influenza antigen Nasopharyngeal Swab   Result Value Ref Range    Influenza A, Molecular Negative Negative    Influenza B, Molecular Negative Negative    Flu A & B Source NP    Magnesium   Result Value Ref Range    Magnesium 1.7 1.6 - 2.6 mg/dL   CBC auto differential   Result Value Ref Range    WBC 9.08 3.90 - 12.70 K/uL    RBC 4.20 4.00 - 5.40 M/uL    Hemoglobin 14.0 12.0 - 16.0 g/dL    Hematocrit 41.3 37.0 - 48.5 %    MCV 98 82 - 98 fL    MCH 33.3 (H) 27.0 - 31.0 pg    MCHC 33.9 32.0 - 36.0 g/dL    RDW 13.1 11.5 - 14.5 %    Platelets 128 (L) 150 - 450 K/uL    MPV 10.1 9.2 - 12.9 fL    Immature Granulocytes 0.6 (H) 0.0 - 0.5 %    Gran # (ANC) 7.6 1.8 - 7.7 K/uL    Immature Grans (Abs) 0.05 (H) 0.00 - 0.04 K/uL    Lymph # 0.9 (L) 1.0 - 4.8 K/uL    Mono # 0.5 0.3 - 1.0 K/uL    Eos # 0.0 0.0 - 0.5 K/uL    Baso # 0.03 0.00 - 0.20 K/uL    nRBC 0 0 /100 WBC    Gran % 83.4 (H) 38.0 - 73.0 %    Lymph % 10.2 (L) 18.0 - 48.0 %    Mono % 5.4 4.0 - 15.0 %    Eosinophil % 0.1 0.0 - 8.0 %    Basophil % 0.3 0.0 - 1.9 %    Differential Method Automated    Comprehensive metabolic panel   Result Value Ref Range    Sodium 143 136 - 145 mmol/L    Potassium 4.0 3.5 - 5.1 mmol/L    Chloride 105 95 - 110 mmol/L    CO2 26 23 - 29 mmol/L    Glucose 136 (H) 70 - 110 mg/dL    BUN 18 8 - 23 mg/dL    Creatinine 1.1 0.5 - 1.4 mg/dL    Calcium 11.6 (H) 8.7 - 10.5 mg/dL    Total Protein 7.2 6.0 - 8.4 g/dL    Albumin 4.1 3.5 - 5.2 g/dL    Total Bilirubin 1.0 0.1 - 1.0 mg/dL    Alkaline Phosphatase 102 55 - 135 U/L    AST 19 10 - 40 U/L    ALT 7 (L) 10 - 44 U/L    eGFR 47.7 (A) >60 mL/min/1.73 m^2    Anion Gap 12 8 - 16 mmol/L   Troponin I High Sensitivity #1   Result Value Ref Range    Troponin I High Sensitivity 11.8 0.0 - 14.9 pg/mL   Troponin I  High Sensitivity #2   Result Value Ref Range    Troponin I High Sensitivity 10.9 0.0 - 14.9 pg/mL   B-Type natriuretic peptide (BNP)   Result Value Ref Range     (H) 0 - 99 pg/mL   Lactic acid, plasma #1   Result Value Ref Range    Lactate (Lactic Acid) 1.8 0.5 - 1.9 mmol/L   Urinalysis, Reflex to Urine Culture Urine, Clean Catch    Specimen: Urine, Catheterized   Result Value Ref Range    Specimen UA Urine, Clean Catch     Color, UA Yellow Yellow, Straw, Enma    Appearance, UA Hazy (A) Clear    pH, UA 7.0 5.0 - 8.0    Specific Gravity, UA 1.020 1.005 - 1.030    Protein, UA 1+ (A) Negative    Glucose, UA Negative Negative    Ketones, UA Negative Negative    Bilirubin (UA) Negative Negative    Occult Blood UA 1+ (A) Negative    Nitrite, UA Negative Negative    Urobilinogen, UA 2.0-3.0 (A) Negative EU/dL    Leukocytes, UA Trace (A) Negative   Procalcitonin   Result Value Ref Range    Procalcitonin <0.050 0.000 - 0.500 ng/mL   Urinalysis Microscopic   Result Value Ref Range    RBC, UA 11 (H) 0 - 4 /hpf    WBC, UA 7 (H) 0 - 5 /hpf    Bacteria Negative None-Occ /hpf    Squam Epithel, UA 6 /hpf    Hyaline Casts, UA 23 (A) 0-1/lpf /lpf    Microscopic Comment SEE COMMENT        Imaging Results              X-Ray Chest AP Portable (Final result)  Result time 11/25/23 17:53:02      Final result by Aguilar Conte MD (11/25/23 17:53:02)                   Narrative:    History: Chest pain. Cough and weakness.    FINDINGS: Single AP view of the chest is compared to previous study dated comparison to previous study dated May 22, 2023. Minimal blunting of the left CP angle is present which may be chronic. Mild left perihilar atelectasis is present. No focal consolidation is seen. Cardiac silhouette is within normal limits in size. Atherosclerotic changes of the thoracic aorta are present. Multilevel thoracic and lumbar vertebroplasty is noted.    IMPRESSION:  1. Minimal blunting of the left CP angle which may be  chronic.  2. Mild left perihilar atelectasis.    Electronically signed by:  Aguilar Conte MD  11/25/2023 05:53 PM [facility] Workstation: [id number]                                     Medications   sodium chloride 0.9% bolus 1,416 mL 1,416 mL (0 mLs Intravenous Stopped 11/25/23 1931)     Medical Decision Making  Differential includes but not limited to dehydration, UTI, COVID, flu, pneumonia, electrolyte abnormality, generalized weakness,    Emergent evaluation 89-year-old female presents emergency department above-mentioned complaints.  Patient is well-appearing, nontoxic in no distress.  Patient likely has some mild dehydration with some hypercalcemia.  Patient has been given IV fluids in the emergency department.  She has 7 white cells in her urine but no bacteria.  No nitrites.  Equivocal urine.  Will not treat at this point.  Patient negative for COVID negative for flu negative for pneumonia.  Patient likely with viral illness starting and some dehydration and hypercalcemia.  I offered to admit the patient for observation IV fluids overnight.  Daughter-in-law wanted her admitted.  Will admit for fluids and hydration and further evaluation of the hypercalcemia.    Amount and/or Complexity of Data Reviewed  Labs: ordered.    Risk  Decision regarding hospitalization.               ED Course as of 11/25/23 2211   Sat Nov 25, 2023 2110 I discussed the case in detail with [place] hospitalist who will admit the patient to the hospital [JR]      ED Course User Index  [JR] Aiden Felix DO                        Clinical Impression:  Final diagnoses:  [R07.9] Chest pain  [R53.1] Generalized weakness  [E83.52] Hypercalcemia (Primary)  [E86.0] Dehydration          ED Disposition Condition    Admit Stable                Adien Felix DO  11/25/23 2211

## 2023-11-26 VITALS
HEIGHT: 55 IN | BODY MASS INDEX: 23.98 KG/M2 | HEART RATE: 78 BPM | TEMPERATURE: 98 F | RESPIRATION RATE: 20 BRPM | DIASTOLIC BLOOD PRESSURE: 69 MMHG | SYSTOLIC BLOOD PRESSURE: 143 MMHG | OXYGEN SATURATION: 95 % | WEIGHT: 103.63 LBS

## 2023-11-26 PROBLEM — E86.0 DEHYDRATION: Status: ACTIVE | Noted: 2023-11-26

## 2023-11-26 PROBLEM — E83.52 HYPERCALCEMIA: Status: ACTIVE | Noted: 2023-11-26

## 2023-11-26 PROBLEM — D69.6 THROMBOCYTOPENIA: Status: ACTIVE | Noted: 2023-11-26

## 2023-11-26 PROBLEM — E86.0 DEHYDRATION: Status: RESOLVED | Noted: 2023-11-26 | Resolved: 2023-11-26

## 2023-11-26 PROBLEM — R54 ADVANCED AGE: Status: ACTIVE | Noted: 2023-11-26

## 2023-11-26 PROBLEM — D53.9 MACROCYTIC ANEMIA: Status: ACTIVE | Noted: 2023-11-26

## 2023-11-26 PROBLEM — Z99.89 WALKER AS AMBULATION AID: Status: ACTIVE | Noted: 2023-11-26

## 2023-11-26 PROBLEM — N17.9 AKI (ACUTE KIDNEY INJURY): Status: RESOLVED | Noted: 2023-11-26 | Resolved: 2023-11-26

## 2023-11-26 PROBLEM — N17.9 AKI (ACUTE KIDNEY INJURY): Status: ACTIVE | Noted: 2023-11-26

## 2023-11-26 PROBLEM — R53.1 GENERALIZED WEAKNESS: Status: ACTIVE | Noted: 2023-11-26

## 2023-11-26 PROBLEM — F03.90 DEMENTIA: Status: ACTIVE | Noted: 2023-11-26

## 2023-11-26 PROBLEM — F01.A0 MILD VASCULAR DEMENTIA WITHOUT BEHAVIORAL DISTURBANCE, PSYCHOTIC DISTURBANCE, MOOD DISTURBANCE, OR ANXIETY: Chronic | Status: ACTIVE | Noted: 2023-01-12

## 2023-11-26 PROBLEM — E86.0 DEHYDRATION: Status: RESOLVED | Noted: 2023-02-07 | Resolved: 2023-11-26

## 2023-11-26 PROBLEM — E83.52 HYPERCALCEMIA: Status: RESOLVED | Noted: 2023-11-26 | Resolved: 2023-11-26

## 2023-11-26 LAB
ALBUMIN SERPL BCP-MCNC: 3 G/DL (ref 3.5–5.2)
ALP SERPL-CCNC: 75 U/L (ref 55–135)
ALT SERPL W/O P-5'-P-CCNC: 19 U/L (ref 10–44)
ANION GAP SERPL CALC-SCNC: 6 MMOL/L (ref 8–16)
AST SERPL-CCNC: 45 U/L (ref 10–40)
BASOPHILS # BLD AUTO: 0.04 K/UL (ref 0–0.2)
BASOPHILS NFR BLD: 0.5 % (ref 0–1.9)
BILIRUB SERPL-MCNC: 0.9 MG/DL (ref 0.1–1)
BUN SERPL-MCNC: 23 MG/DL (ref 8–23)
CALCIUM SERPL-MCNC: 9.6 MG/DL (ref 8.7–10.5)
CHLORIDE SERPL-SCNC: 111 MMOL/L (ref 95–110)
CO2 SERPL-SCNC: 25 MMOL/L (ref 23–29)
CREAT SERPL-MCNC: 0.8 MG/DL (ref 0.5–1.4)
DIFFERENTIAL METHOD: ABNORMAL
EOSINOPHIL # BLD AUTO: 0.1 K/UL (ref 0–0.5)
EOSINOPHIL NFR BLD: 0.6 % (ref 0–8)
ERYTHROCYTE [DISTWIDTH] IN BLOOD BY AUTOMATED COUNT: 13.2 % (ref 11.5–14.5)
EST. GFR  (NO RACE VARIABLE): >60 ML/MIN/1.73 M^2
FOLATE SERPL-MCNC: 8.7 NG/ML (ref 4–24)
GLUCOSE SERPL-MCNC: 106 MG/DL (ref 70–110)
HCT VFR BLD AUTO: 34.1 % (ref 37–48.5)
HGB BLD-MCNC: 11.1 G/DL (ref 12–16)
IMM GRANULOCYTES # BLD AUTO: 0.04 K/UL (ref 0–0.04)
IMM GRANULOCYTES NFR BLD AUTO: 0.5 % (ref 0–0.5)
LYMPHOCYTES # BLD AUTO: 1.3 K/UL (ref 1–4.8)
LYMPHOCYTES NFR BLD: 15.7 % (ref 18–48)
MCH RBC QN AUTO: 32.6 PG (ref 27–31)
MCHC RBC AUTO-ENTMCNC: 32.6 G/DL (ref 32–36)
MCV RBC AUTO: 100 FL (ref 82–98)
MONOCYTES # BLD AUTO: 0.8 K/UL (ref 0.3–1)
MONOCYTES NFR BLD: 9.3 % (ref 4–15)
NEUTROPHILS # BLD AUTO: 6.2 K/UL (ref 1.8–7.7)
NEUTROPHILS NFR BLD: 73.4 % (ref 38–73)
NRBC BLD-RTO: 0 /100 WBC
PLATELET # BLD AUTO: 114 K/UL (ref 150–450)
PMV BLD AUTO: 10.6 FL (ref 9.2–12.9)
POTASSIUM SERPL-SCNC: 4.5 MMOL/L (ref 3.5–5.1)
PROT SERPL-MCNC: 5.3 G/DL (ref 6–8.4)
PTH-INTACT SERPL-MCNC: 65.7 PG/ML (ref 9–77)
RBC # BLD AUTO: 3.4 M/UL (ref 4–5.4)
SODIUM SERPL-SCNC: 142 MMOL/L (ref 136–145)
VIT B12 SERPL-MCNC: 246 PG/ML (ref 210–950)
WBC # BLD AUTO: 8.46 K/UL (ref 3.9–12.7)

## 2023-11-26 PROCEDURE — 82397 CHEMILUMINESCENT ASSAY: CPT | Performed by: NURSE PRACTITIONER

## 2023-11-26 PROCEDURE — 82746 ASSAY OF FOLIC ACID SERUM: CPT | Performed by: NURSE PRACTITIONER

## 2023-11-26 PROCEDURE — 25000003 PHARM REV CODE 250: Performed by: NURSE PRACTITIONER

## 2023-11-26 PROCEDURE — 36415 COLL VENOUS BLD VENIPUNCTURE: CPT | Performed by: NURSE PRACTITIONER

## 2023-11-26 PROCEDURE — 25000003 PHARM REV CODE 250: Performed by: INTERNAL MEDICINE

## 2023-11-26 PROCEDURE — 80053 COMPREHEN METABOLIC PANEL: CPT | Performed by: NURSE PRACTITIONER

## 2023-11-26 PROCEDURE — G0378 HOSPITAL OBSERVATION PER HR: HCPCS

## 2023-11-26 PROCEDURE — 97161 PT EVAL LOW COMPLEX 20 MIN: CPT

## 2023-11-26 PROCEDURE — 97116 GAIT TRAINING THERAPY: CPT

## 2023-11-26 PROCEDURE — 85025 COMPLETE CBC W/AUTO DIFF WBC: CPT | Performed by: NURSE PRACTITIONER

## 2023-11-26 PROCEDURE — 83970 ASSAY OF PARATHORMONE: CPT | Performed by: NURSE PRACTITIONER

## 2023-11-26 PROCEDURE — 82607 VITAMIN B-12: CPT | Performed by: NURSE PRACTITIONER

## 2023-11-26 PROCEDURE — 63600175 PHARM REV CODE 636 W HCPCS: Performed by: NURSE PRACTITIONER

## 2023-11-26 RX ORDER — SERTRALINE HYDROCHLORIDE 25 MG/1
25 TABLET, FILM COATED ORAL DAILY
Status: DISCONTINUED | OUTPATIENT
Start: 2023-11-26 | End: 2023-11-26 | Stop reason: HOSPADM

## 2023-11-26 RX ORDER — ENOXAPARIN SODIUM 100 MG/ML
30 INJECTION SUBCUTANEOUS EVERY 24 HOURS
Status: DISCONTINUED | OUTPATIENT
Start: 2023-11-26 | End: 2023-11-26 | Stop reason: HOSPADM

## 2023-11-26 RX ORDER — TALC
6 POWDER (GRAM) TOPICAL NIGHTLY PRN
Status: DISCONTINUED | OUTPATIENT
Start: 2023-11-26 | End: 2023-11-26 | Stop reason: HOSPADM

## 2023-11-26 RX ORDER — SODIUM CHLORIDE 0.9 % (FLUSH) 0.9 %
10 SYRINGE (ML) INJECTION
Status: DISCONTINUED | OUTPATIENT
Start: 2023-11-26 | End: 2023-11-26 | Stop reason: HOSPADM

## 2023-11-26 RX ORDER — LISINOPRIL 5 MG/1
5 TABLET ORAL DAILY
Status: DISCONTINUED | OUTPATIENT
Start: 2023-11-26 | End: 2023-11-26 | Stop reason: HOSPADM

## 2023-11-26 RX ORDER — FAMOTIDINE 20 MG/1
20 TABLET, FILM COATED ORAL DAILY
Status: DISCONTINUED | OUTPATIENT
Start: 2023-11-26 | End: 2023-11-26 | Stop reason: HOSPADM

## 2023-11-26 RX ORDER — MECLIZINE HCL 12.5 MG 12.5 MG/1
12.5 TABLET ORAL 2 TIMES DAILY PRN
Status: DISCONTINUED | OUTPATIENT
Start: 2023-11-26 | End: 2023-11-26 | Stop reason: HOSPADM

## 2023-11-26 RX ORDER — POLYETHYLENE GLYCOL 3350 17 G/17G
17 POWDER, FOR SOLUTION ORAL 2 TIMES DAILY PRN
Status: DISCONTINUED | OUTPATIENT
Start: 2023-11-26 | End: 2023-11-26 | Stop reason: HOSPADM

## 2023-11-26 RX ORDER — LANOLIN ALCOHOL/MO/W.PET/CERES
1 CREAM (GRAM) TOPICAL DAILY
Status: DISCONTINUED | OUTPATIENT
Start: 2023-11-26 | End: 2023-11-26 | Stop reason: HOSPADM

## 2023-11-26 RX ORDER — ENOXAPARIN SODIUM 100 MG/ML
40 INJECTION SUBCUTANEOUS EVERY 24 HOURS
Status: DISCONTINUED | OUTPATIENT
Start: 2023-11-26 | End: 2023-11-26

## 2023-11-26 RX ORDER — GLYCOPYRROLATE 1 MG/1
1 TABLET ORAL 2 TIMES DAILY
Status: DISCONTINUED | OUTPATIENT
Start: 2023-11-26 | End: 2023-11-26 | Stop reason: HOSPADM

## 2023-11-26 RX ORDER — VERAPAMIL HYDROCHLORIDE 120 MG/1
120 CAPSULE, EXTENDED RELEASE ORAL DAILY
Status: DISCONTINUED | OUTPATIENT
Start: 2023-11-26 | End: 2023-11-26 | Stop reason: HOSPADM

## 2023-11-26 RX ORDER — FAMOTIDINE 20 MG/1
20 TABLET, FILM COATED ORAL 2 TIMES DAILY
Status: DISCONTINUED | OUTPATIENT
Start: 2023-11-26 | End: 2023-11-26

## 2023-11-26 RX ORDER — HYDROCHLOROTHIAZIDE 25 MG/1
12.5 TABLET ORAL DAILY PRN
Qty: 45 TABLET | Refills: 1 | Status: ON HOLD | OUTPATIENT
Start: 2023-11-26 | End: 2024-02-06 | Stop reason: HOSPADM

## 2023-11-26 RX ORDER — QUETIAPINE FUMARATE 100 MG/1
200 TABLET, FILM COATED ORAL NIGHTLY
Status: DISCONTINUED | OUTPATIENT
Start: 2023-11-26 | End: 2023-11-26 | Stop reason: HOSPADM

## 2023-11-26 RX ORDER — ACETAMINOPHEN 325 MG/1
650 TABLET ORAL EVERY 8 HOURS PRN
Status: DISCONTINUED | OUTPATIENT
Start: 2023-11-26 | End: 2023-11-26 | Stop reason: HOSPADM

## 2023-11-26 RX ORDER — PROCHLORPERAZINE EDISYLATE 5 MG/ML
5 INJECTION INTRAMUSCULAR; INTRAVENOUS EVERY 6 HOURS PRN
Status: DISCONTINUED | OUTPATIENT
Start: 2023-11-26 | End: 2023-11-26 | Stop reason: HOSPADM

## 2023-11-26 RX ORDER — ONDANSETRON 2 MG/ML
4 INJECTION INTRAMUSCULAR; INTRAVENOUS EVERY 8 HOURS PRN
Status: DISCONTINUED | OUTPATIENT
Start: 2023-11-26 | End: 2023-11-26 | Stop reason: HOSPADM

## 2023-11-26 RX ORDER — SODIUM CHLORIDE, SODIUM LACTATE, POTASSIUM CHLORIDE, CALCIUM CHLORIDE 600; 310; 30; 20 MG/100ML; MG/100ML; MG/100ML; MG/100ML
INJECTION, SOLUTION INTRAVENOUS CONTINUOUS
Status: DISCONTINUED | OUTPATIENT
Start: 2023-11-26 | End: 2023-11-26 | Stop reason: HOSPADM

## 2023-11-26 RX ORDER — DONEPEZIL HYDROCHLORIDE 5 MG/1
5 TABLET, FILM COATED ORAL NIGHTLY
Status: DISCONTINUED | OUTPATIENT
Start: 2023-11-26 | End: 2023-11-26 | Stop reason: HOSPADM

## 2023-11-26 RX ADMIN — SODIUM CHLORIDE, SODIUM LACTATE, POTASSIUM CHLORIDE, AND CALCIUM CHLORIDE: .6; .31; .03; .02 INJECTION, SOLUTION INTRAVENOUS at 01:11

## 2023-11-26 RX ADMIN — SERTRALINE HYDROCHLORIDE 25 MG: 25 TABLET ORAL at 09:11

## 2023-11-26 RX ADMIN — FERROUS SULFATE TAB 325 MG (65 MG ELEMENTAL FE) 1 EACH: 325 (65 FE) TAB at 09:11

## 2023-11-26 RX ADMIN — VERAPAMIL HYDROCHLORIDE 120 MG: 120 CAPSULE, DELAYED RELEASE PELLETS ORAL at 09:11

## 2023-11-26 RX ADMIN — LISINOPRIL 5 MG: 5 TABLET ORAL at 09:11

## 2023-11-26 RX ADMIN — GLYCOPYRROLATE 1 MG: 1 TABLET ORAL at 10:11

## 2023-11-26 RX ADMIN — FAMOTIDINE 20 MG: 20 TABLET ORAL at 09:11

## 2023-11-26 NOTE — H&P
[facility] - Emergency Dept  Hospital Medicine  History & Physical    Patient Name: Angelique Hamilton  MRN: [id number]  Patient Class: OP- Observation  Admission Date: 11/25/2023  Attending Physician: Rc Powell MD   Primary Care Provider: Ryan Hurtado MD         Patient information was obtained from caregiver / friend and ER records.     Subjective:     Principal Problem:Hypercalcemia    Chief Complaint:   Chief Complaint   Patient presents with    GEN WEAKNESS     TODAY    Cough     ONSET LASTNITE-        HPI: Patient is a 89-year-old female with history of dementia, hypertension, GERD.  She presents to the ED with complaints of increased weakness started yesterday.  According to family member at bedside states patient had a decrease fluid intake.  She reports patient stated she felt fatigued today and difficulty standing up with walker.  On assessment ED tip 99.2, UA leukocytes- trace, 1+ blood, 1+ protein, , calcium 11.6.  1.5 L IV fluid bolus ordered in ED. it was stated a Mcneil was placed with minimal urinary output.     On assessment patient is alert/ oriented with occasional confusion.  She reports increased weakness started yesterday.  She denies chest pain, lightheadedness, dizziness, nausea, vomiting, fever, chills.    Hospitalist asked to admit observation for hypercalcinemia, and generalized weakness    Past Medical History:   Diagnosis Date    Back problem     Constipation     History of fractured vertebra     Hyperlipidemia     Hypertension     IBS (irritable bowel syndrome)     Migraine headache     Osteoporosis     Rectal prolapse     Scoliosis     Vertigo        Past Surgical History:   Procedure Laterality Date    LEWIS      Back Pain    EYE SURGERY      Bilateral Cataracs    HYSTERECTOMY      OOPHORECTOMY      ROTATOR CUFF REPAIR      bialteral    TONSILLECTOMY         Review of patient's allergies indicates:   Allergen Reactions    Antihistamines - alkylamine      Torrie [amlodipine-olmesartan]     Flexeril [cyclobenzaprine]      Hallucinations    Hydrocodone     Hydrocodone-acetaminophen Other (See Comments)    Simvastatin     Statins-hmg-coa reductase inhibitors        No current facility-administered medications on file prior to encounter.     Current Outpatient Medications on File Prior to Encounter   Medication Sig    donepeziL (ARICEPT) 10 MG tablet Take 10 mg by mouth every evening.    glycopyrrolate (ROBINUL) 1 mg Tab Take 1 mg by mouth 2 (two) times daily.    biotin 1 mg tablet Take 1,000 mcg by mouth once daily.    calcium-vitamin D3 (OS-AYSHA 500 + D3) 500 mg-5 mcg (200 unit) per tablet Take 1 tablet by mouth once daily.    cholecalciferol, vitamin D3, (VITAMIN D3) 2,000 unit Tab Take 2,000 Units by mouth once daily.     famotidine (PEPCID) 20 MG tablet Take 1 tablet (20 mg total) by mouth 2 (two) times daily.    ferrous sulfate (FEOSOL) 325 mg (65 mg iron) Tab tablet Take 325 mg by mouth daily with breakfast.    hydroCHLOROthiazide (HYDRODIURIL) 25 MG tablet Take 1 tablet (25 mg total) by mouth every other day.    lisinopriL (PRINIVIL,ZESTRIL) 5 MG tablet Take 5 mg by mouth once daily.    meclizine (ANTIVERT) 12.5 mg tablet Take 0.5 tablets (6.25 mg total) by mouth 2 (two) times daily as needed for Dizziness (1/2 tablet twice daily as needed for dizziness).    mupirocin (BACTROBAN) 2 % ointment Apply topically 2 (two) times daily.    pantoprazole (PROTONIX) 40 MG tablet Take 1 tablet (40 mg total) by mouth once daily.    QUEtiapine (SEROQUEL) 200 MG Tab Take 200 mg by mouth every evening.    sertraline (ZOLOFT) 25 MG tablet Take 25 mg by mouth once daily.    tolterodine (DETROL LA) 2 MG Cp24 Take 1 capsule (2 mg total) by mouth once daily. For bladder    tramadol-acetaminophen 37.5-325 mg (ULTRACET) 37.5-325 mg Tab Take 1 tablet by mouth 2 (two) times a day.    verapamiL (CALAN-SR) 120 MG CR tablet Take 1 tablet (120 mg total) by mouth once daily.     Family History        Problem Relation (Age of Onset)    Breast cancer Mother (70)    Colon cancer Father    Hypertension Mother          Tobacco Use    Smoking status: Never    Smokeless tobacco: Never   Substance and Sexual Activity    Alcohol use: No    Drug use: No    Sexual activity: Not Currently     Partners: Male     Birth control/protection: None     Review of Systems   Constitutional:  Positive for appetite change.   HENT: Negative.     Eyes: Negative.    Respiratory: Negative.     Cardiovascular: Negative.    Gastrointestinal: Negative.    Endocrine: Negative.    Genitourinary: Negative.    Musculoskeletal: Negative.    Skin: Negative.    Allergic/Immunologic: Negative.    Neurological:  Positive for weakness.   Hematological: Negative.    Psychiatric/Behavioral:  Positive for confusion (history of dementia).      Objective:     Vital Signs (Most Recent):  Temp: 99.1 °F (37.3 °C) (11/25/23 1737)  Pulse: 86 (11/26/23 0115)  Resp: 20 (11/25/23 1655)  BP: (!) 148/79 (11/26/23 0100)  SpO2: 98 % (11/26/23 0115) Vital Signs (24h Range):  Temp:  [98.1 °F (36.7 °C)-99.1 °F (37.3 °C)] 99.1 °F (37.3 °C)  Pulse:  [74-86] 86  Resp:  [20] 20  SpO2:  [91 %-99 %] 98 %  BP: (133-174)/(61-85) 148/79     Weight: 47.2 kg (104 lb)  Body mass index is 24.17 kg/m².     Physical Exam  Vitals reviewed.   HENT:      Head: Normocephalic.      Nose: Nose normal.      Mouth/Throat:      Mouth: Mucous membranes are moist.   Eyes:      Pupils: Pupils are equal, round, and reactive to light.   Cardiovascular:      Rate and Rhythm: Normal rate.   Pulmonary:      Effort: Pulmonary effort is normal.      Breath sounds: Normal breath sounds.   Abdominal:      General: Abdomen is flat.      Palpations: Abdomen is soft.   Genitourinary:     General: Normal vulva.   Musculoskeletal:         General: Normal range of motion.      Cervical back: Normal range of motion and neck supple.   Skin:     General: Skin is warm and dry.      Capillary Refill: Capillary  refill takes less than 2 seconds.   Neurological:      Mental Status: She is alert. Mental status is at baseline. She is disoriented.   Psychiatric:         Mood and Affect: Mood normal.              CRANIAL NERVES     CN III, IV, VI   Pupils are equal, round, and reactive to light.       Significant Labs: All pertinent labs within the past 24 hours have been reviewed.  Recent Lab Results  (Last 5 results in the past 24 hours)        11/26/23  0112   11/25/23  1924   11/25/23  1911   11/25/23  1815   11/25/23  1806        Procalcitonin               Albumin               ALP               ALT               Anion Gap               Appearance, UA     Hazy           AST               Bacteria, UA     Negative           Baso #               Basophil %               Bilirubin (UA)     Negative           BILIRUBIN TOTAL               Blood Culture, Routine       No Growth to date  [P]   No Growth to date  [P]       BNP               BUN               Calcium               Chloride               CO2               Color, UA     Yellow           Creatinine               Differential Method               eGFR               Eos #               Eosinophil %               Glucose               Glucose, UA     Negative           Gran # (ANC)               Gran %               Hematocrit               Hemoglobin               Hyaline Casts, UA     23           Immature Grans (Abs)               Immature Granulocytes               Ketones, UA     Negative           Lactate, Shahab               Leukocytes, UA     Trace           Lymph #               Lymph %               Magnesium                MCH               MCHC               MCV               Microscopic Comment     SEE COMMENT  Comment: Other formed elements not mentioned in the report are not   present in the microscopic examination.              Mono #               Mono %               MPV               NITRITE UA     Negative           nRBC               Occult Blood UA      1+           pH, UA     7.0           Platelet Count               Potassium               PROTEIN TOTAL               Protein, UA     1+  Comment: Recommend a 24 hour urine protein or a urine   protein/creatinine ratio if globulin induced proteinuria is  clinically suspected.             PTH 65.7               RBC               RBC, UA     11           RDW               Sodium               Specific Gravity, UA     1.020           Specimen UA     Urine, Clean Catch           Squam Epithel, UA     6           Troponin I High Sensitivity   10.9  Comment: Troponin results differ between methods. Do not use   results between Troponin methods interchangeably.    Alkaline Phospatase levels above 400 U/L may   cause false positive results.    Access hsTnI should not be used for patients taking   Asfotase nieves (Strensiq).               UROBILINOGEN UA     2.0-3.0           WBC, UA     7           WBC                                       [P] - Preliminary Result               Significant Imaging: I have reviewed all pertinent imaging results/findings within the past 24 hours.  I have reviewed and interpreted all pertinent imaging results/findings within the past 24 hours.  Assessment/Plan:     * Hypercalcemia  Initial calcium level 11.6  We will 1.5 L IV fluid ordered in ED we will continue LR 75 mL/HR  Repeat calcium level daily  We will check PTH intact, PTH related peptide    Generalized weakness  Consult physical therapy for evaluation and treat  Uses walker to ambulate      Mild vascular dementia without behavioral disturbance, psychotic disturbance, mood disturbance, or anxiety  Continue home medications for mild/moderate dementia      HTN (hypertension)  Chronic, controlled blood pressure    Temp:  [98.1 °F (36.7 °C)-99.1 °F (37.3 °C)]   Pulse:  [74-86]   Resp:  [20]   BP: (133-174)/(61-85)   SpO2:  [91 %-99 %] .   Home meds for hypertension were reviewed and noted below.   Hypertension Medications                "hydroCHLOROthiazide (HYDRODIURIL) 25 MG tablet Take 1 tablet (25 mg total) by mouth every other day.    lisinopriL (PRINIVIL,ZESTRIL) 5 MG tablet Take 5 mg by mouth once daily.    verapamiL (CALAN-SR) 120 MG CR tablet Take 1 tablet (120 mg total) by mouth once daily.            While in the hospital, will manage blood pressure as follows; Continue home antihypertensive regimen    Will utilize p.r.n. blood pressure medication only if patient's blood pressure greater than 180/110 and she develops symptoms such as worsening chest pain or shortness of breath.      VTE Risk Mitigation (From admission, onward)           Ordered     enoxaparin injection 30 mg  Every 24 hours         11/26/23 0024     IP VTE HIGH RISK PATIENT  Once         11/26/23 0005     Place sequential compression device  Until discontinued         11/26/23 0005                         On 11/26/2023, patient should be placed in hospital observation services under my care in collaboration with       Pharmacist Renal Dose Adjustment Note    Angelique Hamilton is a 89 y.o. female being treated with the medication Enoxaparin    Patient Data:    Vital Signs (Most Recent):  Temp: 99.1 °F (37.3 °C) (11/25/23 1737)  Pulse: 85 (11/25/23 2300)  Resp: 20 (11/25/23 1655)  BP: (!) 143/72 (11/25/23 2300)  SpO2: (!) 94 % (11/25/23 2300) Vital Signs (72h Range):  Temp:  [98.1 °F (36.7 °C)-99.1 °F (37.3 °C)]   Pulse:  [74-86]   Resp:  [20]   BP: (133-174)/(61-85)   SpO2:  [91 %-99 %]        Ht: 4' 7" (1.397 m)  Wt: 47.2 kg (104 lb)  Estimated Creatinine Clearance: 22.8 mL/min (based on SCr of 1.1 mg/dL).  Body mass index is 24.17 kg/m².    Per [facility] renal dosing protocol:     Previous Order: Enoxaparin 40 mg Q24H    Will be changed to:     New Order: Enoxaparin 30 mg Q24H,    Due to: Per Pharmacy Protocol    Renal dose adjustments performed as noted above.    We will continue monitoring and adjusting as necessary.    Pharmacist: Pancho Madison PharmD  Ext: " [phone]        CATHY Becker  Department of Hospital Medicine  [facility] - Emergency Dept

## 2023-11-26 NOTE — PT/OT/SLP EVAL
"Physical Therapy Evaluation and Discharge Note    Patient Name:  Angelique Hamilton   MRN:  [id number]    Recommendations:     Discharge Recommendations: Low Intensity Therapy  Discharge Equipment Recommendations: none   Barriers to discharge: None    Assessment:     Angelique Hamilton is a 89 y.o. female admitted with a medical diagnosis of Hypercalcemia. .  At this time, patient is functioning at their prior level of function and does not require further acute PT services.     Recent Surgery: * No surgery found *      Plan:     During this hospitalization, patient does not require further acute PT services.  Please re-consult if situation changes.      Subjective     Chief Complaint: Weakness   Patient/Family Comments/goals: Pt states she has "felt weak for about a week or so."  Pain/Comfort:  Pain Rating 1: 0/10    Patients cultural, spiritual, Uatsdin conflicts given the current situation: no    Living Environment:  Pt lives /c adult children  Prior to admission, patients level of function was (S) per her description (Pt has dementia and is unreliable historian).  Equipment used at home: walker, rolling.  DME owned (not currently used): rolling walker.  Upon discharge, patient will have assistance from family.    Objective:     Communicated with nurse prior to session.  Patient found HOB elevated with bed alarm, blood pressure cuff, oxygen, PureWick, peripheral IV upon PT entry to room.    General Precautions: Standard, fall    Orthopedic Precautions:N/A   Braces: N/A  Respiratory Status: Nasal cannula, flow 2 L/min. Pt completed eval on RA /c SPO2% at 95% or better.    Exams:  Cognitive Exam:  Patient is oriented to Person and Place  Gross Motor Coordination:  delayed/slowed  Postural Exam:  Patient presented with the following abnormalities:    -       Abnormal trunk flexion  -       Kyphosis  -       Scoliosis  RLE ROM: WFL  RLE Strength: 3+/5  LLE ROM: WFL  LLE Strength: 3+/5    Functional Mobility:  Bed " Mobility:     Supine to Sit: minimum assistance  Sit to Supine: minimum assistance  Transfers:     Sit to Stand:  minimum assistance with rolling walker  Bed to Chair: minimum assistance with  rolling walker  using  Stand Pivot  Gait: 50ft minimum assistance with rolling walker  Balance: standing supported dynamic: Fair-    [facility] 6 CLICK MOBILITY  Total Score:16       Treatment and Education:  Pt needed no acute interventions.    [facility] 6 CLICK MOBILITY  Total Score:16     Patient left HOB elevated with all lines intact, call button in reach, and nurse notified.    GOALS:   Multidisciplinary Problems       Physical Therapy Goals       Not on file                    History:     Past Medical History:   Diagnosis Date    Back problem     Constipation     History of fractured vertebra     Hyperlipidemia     Hypertension     IBS (irritable bowel syndrome)     Migraine headache     Osteoporosis     Rectal prolapse     Scoliosis     Vertigo        Past Surgical History:   Procedure Laterality Date    LEWIS      Back Pain    EYE SURGERY      Bilateral Cataracs    HYSTERECTOMY      OOPHORECTOMY      ROTATOR CUFF REPAIR      bialteral    TONSILLECTOMY         Time Tracking:     PT Received On: 11/26/23  PT Start Time: 0927     PT Stop Time: 0950  PT Total Time (min): 23 min     Billable Minutes: Evaluation 13 and Gait Training 10      11/26/2023

## 2023-11-26 NOTE — DISCHARGE SUMMARY
[facility]  Discharge Summary      Patient Name: Angelique Hamilton  MRN: [id number]  MILAN: [id number]  Patient Class: OP- Observation  Admission Date: 11/25/2023  Hospital Length of Stay: 0 days  Discharge Date and Time: 11/26/2023  1:38 PM  Attending Physician: No att. providers found   Discharging Provider: Saskia Weaver MD  Primary Care Provider: Ryan Hurtado MD    Primary Care Team: Networked reference to record PCT     HPI:   Patient is a 89-year-old female with history of dementia, hypertension, GERD.  She presents to the ED with complaints of increased weakness started yesterday.  According to family member at bedside states patient had a decrease fluid intake.  She reports patient stated she felt fatigued today and difficulty standing up with walker.  On assessment ED tip 99.2, UA leukocytes- trace, 1+ blood, 1+ protein, , calcium 11.6.  1.5 L IV fluid bolus ordered in ED. it was stated a Mcneil was placed with minimal urinary output.     On assessment patient is alert/ oriented with occasional confusion.  She reports increased weakness started yesterday.  She denies chest pain, lightheadedness, dizziness, nausea, vomiting, fever, chills.    Hospitalist asked to admit observation for hypercalcinemia, and generalized weakness    * No surgery found *      Hospital Course:   Patient with history of hypertension, dementia, GERD, IBS, intermittent lower extremity edema, ambulates with walker at home who presented with generalized weakness with decreased oral intake.  Patient lives with son/daughter in-law.  Reports decreased oral intake, states chronically has increased saliva production, generalized weakness with inability to stand and ambulate which prompted ED presentation.  Admission workup, nonfocal neurological examination, marocytic anemia with mild thrombocytopenia, acute kidney injury, hypercalcemia, , troponin negative, lactic acid 1.8, COVID/influenza  negative, calcium elevated, UA not suggestive of UTI.  Patient is on calcium/vitamin-D supplement at home b.i.d., as per daughter in-law only uses hydrochlorothiazide intermittently for leg/pedal edema.  She was admitted with IV fluid hydration, close monitoring and trending of labs.  On 11/26 appears improved, tolerating oral diet, vitals stable, corrected calcium normalized (10.4), worked with PT and appears back to her baseline, was able to ambulate.  Discussed with patient and daughter in-law at bedside, appears medically stable for discharge.  Discharge plan including medication, follow-up as well as return precautions were reviewed, expressed understanding, no questions or concerns.  Discussed with nursing.      Discharge examination   Elderly female, lying in bed, pleasant, cooperative, on room air, abdomen nontender     Goals of Care Treatment Preferences:  Code Status: Full Code      Consults:     No new Assessment & Plan notes have been filed under this hospital service since the last note was generated.  Service: Hospital Medicine    Final Active Diagnoses:    Diagnosis Date Noted POA    PRINCIPAL PROBLEM:  Generalized weakness [R53.1] 11/26/2023 Yes    Dementia [F03.90] 11/26/2023 Yes    Macrocytic anemia [D53.9] 11/26/2023 Yes    Thrombocytopenia [D69.6] 11/26/2023 Yes    Advanced age [R54] 11/26/2023 Yes    Walker as ambulation aid [Z99.89] 11/26/2023 Not Applicable    Aortic stenosis with trileaflet valve [I35.0] 10/25/2019 Yes    Irritable bowel syndrome with both constipation and diarrhea [K58.2] 10/25/2019 Yes    HTN (hypertension) [I10] 06/14/2016 Yes     Chronic      Problems Resolved During this Admission:    Diagnosis Date Noted Date Resolved POA    Hypercalcemia [E83.52] 11/26/2023 11/26/2023 Yes    Dehydration [E86.0] 11/26/2023 11/26/2023 Yes    HEATHER (acute kidney injury) [N17.9] 11/26/2023 11/26/2023 Yes       Discharged Condition: good    Disposition: Home or Self Care    Follow Up:    "Follow-up Information       Ryan Hurtado MD. Schedule an appointment as soon as possible for a visit in 1 week(s).    Specialties: Family Medicine, Home Health Services, Hospice Services  Why: follow up  Contact information:  [address] [facility]  [address]  [facility] [address]  [phone]                           Patient Instructions:      Diet Adult Regular     Notify your health care provider if you experience any of the following:  temperature >100.4     Notify your health care provider if you experience any of the following:  persistent dizziness, light-headedness, or visual disturbances     Notify your health care provider if you experience any of the following:  increased confusion or weakness     Activity as tolerated       Significant Diagnostic Studies: Labs: BMP:   Recent Labs   Lab 11/25/23  1721 11/26/23 0422   * 106    142   K 4.0 4.5    111*   CO2 26 25   BUN 18 23   CREATININE 1.1 0.8   CALCIUM 11.6* 9.6   MG 1.7  --    , CMP   Recent Labs   Lab 11/25/23  1721 11/26/23  0422    142   K 4.0 4.5    111*   CO2 26 25   * 106   BUN 18 23   CREATININE 1.1 0.8   CALCIUM 11.6* 9.6   PROT 7.2 5.3*   ALBUMIN 4.1 3.0*   BILITOT 1.0 0.9   ALKPHOS 102 75   AST 19 45*   ALT 7* 19   ANIONGAP 12 6*   , CBC   Recent Labs   Lab 11/25/23  1721 11/26/23 0422   WBC 9.08 8.46   HGB 14.0 11.1*   HCT 41.3 34.1*   * 114*   , INR   Lab Results   Component Value Date    INR 1.0 06/13/2016   , Lipid Panel   Lab Results   Component Value Date    CHOL 206 (H) 10/05/2023    HDL 74 10/05/2023    LDLCALC 107 (H) 10/05/2023    TRIG 141 10/05/2023    CHOLHDL 2.8 10/05/2023   , Troponin No results for input(s): "TROPONINI" in the last 168 hours., and A1C: No results for input(s): "HGBA1C" in the last 4320 hours.    Pending Diagnostic Studies:       Procedure Component Value Units Date/Time    PTH-related peptide [[id number]] Collected: 11/26/23 0112    Order " Status: Sent Lab Status: In process Updated: 11/26/23 0117    Specimen: Blood          X-Ray Chest AP Portable    Result Date: 11/25/2023  History: Chest pain. Cough and weakness. FINDINGS: Single AP view of the chest is compared to previous study dated comparison to previous study dated May 22, 2023. Minimal blunting of the left CP angle is present which may be chronic. Mild left perihilar atelectasis is present. No focal consolidation is seen. Cardiac silhouette is within normal limits in size. Atherosclerotic changes of the thoracic aorta are present. Multilevel thoracic and lumbar vertebroplasty is noted. IMPRESSION: 1. Minimal blunting of the left CP angle which may be chronic. 2. Mild left perihilar atelectasis. Electronically signed by:  Aguilar Conte MD  11/25/2023 05:53 PM [facility] Workstation: [id number]    Medications:  Reconciled Home Medications:      Medication List        CHANGE how you take these medications      hydroCHLOROthiazide 25 MG tablet  Commonly known as: HYDRODIURIL  Take 0.5 tablets (12.5 mg total) by mouth daily as needed (leg swelling).  What changed:   how much to take  when to take this  reasons to take this            CONTINUE taking these medications      biotin 1 mg tablet  Take 1,000 mcg by mouth once daily.     donepeziL 10 MG tablet  Commonly known as: ARICEPT  Take 10 mg by mouth every evening.     famotidine 20 MG tablet  Commonly known as: PEPCID  Take 1 tablet (20 mg total) by mouth 2 (two) times daily.     ferrous sulfate 325 mg (65 mg iron) Tab tablet  Commonly known as: FEOSOL  Take 325 mg by mouth daily with breakfast.     glycopyrrolate 1 mg Tab  Commonly known as: ROBINUL  Take 1 mg by mouth 2 (two) times daily.     lisinopriL 5 MG tablet  Commonly known as: PRINIVIL,ZESTRIL  Take 5 mg by mouth once daily.     meclizine 12.5 mg tablet  Commonly known as: ANTIVERT  Take 0.5 tablets (6.25 mg total) by mouth 2 (two) times daily as needed for Dizziness (1/2 tablet twice daily  as needed for dizziness).     mupirocin 2 % ointment  Commonly known as: BACTROBAN  Apply topically 2 (two) times daily.     pantoprazole 40 MG tablet  Commonly known as: PROTONIX  Take 1 tablet (40 mg total) by mouth once daily.     QUEtiapine 200 MG Tab  Commonly known as: SEROQUEL  Take 200 mg by mouth every evening.     sertraline 25 MG tablet  Commonly known as: ZOLOFT  Take 25 mg by mouth once daily.     tolterodine 2 MG Cp24  Commonly known as: DETROL LA  Take 1 capsule (2 mg total) by mouth once daily. For bladder     tramadol-acetaminophen 37.5-325 mg 37.5-325 mg Tab  Commonly known as: ULTRACET  Take 1 tablet by mouth 2 (two) times a day.     verapamiL 120 MG CR tablet  Commonly known as: CALAN-SR  Take 1 tablet (120 mg total) by mouth once daily.            STOP taking these medications      calcium-vitamin D3 500 mg-5 mcg (200 unit) per tablet  Commonly known as: OS-AYSHA 500 + D3     cholecalciferol (vitamin D3) 50 mcg (2,000 unit) Tab  Commonly known as: VITAMIN D3              Indwelling Lines/Drains at time of discharge:   Lines/Drains/Airways       None                   Time spent on the discharge of patient: 33 minutes         Saskia Weaver MD  Department of Hospital Medicine  [facility]

## 2023-11-26 NOTE — NURSING
Patient discharged per orders, all instructions reviewed with patient and patient verbalized understanding.  IV discontinued x 1, Tele box discontinued x 1.  Patient transferred to wheelchair x 1, all belongings by patients side.  Patient's daughter to transfer her home.

## 2023-11-26 NOTE — ASSESSMENT & PLAN NOTE
Initial calcium level 11.6  We will 1.5 L IV fluid ordered in ED we will continue LR 75 mL/HR  Repeat calcium level daily  We will check PTH intact, PTH related peptide

## 2023-11-26 NOTE — HOSPITAL COURSE
Patient with history of hypertension, dementia, GERD, IBS, intermittent lower extremity edema, ambulates with walker at home who presented with generalized weakness with decreased oral intake.  Patient lives with son/daughter in-law.  Reports decreased oral intake, states chronically has increased saliva production, generalized weakness with inability to stand and ambulate which prompted ED presentation.  Admission workup, nonfocal neurological examination, marocytic anemia with mild thrombocytopenia, acute kidney injury, hypercalcemia, , troponin negative, lactic acid 1.8, COVID/influenza negative, calcium elevated, UA not suggestive of UTI.  Patient is on calcium/vitamin-D supplement at home b.i.d., as per daughter in-law only uses hydrochlorothiazide intermittently for leg/pedal edema.  She was admitted with IV fluid hydration, close monitoring and trending of labs.  On 11/26 appears improved, tolerating oral diet, vitals stable, corrected calcium normalized (10.4), worked with PT and appears back to her baseline, was able to ambulate.  Discussed with patient and daughter in-law at bedside, appears medically stable for discharge.  Discharge plan including medication, follow-up as well as return precautions were reviewed, expressed understanding, no questions or concerns.  Discussed with nursing.      Discharge examination   Elderly female, lying in bed, pleasant, cooperative, on room air, abdomen nontender

## 2023-11-26 NOTE — PLAN OF CARE
[facility]  Initial Discharge Assessment       Primary Care Provider: Ryan Hurtado MD    Admission Diagnosis: Hypercalcemia [E83.52]    Admission Date: 11/25/2023  Expected Discharge Date: 11/27/2023  Met with patient at bedside and spoke with Sanford Adkins/daughter in law by phone to complete assessment. No POA, living will or advance directive. No HH, HD or Coumadin. See DME. Patient's daughter in law reported that patient has an Alzheimer's diagnosis. Patient's granddaughter reported that is oriented people and place but not day/time. Patient's daughter in law and son will bring patient home when she is discharged. No needs identified at this time.    Transition of Care Barriers: None    Payor: MEDICARE / Plan: MEDICARE PART A & B / Product Type: Government /     Extended Emergency Contact Information  Primary Emergency Contact: SANFORD ADKINS  Address: [address]           [place] [address] [place] [facility]  Mobile Phone: [phone]  Relation: Daughter  Preferred language: English   needed? No    Discharge Plan A: Home with family  Discharge Plan B: Home with family      [id number] [place] - [address]  [address]  [place] [address]  Phone: [phone] Fax: [phone]      Initial Assessment (most recent)       Adult Discharge Assessment - 11/26/23 1117          Discharge Assessment    Assessment Type Discharge Planning Assessment     Confirmed/corrected address, phone number and insurance Yes     Confirmed Demographics Correct on Facesheet     Source of Information patient;family     When was your last doctors appointment? --   at the end of October    Does patient/caregiver understand observation status Yes     Communicated FRANKY with patient/caregiver Yes     Reason For Admission Hypercalcemia     People in Home child(guy), adult     Facility Arrived From: home     Do you expect to return to your current living situation? Yes     Do  you have help at home or someone to help you manage your care at home? Yes     Who are your caregiver(s) and their phone number(s)? Darnell Hamilton/son and Karen Hamilton/daughter in law [phone]     Prior to hospitilization cognitive status: Alert/Oriented     Current cognitive status: Not Oriented to Time     Walking or Climbing Stairs ambulation difficulty, requires equipment;stair climbing difficulty, assistance 1 person;stair climbing difficulty, requires equipment     Mobility Management rollator, son and daughter in law assist., elevatir in the home     Dressing/Bathing bathing difficulty, assistance 1 person     Dressing/Bathing Management daughter in law assists     Home Accessibility other (see comments)   Elevator in the home    Equipment Currently Used at Home blood pressure machine;rollator     Readmission within 30 days? No     Patient currently being followed by outpatient case management? No     Do you currently have service(s) that help you manage your care at home? No     Do you take prescription medications? Yes     Do you have prescription coverage? Yes     Coverage [employer] of [place]     Do you have any problems affording any of your prescribed medications? No     Is the patient taking medications as prescribed? yes     Who is going to help you get home at discharge? Darnell Hamilton/son and Karen Hamilton/daughter in law [phone]     How do you get to doctors appointments? family or friend will provide     Are you on dialysis? No     Do you take coumadin? No     DME Needed Upon Discharge  none     Discharge Plan discussed with: Patient;Adult children     Transition of Care Barriers None     Discharge Plan A Home with family     Discharge Plan B Home with family        OTHER    Name(s) of People in Home Darnell Hamilton/son and Karen Hamilton/daughter in law [phone]

## 2023-11-26 NOTE — PLAN OF CARE
11/26/23 1133   VERA Message   Medicare Outpatient and Observation Notification regarding financial responsibility Given to patient/caregiver;Explained to patient/caregiver;Signed/date by patient/caregiver   Date VERA was signed 11/26/23   Time VERA was signed 0845

## 2023-11-26 NOTE — SUBJECTIVE & OBJECTIVE
Past Medical History:   Diagnosis Date    Back problem     Constipation     History of fractured vertebra     Hyperlipidemia     Hypertension     IBS (irritable bowel syndrome)     Migraine headache     Osteoporosis     Rectal prolapse     Scoliosis     Vertigo        Past Surgical History:   Procedure Laterality Date    LEWIS      Back Pain    EYE SURGERY      Bilateral Cataracs    HYSTERECTOMY      OOPHORECTOMY      ROTATOR CUFF REPAIR      bialteral    TONSILLECTOMY         Review of patient's allergies indicates:   Allergen Reactions    Antihistamines - alkylamine     Torrie [amlodipine-olmesartan]     Flexeril [cyclobenzaprine]      Hallucinations    Hydrocodone     Hydrocodone-acetaminophen Other (See Comments)    Simvastatin     Statins-hmg-coa reductase inhibitors        No current facility-administered medications on file prior to encounter.     Current Outpatient Medications on File Prior to Encounter   Medication Sig    donepeziL (ARICEPT) 10 MG tablet Take 10 mg by mouth every evening.    glycopyrrolate (ROBINUL) 1 mg Tab Take 1 mg by mouth 2 (two) times daily.    biotin 1 mg tablet Take 1,000 mcg by mouth once daily.    calcium-vitamin D3 (OS-AYSHA 500 + D3) 500 mg-5 mcg (200 unit) per tablet Take 1 tablet by mouth once daily.    cholecalciferol, vitamin D3, (VITAMIN D3) 2,000 unit Tab Take 2,000 Units by mouth once daily.     famotidine (PEPCID) 20 MG tablet Take 1 tablet (20 mg total) by mouth 2 (two) times daily.    ferrous sulfate (FEOSOL) 325 mg (65 mg iron) Tab tablet Take 325 mg by mouth daily with breakfast.    hydroCHLOROthiazide (HYDRODIURIL) 25 MG tablet Take 1 tablet (25 mg total) by mouth every other day.    lisinopriL (PRINIVIL,ZESTRIL) 5 MG tablet Take 5 mg by mouth once daily.    meclizine (ANTIVERT) 12.5 mg tablet Take 0.5 tablets (6.25 mg total) by mouth 2 (two) times daily as needed for Dizziness (1/2 tablet twice daily as needed for dizziness).    mupirocin (BACTROBAN) 2 % ointment Apply  topically 2 (two) times daily.    pantoprazole (PROTONIX) 40 MG tablet Take 1 tablet (40 mg total) by mouth once daily.    QUEtiapine (SEROQUEL) 200 MG Tab Take 200 mg by mouth every evening.    sertraline (ZOLOFT) 25 MG tablet Take 25 mg by mouth once daily.    tolterodine (DETROL LA) 2 MG Cp24 Take 1 capsule (2 mg total) by mouth once daily. For bladder    tramadol-acetaminophen 37.5-325 mg (ULTRACET) 37.5-325 mg Tab Take 1 tablet by mouth 2 (two) times a day.    verapamiL (CALAN-SR) 120 MG CR tablet Take 1 tablet (120 mg total) by mouth once daily.     Family History       Problem Relation (Age of Onset)    Breast cancer Mother (70)    Colon cancer Father    Hypertension Mother          Tobacco Use    Smoking status: Never    Smokeless tobacco: Never   Substance and Sexual Activity    Alcohol use: No    Drug use: No    Sexual activity: Not Currently     Partners: Male     Birth control/protection: None     Review of Systems   Constitutional:  Positive for appetite change.   HENT: Negative.     Eyes: Negative.    Respiratory: Negative.     Cardiovascular: Negative.    Gastrointestinal: Negative.    Endocrine: Negative.    Genitourinary: Negative.    Musculoskeletal: Negative.    Skin: Negative.    Allergic/Immunologic: Negative.    Neurological:  Positive for weakness.   Hematological: Negative.    Psychiatric/Behavioral:  Positive for confusion (history of dementia).      Objective:     Vital Signs (Most Recent):  Temp: 99.1 °F (37.3 °C) (11/25/23 1737)  Pulse: 86 (11/26/23 0115)  Resp: 20 (11/25/23 1655)  BP: (!) 148/79 (11/26/23 0100)  SpO2: 98 % (11/26/23 0115) Vital Signs (24h Range):  Temp:  [98.1 °F (36.7 °C)-99.1 °F (37.3 °C)] 99.1 °F (37.3 °C)  Pulse:  [74-86] 86  Resp:  [20] 20  SpO2:  [91 %-99 %] 98 %  BP: (133-174)/(61-85) 148/79     Weight: 47.2 kg (104 lb)  Body mass index is 24.17 kg/m².     Physical Exam  Vitals reviewed.   HENT:      Head: Normocephalic.      Nose: Nose normal.      Mouth/Throat:       Mouth: Mucous membranes are moist.   Eyes:      Pupils: Pupils are equal, round, and reactive to light.   Cardiovascular:      Rate and Rhythm: Normal rate.   Pulmonary:      Effort: Pulmonary effort is normal.      Breath sounds: Normal breath sounds.   Abdominal:      General: Abdomen is flat.      Palpations: Abdomen is soft.   Genitourinary:     General: Normal vulva.   Musculoskeletal:         General: Normal range of motion.      Cervical back: Normal range of motion and neck supple.   Skin:     General: Skin is warm and dry.      Capillary Refill: Capillary refill takes less than 2 seconds.   Neurological:      Mental Status: She is alert. Mental status is at baseline. She is disoriented.   Psychiatric:         Mood and Affect: Mood normal.              CRANIAL NERVES     CN III, IV, VI   Pupils are equal, round, and reactive to light.       Significant Labs: All pertinent labs within the past 24 hours have been reviewed.  Recent Lab Results  (Last 5 results in the past 24 hours)        11/26/23  0112   11/25/23  1924   11/25/23  1911   11/25/23  1815   11/25/23  1806        Procalcitonin               Albumin               ALP               ALT               Anion Gap               Appearance, UA     Hazy           AST               Bacteria, UA     Negative           Baso #               Basophil %               Bilirubin (UA)     Negative           BILIRUBIN TOTAL               Blood Culture, Routine       No Growth to date  [P]   No Growth to date  [P]       BNP               BUN               Calcium               Chloride               CO2               Color, UA     Yellow           Creatinine               Differential Method               eGFR               Eos #               Eosinophil %               Glucose               Glucose, UA     Negative           Gran # (ANC)               Gran %               Hematocrit               Hemoglobin               Hyaline Casts, UA     23            Immature Grans (Abs)               Immature Granulocytes               Ketones, UA     Negative           Lactate, Shahab               Leukocytes, UA     Trace           Lymph #               Lymph %               Magnesium                MCH               MCHC               MCV               Microscopic Comment     SEE COMMENT  Comment: Other formed elements not mentioned in the report are not   present in the microscopic examination.              Mono #               Mono %               MPV               NITRITE UA     Negative           nRBC               Occult Blood UA     1+           pH, UA     7.0           Platelet Count               Potassium               PROTEIN TOTAL               Protein, UA     1+  Comment: Recommend a 24 hour urine protein or a urine   protein/creatinine ratio if globulin induced proteinuria is  clinically suspected.             PTH 65.7               RBC               RBC, UA     11           RDW               Sodium               Specific Gravity, UA     1.020           Specimen UA     Urine, Clean Catch           Squam Epithel, UA     6           Troponin I High Sensitivity   10.9  Comment: Troponin results differ between methods. Do not use   results between Troponin methods interchangeably.    Alkaline Phospatase levels above 400 U/L may   cause false positive results.    Access hsTnI should not be used for patients taking   Asfotase nieves (Strensiq).               UROBILINOGEN UA     2.0-3.0           WBC, UA     7           WBC                                       [P] - Preliminary Result               Significant Imaging: I have reviewed all pertinent imaging results/findings within the past 24 hours.  I have reviewed and interpreted all pertinent imaging results/findings within the past 24 hours.

## 2023-11-26 NOTE — PROGRESS NOTES
"Pharmacist Renal Dose Adjustment Note    Angelique Hamilton is a 89 y.o. female being treated with the medication Famotidine    Patient Data:    Vital Signs (Most Recent):  Temp: 99.1 °F (37.3 °C) (11/25/23 1737)  Pulse: 75 (11/26/23 0459)  Resp: 20 (11/25/23 1655)  BP: 119/60 (11/26/23 0459)  SpO2: 97 % (11/26/23 0459) Vital Signs (72h Range):  Temp:  [98.1 °F (36.7 °C)-99.1 °F (37.3 °C)]   Pulse:  [74-86]   Resp:  [20]   BP: (119-174)/(60-85)   SpO2:  [91 %-99 %]        Ht: 4' 7" (1.397 m)  Wt: 47.2 kg (104 lb)  Estimated Creatinine Clearance: 31.3 mL/min (based on SCr of 0.8 mg/dL).  Body mass index is 24.17 kg/m².    Per [facility] renal dosing protocol:     Previous Order: Famotidine 20 mg BiD    Will be changed to:     New Order: Famotidine 20 mg Daily,    Due to: CrCl < 50 mL/min    Renal dose adjustments performed as noted above.    We will continue monitoring and adjusting as necessary.    Pharmacist: Pancho Madison PharmD  Ext: [phone]      "

## 2023-11-26 NOTE — PLAN OF CARE
Patient cleared for discharge from case management standpoint.    Chart and discharge orders reviewed.  Patient discharged home with no further case management needs.               11/26/23 1211   Final Note   Assessment Type Final Discharge Note   Anticipated Discharge Disposition Home   What phone number can be called within the next 1-3 days to see how you are doing after discharge? [phone]   Post-Acute Status   Discharge Delays None known at this time

## 2023-11-26 NOTE — HPI
Patient is a 89-year-old female with history of dementia, hypertension, GERD.  She presents to the ED with complaints of increased weakness started yesterday.  According to family member at bedside states patient had a decrease fluid intake.  She reports patient stated she felt fatigued today and difficulty standing up with walker.  On assessment ED tip 99.2, UA leukocytes- trace, 1+ blood, 1+ protein, , calcium 11.6.  1.5 L IV fluid bolus ordered in ED. it was stated a Mcneil was placed with minimal urinary output.     On assessment patient is alert/ oriented with occasional confusion.  She reports increased weakness started yesterday.  She denies chest pain, lightheadedness, dizziness, nausea, vomiting, fever, chills.    Hospitalist asked to admit observation for hypercalcinemia, and generalized weakness

## 2023-11-26 NOTE — ASSESSMENT & PLAN NOTE
Chronic, controlled blood pressure    Temp:  [98.1 °F (36.7 °C)-99.1 °F (37.3 °C)]   Pulse:  [74-86]   Resp:  [20]   BP: (133-174)/(61-85)   SpO2:  [91 %-99 %] .   Home meds for hypertension were reviewed and noted below.   Hypertension Medications               hydroCHLOROthiazide (HYDRODIURIL) 25 MG tablet Take 1 tablet (25 mg total) by mouth every other day.    lisinopriL (PRINIVIL,ZESTRIL) 5 MG tablet Take 5 mg by mouth once daily.    verapamiL (CALAN-SR) 120 MG CR tablet Take 1 tablet (120 mg total) by mouth once daily.            While in the hospital, will manage blood pressure as follows; Continue home antihypertensive regimen    Will utilize p.r.n. blood pressure medication only if patient's blood pressure greater than 180/110 and she develops symptoms such as worsening chest pain or shortness of breath.

## 2023-11-26 NOTE — PROGRESS NOTES
"Pharmacist Renal Dose Adjustment Note    Angelique Hamilton is a 89 y.o. female being treated with the medication Enoxaparin    Patient Data:    Vital Signs (Most Recent):  Temp: 99.1 °F (37.3 °C) (11/25/23 1737)  Pulse: 85 (11/25/23 2300)  Resp: 20 (11/25/23 1655)  BP: (!) 143/72 (11/25/23 2300)  SpO2: (!) 94 % (11/25/23 2300) Vital Signs (72h Range):  Temp:  [98.1 °F (36.7 °C)-99.1 °F (37.3 °C)]   Pulse:  [74-86]   Resp:  [20]   BP: (133-174)/(61-85)   SpO2:  [91 %-99 %]        Ht: 4' 7" (1.397 m)  Wt: 47.2 kg (104 lb)  Estimated Creatinine Clearance: 22.8 mL/min (based on SCr of 1.1 mg/dL).  Body mass index is 24.17 kg/m².    Per [facility] renal dosing protocol:     Previous Order: Enoxaparin 40 mg Q24H    Will be changed to:     New Order: Enoxaparin 30 mg Q24H,    Due to: Per Pharmacy Protocol    Renal dose adjustments performed as noted above.    We will continue monitoring and adjusting as necessary.    Pharmacist: Pnacho Madison PharmD  Ext: [phone]      "

## 2023-11-27 ENCOUNTER — PATIENT OUTREACH (OUTPATIENT)
Dept: FAMILY MEDICINE | Facility: CLINIC | Age: 88
End: 2023-11-27

## 2023-11-27 ENCOUNTER — TELEPHONE (OUTPATIENT)
Dept: FAMILY MEDICINE | Facility: CLINIC | Age: 88
End: 2023-11-27

## 2023-11-27 NOTE — PROGRESS NOTES
Discharge Information     Discharge Date:   11/26/23    Primary Discharge Diagnosis:  weakness     Discharge Summary:  Reviewed      Medication & Order Review     Were medication changes made or new medications added?   No    If so, has the patient filled the prescriptions?  No     Was Home Health ordered? No    If so, has Home Health contacted patient and/or initiated services?  No    Name of Home Health Agency? N/A    Durable Medical Equipment ordered?  No     If so, has the DME provider contacted patient and delivered equipment?  N/A    Follow Up               Any problems since discharge? No    How is the patient feeling since returning home?      Have you set up recommended follow up appointments?  (cardiology, surgery, etc.)    Schedule Hospital Follow-up appointment within 7-14 days (preferably 7).      Notes:     Spoke with patients daughter and got her scheduled. States she has her good days and bad days. They did not change any medications or send her home health because they said she was able to get up and walk around just fine. Daughter agrees to appointment next week with Dr. Hurtado and will call us if she needs anything from us in the meantime.       Misty Mayorga

## 2023-11-30 LAB
BACTERIA BLD CULT: NORMAL
BACTERIA BLD CULT: NORMAL

## 2023-12-01 LAB — PTH RELATED PROT SERPL-SCNC: <2 PMOL/L

## 2023-12-03 ENCOUNTER — PATIENT MESSAGE (OUTPATIENT)
Dept: FAMILY MEDICINE | Facility: CLINIC | Age: 88
End: 2023-12-03

## 2023-12-04 NOTE — TELEPHONE ENCOUNTER
"Spoke with patients daughter, states she is extremely weak and her knees are locking up. She states she was like that when she went to the hospital but she had a "burst of energy" in the hospital and they d/c her. She isn't able to stand for any periods of time and is wondering if maybe they should order PT or something for her. States they didn't order HH or anything upon d/c.     Wants to know if they can do the HFU on Wednesday as a virtual to discuss   "

## 2023-12-06 ENCOUNTER — OFFICE VISIT (OUTPATIENT)
Dept: FAMILY MEDICINE | Facility: CLINIC | Age: 88
End: 2023-12-06
Attending: FAMILY MEDICINE
Payer: MEDICARE

## 2023-12-06 DIAGNOSIS — I35.0 AORTIC STENOSIS WITH TRILEAFLET VALVE: ICD-10-CM

## 2023-12-06 DIAGNOSIS — R54 ADVANCED AGE: ICD-10-CM

## 2023-12-06 DIAGNOSIS — F01.A0 MILD VASCULAR DEMENTIA WITHOUT BEHAVIORAL DISTURBANCE, PSYCHOTIC DISTURBANCE, MOOD DISTURBANCE, OR ANXIETY: ICD-10-CM

## 2023-12-06 DIAGNOSIS — I10 PRIMARY HYPERTENSION: Chronic | ICD-10-CM

## 2023-12-06 DIAGNOSIS — G40.909 NONINTRACTABLE EPILEPSY WITHOUT STATUS EPILEPTICUS, UNSPECIFIED EPILEPSY TYPE: ICD-10-CM

## 2023-12-06 DIAGNOSIS — R42 VERTIGO: ICD-10-CM

## 2023-12-06 DIAGNOSIS — E78.2 MIXED HYPERLIPIDEMIA: ICD-10-CM

## 2023-12-06 DIAGNOSIS — M41.9 KYPHOSCOLIOSIS: ICD-10-CM

## 2023-12-06 DIAGNOSIS — K58.2 IRRITABLE BOWEL SYNDROME WITH BOTH CONSTIPATION AND DIARRHEA: ICD-10-CM

## 2023-12-06 DIAGNOSIS — K59.01 SLOW TRANSIT CONSTIPATION: ICD-10-CM

## 2023-12-06 DIAGNOSIS — R53.1 GENERALIZED WEAKNESS: Primary | ICD-10-CM

## 2023-12-06 DIAGNOSIS — D50.9 IRON DEFICIENCY ANEMIA, UNSPECIFIED IRON DEFICIENCY ANEMIA TYPE: ICD-10-CM

## 2023-12-06 PROCEDURE — 99213 PR OFFICE/OUTPT VISIT, EST, LEVL III, 20-29 MIN: ICD-10-PCS | Mod: 95,,, | Performed by: FAMILY MEDICINE

## 2023-12-06 PROCEDURE — 99213 OFFICE O/P EST LOW 20 MIN: CPT | Mod: 95,,, | Performed by: FAMILY MEDICINE

## 2023-12-06 RX ORDER — MECLIZINE HCL 12.5 MG 12.5 MG/1
6.25 TABLET ORAL 2 TIMES DAILY PRN
Qty: 90 TABLET | Refills: 1 | Status: SHIPPED | OUTPATIENT
Start: 2023-12-06

## 2023-12-06 NOTE — PROGRESS NOTES
Subjective:        The chief complaint leading to consultation is:  Weakness  The patient location is:  Home  Visit type: Virtual visit with synchronous audio/video or audio only  This was a video visit in lieu of in-person visit due to the coronavirus emergency. Patient acknowledged and consented to the video visit encounter.     89-year-old female has been calm very weak.  Her appetite is decreased.  She eats breakfast and supper plus a few snacks.  She drinks 2 bottles of water per day.  She is cared for by her daughter in law.  She now sleeps in a recliner chair, groups and leans more to the left nose unable to straighten backup.  Takes tramadol/acetaminophen for pain.    Feels somewhat depressed.    Her right knee has been giving out, she is unable to bear weight on well.  Not ambulating much at all anymore.  No longer can come to the office for a checkup.  She needs assistance to get on her bedside commode.    Constipation has been an  issue-using senna tablets at this time    Family would like home health to come back in give them some assistance and some PT/OT        Past Surgical History:   Procedure Laterality Date    LEWIS      Back Pain    EYE SURGERY      Bilateral Cataracs    HYSTERECTOMY      OOPHORECTOMY      ROTATOR CUFF REPAIR      bialteral    TONSILLECTOMY       Past Medical History:   Diagnosis Date    Back problem     Constipation     History of fractured vertebra     Hyperlipidemia     Hypertension     IBS (irritable bowel syndrome)     Migraine headache     Osteoporosis     Rectal prolapse     Scoliosis     Vertigo      Family History   Problem Relation Age of Onset    Colon cancer Father     Breast cancer Mother 70    Hypertension Mother     Ovarian cancer Neg Hx         Social History:   Marital Status:   Alcohol History:  reports no history of alcohol use.  Tobacco History:  reports that she has never smoked. She has never used smokeless tobacco.  Drug History:  reports no history  of drug use.    Review of patient's allergies indicates:   Allergen Reactions    Antihistamines - alkylamine     Torrie [amlodipine-olmesartan]     Flexeril [cyclobenzaprine]      Hallucinations    Hydrocodone     Hydrocodone-acetaminophen Other (See Comments)    Simvastatin     Statins-hmg-coa reductase inhibitors        Current Outpatient Medications   Medication Sig Dispense Refill    biotin 1 mg tablet Take 1,000 mcg by mouth once daily.      donepeziL (ARICEPT) 10 MG tablet Take 10 mg by mouth every evening.      famotidine (PEPCID) 20 MG tablet Take 1 tablet (20 mg total) by mouth 2 (two) times daily. 180 tablet 3    ferrous sulfate (FEOSOL) 325 mg (65 mg iron) Tab tablet Take 325 mg by mouth daily with breakfast.      glycopyrrolate (ROBINUL) 1 mg Tab Take 1 mg by mouth 2 (two) times daily.      hydroCHLOROthiazide (HYDRODIURIL) 25 MG tablet Take 0.5 tablets (12.5 mg total) by mouth daily as needed (leg swelling). 45 tablet 1    linaCLOtide (LINZESS) 72 mcg Cap capsule Take 1 capsule (72 mcg total) by mouth before breakfast. 30 capsule 0    lisinopriL (PRINIVIL,ZESTRIL) 5 MG tablet Take 5 mg by mouth once daily.      meclizine (ANTIVERT) 12.5 mg tablet Take 0.5 tablets (6.25 mg total) by mouth 2 (two) times daily as needed for Dizziness (1/2 tablet twice daily as needed for dizziness). 90 tablet 1    mupirocin (BACTROBAN) 2 % ointment Apply topically 2 (two) times daily. 30 g 2    pantoprazole (PROTONIX) 40 MG tablet Take 1 tablet (40 mg total) by mouth once daily. 90 tablet 1    QUEtiapine (SEROQUEL) 200 MG Tab Take 200 mg by mouth every evening.      sertraline (ZOLOFT) 25 MG tablet Take 25 mg by mouth once daily.      tolterodine (DETROL LA) 2 MG Cp24 Take 1 capsule (2 mg total) by mouth once daily. For bladder 90 capsule 3    tramadol-acetaminophen 37.5-325 mg (ULTRACET) 37.5-325 mg Tab Take 1 tablet by mouth 2 (two) times a day. 180 tablet 0    verapamiL (CALAN-SR) 120 MG CR tablet Take 1 tablet (120 mg  total) by mouth once daily. 90 tablet 3     No current facility-administered medications for this visit.       Review of Systems   Constitutional:  Positive for activity change (unable to walk at this time.). Negative for unexpected weight change.   HENT:  Negative for hearing loss, rhinorrhea and trouble swallowing.    Eyes:  Negative for discharge and visual disturbance.   Respiratory:  Negative for chest tightness and wheezing.    Cardiovascular:  Negative for chest pain and palpitations.   Gastrointestinal:  Negative for blood in stool, constipation, diarrhea and vomiting.   Endocrine: Negative for polydipsia and polyuria.   Genitourinary:  Positive for bladder incontinence. Negative for difficulty urinating, dysuria, hematuria and menstrual problem.   Musculoskeletal:  Positive for arthralgias and joint swelling. Negative for neck pain.   Neurological:  Positive for weakness. Negative for headaches.   Psychiatric/Behavioral:  Positive for confusion. Negative for dysphoric mood.           Objective:        Physical Exam:   Physical Exam         Assessment:       1. Generalized weakness    2. Vertigo    3. Slow transit constipation    4. Mild vascular dementia without behavioral disturbance, psychotic disturbance, mood disturbance, or anxiety    5. Kyphoscoliosis    6. 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 Aortic stenosis with trileaflet valve    8. Primary hypertension    9. Mixed hyperlipidemia    10. Iron deficiency anemia, unspecified iron deficiency anemia type    11. Irritable bowel syndrome with both constipation and diarrhea    12. Advanced age      Plan:   Generalized weakness  Patient has declined quite a bit over the last month or 2.  No longer ambulatory.  Now homebound  Vertigo  Comments:  pt reports vertigo sxs stable with meclizine  Orders:  -     meclizine (ANTIVERT) 12.5 mg tablet; Take 0.5 tablets (6.25 mg total) by mouth 2 (two) times daily as  needed for Dizziness (1/2 tablet twice daily as needed for dizziness).  Dispense: 90 tablet; Refill: 1    Slow transit constipation  -     linaCLOtide (LINZESS) 72 mcg Cap capsule; Take 1 capsule (72 mcg total) by mouth before breakfast.  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 behavioral disturbance, psychotic disturbance, mood disturbance, or anxiety  -     Ambulatory referral/consult to Home Health; Future; Expected date: 12/07/2023  Consult home health repeat T OT and skilled nursing  Kyphoscoliosis    Nonintractable epilepsy without status epilepticus, unspecified epilepsy type    Aortic stenosis with trileaflet valve    Primary hypertension    Mixed hyperlipidemia    Iron deficiency anemia, unspecified iron deficiency anemia type    Irritable bowel syndrome with both constipation and diarrhea    Advanced age  Will begin advanced care planning, may be a hospice candidate soon.    No follow-ups on file.    Total time spent with patient:  15 minutes    Each patient to whom he or she provides medical services by telemedicine is:  (1) informed of the relationship between the physician and patient and the respective role of any other health care provider with respect to management of the patient; and (2) notified that he or she may decline to receive medical services by telemedicine and may withdraw from such care at any time.

## 2023-12-07 PROCEDURE — G0180 MD CERTIFICATION HHA PATIENT: HCPCS | Mod: ,,, | Performed by: FAMILY MEDICINE

## 2023-12-07 PROCEDURE — G0180 PR HOME HEALTH MD CERTIFICATION: ICD-10-PCS | Mod: ,,, | Performed by: FAMILY MEDICINE

## 2023-12-08 ENCOUNTER — LAB VISIT (OUTPATIENT)
Dept: LAB | Facility: HOSPITAL | Age: 88
End: 2023-12-08
Attending: FAMILY MEDICINE
Payer: MEDICARE

## 2023-12-08 DIAGNOSIS — D53.9 SIMPLE CHRONIC ANEMIA: ICD-10-CM

## 2023-12-08 DIAGNOSIS — N18.31 CHRONIC KIDNEY DISEASE (CKD) STAGE G3A/A1, MODERATELY DECREASED GLOMERULAR FILTRATION RATE (GFR) BETWEEN 45-59 ML/MIN/1.73 SQUARE METER AND ALBUMINURIA CREATININE RATIO LESS THAN 30 MG/G: ICD-10-CM

## 2023-12-08 DIAGNOSIS — F03.90 SENILE DEMENTIA, UNCOMPLICATED: Primary | ICD-10-CM

## 2023-12-08 DIAGNOSIS — R53.1 ASTHENIA: ICD-10-CM

## 2023-12-08 PROCEDURE — 87077 CULTURE AEROBIC IDENTIFY: CPT | Performed by: FAMILY MEDICINE

## 2023-12-08 PROCEDURE — 87086 URINE CULTURE/COLONY COUNT: CPT | Performed by: FAMILY MEDICINE

## 2023-12-08 PROCEDURE — 87186 SC STD MICRODIL/AGAR DIL: CPT | Performed by: FAMILY MEDICINE

## 2023-12-10 LAB — BACTERIA UR CULT: ABNORMAL

## 2023-12-10 NOTE — PROGRESS NOTES
Call patient's daughter.  Her recent urinalysis is growing E coli, a urinary tract infection.  We can treat that with Cipro 500 mg p.o. b.i.d. x7 days

## 2023-12-11 ENCOUNTER — DOCUMENT SCAN (OUTPATIENT)
Dept: HOME HEALTH SERVICES | Facility: HOSPITAL | Age: 88
End: 2023-12-11
Payer: MEDICARE

## 2023-12-11 ENCOUNTER — TELEPHONE (OUTPATIENT)
Dept: FAMILY MEDICINE | Facility: CLINIC | Age: 88
End: 2023-12-11

## 2023-12-11 DIAGNOSIS — N39.0 UTI (URINARY TRACT INFECTION): Primary | ICD-10-CM

## 2023-12-11 RX ORDER — CIPROFLOXACIN 500 MG/1
500 TABLET ORAL EVERY 12 HOURS
Qty: 14 TABLET | Refills: 0 | Status: SHIPPED | OUTPATIENT
Start: 2023-12-11 | End: 2023-12-18

## 2023-12-11 NOTE — TELEPHONE ENCOUNTER
----- Message from Ryan Hurtado MD sent at 12/10/2023  4:41 PM CST -----  Call patient's daughter.  Her recent urinalysis is growing E coli, a urinary tract infection.  We can treat that with Cipro 500 mg p.o. b.i.d. x7 days

## 2023-12-11 NOTE — TELEPHONE ENCOUNTER
Spoke to Karen, daughter, that Dr Hurtado wants to send in abx for UTI. Verbalized understanding. Allergies and pharmacy verified. Order pended.

## 2023-12-15 ENCOUNTER — PATIENT MESSAGE (OUTPATIENT)
Dept: FAMILY MEDICINE | Facility: CLINIC | Age: 88
End: 2023-12-15

## 2023-12-15 NOTE — TELEPHONE ENCOUNTER
Unclear etiology.  If no pain associated with the swelling can watch it over the weekend.  Need to make sure that no possibility of injury especially if this patient has any sort of dementia present.  May not remember an injury.  Would need x-rays to evaluate wrist and hand.  Maybe home health give us an update next week.  If she needs to be seen emergently over the weekend ER or urgent care would be advisable

## 2023-12-15 NOTE — TELEPHONE ENCOUNTER
Can you look at this for Dr. Hurtado. No mention of hand swelling in note. Not on a fluid pill. No injuries.

## 2023-12-18 RX ORDER — TRAMADOL HYDROCHLORIDE AND ACETAMINOPHEN 37.5; 325 MG/1; MG/1
1 TABLET, FILM COATED ORAL 2 TIMES DAILY
Qty: 180 TABLET | Refills: 0 | Status: ON HOLD | OUTPATIENT
Start: 2023-12-18 | End: 2024-02-01

## 2023-12-26 ENCOUNTER — EXTERNAL HOME HEALTH (OUTPATIENT)
Dept: HOME HEALTH SERVICES | Facility: HOSPITAL | Age: 88
End: 2023-12-26
Payer: MEDICARE

## 2023-12-31 DIAGNOSIS — K59.01 SLOW TRANSIT CONSTIPATION: ICD-10-CM

## 2024-01-02 ENCOUNTER — PATIENT MESSAGE (OUTPATIENT)
Dept: FAMILY MEDICINE | Facility: CLINIC | Age: 89
End: 2024-01-02
Payer: MEDICARE

## 2024-01-02 DIAGNOSIS — N30.00 ACUTE CYSTITIS WITHOUT HEMATURIA: Primary | ICD-10-CM

## 2024-01-03 ENCOUNTER — LAB VISIT (OUTPATIENT)
Dept: LAB | Facility: HOSPITAL | Age: 89
End: 2024-01-03
Attending: FAMILY MEDICINE
Payer: MEDICARE

## 2024-01-03 DIAGNOSIS — N30.00 BLADDER INFECTION, ACUTE: Primary | ICD-10-CM

## 2024-01-03 LAB
BACTERIA #/AREA URNS HPF: NORMAL /HPF
BILIRUB UR QL STRIP: NEGATIVE
CLARITY UR: CLEAR
COLOR UR: YELLOW
GLUCOSE UR QL STRIP: NEGATIVE
HGB UR QL STRIP: NEGATIVE
KETONES UR QL STRIP: NEGATIVE
LEUKOCYTE ESTERASE UR QL STRIP: NEGATIVE
MICROSCOPIC COMMENT: NORMAL
NITRITE UR QL STRIP: POSITIVE
PH UR STRIP: 6 [PH] (ref 5–8)
PROT UR QL STRIP: NEGATIVE
RBC #/AREA URNS HPF: 1 /HPF (ref 0–4)
SP GR UR STRIP: 1.01 (ref 1–1.03)
SQUAMOUS #/AREA URNS HPF: 0 /HPF
URN SPEC COLLECT METH UR: ABNORMAL
UROBILINOGEN UR STRIP-ACNC: NEGATIVE EU/DL
WBC #/AREA URNS HPF: 2 /HPF (ref 0–5)

## 2024-01-03 PROCEDURE — 87086 URINE CULTURE/COLONY COUNT: CPT | Performed by: FAMILY MEDICINE

## 2024-01-03 PROCEDURE — 81000 URINALYSIS NONAUTO W/SCOPE: CPT | Performed by: FAMILY MEDICINE

## 2024-01-06 LAB — BACTERIA UR CULT: NO GROWTH

## 2024-01-08 ENCOUNTER — TELEPHONE (OUTPATIENT)
Dept: FAMILY MEDICINE | Facility: CLINIC | Age: 89
End: 2024-01-08
Payer: MEDICARE

## 2024-01-08 NOTE — TELEPHONE ENCOUNTER
----- Message from Ryan Hurtado MD sent at 1/7/2024 10:32 AM CST -----  Call patients daughter.  Her urinalysis did not show any infection.

## 2024-01-08 NOTE — TELEPHONE ENCOUNTER
Spoke to Karen with result per Dr Hurtado. Verbalized understanding. Said she saw this on the portal.

## 2024-01-09 ENCOUNTER — PATIENT MESSAGE (OUTPATIENT)
Dept: FAMILY MEDICINE | Facility: CLINIC | Age: 89
End: 2024-01-09
Payer: MEDICARE

## 2024-01-12 DIAGNOSIS — Z00.00 ENCOUNTER FOR MEDICARE ANNUAL WELLNESS EXAM: ICD-10-CM

## 2024-01-16 ENCOUNTER — HOSPITAL ENCOUNTER (EMERGENCY)
Facility: HOSPITAL | Age: 89
Discharge: HOME | End: 2024-01-16
Attending: EMERGENCY MEDICINE
Payer: MEDICARE

## 2024-01-16 VITALS
BODY MASS INDEX: 23.14 KG/M2 | DIASTOLIC BLOOD PRESSURE: 53 MMHG | TEMPERATURE: 98 F | WEIGHT: 100 LBS | RESPIRATION RATE: 14 BRPM | OXYGEN SATURATION: 96 % | HEART RATE: 67 BPM | HEIGHT: 55 IN | SYSTOLIC BLOOD PRESSURE: 105 MMHG

## 2024-01-16 DIAGNOSIS — R53.1 WEAKNESS: Primary | ICD-10-CM

## 2024-01-16 DIAGNOSIS — J18.9 PNEUMONIA DUE TO INFECTIOUS ORGANISM, UNSPECIFIED LATERALITY, UNSPECIFIED PART OF LUNG: ICD-10-CM

## 2024-01-16 DIAGNOSIS — N39.0 URINARY TRACT INFECTION WITHOUT HEMATURIA, SITE UNSPECIFIED: ICD-10-CM

## 2024-01-16 LAB
ALBUMIN SERPL BCP-MCNC: 3 G/DL (ref 3.5–5.2)
ALP SERPL-CCNC: 75 U/L (ref 55–135)
ALT SERPL W/O P-5'-P-CCNC: 5 U/L (ref 10–44)
ANION GAP SERPL CALC-SCNC: 7 MMOL/L (ref 8–16)
AST SERPL-CCNC: 15 U/L (ref 10–40)
BACTERIA #/AREA URNS HPF: NEGATIVE /HPF
BASOPHILS # BLD AUTO: 0.05 K/UL (ref 0–0.2)
BASOPHILS NFR BLD: 0.7 % (ref 0–1.9)
BILIRUB SERPL-MCNC: 0.7 MG/DL (ref 0.1–1)
BILIRUB UR QL STRIP: NEGATIVE
BNP SERPL-MCNC: 215 PG/ML (ref 0–99)
BUN SERPL-MCNC: 13 MG/DL (ref 8–23)
CALCIUM SERPL-MCNC: 9.3 MG/DL (ref 8.7–10.5)
CHLORIDE SERPL-SCNC: 108 MMOL/L (ref 95–110)
CLARITY UR: CLEAR
CO2 SERPL-SCNC: 25 MMOL/L (ref 23–29)
COLOR UR: YELLOW
CREAT SERPL-MCNC: 1 MG/DL (ref 0.5–1.4)
DIFFERENTIAL METHOD BLD: ABNORMAL
EOSINOPHIL # BLD AUTO: 0.2 K/UL (ref 0–0.5)
EOSINOPHIL NFR BLD: 2.5 % (ref 0–8)
ERYTHROCYTE [DISTWIDTH] IN BLOOD BY AUTOMATED COUNT: 12.5 % (ref 11.5–14.5)
EST. GFR  (NO RACE VARIABLE): 53.5 ML/MIN/1.73 M^2
GLUCOSE SERPL-MCNC: 89 MG/DL (ref 70–110)
GLUCOSE UR QL STRIP: NEGATIVE
HCT VFR BLD AUTO: 36.9 % (ref 37–48.5)
HGB BLD-MCNC: 12 G/DL (ref 12–16)
HGB UR QL STRIP: NEGATIVE
HYALINE CASTS #/AREA URNS LPF: 1 /LPF
IMM GRANULOCYTES # BLD AUTO: 0.05 K/UL (ref 0–0.04)
IMM GRANULOCYTES NFR BLD AUTO: 0.7 % (ref 0–0.5)
KETONES UR QL STRIP: NEGATIVE
LACTATE SERPL-SCNC: 1.1 MMOL/L (ref 0.5–1.9)
LEUKOCYTE ESTERASE UR QL STRIP: NEGATIVE
LYMPHOCYTES # BLD AUTO: 1.8 K/UL (ref 1–4.8)
LYMPHOCYTES NFR BLD: 23.5 % (ref 18–48)
MAGNESIUM SERPL-MCNC: 1.4 MG/DL (ref 1.6–2.6)
MCH RBC QN AUTO: 32.3 PG (ref 27–31)
MCHC RBC AUTO-ENTMCNC: 32.5 G/DL (ref 32–36)
MCV RBC AUTO: 99 FL (ref 82–98)
MICROSCOPIC COMMENT: NORMAL
MONOCYTES # BLD AUTO: 0.8 K/UL (ref 0.3–1)
MONOCYTES NFR BLD: 9.9 % (ref 4–15)
NEUTROPHILS # BLD AUTO: 4.7 K/UL (ref 1.8–7.7)
NEUTROPHILS NFR BLD: 62.7 % (ref 38–73)
NITRITE UR QL STRIP: POSITIVE
NRBC BLD-RTO: 0 /100 WBC
PH UR STRIP: 7 [PH] (ref 5–8)
PLATELET # BLD AUTO: 139 K/UL (ref 150–450)
PMV BLD AUTO: 10.3 FL (ref 9.2–12.9)
POTASSIUM SERPL-SCNC: 3.8 MMOL/L (ref 3.5–5.1)
PROT SERPL-MCNC: 5.6 G/DL (ref 6–8.4)
PROT UR QL STRIP: NEGATIVE
RBC # BLD AUTO: 3.72 M/UL (ref 4–5.4)
RBC #/AREA URNS HPF: 1 /HPF (ref 0–4)
SODIUM SERPL-SCNC: 140 MMOL/L (ref 136–145)
SP GR UR STRIP: 1.01 (ref 1–1.03)
SQUAMOUS #/AREA URNS HPF: 1 /HPF
TROPONIN I SERPL HS-MCNC: 12.2 PG/ML (ref 0–14.9)
TROPONIN I SERPL HS-MCNC: 12.5 PG/ML (ref 0–14.9)
URN SPEC COLLECT METH UR: ABNORMAL
UROBILINOGEN UR STRIP-ACNC: ABNORMAL EU/DL
WBC # BLD AUTO: 7.56 K/UL (ref 3.9–12.7)
WBC #/AREA URNS HPF: 1 /HPF (ref 0–5)

## 2024-01-16 PROCEDURE — 36415 COLL VENOUS BLD VENIPUNCTURE: CPT | Performed by: EMERGENCY MEDICINE

## 2024-01-16 PROCEDURE — 83605 ASSAY OF LACTIC ACID: CPT | Performed by: EMERGENCY MEDICINE

## 2024-01-16 PROCEDURE — 85025 COMPLETE CBC W/AUTO DIFF WBC: CPT | Performed by: EMERGENCY MEDICINE

## 2024-01-16 PROCEDURE — 81001 URINALYSIS AUTO W/SCOPE: CPT | Performed by: EMERGENCY MEDICINE

## 2024-01-16 PROCEDURE — 96361 HYDRATE IV INFUSION ADD-ON: CPT

## 2024-01-16 PROCEDURE — 83735 ASSAY OF MAGNESIUM: CPT | Performed by: EMERGENCY MEDICINE

## 2024-01-16 PROCEDURE — 63600175 PHARM REV CODE 636 W HCPCS: Performed by: EMERGENCY MEDICINE

## 2024-01-16 PROCEDURE — 87040 BLOOD CULTURE FOR BACTERIA: CPT | Mod: 59 | Performed by: EMERGENCY MEDICINE

## 2024-01-16 PROCEDURE — 93010 ELECTROCARDIOGRAM REPORT: CPT | Mod: ,,, | Performed by: GENERAL PRACTICE

## 2024-01-16 PROCEDURE — 84484 ASSAY OF TROPONIN QUANT: CPT | Mod: 91 | Performed by: EMERGENCY MEDICINE

## 2024-01-16 PROCEDURE — 96367 TX/PROPH/DG ADDL SEQ IV INF: CPT

## 2024-01-16 PROCEDURE — 93005 ELECTROCARDIOGRAM TRACING: CPT | Performed by: GENERAL PRACTICE

## 2024-01-16 PROCEDURE — 96365 THER/PROPH/DIAG IV INF INIT: CPT

## 2024-01-16 PROCEDURE — 99285 EMERGENCY DEPT VISIT HI MDM: CPT | Mod: 25

## 2024-01-16 PROCEDURE — 25000003 PHARM REV CODE 250: Performed by: EMERGENCY MEDICINE

## 2024-01-16 PROCEDURE — 83880 ASSAY OF NATRIURETIC PEPTIDE: CPT | Performed by: EMERGENCY MEDICINE

## 2024-01-16 PROCEDURE — 80053 COMPREHEN METABOLIC PANEL: CPT | Performed by: EMERGENCY MEDICINE

## 2024-01-16 RX ORDER — AMOXICILLIN AND CLAVULANATE POTASSIUM 875; 125 MG/1; MG/1
1 TABLET, FILM COATED ORAL 2 TIMES DAILY
Qty: 14 TABLET | Refills: 0 | Status: SHIPPED | OUTPATIENT
Start: 2024-01-16 | End: 2024-01-23

## 2024-01-16 RX ORDER — SODIUM CHLORIDE 9 MG/ML
1000 INJECTION, SOLUTION INTRAVENOUS
Status: COMPLETED | OUTPATIENT
Start: 2024-01-16 | End: 2024-01-16

## 2024-01-16 RX ORDER — MAGNESIUM SULFATE 1 G/100ML
1 INJECTION INTRAVENOUS ONCE
Status: COMPLETED | OUTPATIENT
Start: 2024-01-16 | End: 2024-01-16

## 2024-01-16 RX ADMIN — CEFTRIAXONE SODIUM 1 G: 1 INJECTION, POWDER, FOR SOLUTION INTRAMUSCULAR; INTRAVENOUS at 12:01

## 2024-01-16 RX ADMIN — SODIUM CHLORIDE 1000 ML: 0.9 INJECTION, SOLUTION INTRAVENOUS at 11:01

## 2024-01-16 RX ADMIN — MAGNESIUM SULFATE HEPTAHYDRATE 1 G: 1 INJECTION, SOLUTION INTRAVENOUS at 02:01

## 2024-01-16 NOTE — ED PROVIDER NOTES
Encounter Date: 1/16/2024       History     Chief Complaint   Patient presents with    Weakness     Generalized weakness since this morning with hypotension, the family denies fever.     Patient presents complaining of feeling generally weak this morning.  Patient with low blood pressures at home.  Patient has dementia difficulty with history.  Daughter complains patient just appears weak.  Decreased oral intake.  At the worst symptoms are mild-to-moderate.  Nothing makes it better or worse.      Review of patient's allergies indicates:   Allergen Reactions    Antihistamines - alkylamine     Torrie [amlodipine-olmesartan]     Flexeril [cyclobenzaprine]      Hallucinations    Hydrocodone     Hydrocodone-acetaminophen Other (See Comments)    Simvastatin     Statins-hmg-coa reductase inhibitors      Past Medical History:   Diagnosis Date    Back problem     Constipation     History of fractured vertebra     Hyperlipidemia     Hypertension     IBS (irritable bowel syndrome)     Migraine headache     Osteoporosis     Rectal prolapse     Scoliosis     Vertigo      Past Surgical History:   Procedure Laterality Date    LEWIS      Back Pain    EYE SURGERY      Bilateral Cataracs    HYSTERECTOMY      OOPHORECTOMY      ROTATOR CUFF REPAIR      bialteral    TONSILLECTOMY       Family History   Problem Relation Age of Onset    Colon cancer Father     Breast cancer Mother 70    Hypertension Mother     Ovarian cancer Neg Hx      Social History     Tobacco Use    Smoking status: Never    Smokeless tobacco: Never   Substance Use Topics    Alcohol use: No    Drug use: No     Review of Systems   Unable to perform ROS: Dementia       Physical Exam     Initial Vitals [01/16/24 1102]   BP Pulse Resp Temp SpO2   124/64 76 14 97.9 °F (36.6 °C) 95 %      MAP       --         Physical Exam    Nursing note and vitals reviewed.  Constitutional:   Pleasant, polite, weak, frail   HENT:   Head: Normocephalic and atraumatic.   Mouth/Throat: Oropharynx  is clear and moist.   Eyes: EOM are normal. Pupils are equal, round, and reactive to light.   Neck: Neck supple.   Normal range of motion.  Cardiovascular:  Normal rate, regular rhythm, normal heart sounds and intact distal pulses.           Pulmonary/Chest: Breath sounds normal. No respiratory distress.   Abdominal: Abdomen is soft.   Musculoskeletal:         General: Normal range of motion.      Cervical back: Normal range of motion and neck supple.     Neurological: She is alert. She has normal strength.   Dementia   Skin: Skin is warm and dry. Capillary refill takes less than 2 seconds.         ED Course   Procedures  Labs Reviewed   CBC W/ AUTO DIFFERENTIAL - Abnormal; Notable for the following components:       Result Value    RBC 3.72 (*)     Hematocrit 36.9 (*)     MCV 99 (*)     MCH 32.3 (*)     Platelets 139 (*)     Immature Granulocytes 0.7 (*)     Immature Grans (Abs) 0.05 (*)     All other components within normal limits   B-TYPE NATRIURETIC PEPTIDE - Abnormal; Notable for the following components:     (*)     All other components within normal limits   URINALYSIS, REFLEX TO URINE CULTURE - Abnormal; Notable for the following components:    Nitrite, UA Positive (*)     Urobilinogen, UA 2.0-3.0 (*)     All other components within normal limits    Narrative:     Specimen Source->Urine   MAGNESIUM - Abnormal; Notable for the following components:    Magnesium 1.4 (*)     All other components within normal limits   COMPREHENSIVE METABOLIC PANEL - Abnormal; Notable for the following components:    Total Protein 5.6 (*)     Albumin 3.0 (*)     ALT 5 (*)     eGFR 53.5 (*)     Anion Gap 7 (*)     All other components within normal limits   CULTURE, BLOOD   CULTURE, BLOOD   TROPONIN I HIGH SENSITIVITY   LACTIC ACID, PLASMA   TROPONIN I HIGH SENSITIVITY   URINALYSIS MICROSCOPIC    Narrative:     Specimen Source->Urine        ECG Results              EKG 12-lead (In process)  Result time 01/16/24 11:22:43       In process by Interface, Lab In [facility] (01/16/24 11:22:43)                   Narrative:    Test Reason : R07.9,    Vent. Rate : 072 BPM     Atrial Rate : 072 BPM     P-R Int : 152 ms          QRS Dur : 078 ms      QT Int : 418 ms       P-R-T Axes : 035 017 017 degrees     QTc Int : 457 ms    Normal sinus rhythm  Possible Lateral infarct (cited on or before 25-NOV-2023)  Abnormal ECG  When compared with ECG of 25-NOV-2023 17:53,  Nonspecific T wave abnormality has replaced inverted T waves in Inferior  leads  Nonspecific T wave abnormality no longer evident in Lateral leads  QT has shortened    Referred By: [email or web address]   SELF           Confirmed By:                                   Imaging Results              X-Ray Chest AP Portable (Final result)  Result time 01/16/24 11:26:42      Final result by Shahana Nur MD (01/16/24 11:26:42)                   Narrative:    Portable chest x-ray at 11:04 AM is compared to prior study dated 11/25/2023    Clinical history is chest pain and weakness    FINDINGS: The heart is mildly enlarged. There is patchy groundglass opacity in the left lung base. The remainder the lungs are clear.    There are old left rib fractures. There has been prior vertebral plasties of the lower thoracic and upper lumbar spine.    IMPRESSION: Faint groundglass opacity in the left lower lobe which may represent early infiltrate    Old left rib fractures    Electronically signed by:  Shahana Nur MD  01/16/2024 11:26 AM CST Workstation: [id number]                                     Medications   0.9%  NaCl infusion (0 mLs Intravenous Stopped 1/16/24 1220)   cefTRIAXone (ROCEPHIN) 1 g in dextrose 5 % 100 mL IVPB (ready to mix) (0 g Intravenous Stopped 1/16/24 1242)   magnesium sulfate in dextrose IVPB (premix) 1 g (1 g Intravenous New Bag 1/16/24 1411)     Medical Decision Making  Patient appears weak and frail    Considerations include but are not limited to infection, acute kidney  injury, dehydration, electrolyte abnormalities    MDM    Patient presents for emergent evaluation of acute weakness that poses a threat to life and/or bodily function.    In the ED patient found to have acute urinary tract infection and possible early pneumonia.    I ordered labs and personally reviewed them.  Labs significant for normal lactate, normal white blood cell count, nitrite positive urine.  I ordered X-rays and personally reviewed them and reviewed the radiologist interpretation.  Xray significant for possible infiltrate.    I ordered EKG and personally reviewed it.  EKG significant for regular rate and rhythm without ST elevation.        Discharge MDM    Patient was managed in the ED with IV normal saline, Rocephin, magnesium.    The response to treatment was improved.  This patient appears in no acute distress has normal vital signs normal white count and normal lactate.  Patient has evidence of possible UTI and pneumonia which could explain patient's weakness.  Magnesium was replaced in the emergency department.  Patient was given IV antibiotic therapy and fluids.  She appears nontoxic.  Discussed the case thoroughly with her daughter who is in agreement with the plan.  Antibiotics sent to the patient's pharmacy.  Patient was discharged in stable condition.  Detailed return precautions discussed.    Amount and/or Complexity of Data Reviewed  Labs: ordered. Decision-making details documented in ED Course.  Radiology: ordered.    Risk  Prescription drug management.               ED Course as of 01/16/24 1512   Tue Jan 16, 2024   1142 WBC: 7.56 [AP]   1142 Hemoglobin: 12.0 [AP]   1142 Hematocrit(!): 36.9 [AP]   1142 Platelet Count(!): 139 [AP]   1207 Lactate, Shahab: 1.1 [AP]   1238 PROTEIN TOTAL(!): 5.6 [AP]   1238 Albumin(!): 3.0 [AP]   1238 ALP: 75 [AP]   1238 AST: 15 [AP]   1238 ALT(!): 5 [AP]   1405 Troponin I High Sensitivity: 12.5 [AP]   1406 Sodium: 140 [AP]   1406 Potassium: 3.8 [AP]   1406  Chloride: 108 [AP]   1406 CO2: 25 [AP]   1406 Glucose: 89 [AP]   1406 BUN: 13 [AP]   1406 Creatinine: 1.0 [AP]   1406 Calcium: 9.3 [AP]   1406 Anion Gap(!): 7 [AP]   1406 BNP(!): 215 [AP]   1439 Lactate, Shahab: 1.1 [AP]   1440 WBC: 7.56 [AP]      ED Course User Index  [AP] Alexander Greene MD                           Clinical Impression:  Final diagnoses:  [R53.1] Weakness (Primary)  [N39.0] Urinary tract infection without hematuria, site unspecified  [J18.9] Pneumonia due to infectious organism, unspecified laterality, unspecified part of lung          ED Disposition Condition    Discharge Stable          ED Prescriptions       Medication Sig Dispense Start Date End Date Auth. Provider    amoxicillin-clavulanate 875-125mg (AUGMENTIN) 875-125 mg per tablet Take 1 tablet by mouth 2 (two) times daily. for 7 days 14 tablet 1/16/2024 1/23/2024 Alexander Greene MD          Follow-up Information       Follow up With Specialties Details Why Contact Info    Ryan Hurtado MD Family Medicine, Home Health Services, Hospice Services Schedule an appointment as soon as possible for a visit in 2 days  [address] [facility]  [address] [place] [id number]  [phone]               Alexander Greene MD  01/16/24 1106

## 2024-01-16 NOTE — ED NOTES
Pt to er via ems with c/o generalized weakness. Aaox2. She knows winter time, not month, year. She denies pain. No fever. On cm, pulse ox, nibp. Speech clear. Skin w/dr/pk. Rr even/unlab. Denies cough, congestion, cp, sob.

## 2024-01-21 LAB
BACTERIA BLD CULT: NORMAL
BACTERIA BLD CULT: NORMAL

## 2024-01-29 ENCOUNTER — HOSPITAL ENCOUNTER (INPATIENT)
Facility: HOSPITAL | Age: 89
LOS: 3 days | Discharge: NURSING, REHAB OR LONG-TERM CARE | DRG: 193 | End: 2024-02-01
Attending: EMERGENCY MEDICINE | Admitting: INTERNAL MEDICINE
Payer: MEDICARE

## 2024-01-29 DIAGNOSIS — J96.01 ACUTE RESPIRATORY FAILURE WITH HYPOXIA: Primary | ICD-10-CM

## 2024-01-29 DIAGNOSIS — R53.1 WEAKNESS: ICD-10-CM

## 2024-01-29 DIAGNOSIS — R07.9 CHEST PAIN: ICD-10-CM

## 2024-01-29 DIAGNOSIS — J18.9 PNEUMONIA OF LEFT LOWER LOBE DUE TO INFECTIOUS ORGANISM: ICD-10-CM

## 2024-01-29 DIAGNOSIS — R09.02 HYPOXIA: ICD-10-CM

## 2024-01-29 LAB
ALBUMIN SERPL BCP-MCNC: 3 G/DL (ref 3.5–5.2)
ALLENS TEST: ABNORMAL
ALP SERPL-CCNC: 86 U/L (ref 55–135)
ALT SERPL W/O P-5'-P-CCNC: 6 U/L (ref 10–44)
ANION GAP SERPL CALC-SCNC: 9 MMOL/L (ref 8–16)
AST SERPL-CCNC: 15 U/L (ref 10–40)
BASOPHILS # BLD AUTO: 0.03 K/UL (ref 0–0.2)
BASOPHILS NFR BLD: 0.6 % (ref 0–1.9)
BILIRUB SERPL-MCNC: 1 MG/DL (ref 0.1–1)
BILIRUB UR QL STRIP: NEGATIVE
BNP SERPL-MCNC: 108 PG/ML (ref 0–99)
BUN SERPL-MCNC: 15 MG/DL (ref 8–23)
CALCIUM SERPL-MCNC: 9.8 MG/DL (ref 8.7–10.5)
CHLORIDE SERPL-SCNC: 103 MMOL/L (ref 95–110)
CLARITY UR: CLEAR
CO2 SERPL-SCNC: 26 MMOL/L (ref 23–29)
COLOR UR: YELLOW
CREAT SERPL-MCNC: 1 MG/DL (ref 0.5–1.4)
DELSYS: ABNORMAL
DIFFERENTIAL METHOD BLD: ABNORMAL
EOSINOPHIL # BLD AUTO: 0.1 K/UL (ref 0–0.5)
EOSINOPHIL NFR BLD: 2 % (ref 0–8)
ERYTHROCYTE [DISTWIDTH] IN BLOOD BY AUTOMATED COUNT: 12.8 % (ref 11.5–14.5)
EST. GFR  (NO RACE VARIABLE): 54 ML/MIN/1.73 M^2
GLUCOSE SERPL-MCNC: 97 MG/DL (ref 70–110)
GLUCOSE UR QL STRIP: NEGATIVE
HCT VFR BLD AUTO: 35.5 % (ref 37–48.5)
HGB BLD-MCNC: 11.6 G/DL (ref 12–16)
HGB UR QL STRIP: NEGATIVE
IMM GRANULOCYTES # BLD AUTO: 0.03 K/UL (ref 0–0.04)
IMM GRANULOCYTES NFR BLD AUTO: 0.6 % (ref 0–0.5)
INFLUENZA A, MOLECULAR: NEGATIVE
INFLUENZA B, MOLECULAR: NEGATIVE
KETONES UR QL STRIP: NEGATIVE
LDH SERPL L TO P-CCNC: 0.46 MMOL/L (ref 0.5–2.2)
LEUKOCYTE ESTERASE UR QL STRIP: NEGATIVE
LYMPHOCYTES # BLD AUTO: 1.1 K/UL (ref 1–4.8)
LYMPHOCYTES NFR BLD: 22.3 % (ref 18–48)
MAGNESIUM SERPL-MCNC: 1.7 MG/DL (ref 1.6–2.6)
MCH RBC QN AUTO: 32.6 PG (ref 27–31)
MCHC RBC AUTO-ENTMCNC: 32.7 G/DL (ref 32–36)
MCV RBC AUTO: 100 FL (ref 82–98)
MONOCYTES # BLD AUTO: 0.4 K/UL (ref 0.3–1)
MONOCYTES NFR BLD: 7.5 % (ref 4–15)
NEUTROPHILS # BLD AUTO: 3.3 K/UL (ref 1.8–7.7)
NEUTROPHILS NFR BLD: 67 % (ref 38–73)
NITRITE UR QL STRIP: NEGATIVE
NRBC BLD-RTO: 0 /100 WBC
PH UR STRIP: 6 [PH] (ref 5–8)
PLATELET # BLD AUTO: 129 K/UL (ref 150–450)
PMV BLD AUTO: 10 FL (ref 9.2–12.9)
POTASSIUM SERPL-SCNC: 4.1 MMOL/L (ref 3.5–5.1)
PROT SERPL-MCNC: 6 G/DL (ref 6–8.4)
PROT UR QL STRIP: NEGATIVE
RBC # BLD AUTO: 3.56 M/UL (ref 4–5.4)
SAMPLE: ABNORMAL
SARS-COV-2 RDRP RESP QL NAA+PROBE: NEGATIVE
SITE: ABNORMAL
SODIUM SERPL-SCNC: 138 MMOL/L (ref 136–145)
SP GR UR STRIP: 1.02 (ref 1–1.03)
SPECIMEN SOURCE: NORMAL
TROPONIN I SERPL DL<=0.01 NG/ML-MCNC: <0.006 NG/ML (ref 0–0.03)
URN SPEC COLLECT METH UR: NORMAL
UROBILINOGEN UR STRIP-ACNC: NEGATIVE EU/DL
WBC # BLD AUTO: 4.93 K/UL (ref 3.9–12.7)

## 2024-01-29 PROCEDURE — 83605 ASSAY OF LACTIC ACID: CPT

## 2024-01-29 PROCEDURE — 63600175 PHARM REV CODE 636 W HCPCS: Performed by: NURSE PRACTITIONER

## 2024-01-29 PROCEDURE — 51798 US URINE CAPACITY MEASURE: CPT

## 2024-01-29 PROCEDURE — 25000003 PHARM REV CODE 250: Performed by: NURSE PRACTITIONER

## 2024-01-29 PROCEDURE — 99900035 HC TECH TIME PER 15 MIN (STAT)

## 2024-01-29 PROCEDURE — 85025 COMPLETE CBC W/AUTO DIFF WBC: CPT | Performed by: NURSE PRACTITIONER

## 2024-01-29 PROCEDURE — 94640 AIRWAY INHALATION TREATMENT: CPT

## 2024-01-29 PROCEDURE — 93010 ELECTROCARDIOGRAM REPORT: CPT | Mod: ,,, | Performed by: GENERAL PRACTICE

## 2024-01-29 PROCEDURE — 96361 HYDRATE IV INFUSION ADD-ON: CPT

## 2024-01-29 PROCEDURE — 87502 INFLUENZA DNA AMP PROBE: CPT | Performed by: NURSE PRACTITIONER

## 2024-01-29 PROCEDURE — 36415 COLL VENOUS BLD VENIPUNCTURE: CPT | Performed by: NURSE PRACTITIONER

## 2024-01-29 PROCEDURE — 81003 URINALYSIS AUTO W/O SCOPE: CPT | Performed by: NURSE PRACTITIONER

## 2024-01-29 PROCEDURE — 80053 COMPREHEN METABOLIC PANEL: CPT | Performed by: NURSE PRACTITIONER

## 2024-01-29 PROCEDURE — 27000221 HC OXYGEN, UP TO 24 HOURS

## 2024-01-29 PROCEDURE — 83880 ASSAY OF NATRIURETIC PEPTIDE: CPT | Performed by: NURSE PRACTITIONER

## 2024-01-29 PROCEDURE — 51701 INSERT BLADDER CATHETER: CPT

## 2024-01-29 PROCEDURE — 96365 THER/PROPH/DIAG IV INF INIT: CPT

## 2024-01-29 PROCEDURE — 93005 ELECTROCARDIOGRAM TRACING: CPT

## 2024-01-29 PROCEDURE — 83735 ASSAY OF MAGNESIUM: CPT | Performed by: NURSE PRACTITIONER

## 2024-01-29 PROCEDURE — 94761 N-INVAS EAR/PLS OXIMETRY MLT: CPT

## 2024-01-29 PROCEDURE — 87040 BLOOD CULTURE FOR BACTERIA: CPT | Performed by: NURSE PRACTITIONER

## 2024-01-29 PROCEDURE — 11000001 HC ACUTE MED/SURG PRIVATE ROOM

## 2024-01-29 PROCEDURE — 99291 CRITICAL CARE FIRST HOUR: CPT

## 2024-01-29 PROCEDURE — 25000242 PHARM REV CODE 250 ALT 637 W/ HCPCS: Performed by: NURSE PRACTITIONER

## 2024-01-29 PROCEDURE — U0002 COVID-19 LAB TEST NON-CDC: HCPCS | Performed by: NURSE PRACTITIONER

## 2024-01-29 PROCEDURE — 84484 ASSAY OF TROPONIN QUANT: CPT | Performed by: NURSE PRACTITIONER

## 2024-01-29 RX ORDER — PANTOPRAZOLE SODIUM 40 MG/1
40 TABLET, DELAYED RELEASE ORAL DAILY
Status: DISCONTINUED | OUTPATIENT
Start: 2024-01-30 | End: 2024-02-01 | Stop reason: HOSPADM

## 2024-01-29 RX ORDER — ACETAMINOPHEN 325 MG/1
650 TABLET ORAL EVERY 8 HOURS PRN
Status: DISCONTINUED | OUTPATIENT
Start: 2024-01-29 | End: 2024-02-01 | Stop reason: HOSPADM

## 2024-01-29 RX ORDER — LANOLIN ALCOHOL/MO/W.PET/CERES
1 CREAM (GRAM) TOPICAL DAILY
Status: DISCONTINUED | OUTPATIENT
Start: 2024-01-30 | End: 2024-02-01 | Stop reason: HOSPADM

## 2024-01-29 RX ORDER — IBUPROFEN 200 MG
24 TABLET ORAL
Status: DISCONTINUED | OUTPATIENT
Start: 2024-01-29 | End: 2024-02-01 | Stop reason: HOSPADM

## 2024-01-29 RX ORDER — LANOLIN ALCOHOL/MO/W.PET/CERES
800 CREAM (GRAM) TOPICAL
Status: DISCONTINUED | OUTPATIENT
Start: 2024-01-29 | End: 2024-02-01 | Stop reason: HOSPADM

## 2024-01-29 RX ORDER — SODIUM,POTASSIUM PHOSPHATES 280-250MG
2 POWDER IN PACKET (EA) ORAL
Status: DISCONTINUED | OUTPATIENT
Start: 2024-01-29 | End: 2024-02-01 | Stop reason: HOSPADM

## 2024-01-29 RX ORDER — IBUPROFEN 200 MG
16 TABLET ORAL
Status: DISCONTINUED | OUTPATIENT
Start: 2024-01-29 | End: 2024-02-01 | Stop reason: HOSPADM

## 2024-01-29 RX ORDER — TALC
6 POWDER (GRAM) TOPICAL NIGHTLY PRN
Status: DISCONTINUED | OUTPATIENT
Start: 2024-01-29 | End: 2024-02-01 | Stop reason: HOSPADM

## 2024-01-29 RX ORDER — ALUMINUM HYDROXIDE, MAGNESIUM HYDROXIDE, AND SIMETHICONE 1200; 120; 1200 MG/30ML; MG/30ML; MG/30ML
30 SUSPENSION ORAL 4 TIMES DAILY PRN
Status: DISCONTINUED | OUTPATIENT
Start: 2024-01-29 | End: 2024-02-01 | Stop reason: HOSPADM

## 2024-01-29 RX ORDER — SERTRALINE HYDROCHLORIDE 25 MG/1
25 TABLET, FILM COATED ORAL DAILY
Status: DISCONTINUED | OUTPATIENT
Start: 2024-01-30 | End: 2024-02-01 | Stop reason: HOSPADM

## 2024-01-29 RX ORDER — NALOXONE HCL 0.4 MG/ML
0.02 VIAL (ML) INJECTION
Status: DISCONTINUED | OUTPATIENT
Start: 2024-01-29 | End: 2024-02-01 | Stop reason: HOSPADM

## 2024-01-29 RX ORDER — AMOXICILLIN 250 MG
1 CAPSULE ORAL 2 TIMES DAILY
Status: DISCONTINUED | OUTPATIENT
Start: 2024-01-29 | End: 2024-02-01 | Stop reason: HOSPADM

## 2024-01-29 RX ORDER — DONEPEZIL HYDROCHLORIDE 5 MG/1
10 TABLET, FILM COATED ORAL NIGHTLY
Status: DISCONTINUED | OUTPATIENT
Start: 2024-01-29 | End: 2024-02-01 | Stop reason: HOSPADM

## 2024-01-29 RX ORDER — GLUCAGON 1 MG
1 KIT INJECTION
Status: DISCONTINUED | OUTPATIENT
Start: 2024-01-29 | End: 2024-02-01 | Stop reason: HOSPADM

## 2024-01-29 RX ORDER — SODIUM CHLORIDE, SODIUM LACTATE, POTASSIUM CHLORIDE, CALCIUM CHLORIDE 600; 310; 30; 20 MG/100ML; MG/100ML; MG/100ML; MG/100ML
INJECTION, SOLUTION INTRAVENOUS CONTINUOUS
Status: DISCONTINUED | OUTPATIENT
Start: 2024-01-29 | End: 2024-01-31

## 2024-01-29 RX ORDER — OXYBUTYNIN CHLORIDE 5 MG/1
5 TABLET, EXTENDED RELEASE ORAL DAILY
Status: DISCONTINUED | OUTPATIENT
Start: 2024-01-30 | End: 2024-02-01 | Stop reason: HOSPADM

## 2024-01-29 RX ORDER — ENOXAPARIN SODIUM 100 MG/ML
30 INJECTION SUBCUTANEOUS EVERY 24 HOURS
Status: DISCONTINUED | OUTPATIENT
Start: 2024-01-29 | End: 2024-02-01 | Stop reason: HOSPADM

## 2024-01-29 RX ORDER — ONDANSETRON HYDROCHLORIDE 2 MG/ML
4 INJECTION, SOLUTION INTRAVENOUS EVERY 8 HOURS PRN
Status: DISCONTINUED | OUTPATIENT
Start: 2024-01-29 | End: 2024-02-01 | Stop reason: HOSPADM

## 2024-01-29 RX ORDER — SODIUM CHLORIDE 0.9 % (FLUSH) 0.9 %
10 SYRINGE (ML) INJECTION EVERY 12 HOURS PRN
Status: DISCONTINUED | OUTPATIENT
Start: 2024-01-29 | End: 2024-02-01 | Stop reason: HOSPADM

## 2024-01-29 RX ORDER — LEVALBUTEROL INHALATION SOLUTION 0.63 MG/3ML
0.63 SOLUTION RESPIRATORY (INHALATION) EVERY 8 HOURS
Status: DISCONTINUED | OUTPATIENT
Start: 2024-01-29 | End: 2024-02-01 | Stop reason: HOSPADM

## 2024-01-29 RX ORDER — SIMETHICONE 80 MG
1 TABLET,CHEWABLE ORAL 4 TIMES DAILY PRN
Status: DISCONTINUED | OUTPATIENT
Start: 2024-01-29 | End: 2024-02-01 | Stop reason: HOSPADM

## 2024-01-29 RX ORDER — QUETIAPINE FUMARATE 100 MG/1
200 TABLET, FILM COATED ORAL NIGHTLY
Status: DISCONTINUED | OUTPATIENT
Start: 2024-01-29 | End: 2024-02-01 | Stop reason: HOSPADM

## 2024-01-29 RX ADMIN — DOCUSATE SODIUM AND SENNOSIDES 1 TABLET: 8.6; 5 TABLET, FILM COATED ORAL at 08:01

## 2024-01-29 RX ADMIN — MELATONIN TAB 3 MG 6 MG: 3 TAB at 11:01

## 2024-01-29 RX ADMIN — ENOXAPARIN SODIUM 30 MG: 30 INJECTION SUBCUTANEOUS at 06:01

## 2024-01-29 RX ADMIN — CEFTRIAXONE SODIUM 1 G: 1 INJECTION, POWDER, FOR SOLUTION INTRAMUSCULAR; INTRAVENOUS at 02:01

## 2024-01-29 RX ADMIN — SODIUM CHLORIDE, POTASSIUM CHLORIDE, SODIUM LACTATE AND CALCIUM CHLORIDE 1000 ML: 600; 310; 30; 20 INJECTION, SOLUTION INTRAVENOUS at 01:01

## 2024-01-29 RX ADMIN — SODIUM CHLORIDE, POTASSIUM CHLORIDE, SODIUM LACTATE AND CALCIUM CHLORIDE 500 ML: 600; 310; 30; 20 INJECTION, SOLUTION INTRAVENOUS at 04:01

## 2024-01-29 RX ADMIN — LEVALBUTEROL 0.63 MG: 0.63 SOLUTION RESPIRATORY (INHALATION) at 11:01

## 2024-01-29 RX ADMIN — DONEPEZIL HYDROCHLORIDE 10 MG: 5 TABLET, FILM COATED ORAL at 08:01

## 2024-01-29 RX ADMIN — LEVALBUTEROL 0.63 MG: 0.63 SOLUTION RESPIRATORY (INHALATION) at 05:01

## 2024-01-29 RX ADMIN — QUETIAPINE 200 MG: 100 TABLET ORAL at 08:01

## 2024-01-29 RX ADMIN — AZITHROMYCIN MONOHYDRATE 500 MG: 500 INJECTION, POWDER, LYOPHILIZED, FOR SOLUTION INTRAVENOUS at 04:01

## 2024-01-29 RX ADMIN — SODIUM CHLORIDE, POTASSIUM CHLORIDE, SODIUM LACTATE AND CALCIUM CHLORIDE: 600; 310; 30; 20 INJECTION, SOLUTION INTRAVENOUS at 05:01

## 2024-01-29 NOTE — SUBJECTIVE & OBJECTIVE
Past Medical History:   Diagnosis Date    Back problem     Constipation     History of fractured vertebra     Hyperlipidemia     Hypertension     IBS (irritable bowel syndrome)     Migraine headache     Osteoporosis     Rectal prolapse     Scoliosis     Vertigo        Past Surgical History:   Procedure Laterality Date    LEWIS      Back Pain    EYE SURGERY      Bilateral Cataracs    HYSTERECTOMY      OOPHORECTOMY      ROTATOR CUFF REPAIR      bialteral    TONSILLECTOMY         Review of patient's allergies indicates:   Allergen Reactions    Antihistamines - alkylamine     Torrie [amlodipine-olmesartan]     Flexeril [cyclobenzaprine]      Hallucinations    Hydrocodone     Hydrocodone-acetaminophen Other (See Comments)    Simvastatin     Statins-hmg-coa reductase inhibitors        No current facility-administered medications on file prior to encounter.     Current Outpatient Medications on File Prior to Encounter   Medication Sig    biotin 1 mg tablet Take 1,000 mcg by mouth once daily.    donepeziL (ARICEPT) 10 MG tablet Take 10 mg by mouth every evening.    famotidine (PEPCID) 20 MG tablet Take 1 tablet (20 mg total) by mouth 2 (two) times daily.    ferrous sulfate (FEOSOL) 325 mg (65 mg iron) Tab tablet Take 325 mg by mouth every evening.    glycopyrrolate (ROBINUL) 1 mg Tab Take 1 mg by mouth 2 (two) times daily.    hydroCHLOROthiazide (HYDRODIURIL) 25 MG tablet Take 0.5 tablets (12.5 mg total) by mouth daily as needed (leg swelling).    linaCLOtide (LINZESS) 72 mcg Cap capsule Take 1 capsule (72 mcg total) by mouth before breakfast.    lisinopriL (PRINIVIL,ZESTRIL) 5 MG tablet Take 5 mg by mouth once daily.    meclizine (ANTIVERT) 12.5 mg tablet Take 0.5 tablets (6.25 mg total) by mouth 2 (two) times daily as needed for Dizziness (1/2 tablet twice daily as needed for dizziness). (Patient taking differently: Take 6.25 mg by mouth 2 (two) times daily as needed for Dizziness.)    mupirocin (BACTROBAN) 2 % ointment Apply  topically 2 (two) times daily. (Patient taking differently: Apply 1 g topically 2 (two) times daily.)    pantoprazole (PROTONIX) 40 MG tablet Take 1 tablet (40 mg total) by mouth once daily.    QUEtiapine (SEROQUEL) 200 MG Tab Take 200 mg by mouth every evening.    sertraline (ZOLOFT) 25 MG tablet Take 25 mg by mouth once daily.    tolterodine (DETROL LA) 2 MG Cp24 Take 1 capsule (2 mg total) by mouth once daily. For bladder    tramadol-acetaminophen 37.5-325 mg (ULTRACET) 37.5-325 mg Tab Take 1 tablet by mouth 2 (two) times a day.    verapamiL (CALAN-SR) 120 MG CR tablet Take 1 tablet (120 mg total) by mouth once daily. (Patient taking differently: Take 120 mg by mouth nightly.)     Family History       Problem Relation (Age of Onset)    Breast cancer Mother (70)    Colon cancer Father    Hypertension Mother          Tobacco Use    Smoking status: Never    Smokeless tobacco: Never   Substance and Sexual Activity    Alcohol use: No    Drug use: No    Sexual activity: Not Currently     Partners: Male     Birth control/protection: None     Review of Systems   Constitutional:  Positive for activity change, fatigue and fever. Negative for chills.   Respiratory:  Positive for cough and shortness of breath.    Gastrointestinal:  Negative for nausea and vomiting.        Poor appetite   Musculoskeletal:  Positive for gait problem.   Neurological:  Positive for weakness (generalized).     Objective:     Vital Signs (Most Recent):  Temp: 99.4 °F (37.4 °C) (01/29/24 1257)  Pulse: 65 (01/29/24 1631)  Resp: 18 (01/29/24 1431)  BP: (!) 109/57 (01/29/24 1631)  SpO2: 99 % (01/29/24 1631) Vital Signs (24h Range):  Temp:  [99.4 °F (37.4 °C)] 99.4 °F (37.4 °C)  Pulse:  [65-76] 65  Resp:  [16-18] 18  SpO2:  [89 %-99 %] 99 %  BP: ()/(50-63) 109/57     Weight: 45.4 kg (100 lb)  Body mass index is 23.24 kg/m².     Physical Exam  Constitutional:       General: She is not in acute distress.     Appearance: She is well-developed.       Interventions: Nasal cannula in place.      Comments: Frail elderly   HENT:      Head: Normocephalic and atraumatic.      Mouth/Throat:      Mouth: Mucous membranes are dry.   Eyes:      Conjunctiva/sclera: Conjunctivae normal.      Pupils: Pupils are equal, round, and reactive to light.   Cardiovascular:      Rate and Rhythm: Normal rate and regular rhythm.   Pulmonary:      Effort: Pulmonary effort is normal.      Breath sounds: Examination of the left-lower field reveals decreased breath sounds. Decreased breath sounds present.   Abdominal:      General: Bowel sounds are normal. There is no distension.      Palpations: Abdomen is soft.      Tenderness: There is no abdominal tenderness.   Musculoskeletal:         General: Normal range of motion.      Cervical back: Normal range of motion. No tenderness.      Right lower leg: No edema.      Left lower leg: No edema.   Skin:     General: Skin is warm and dry.   Neurological:      General: No focal deficit present.      Mental Status: She is alert. Mental status is at baseline.   Psychiatric:         Mood and Affect: Mood normal.         Behavior: Behavior is cooperative.         Cognition and Memory: She exhibits impaired recent memory.              CRANIAL NERVES     CN III, IV, VI   Pupils are equal, round, and reactive to light.       Significant Labs: All pertinent labs within the past 24 hours have been reviewed.  Bilirubin:   Recent Labs   Lab 01/16/24  1207 01/29/24  1321   BILITOT 0.7 1.0     CBC:   Recent Labs   Lab 01/29/24  1321   WBC 4.93   HGB 11.6*   HCT 35.5*   *     CMP:   Recent Labs   Lab 01/29/24  1321      K 4.1      CO2 26   GLU 97   BUN 15   CREATININE 1.0   CALCIUM 9.8   PROT 6.0   ALBUMIN 3.0*   BILITOT 1.0   ALKPHOS 86   AST 15   ALT 6*   ANIONGAP 9     Troponin:   Recent Labs   Lab 01/29/24  1321   TROPONINI <0.006       Significant Imaging: I have reviewed all pertinent imaging results/findings within the past 24  hours.  CXR:  Small pleural effusion hazy left basilar airspace disease for which pneumonia is not excluded.     Hiatal hernia.

## 2024-01-29 NOTE — ASSESSMENT & PLAN NOTE
Failed OP therapy  IV rocephin, IV azithromycin  Sputum/blood cultures  Check procalcitonin  Supplemental oxygen as needed  bronchodilators

## 2024-01-29 NOTE — H&P
[facility] Medicine  History & Physical    Patient Name: Angelique Hamilton  MRN: [id number]  Patient Class: IP- Inpatient  Admission Date: 1/29/2024  Attending Physician: Bekah Hdz MD   Primary Care Provider: Ryan Hurtado MD         Patient information was obtained from patient, relative(s), past medical records, and ER records.     Subjective:     Principal Problem:Pneumonia of left lower lobe due to infectious organism    Chief Complaint:   Chief Complaint   Patient presents with    Weakness     EMS brought pt to ED with complaint of weakness.  Family advised EMS that pt can normally get around on her own.        HPI: Angelique Hamilton is a 89 y/o F with a past medical history significant for HLD, HTN, IBS, scoliosis and dementia who presented to the ED for further evaluation of increased fatigue and weakness.  She is accompanied by her daughter in law who states pt appeared to have increased generalized weakness 2 days ago, but symptoms have progressed and she was unable to get out of bed this morning.  She was referred to the ED by her home health nurse.  Per family report, pt was diagnosed with pneumonia and UTI about 2 weeks ago and completed a course of augmentin.  Symptoms are accompanied by low grade fevers, cough and loss of appetite.  Further history is limited due to the patient's chronic dementia.  She was hypoxic at 89% on presentation and was placed on supplemental oxygen 2L.  Findings on CXR today are consistent with LLL pneumonia.  IV antibiotics were initiated.   She will be admitted to the service of hospital medicine for continued medical management.       Past Medical History:   Diagnosis Date    Back problem     Constipation     History of fractured vertebra     Hyperlipidemia     Hypertension     IBS (irritable bowel syndrome)     Migraine headache     Osteoporosis     Rectal prolapse     Scoliosis     Vertigo        Past Surgical History:   Procedure Laterality  Date    LEWIS      Back Pain    EYE SURGERY      Bilateral Cataracs    HYSTERECTOMY      OOPHORECTOMY      ROTATOR CUFF REPAIR      bialteral    TONSILLECTOMY         Review of patient's allergies indicates:   Allergen Reactions    Antihistamines - alkylamine     Torrie [amlodipine-olmesartan]     Flexeril [cyclobenzaprine]      Hallucinations    Hydrocodone     Hydrocodone-acetaminophen Other (See Comments)    Simvastatin     Statins-hmg-coa reductase inhibitors        No current facility-administered medications on file prior to encounter.     Current Outpatient Medications on File Prior to Encounter   Medication Sig    biotin 1 mg tablet Take 1,000 mcg by mouth once daily.    donepeziL (ARICEPT) 10 MG tablet Take 10 mg by mouth every evening.    famotidine (PEPCID) 20 MG tablet Take 1 tablet (20 mg total) by mouth 2 (two) times daily.    ferrous sulfate (FEOSOL) 325 mg (65 mg iron) Tab tablet Take 325 mg by mouth every evening.    glycopyrrolate (ROBINUL) 1 mg Tab Take 1 mg by mouth 2 (two) times daily.    hydroCHLOROthiazide (HYDRODIURIL) 25 MG tablet Take 0.5 tablets (12.5 mg total) by mouth daily as needed (leg swelling).    linaCLOtide (LINZESS) 72 mcg Cap capsule Take 1 capsule (72 mcg total) by mouth before breakfast.    lisinopriL (PRINIVIL,ZESTRIL) 5 MG tablet Take 5 mg by mouth once daily.    meclizine (ANTIVERT) 12.5 mg tablet Take 0.5 tablets (6.25 mg total) by mouth 2 (two) times daily as needed for Dizziness (1/2 tablet twice daily as needed for dizziness). (Patient taking differently: Take 6.25 mg by mouth 2 (two) times daily as needed for Dizziness.)    mupirocin (BACTROBAN) 2 % ointment Apply topically 2 (two) times daily. (Patient taking differently: Apply 1 g topically 2 (two) times daily.)    pantoprazole (PROTONIX) 40 MG tablet Take 1 tablet (40 mg total) by mouth once daily.    QUEtiapine (SEROQUEL) 200 MG Tab Take 200 mg by mouth every evening.    sertraline (ZOLOFT) 25 MG tablet Take 25 mg by  mouth once daily.    tolterodine (DETROL LA) 2 MG Cp24 Take 1 capsule (2 mg total) by mouth once daily. For bladder    tramadol-acetaminophen 37.5-325 mg (ULTRACET) 37.5-325 mg Tab Take 1 tablet by mouth 2 (two) times a day.    verapamiL (CALAN-SR) 120 MG CR tablet Take 1 tablet (120 mg total) by mouth once daily. (Patient taking differently: Take 120 mg by mouth nightly.)     Family History       Problem Relation (Age of Onset)    Breast cancer Mother (70)    Colon cancer Father    Hypertension Mother          Tobacco Use    Smoking status: Never    Smokeless tobacco: Never   Substance and Sexual Activity    Alcohol use: No    Drug use: No    Sexual activity: Not Currently     Partners: Male     Birth control/protection: None     Review of Systems   Constitutional:  Positive for activity change, fatigue and fever. Negative for chills.   Respiratory:  Positive for cough and shortness of breath.    Gastrointestinal:  Negative for nausea and vomiting.        Poor appetite   Musculoskeletal:  Positive for gait problem.   Neurological:  Positive for weakness (generalized).     Objective:     Vital Signs (Most Recent):  Temp: 99.4 °F (37.4 °C) (01/29/24 1257)  Pulse: 65 (01/29/24 1631)  Resp: 18 (01/29/24 1431)  BP: (!) 109/57 (01/29/24 1631)  SpO2: 99 % (01/29/24 1631) Vital Signs (24h Range):  Temp:  [99.4 °F (37.4 °C)] 99.4 °F (37.4 °C)  Pulse:  [65-76] 65  Resp:  [16-18] 18  SpO2:  [89 %-99 %] 99 %  BP: ()/(50-63) 109/57     Weight: 45.4 kg (100 lb)  Body mass index is 23.24 kg/m².     Physical Exam  Constitutional:       General: She is not in acute distress.     Appearance: She is well-developed.      Interventions: Nasal cannula in place.      Comments: Frail elderly   HENT:      Head: Normocephalic and atraumatic.      Mouth/Throat:      Mouth: Mucous membranes are dry.   Eyes:      Conjunctiva/sclera: Conjunctivae normal.      Pupils: Pupils are equal, round, and reactive to light.   Cardiovascular:       Rate and Rhythm: Normal rate and regular rhythm.   Pulmonary:      Effort: Pulmonary effort is normal.      Breath sounds: Examination of the left-lower field reveals decreased breath sounds. Decreased breath sounds present.   Abdominal:      General: Bowel sounds are normal. There is no distension.      Palpations: Abdomen is soft.      Tenderness: There is no abdominal tenderness.   Musculoskeletal:         General: Normal range of motion.      Cervical back: Normal range of motion. No tenderness.      Right lower leg: No edema.      Left lower leg: No edema.   Skin:     General: Skin is warm and dry.   Neurological:      General: No focal deficit present.      Mental Status: She is alert. Mental status is at baseline.   Psychiatric:         Mood and Affect: Mood normal.         Behavior: Behavior is cooperative.         Cognition and Memory: She exhibits impaired recent memory.              CRANIAL NERVES     CN III, IV, VI   Pupils are equal, round, and reactive to light.       Significant Labs: All pertinent labs within the past 24 hours have been reviewed.  Bilirubin:   Recent Labs   Lab 01/16/24  1207 01/29/24  1321   BILITOT 0.7 1.0     CBC:   Recent Labs   Lab 01/29/24  1321   WBC 4.93   HGB 11.6*   HCT 35.5*   *     CMP:   Recent Labs   Lab 01/29/24  1321      K 4.1      CO2 26   GLU 97   BUN 15   CREATININE 1.0   CALCIUM 9.8   PROT 6.0   ALBUMIN 3.0*   BILITOT 1.0   ALKPHOS 86   AST 15   ALT 6*   ANIONGAP 9     Troponin:   Recent Labs   Lab 01/29/24  1321   TROPONINI <0.006       Significant Imaging: I have reviewed all pertinent imaging results/findings within the past 24 hours.  CXR:  Small pleural effusion hazy left basilar airspace disease for which pneumonia is not excluded.     Hiatal hernia.  Assessment/Plan:     * Pneumonia of left lower lobe due to infectious organism  Failed OP therapy  IV rocephin, IV azithromycin  Sputum/blood cultures  Check procalcitonin  Supplemental  oxygen as needed  bronchodilators      Acute respiratory failure with hypoxia  Patient with Hypoxic Respiratory failure which is Acute.  she is not on home oxygen. Supplemental oxygen was provided and noted- Oxygen Concentration (%):  [28] 28    .   Signs/symptoms of respiratory failure include- tachypnea. Contributing diagnoses includes - Pneumonia Labs and images were reviewed. Patient Has not had a recent ABG. Will treat underlying causes and adjust management of respiratory failure as follows- continue supplemental oxygen as needed and treat PNA    Thrombocytopenia  Chronic; stable    Macrocytic anemia  Patient's anemia is currently controlled. Has not received any PRBCs to date.   Lab Results   Component Value Date    HGB 11.6 (L) 01/29/2024    HCT 35.5 (L) 01/29/2024     Monitor serial CBC and transfuse if patient becomes hemodynamically unstable, symptomatic or H/H drops below 7/21.    Dementia  Maintain fall/delirium precautions  Continue chronic meds    Generalized weakness  Per family report, has had increased generalized weakness over the past 2 days.  Usually ambulates with walker.  PT/OT consult  Fall precautions      HTN (hypertension)  Chronic, controlled. Latest blood pressure and vitals reviewed-     Temp:  [99.4 °F (37.4 °C)]   Pulse:  [65-76]   Resp:  [16-18]   BP: ()/(50-63)   SpO2:  [89 %-100 %] .   Home meds for hypertension were reviewed and noted below.   Hypertension Medications               hydroCHLOROthiazide (HYDRODIURIL) 25 MG tablet Take 0.5 tablets (12.5 mg total) by mouth daily as needed (leg swelling).    lisinopriL (PRINIVIL,ZESTRIL) 5 MG tablet Take 5 mg by mouth once daily.    verapamiL (CALAN-SR) 120 MG CR tablet Take 1 tablet (120 mg total) by mouth once daily.            While in the hospital, will manage blood pressure as follows; Adjust home antihypertensive regimen as follows- pt with episode of hypotension while in the ED requiring fluid bolus with appropriate  response noted.  Hold chronic antihypertensives for now and resume when clinically appropriate.    Will utilize p.r.n. blood pressure medication only if patient's blood pressure greater than 180/110 and she develops symptoms such as worsening chest pain or shortness of breath.      Advance Care Planning      Date: 01/29/2024     Code Status  In light of the patients advanced age,I engaged the the family in a voluntary conversation about the patient's preferences for care  at the very end of life. The patient wishes to have a natural, peaceful death.  Along those lines, the patient does not wish to have CPR or other invasive treatments performed when her heart and/or breathing stops. I communicated to the family that a DNR order would be placed in her medical record to reflect this preference.  I spent a total of 5 minutes engaging the patient in this advance care planning discussion.  VTE Risk Mitigation (From admission, onward)           Ordered     enoxaparin injection 30 mg  Daily         01/29/24 1708     IP VTE HIGH RISK PATIENT  Once         01/29/24 1708     Place sequential compression device  Until discontinued         01/29/24 1708                       LUIS Baig  Department of Hospital Medicine  [facility]

## 2024-01-29 NOTE — PHARMACY MED REC
"Admission Medication History     The home medication history was taken by Luis Vazquez.    You may go to "Admission" then "Reconcile Home Medications" tabs to review and/or act upon these items.     The home medication list has been updated by the Pharmacy department.   Please read ALL comments highlighted in yellow.   Please address this information as you see fit.    Feel free to contact us if you have any questions or require assistance.      Medications listed below were obtained from: Patient/family and Analytic software- [employer]  No current facility-administered medications on file prior to encounter.     Current Outpatient Medications on File Prior to Encounter   Medication Sig Dispense Refill    biotin 1 mg tablet Take 1,000 mcg by mouth once daily.      donepeziL (ARICEPT) 10 MG tablet Take 10 mg by mouth every evening.      famotidine (PEPCID) 20 MG tablet Take 1 tablet (20 mg total) by mouth 2 (two) times daily. 180 tablet 3    ferrous sulfate (FEOSOL) 325 mg (65 mg iron) Tab tablet Take 325 mg by mouth every evening.      glycopyrrolate (ROBINUL) 1 mg Tab Take 1 mg by mouth 2 (two) times daily.      hydroCHLOROthiazide (HYDRODIURIL) 25 MG tablet Take 0.5 tablets (12.5 mg total) by mouth daily as needed (leg swelling). 45 tablet 1    linaCLOtide (LINZESS) 72 mcg Cap capsule Take 1 capsule (72 mcg total) by mouth before breakfast. 30 capsule 5    lisinopriL (PRINIVIL,ZESTRIL) 5 MG tablet Take 5 mg by mouth once daily.      meclizine (ANTIVERT) 12.5 mg tablet Take 0.5 tablets (6.25 mg total) by mouth 2 (two) times daily as needed for Dizziness (1/2 tablet twice daily as needed for dizziness). (Patient taking differently: Take 6.25 mg by mouth 2 (two) times daily as needed for Dizziness.) 90 tablet 1    mupirocin (BACTROBAN) 2 % ointment Apply topically 2 (two) times daily. (Patient taking differently: Apply 1 g topically 2 (two) times daily.) 30 g 2    pantoprazole (PROTONIX) 40 MG tablet Take 1 tablet " (40 mg total) by mouth once daily. 90 tablet 1    QUEtiapine (SEROQUEL) 200 MG Tab Take 200 mg by mouth every evening.      sertraline (ZOLOFT) 25 MG tablet Take 25 mg by mouth once daily.      tolterodine (DETROL LA) 2 MG Cp24 Take 1 capsule (2 mg total) by mouth once daily. For bladder 90 capsule 3    tramadol-acetaminophen 37.5-325 mg (ULTRACET) 37.5-325 mg Tab Take 1 tablet by mouth 2 (two) times a day. 180 tablet 0    verapamiL (CALAN-SR) 120 MG CR tablet Take 1 tablet (120 mg total) by mouth once daily. (Patient taking differently: Take 120 mg by mouth nightly.) 90 tablet 3             Luis Vazquez  EXT 1924                .

## 2024-01-29 NOTE — ASSESSMENT & PLAN NOTE
Patient's anemia is currently controlled. Has not received any PRBCs to date.   Lab Results   Component Value Date    HGB 11.6 (L) 01/29/2024    HCT 35.5 (L) 01/29/2024     Monitor serial CBC and transfuse if patient becomes hemodynamically unstable, symptomatic or H/H drops below 7/21.

## 2024-01-29 NOTE — ASSESSMENT & PLAN NOTE
Per family report, has had increased generalized weakness over the past 2 days.  Usually ambulates with walker.  PT/OT consult  Fall precautions

## 2024-01-29 NOTE — ASSESSMENT & PLAN NOTE
Chronic, controlled. Latest blood pressure and vitals reviewed-     Temp:  [99.4 °F (37.4 °C)]   Pulse:  [65-76]   Resp:  [16-18]   BP: ()/(50-63)   SpO2:  [89 %-100 %] .   Home meds for hypertension were reviewed and noted below.   Hypertension Medications               hydroCHLOROthiazide (HYDRODIURIL) 25 MG tablet Take 0.5 tablets (12.5 mg total) by mouth daily as needed (leg swelling).    lisinopriL (PRINIVIL,ZESTRIL) 5 MG tablet Take 5 mg by mouth once daily.    verapamiL (CALAN-SR) 120 MG CR tablet Take 1 tablet (120 mg total) by mouth once daily.            While in the hospital, will manage blood pressure as follows; Adjust home antihypertensive regimen as follows- pt with episode of hypotension while in the ED requiring fluid bolus with appropriate response noted.  Hold chronic antihypertensives for now and resume when clinically appropriate.    Will utilize p.r.n. blood pressure medication only if patient's blood pressure greater than 180/110 and she develops symptoms such as worsening chest pain or shortness of breath.

## 2024-01-29 NOTE — ASSESSMENT & PLAN NOTE
Patient with Hypoxic Respiratory failure which is Acute.  she is not on home oxygen. Supplemental oxygen was provided and noted- Oxygen Concentration (%):  [28] 28    .   Signs/symptoms of respiratory failure include- tachypnea. Contributing diagnoses includes - Pneumonia Labs and images were reviewed. Patient Has not had a recent ABG. Will treat underlying causes and adjust management of respiratory failure as follows- continue supplemental oxygen as needed and treat PNA

## 2024-01-29 NOTE — ED NOTES
Pt identifiers checked and accurate with Angelique Hamilton     Pt presents to ED with complaints of increased weakness since Friday. Pt daughter in law reports patient recently completed antibiotics for UTI and pneumonia. Pt denies all other complaints at this time.

## 2024-01-29 NOTE — PROGRESS NOTES
Pharmacist Renal Dose Adjustment Note    Angelique Hamilton is a 89 y.o. female being treated with the medication lovenox    Patient Data:    Vital Signs (Most Recent):  Temp: 99.4 °F (37.4 °C) (01/29/24 1257)  Pulse: 70 (01/29/24 1702)  Resp: 18 (01/29/24 1702)  BP: 115/63 (01/29/24 1702)  SpO2: 100 % (01/29/24 1702) Vital Signs (72h Range):  Temp:  [99.4 °F (37.4 °C)]   Pulse:  [65-76]   Resp:  [16-18]   BP: ()/(50-63)   SpO2:  [89 %-100 %]      Recent Labs   Lab 01/29/24  1321   CREATININE 1.0     Serum creatinine: 1 mg/dL 01/29/24 1321  Estimated creatinine clearance: 25 mL/min    lovenox 40 mg daily will be changed to lovenox 30 mg daily    Pharmacist's Name: Yasmin Gómez  Pharmacist's Extension: [phone]

## 2024-01-29 NOTE — ED PROVIDER NOTES
Source of History:  Patient, chart, family    Chief complaint:  Weakness (EMS brought pt to ED with complaint of weakness.  Family advised EMS that pt can normally get around on her own.)      HPI:  Angelique Hamilton is a 89 y.o. female with medical history of hypertension, aortic stenosis, IBS, anemia, CKD presenting with increased weakness and fatigue.  Family states symptoms began on Saturday.  Family states they were unable to get patient out of bed this morning.  Home health came out to evaluate patient was sent to the ED. family denies any sick contacts.  Family does state patient was seen approximately 2 weeks ago and diagnosed with pneumonia and urinary tract infection.  Family states patient completed complete cycle of antibiotics.    This is the extent to the patients complaints today here in the emergency department.    ROS: As per HPI and below:    Constitutional:  Positive for fever.  Positive for generalized weakness.  Positive for activity change.  Eyes: No visual changes.  ENT: No sore throat. No ear pain    Cardiovascular: No chest pain.  Respiratory: No shortness of breath.  Positive for mild cough  GI: No abdominal pain.  No nausea or vomiting.  Genitourinary: No abnormal urination.  Neurologic: No headache. No focal weakness.  No numbness.  MSK: no back pain.  Integument: No rashes or lesions.  Hematologic: No easy bruising.  Endocrine: No excessive thirst or urination.    Review of patient's allergies indicates:   Allergen Reactions    Antihistamines - alkylamine     Torrie [amlodipine-olmesartan]     Flexeril [cyclobenzaprine]      Hallucinations    Hydrocodone     Hydrocodone-acetaminophen Other (See Comments)    Simvastatin     Statins-hmg-coa reductase inhibitors        PMH:  As per HPI and below:  Past Medical History:   Diagnosis Date    Back problem     Constipation     History of fractured vertebra     Hyperlipidemia     Hypertension     IBS (irritable bowel syndrome)     Migraine headache      Osteoporosis     Rectal prolapse     Scoliosis     Vertigo      Past Surgical History:   Procedure Laterality Date    LEWIS      Back Pain    EYE SURGERY      Bilateral Cataracs    HYSTERECTOMY      OOPHORECTOMY      ROTATOR CUFF REPAIR      bialteral    TONSILLECTOMY         Social History     Tobacco Use    Smoking status: Never    Smokeless tobacco: Never   Substance Use Topics    Alcohol use: No    Drug use: No       Physical Exam:    BP (!) 101/55   Pulse 73   Temp 99.4 °F (37.4 °C) (Oral)   Resp 18   Wt 45.4 kg (100 lb)   SpO2 99%   BMI 23.24 kg/m²     Nursing note and vital signs reviewed.  Mildly febrile.  Mildly hypoxic (No home oxygen use as per family).  Constitutional: No acute distress.  Nontoxic  Eyes: No conjunctival injection.Extraocular muscles are intact.  ENT: Oropharynx clear.  Normal phonation.   Cardiovascular: Regular rate and rhythm.  No murmurs. No gallops. No rubs  Respiratory:  Diminished to auscultation bilaterally.  No rhonchi, rales or wheezing noted No accessory muscle use.  Abdomen: Soft.  Not distended.  Nontender.  No guarding.  No rebound. Non-peritoneal.  Musculoskeletal: Good range of motion all joints.  No deformities.  Neck supple.  No meningismus.  Skin: No rashes seen.  Poor skin turgor.  No abrasions.  No ecchymoses.  Neurologic: Motor intact.  Sensation intact.  No ataxia. No focal neurological deficits.  Psych: Appropriate, conversant    MDM    Emergent evaluation of a 89 yo female presenting for increased weakness and fatigue.  Family states symptoms began on Saturday.  Patient was evaluated by home health today and advised come to the ED for evaluation.  Family states they were unable to get patient out of bed today due to weakness.  Family states patient usually ambulates with a walker..  On exam pt is A&Ox3.  Answers questions appropriately.  GCS 15.  Mildly febrile and mildly hypoxic on initial exam.  All other vital signs stable. Nonfebrile and nontoxic  appearing.  Mucous membranes slightly pale but moist. Breath sounds diminished bilaterally with no rhonchi, rales or wheezing noted on exam.  Abdomen soft and nontender. No rebound or guarding appreciated on exam.   BS WNL.  Pt speaking in full sentences.  Poor skin turgor noted.    History Acquisition   Additional history was acquired from other historians.  Family, chart    The patient's list of active medical problems, social history, medications, and allergies as documented per RN staff has been reviewed.     Differential Diagnoses   Based on available information and the initial assessment, the working differential diagnoses considered during this evaluation include but are not limited to sepsis, pneumonia, UTI, electrolyte abnormality, dehydration, others.    I will get labs, imaging, hydrate, medicate and reassess.  I discussed this case with my supervising physician.      LABS     Labs Reviewed   CBC W/ AUTO DIFFERENTIAL - Abnormal; Notable for the following components:       Result Value    RBC 3.56 (*)     Hemoglobin 11.6 (*)     Hematocrit 35.5 (*)      (*)     MCH 32.6 (*)     Platelets 129 (*)     Immature Granulocytes 0.6 (*)     All other components within normal limits   COMPREHENSIVE METABOLIC PANEL - Abnormal; Notable for the following components:    Albumin 3.0 (*)     ALT 6 (*)     eGFR 54 (*)     All other components within normal limits   B-TYPE NATRIURETIC PEPTIDE - Abnormal; Notable for the following components:     (*)     All other components within normal limits   ISTAT LACTATE - Abnormal; Notable for the following components:    POC Lactate 0.46 (*)     All other components within normal limits   INFLUENZA A & B BY MOLECULAR   CULTURE, BLOOD   CULTURE, BLOOD   URINALYSIS, REFLEX TO URINE CULTURE    Narrative:     Specimen Source->Urine   MAGNESIUM   TROPONIN I   SARS-COV-2 RNA AMPLIFICATION, QUAL         Imaging     Imaging Results              X-Ray Chest AP Portable (Final  result)  Result time 01/29/24 14:10:53      Final result by Froylan Chi MD (01/29/24 14:10:53)                   Impression:      Small pleural effusion hazy left basilar airspace disease for which pneumonia is not excluded.    Hiatal hernia.      Electronically signed by: Froylan Chi MD  Date:    01/29/2024  Time:    14:10               Narrative:    EXAMINATION:  XR CHEST AP PORTABLE    CLINICAL HISTORY:  Sepsis;    TECHNIQUE:  Single frontal view of the chest was performed.    COMPARISON:  01/16/2024    FINDINGS:  The heart is mildly enlarged.  There is calcification of the aorta.  After hernia is present, obscured by the cardiac silhouette.  There is a small left pleural effusion.  Hazy airspace disease is present in the left lower lung zone.  There is mild right basilar atelectasis.  There has been multilevel vertebroplasty.                                      A radiology report was available for my review at the time of the encounter.    EKG   EKG Readings: (Independently Interpreted)   Initial Reading: No STEMI. Previous EKG: Compared with most recent EKG Previous EKG Date: 1/16/24. Rhythm: Normal Sinus Rhythm. Heart Rate: 70. Ectopy: No Ectopy. Conduction: Normal.   My independent interpretation    No STEMI  Normal sinus rhythm  Rate of 70       Additional Consideration   All available testing was considered during the course of this workup.  Comorbidities taken into consideration during the patient's evaluation and treatment include weight, age.    Social determinants of health were taken into consideration during development of our treatment plan.    Medications   cefTRIAXone (Rocephin) 1 g in dextrose 5 % in water (D5W) 100 mL IVPB (MB+) (1 g Intravenous New Bag 1/29/24 1442)   lactated ringers bolus 1,000 mL (0 mLs Intravenous Stopped 1/29/24 1434)      ED Course as of 01/29/24 1507   Mon Jan 29, 2024   1407 CBC unremarkable.  No leukocytosis noted.  H&H stable 11.6 and 35.5.  UA  negative for any acute infectious process.  I-STAT lactic negative at 0.46.  COVID and influenza negative. [RZ]   1427 CMP unremarkable.  Mag within normal limits.  Troponin negative.  BNP within normal limits at 1:08 a.m..  X-ray independently reviewed by myself and Radiology.  X-ray concerning for left lower lobe pneumonia.  Patient was recently treated for pneumonia with Augmentin.  Will discussed case with hospital medicine to admit for failed outpatient treatment of pneumonia as well as increased weakness. [RZ]   1506 Discussed case with hospital medicine.  Will admit for further evaluation and treatment of left lower lobe pneumonia with hypoxia.  Awaiting bed assignment. [RZ]      ED Course User Index  [RZ] Tasha Sierra NP             CLINICAL IMPRESSION  1. Pneumonia of left lower lobe due to infectious organism    2. Weakness    3. Hypoxia         ED Disposition Condition    Admit Stable          This note was created using dictation software.  This program may occasionally mistype words and phrases.         Tasha Sierra NP  01/29/24 1100

## 2024-01-29 NOTE — ASSESSMENT & PLAN NOTE
Advance Care Planning     Date: 01/29/2024    Code Status  In light of the patients advanced age,I engaged the the family in a voluntary conversation about the patient's preferences for care  at the very end of life. The patient wishes to have a natural, peaceful death.  Along those lines, the patient does not wish to have CPR or other invasive treatments performed when her heart and/or breathing stops. I communicated to the family that a DNR order would be placed in her medical record to reflect this preference.  I spent a total of 5 minutes engaging the patient in this advance care planning discussion.

## 2024-01-29 NOTE — HPI
Angelique Hamilton is a 89 y/o F with a past medical history significant for HLD, HTN, IBS, scoliosis and dementia who presented to the ED for further evaluation of increased fatigue and weakness.  She is accompanied by her daughter in law who states pt appeared to have increased generalized weakness 2 days ago, but symptoms have progressed and she was unable to get out of bed this morning.  She was referred to the ED by her home health nurse.  Per family report, pt was diagnosed with pneumonia and UTI about 2 weeks ago and completed a course of augmentin.  Symptoms are accompanied by low grade fevers, cough and loss of appetite.  Further history is limited due to the patient's chronic dementia.  She was hypoxic at 89% on presentation and was placed on supplemental oxygen 2L.  Findings on CXR today are consistent with LLL pneumonia.  IV antibiotics were initiated.   She will be admitted to the service of hospital medicine for continued medical management.

## 2024-01-30 LAB
ALBUMIN SERPL BCP-MCNC: 3.1 G/DL (ref 3.5–5.2)
ALP SERPL-CCNC: 90 U/L (ref 55–135)
ALT SERPL W/O P-5'-P-CCNC: 6 U/L (ref 10–44)
ANION GAP SERPL CALC-SCNC: 11 MMOL/L (ref 8–16)
AST SERPL-CCNC: 17 U/L (ref 10–40)
BASOPHILS # BLD AUTO: 0.03 K/UL (ref 0–0.2)
BASOPHILS NFR BLD: 0.7 % (ref 0–1.9)
BILIRUB SERPL-MCNC: 0.9 MG/DL (ref 0.1–1)
BUN SERPL-MCNC: 12 MG/DL (ref 8–23)
CALCIUM SERPL-MCNC: 9.7 MG/DL (ref 8.7–10.5)
CHLORIDE SERPL-SCNC: 103 MMOL/L (ref 95–110)
CO2 SERPL-SCNC: 27 MMOL/L (ref 23–29)
CREAT SERPL-MCNC: 0.8 MG/DL (ref 0.5–1.4)
DIFFERENTIAL METHOD BLD: ABNORMAL
EOSINOPHIL # BLD AUTO: 0.1 K/UL (ref 0–0.5)
EOSINOPHIL NFR BLD: 1.5 % (ref 0–8)
ERYTHROCYTE [DISTWIDTH] IN BLOOD BY AUTOMATED COUNT: 12.4 % (ref 11.5–14.5)
EST. GFR  (NO RACE VARIABLE): >60 ML/MIN/1.73 M^2
GLUCOSE SERPL-MCNC: 91 MG/DL (ref 70–110)
HCT VFR BLD AUTO: 34.9 % (ref 37–48.5)
HGB BLD-MCNC: 11.4 G/DL (ref 12–16)
IMM GRANULOCYTES # BLD AUTO: 0.03 K/UL (ref 0–0.04)
IMM GRANULOCYTES NFR BLD AUTO: 0.7 % (ref 0–0.5)
LYMPHOCYTES # BLD AUTO: 1 K/UL (ref 1–4.8)
LYMPHOCYTES NFR BLD: 22.5 % (ref 18–48)
MAGNESIUM SERPL-MCNC: 1.6 MG/DL (ref 1.6–2.6)
MCH RBC QN AUTO: 32 PG (ref 27–31)
MCHC RBC AUTO-ENTMCNC: 32.7 G/DL (ref 32–36)
MCV RBC AUTO: 98 FL (ref 82–98)
MONOCYTES # BLD AUTO: 0.4 K/UL (ref 0.3–1)
MONOCYTES NFR BLD: 8.6 % (ref 4–15)
NEUTROPHILS # BLD AUTO: 3 K/UL (ref 1.8–7.7)
NEUTROPHILS NFR BLD: 66 % (ref 38–73)
NRBC BLD-RTO: 0 /100 WBC
PHOSPHATE SERPL-MCNC: 2.8 MG/DL (ref 2.7–4.5)
PLATELET # BLD AUTO: 140 K/UL (ref 150–450)
PMV BLD AUTO: 10.2 FL (ref 9.2–12.9)
POTASSIUM SERPL-SCNC: 3.4 MMOL/L (ref 3.5–5.1)
PROT SERPL-MCNC: 6.3 G/DL (ref 6–8.4)
RBC # BLD AUTO: 3.56 M/UL (ref 4–5.4)
SODIUM SERPL-SCNC: 141 MMOL/L (ref 136–145)
WBC # BLD AUTO: 4.54 K/UL (ref 3.9–12.7)

## 2024-01-30 PROCEDURE — 63600175 PHARM REV CODE 636 W HCPCS: Performed by: NURSE PRACTITIONER

## 2024-01-30 PROCEDURE — 94761 N-INVAS EAR/PLS OXIMETRY MLT: CPT

## 2024-01-30 PROCEDURE — 27000221 HC OXYGEN, UP TO 24 HOURS

## 2024-01-30 PROCEDURE — 84100 ASSAY OF PHOSPHORUS: CPT | Performed by: NURSE PRACTITIONER

## 2024-01-30 PROCEDURE — 36415 COLL VENOUS BLD VENIPUNCTURE: CPT | Performed by: NURSE PRACTITIONER

## 2024-01-30 PROCEDURE — 25000003 PHARM REV CODE 250: Performed by: NURSE PRACTITIONER

## 2024-01-30 PROCEDURE — 80053 COMPREHEN METABOLIC PANEL: CPT | Performed by: NURSE PRACTITIONER

## 2024-01-30 PROCEDURE — 94640 AIRWAY INHALATION TREATMENT: CPT

## 2024-01-30 PROCEDURE — 83735 ASSAY OF MAGNESIUM: CPT | Performed by: NURSE PRACTITIONER

## 2024-01-30 PROCEDURE — 11000001 HC ACUTE MED/SURG PRIVATE ROOM

## 2024-01-30 PROCEDURE — 85025 COMPLETE CBC W/AUTO DIFF WBC: CPT | Performed by: NURSE PRACTITIONER

## 2024-01-30 PROCEDURE — 25000242 PHARM REV CODE 250 ALT 637 W/ HCPCS: Performed by: NURSE PRACTITIONER

## 2024-01-30 RX ADMIN — LEVALBUTEROL 0.63 MG: 0.63 SOLUTION RESPIRATORY (INHALATION) at 07:01

## 2024-01-30 RX ADMIN — PANTOPRAZOLE SODIUM 40 MG: 40 TABLET, DELAYED RELEASE ORAL at 09:01

## 2024-01-30 RX ADMIN — LEVALBUTEROL 0.63 MG: 0.63 SOLUTION RESPIRATORY (INHALATION) at 03:01

## 2024-01-30 RX ADMIN — Medication 800 MG: at 05:01

## 2024-01-30 RX ADMIN — POTASSIUM BICARBONATE 35 MEQ: 391 TABLET, EFFERVESCENT ORAL at 05:01

## 2024-01-30 RX ADMIN — QUETIAPINE 200 MG: 100 TABLET ORAL at 08:01

## 2024-01-30 RX ADMIN — DONEPEZIL HYDROCHLORIDE 10 MG: 5 TABLET, FILM COATED ORAL at 08:01

## 2024-01-30 RX ADMIN — ENOXAPARIN SODIUM 30 MG: 30 INJECTION SUBCUTANEOUS at 05:01

## 2024-01-30 RX ADMIN — ACETAMINOPHEN 650 MG: 325 TABLET ORAL at 09:01

## 2024-01-30 RX ADMIN — DOCUSATE SODIUM AND SENNOSIDES 1 TABLET: 8.6; 5 TABLET, FILM COATED ORAL at 09:01

## 2024-01-30 RX ADMIN — CEFTRIAXONE SODIUM 1 G: 1 INJECTION, POWDER, FOR SOLUTION INTRAMUSCULAR; INTRAVENOUS at 09:01

## 2024-01-30 RX ADMIN — LINACLOTIDE 72 MCG: 72 CAPSULE, GELATIN COATED ORAL at 05:01

## 2024-01-30 RX ADMIN — SERTRALINE HYDROCHLORIDE 25 MG: 25 TABLET ORAL at 09:01

## 2024-01-30 RX ADMIN — AZITHROMYCIN MONOHYDRATE 500 MG: 500 INJECTION, POWDER, LYOPHILIZED, FOR SOLUTION INTRAVENOUS at 05:01

## 2024-01-30 RX ADMIN — SODIUM CHLORIDE, POTASSIUM CHLORIDE, SODIUM LACTATE AND CALCIUM CHLORIDE: 600; 310; 30; 20 INJECTION, SOLUTION INTRAVENOUS at 11:01

## 2024-01-30 RX ADMIN — OXYBUTYNIN CHLORIDE 5 MG: 5 TABLET, EXTENDED RELEASE ORAL at 09:01

## 2024-01-30 RX ADMIN — FERROUS SULFATE TAB 325 MG (65 MG ELEMENTAL FE) 1 EACH: 325 (65 FE) TAB at 09:01

## 2024-01-30 NOTE — PLAN OF CARE
POC discussed with pt, pt verbalized understanding. Oriented to self and place. PIV cdi, fluids infusing. NSR on tele. 2 liters 02, sats remain above 90%. Pictures on wounds uploaded and wound care consulted. Purewick and brief on. Weight shift assistance provided. Call light in reach, bed alarm set, safety maintained. No complaints or requests at this time, will continue to monitor.

## 2024-01-30 NOTE — NURSING
Nurses Note -- 4 Eyes      1/29/2024   6:26 PM      Skin assessed during: Admit      [] No Altered Skin Integrity Present    []Prevention Measures Documented      [x] Yes- Altered Skin Integrity Present or Discovered   [x] LDA Added if Not in Epic (Describe Wound)   [x] New Altered Skin Integrity was Present on Admit and Documented in LDA   [x] Wound Image Taken    Wound Care Consulted? Yes    Attending Nurse:  Samia Neumann RN/Staff Member:   Samuel

## 2024-01-30 NOTE — ASSESSMENT & PLAN NOTE
Chronic, controlled. Latest blood pressure and vitals reviewed-     Temp:  [97.5 °F (36.4 °C)-99.4 °F (37.4 °C)]   Pulse:  [65-80]   Resp:  [16-18]   BP: ()/(50-82)   SpO2:  [89 %-100 %] .   Home meds for hypertension were reviewed and noted below.   Hypertension Medications               hydroCHLOROthiazide (HYDRODIURIL) 25 MG tablet Take 0.5 tablets (12.5 mg total) by mouth daily as needed (leg swelling).    lisinopriL (PRINIVIL,ZESTRIL) 5 MG tablet Take 5 mg by mouth once daily.    verapamiL (CALAN-SR) 120 MG CR tablet Take 1 tablet (120 mg total) by mouth once daily.            While in the hospital, will manage blood pressure as follows; Adjust home antihypertensive regimen as follows- pt with episode of hypotension while in the ED requiring fluid bolus with appropriate response noted.  Hold chronic antihypertensives for now and resume when clinically appropriate.    Will utilize p.r.n. blood pressure medication only if patient's blood pressure greater than 180/110 and she develops symptoms such as worsening chest pain or shortness of breath.

## 2024-01-30 NOTE — CARE UPDATE
01/29/24 2322   Patient Assessment/Suction   Level of Consciousness (AVPU) alert   Respiratory Effort Normal;Unlabored   Expansion/Accessory Muscles/Retractions expansion symmetric   All Lung Fields Breath Sounds Anterior:   DEREK Breath Sounds clear   LLL Breath Sounds diminished   RUL Breath Sounds clear   RML Breath Sounds clear   RLL Breath Sounds diminished   Rhythm/Pattern, Respiratory pattern regular;unlabored   Cough Frequency no cough   PRE-TX-O2   Device (Oxygen Therapy) nasal cannula   $ Is the patient on Low Flow Oxygen? Yes   Flow (L/min) 2   SpO2 98 %   Pulse Oximetry Type Intermittent   $ Pulse Oximetry - Multiple Charge Pulse Oximetry - Multiple   Pulse 77   Resp 17   Temp 97.5 °F (36.4 °C)   BP (!) 159/77   Aerosol Therapy   $ Aerosol Therapy Charges Aerosol Treatment   Daily Review of Necessity (SVN) completed   Respiratory Treatment Status (SVN) given   Treatment Route (SVN) mask   Patient Position (SVN) HOB elevated   Post Treatment Assessment (SVN) breath sounds unchanged   Signs of Intolerance (SVN) none   Breath Sounds Post-Respiratory Treatment   Throughout All Fields Post-Treatment All Fields   Throughout All Fields Post-Treatment no change   Post-treatment Heart Rate (beats/min) 79   Post-treatment Resp Rate (breaths/min) 18

## 2024-01-30 NOTE — CONSULTS
Patient admitted with a blood blister to her right great toe. Patients daughter at bedside and reports patient bumped her toe on her walker at home prior to hospital stay. Painted the right great toe with betadine. Patient has a recently healed sacral wound that is very sensitive and is now noted with DTI and skin shearing noted due to decreased mobility at home prior to admit as reported by patients daughter. Sacral foam pad placed by patients nurse Samia. Air pump added the the patients mattress for maximum pressure relief. Pressure injury prevention measures in place. Wound care will follow up as needed throughout hospital stay.      Right great toe    Sacral and buttock areas DTI

## 2024-01-30 NOTE — PLAN OF CARE
[facility] - Med/Surg  Initial Discharge Assessment       Primary Care Provider: Ryan Hurtado MD    Admission Diagnosis: Pneumonia of left lower lobe due to infectious organism [J18.9]    Admission Date: 1/29/2024  Expected Discharge Date: 1/31/2024    Transition of Care Barriers: None    Payor: MEDICARE / Plan: MEDICARE PART A & B / Product Type: Government /     Extended Emergency Contact Information  Primary Emergency Contact: SANFORD ADKINS  Address: [address]           [place] [address] [address] [place] [facility]  Mobile Phone: [phone]  Relation: Daughter  Preferred language: English   needed? No    Discharge Plan A: Home Health  Discharge Plan B: Skilled Nursing Facility      [id number] - [place] - [address]  [address]  [place] [address]  Phone: [phone] Fax: [phone]    SW met with patient and patient's daughter in law Sanford Adkins at bedside to complete discharge planning assessment.  Patient laying in bed and appear confused.  Daughter in law completed assessment.   Sanford verified all demographic information on facesheet is correct.  Sanford verified PCP is Dr. Hurtado.  Sanford  verified primary health insurance is Medicare and secondary is BCBS.  Patient active with [facility] and has listed DME.  Patient with POA (son) and Living Will.  Patient not on dialysis or medication coumadin.  Patient with no 30 day admission.  Patient with no financial issues at this time.  Patient family will provide transportation upon discharge from facility.  Patient require assistance with ADLs, live with son and daughter in law, Sanford drives.      Initial Assessment (most recent)       Adult Discharge Assessment - 01/30/24 1210          Discharge Assessment    Assessment Type Discharge Planning Assessment     Confirmed/corrected address, phone number and insurance Yes     Confirmed Demographics Correct on Facesheet     Source of Information  family     Communicated FRANKY with patient/caregiver Date not available/Unable to determine     People in Home child(guy), adult     Facility Arrived From: home     Do you expect to return to your current living situation? Yes     Do you have help at home or someone to help you manage your care at home? Yes     Who are your caregiver(s) and their phone number(s)? daughter in law     Prior to hospitilization cognitive status: Alert/Oriented     Current cognitive status: Alert/Oriented     Walking or Climbing Stairs Difficulty yes     Walking or Climbing Stairs ambulation difficulty, requires equipment;stair climbing difficulty, requires equipment;ambulation difficulty, assistance 1 person;stair climbing difficulty, assistance 1 person     Dressing/Bathing Difficulty yes     Dressing/Bathing bathing difficulty, requires equipment;bathing difficulty, assistance 1 person;dressing difficulty, requires equipment;dressing difficulty, assistance 1 person     Equipment Currently Used at Home rollator;walker, rolling;wheelchair;bedside commode     Readmission within 30 days? No     Patient currently being followed by outpatient case management? Yes     If yes, name of outpatient case management following: [facility] outpatient case management     Do you currently have service(s) that help you manage your care at home? Yes     Name and Contact number of agency [facility] home [facility]     Is the pt/caregiver preference to resume services with current agency Yes     Do you take prescription medications? Yes     Do you have prescription coverage? Yes     Do you have any problems affording any of your prescribed medications? No     Is the patient taking medications as prescribed? yes     Who is going to help you get home at discharge? daughter in law     How do you get to doctors appointments? family or friend will provide     Are you on dialysis? No     Do you take coumadin? No     Discharge Plan A Home Health     Discharge Plan B  Skilled Nursing Facility     DME Needed Upon Discharge  none     Discharge Plan discussed with: Adult children     Transition of Care Barriers None

## 2024-01-30 NOTE — PT/OT/SLP PROGRESS
Occupational Therapy      Patient Name:  Angelique Hamilton   MRN:  [id number]    Patient not seen today secondary to Patient unwilling to participate. Will follow-up tomorrow (1/31).    1/30/2024

## 2024-01-30 NOTE — PT/OT/SLP PROGRESS
Physical Therapy      Patient Name:  Angelique Hamilton   MRN:  [id number]    Patient not seen today secondary to Patient unwilling to participate (patient very confused and refused PT eval for 15  minutes even with daughte in law trying to convince her.  Pt may need female therapist next time.). Will follow-up 1/31/24.

## 2024-01-30 NOTE — PLAN OF CARE
Problem: Adult Inpatient Plan of Care  Goal: Plan of Care Review  Outcome: Ongoing, Progressing  Goal: Readiness for Transition of Care  Outcome: Ongoing, Progressing     Problem: Infection (Pneumonia)  Goal: Resolution of Infection Signs and Symptoms  Outcome: Ongoing, Progressing     Problem: Respiratory Compromise (Pneumonia)  Goal: Effective Oxygenation and Ventilation  Outcome: Ongoing, Progressing     Problem: Fall Injury Risk  Goal: Absence of Fall and Fall-Related Injury  Outcome: Ongoing, Progressing     Problem: Skin Injury Risk Increased  Goal: Skin Health and Integrity  Outcome: Ongoing, Progressing     Problem: Impaired Wound Healing  Goal: Optimal Wound Healing  Outcome: Ongoing, Progressing   Pt is alert and disoriented x3. IV fluids infusing as ordered. External Cath. Clear yellow urine. Turn 2 q hrs. Wound care provided. Room near nurses station.

## 2024-01-30 NOTE — SUBJECTIVE & OBJECTIVE
Interval History: She has refused to work with PT/OT currently. Satting 91% on room air, improved with supplemental O2. Minimal nonproductive cough. Afebrile since admission.     Review of Systems   Respiratory:  Positive for cough and shortness of breath.    Musculoskeletal:  Positive for back pain.     Objective:     Vital Signs (Most Recent):  Temp: 98.4 °F (36.9 °C) (01/30/24 0704)  Pulse: 80 (01/30/24 0704)  Resp: 16 (01/30/24 0704)  BP: (!) 158/72 (01/30/24 0704)  SpO2: 98 % (01/30/24 0704) Vital Signs (24h Range):  Temp:  [97.5 °F (36.4 °C)-99.4 °F (37.4 °C)] 98.4 °F (36.9 °C)  Pulse:  [65-80] 80  Resp:  [16-18] 16  SpO2:  [89 %-100 %] 98 %  BP: ()/(50-82) 158/72     Weight: 50.8 kg (111 lb 15.9 oz)  Body mass index is 24.24 kg/m².    Intake/Output Summary (Last 24 hours) at 1/30/2024 1133  Last data filed at 1/30/2024 0602  Gross per 24 hour   Intake 1600 ml   Output 400 ml   Net 1200 ml         Physical Exam  Vitals and nursing note reviewed.   Constitutional:       Comments: Advanced age   HENT:      Head: Normocephalic and atraumatic.      Nose: Nose normal.      Mouth/Throat:      Mouth: Mucous membranes are moist.      Pharynx: Oropharynx is clear.   Eyes:      Extraocular Movements: Extraocular movements intact.      Pupils: Pupils are equal, round, and reactive to light.   Cardiovascular:      Rate and Rhythm: Normal rate and regular rhythm.      Pulses: Normal pulses.      Heart sounds: Murmur heard.   Pulmonary:      Effort: Pulmonary effort is normal.      Comments: Diminished in bases  Abdominal:      General: Bowel sounds are normal.      Palpations: Abdomen is soft.   Musculoskeletal:         General: Deformity present. Normal range of motion.      Cervical back: Normal range of motion and neck supple.      Comments: Spine curved to left   Skin:     General: Skin is warm and dry.      Capillary Refill: Capillary refill takes less than 2 seconds.   Neurological:      General: No focal  deficit present.      Mental Status: She is alert and oriented to person, place, and time.   Psychiatric:         Mood and Affect: Mood normal.         Behavior: Behavior normal.             Significant Labs: All pertinent labs within the past 24 hours have been reviewed.  CBC:   Recent Labs   Lab 01/29/24  1321 01/30/24  0307   WBC 4.93 4.54   HGB 11.6* 11.4*   HCT 35.5* 34.9*   * 140*     CMP:   Recent Labs   Lab 01/29/24  1321 01/30/24  0307    141   K 4.1 3.4*    103   CO2 26 27   GLU 97 91   BUN 15 12   CREATININE 1.0 0.8   CALCIUM 9.8 9.7   PROT 6.0 6.3   ALBUMIN 3.0* 3.1*   BILITOT 1.0 0.9   ALKPHOS 86 90   AST 15 17   ALT 6* 6*   ANIONGAP 9 11       Significant Imaging: I have reviewed all pertinent imaging results/findings within the past 24 hours.    X-Ray Chest AP Portable    Result Date: 1/29/2024  EXAMINATION: XR CHEST AP PORTABLE CLINICAL HISTORY: Sepsis; TECHNIQUE: Single frontal view of the chest was performed. COMPARISON: 01/16/2024 FINDINGS: The heart is mildly enlarged.  There is calcification of the aorta.  After hernia is present, obscured by the cardiac silhouette.  There is a small left pleural effusion.  Hazy airspace disease is present in the left lower lung zone.  There is mild right basilar atelectasis.  There has been multilevel vertebroplasty.     Small pleural effusion hazy left basilar airspace disease for which pneumonia is not excluded. Hiatal hernia. Electronically signed by: Froylan Chi MD Date:    01/29/2024 Time:    14:10    X-Ray Chest AP Portable    Result Date: 1/16/2024  Portable chest x-ray at 11:04 AM is compared to prior study dated 11/25/2023 Clinical history is chest pain and weakness FINDINGS: The heart is mildly enlarged. There is patchy groundglass opacity in the left lung base. The remainder the lungs are clear. There are old left rib fractures. There has been prior vertebral plasties of the lower thoracic and upper lumbar spine. IMPRESSION:  Faint groundglass opacity in the left lower lobe which may represent early infiltrate Old left rib fractures Electronically signed by:  Shahana Nur MD  01/16/2024 11:26 AM [facility] Workstation: [id number]

## 2024-01-30 NOTE — ASSESSMENT & PLAN NOTE
Patient's anemia is currently controlled. Has not received any PRBCs to date.   Lab Results   Component Value Date    HGB 11.4 (L) 01/30/2024    HCT 34.9 (L) 01/30/2024     Monitor serial CBC and transfuse if patient becomes hemodynamically unstable, symptomatic or H/H drops below 7/21.

## 2024-01-30 NOTE — PROGRESS NOTES
[facility] Medicine  Progress Note    Patient Name: Angelique Hamilton  MRN: [id number]  Patient Class: IP- Inpatient   Admission Date: 1/29/2024  Length of Stay: 1 days  Attending Physician: Bekah Hdz MD  Primary Care Provider: Ryan Hurtado MD        Subjective:     Principal Problem:Acute respiratory failure with hypoxia        HPI:  Angelique Hamilton is a 89 y/o F with a past medical history significant for HLD, HTN, IBS, scoliosis and dementia who presented to the ED for further evaluation of increased fatigue and weakness.  She is accompanied by her daughter in law who states pt appeared to have increased generalized weakness 2 days ago, but symptoms have progressed and she was unable to get out of bed this morning.  She was referred to the ED by her home health nurse.  Per family report, pt was diagnosed with pneumonia and UTI about 2 weeks ago and completed a course of augmentin.  Symptoms are accompanied by low grade fevers, cough and loss of appetite.  Further history is limited due to the patient's chronic dementia.  She was hypoxic at 89% on presentation and was placed on supplemental oxygen 2L.  Findings on CXR today are consistent with LLL pneumonia.  IV antibiotics were initiated.   She will be admitted to the service of hospital medicine for continued medical management.       Overview/Hospital Course:   was monitored closely during her hospital stay. She was admitted on 1/29/24 with acute hypoxemic resp failure and LLL pna with failed outpt management. She is being treated with rocephin/azithro, scheduled xopenex nebs, and supplemental O2. She has significant scoliosis that may be contributing to hypoxemia. She's been afebrile since admission and WBC WNL. Blood cultures are NGTD. She lives at home with her daughter who plans to take her home when she's cleared for discharge.     Interval History: She has refused to work with PT/OT currently. Satting 91%  on room air, improved with supplemental O2. Minimal nonproductive cough. Afebrile since admission.     Review of Systems   Respiratory:  Positive for cough and shortness of breath.    Musculoskeletal:  Positive for back pain.     Objective:     Vital Signs (Most Recent):  Temp: 98.4 °F (36.9 °C) (01/30/24 0704)  Pulse: 80 (01/30/24 0704)  Resp: 16 (01/30/24 0704)  BP: (!) 158/72 (01/30/24 0704)  SpO2: 98 % (01/30/24 0704) Vital Signs (24h Range):  Temp:  [97.5 °F (36.4 °C)-99.4 °F (37.4 °C)] 98.4 °F (36.9 °C)  Pulse:  [65-80] 80  Resp:  [16-18] 16  SpO2:  [89 %-100 %] 98 %  BP: ()/(50-82) 158/72     Weight: 50.8 kg (111 lb 15.9 oz)  Body mass index is 24.24 kg/m².    Intake/Output Summary (Last 24 hours) at 1/30/2024 1133  Last data filed at 1/30/2024 0602  Gross per 24 hour   Intake 1600 ml   Output 400 ml   Net 1200 ml         Physical Exam  Vitals and nursing note reviewed.   Constitutional:       Comments: Advanced age   HENT:      Head: Normocephalic and atraumatic.      Nose: Nose normal.      Mouth/Throat:      Mouth: Mucous membranes are moist.      Pharynx: Oropharynx is clear.   Eyes:      Extraocular Movements: Extraocular movements intact.      Pupils: Pupils are equal, round, and reactive to light.   Cardiovascular:      Rate and Rhythm: Normal rate and regular rhythm.      Pulses: Normal pulses.      Heart sounds: Murmur heard.   Pulmonary:      Effort: Pulmonary effort is normal.      Comments: Diminished in bases  Abdominal:      General: Bowel sounds are normal.      Palpations: Abdomen is soft.   Musculoskeletal:         General: Deformity present. Normal range of motion.      Cervical back: Normal range of motion and neck supple.      Comments: Spine curved to left   Skin:     General: Skin is warm and dry.      Capillary Refill: Capillary refill takes less than 2 seconds.   Neurological:      General: No focal deficit present.      Mental Status: She is alert and oriented to person, place,  and time.   Psychiatric:         Mood and Affect: Mood normal.         Behavior: Behavior normal.             Significant Labs: All pertinent labs within the past 24 hours have been reviewed.  CBC:   Recent Labs   Lab 01/29/24  1321 01/30/24  0307   WBC 4.93 4.54   HGB 11.6* 11.4*   HCT 35.5* 34.9*   * 140*     CMP:   Recent Labs   Lab 01/29/24  1321 01/30/24  0307    141   K 4.1 3.4*    103   CO2 26 27   GLU 97 91   BUN 15 12   CREATININE 1.0 0.8   CALCIUM 9.8 9.7   PROT 6.0 6.3   ALBUMIN 3.0* 3.1*   BILITOT 1.0 0.9   ALKPHOS 86 90   AST 15 17   ALT 6* 6*   ANIONGAP 9 11       Significant Imaging: I have reviewed all pertinent imaging results/findings within the past 24 hours.    X-Ray Chest AP Portable    Result Date: 1/29/2024  EXAMINATION: XR CHEST AP PORTABLE CLINICAL HISTORY: Sepsis; TECHNIQUE: Single frontal view of the chest was performed. COMPARISON: 01/16/2024 FINDINGS: The heart is mildly enlarged.  There is calcification of the aorta.  After hernia is present, obscured by the cardiac silhouette.  There is a small left pleural effusion.  Hazy airspace disease is present in the left lower lung zone.  There is mild right basilar atelectasis.  There has been multilevel vertebroplasty.     Small pleural effusion hazy left basilar airspace disease for which pneumonia is not excluded. Hiatal hernia. Electronically signed by: Froylan Chi MD Date:    01/29/2024 Time:    14:10    X-Ray Chest AP Portable    Result Date: 1/16/2024  Portable chest x-ray at 11:04 AM is compared to prior study dated 11/25/2023 Clinical history is chest pain and weakness FINDINGS: The heart is mildly enlarged. There is patchy groundglass opacity in the left lung base. The remainder the lungs are clear. There are old left rib fractures. There has been prior vertebral plasties of the lower thoracic and upper lumbar spine. IMPRESSION: Faint groundglass opacity in the left lower lobe which may represent early  infiltrate Old left rib fractures Electronically signed by:  Shahana Nur MD  01/16/2024 11:26 AM [facility] Workstation: [id number]       Assessment/Plan:      * Acute respiratory failure with hypoxia  Patient with Hypoxic Respiratory failure which is Acute.  she is not on home oxygen. Supplemental oxygen was provided and noted- Oxygen Concentration (%):  [28] 28    .   Signs/symptoms of respiratory failure include- tachypnea. Contributing diagnoses includes - Pneumonia Labs and images were reviewed. Patient Has not had a recent ABG. Will treat underlying causes and adjust management of respiratory failure as follows- continue supplemental oxygen as needed and treat PNA    Thrombocytopenia  Chronic; stable    Macrocytic anemia  Patient's anemia is currently controlled. Has not received any PRBCs to date.   Lab Results   Component Value Date    HGB 11.4 (L) 01/30/2024    HCT 34.9 (L) 01/30/2024     Monitor serial CBC and transfuse if patient becomes hemodynamically unstable, symptomatic or H/H drops below 7/21.    Dementia  Maintain fall/delirium precautions  Continue chronic meds    Generalized weakness  Per family report, has had increased generalized weakness over the past 2 days.  Usually ambulates with walker.  PT/OT consult  Fall precautions      Pneumonia of left lower lobe due to infectious organism  Failed OP therapy  IV rocephin, IV azithromycin  Sputum/blood cultures  Check procalcitonin  Supplemental oxygen as needed  bronchodilators      HTN (hypertension)  Chronic, controlled. Latest blood pressure and vitals reviewed-     Temp:  [97.5 °F (36.4 °C)-99.4 °F (37.4 °C)]   Pulse:  [65-80]   Resp:  [16-18]   BP: ()/(50-82)   SpO2:  [89 %-100 %] .   Home meds for hypertension were reviewed and noted below.   Hypertension Medications               hydroCHLOROthiazide (HYDRODIURIL) 25 MG tablet Take 0.5 tablets (12.5 mg total) by mouth daily as needed (leg swelling).    lisinopriL (PRINIVIL,ZESTRIL) 5 MG  tablet Take 5 mg by mouth once daily.    verapamiL (CALAN-SR) 120 MG CR tablet Take 1 tablet (120 mg total) by mouth once daily.            While in the hospital, will manage blood pressure as follows; Adjust home antihypertensive regimen as follows- pt with episode of hypotension while in the ED requiring fluid bolus with appropriate response noted.  Hold chronic antihypertensives for now and resume when clinically appropriate.    Will utilize p.r.n. blood pressure medication only if patient's blood pressure greater than 180/110 and she develops symptoms such as worsening chest pain or shortness of breath.    ACP (advance care planning)  Advance Care Planning    Date: 01/29/2024    Code Status  In light of the patients advanced age,I engaged the the family in a voluntary conversation about the patient's preferences for care  at the very end of life. The patient wishes to have a natural, peaceful death.  Along those lines, the patient does not wish to have CPR or other invasive treatments performed when her heart and/or breathing stops. I communicated to the family that a DNR order would be placed in her medical record to reflect this preference.  I spent a total of 5 minutes engaging the patient in this advance care planning discussion.                VTE Risk Mitigation (From admission, onward)           Ordered     enoxaparin injection 30 mg  Daily         01/29/24 1708     IP VTE HIGH RISK PATIENT  Once         01/29/24 1708     Place sequential compression device  Until discontinued         01/29/24 1708                    Discharge Planning   FRANKY: 1/31/2024     Code Status: DNR   Is the patient medically ready for discharge?:     Reason for patient still in hospital (select all that apply): Patient trending condition                     Vilma Yang NP  Department of Hospital Medicine   [facility]/Surg

## 2024-01-30 NOTE — HOSPITAL COURSE
was monitored closely during her hospital stay. She was admitted on 1/29/24 with acute hypoxemic resp failure and LLL pna with failed outpt management.  Her labs and vital signs were monitored.  She was treated with rocephin/azithro, scheduled xopenex nebs, and supplemental O2.  A sputum culture was unable to be collected.  She has significant scoliosis that may be contributing to hypoxemia. Blood cultures remained negative.  Her overall respiratory status improved, and she was weaned off of supplemental O2.  She had a desat study which showed that she does not qualify for home O2.  PT was consulted and followed her.  She was noted to have poor oral intake and dietitian was consulted.  She was started on Megace therapy as well.  Her daughter requested that she be placed and case management was consulted.  She has been accepted to [facility] in his will be discharged there today.  She will continue a course of oral antibiotics.  She will follow up with her PCP.  She did not have any adverse events while here.

## 2024-01-30 NOTE — PLAN OF CARE
Problem: Adult Inpatient Plan of Care  Goal: Plan of Care Review  Outcome: Ongoing, Progressing  Goal: Patient-Specific Goal (Individualized)  Outcome: Ongoing, Progressing  Goal: Absence of Hospital-Acquired Illness or Injury  Outcome: Ongoing, Progressing  Goal: Optimal Comfort and Wellbeing  Outcome: Ongoing, Progressing  Goal: Readiness for Transition of Care  Outcome: Ongoing, Progressing     Problem: Fluid Imbalance (Pneumonia)  Goal: Fluid Balance  Outcome: Ongoing, Progressing    Problem: Respiratory Compromise (Pneumonia)  Goal: Effective Oxygenation and Ventilation  Outcome: Ongoing, Progressing     Problem: Fall Injury Risk  Goal: Absence of Fall and Fall-Related Injury  Outcome: Ongoing, Progressing      Problem: Infection (Pneumonia)  Goal: Resolution of Infection Signs and Symptoms  Outcome: Ongoing, Progressing

## 2024-01-30 NOTE — TREATMENT PLAN
2330 Pt removed IV access . Catheter intact. Primary nurse attempted twice. Second nurse attempted once. House supervisor notified to attempt IV insertion.

## 2024-01-30 NOTE — CARE UPDATE
01/30/24 0704   Patient Assessment/Suction   Level of Consciousness (AVPU) alert   Respiratory Effort Normal;Unlabored   Expansion/Accessory Muscles/Retractions no use of accessory muscles;no retractions;expansion symmetric   All Lung Fields Breath Sounds Anterior:;Lateral:;diminished   Rhythm/Pattern, Respiratory unlabored;pattern regular;depth regular;no shortness of breath reported   PRE-TX-O2   Device (Oxygen Therapy) nasal cannula   $ Is the patient on Low Flow Oxygen? Yes   Flow (L/min) 2   SpO2 98 %   Pulse Oximetry Type Intermittent   $ Pulse Oximetry - Multiple Charge Pulse Oximetry - Multiple   Pulse 80   Resp 16   Temp 98.4 °F (36.9 °C)   BP (!) 158/72   Aerosol Therapy   $ Aerosol Therapy Charges Aerosol Treatment   Respiratory Treatment Status (SVN) given   Treatment Route (SVN) mask;oxygen   Patient Position (SVN) HOB elevated   Post Treatment Assessment (SVN) breath sounds unchanged   Signs of Intolerance (SVN) none   Breath Sounds Post-Respiratory Treatment   Throughout All Fields Post-Treatment All Fields   Throughout All Fields Post-Treatment no change   Post-treatment Heart Rate (beats/min) 78   Post-treatment Resp Rate (breaths/min) 16

## 2024-01-31 PROBLEM — R53.81 DEBILITY: Status: ACTIVE | Noted: 2023-11-26

## 2024-01-31 PROBLEM — R63.8 DECREASED ORAL INTAKE: Status: ACTIVE | Noted: 2024-01-31

## 2024-01-31 LAB
ALBUMIN SERPL BCP-MCNC: 3.1 G/DL (ref 3.5–5.2)
ALP SERPL-CCNC: 87 U/L (ref 55–135)
ALT SERPL W/O P-5'-P-CCNC: 10 U/L (ref 10–44)
ANION GAP SERPL CALC-SCNC: 12 MMOL/L (ref 8–16)
AST SERPL-CCNC: 51 U/L (ref 10–40)
BASOPHILS # BLD AUTO: 0.03 K/UL (ref 0–0.2)
BASOPHILS NFR BLD: 0.5 % (ref 0–1.9)
BILIRUB SERPL-MCNC: 0.9 MG/DL (ref 0.1–1)
BUN SERPL-MCNC: 8 MG/DL (ref 8–23)
CALCIUM SERPL-MCNC: 9.6 MG/DL (ref 8.7–10.5)
CHLORIDE SERPL-SCNC: 103 MMOL/L (ref 95–110)
CO2 SERPL-SCNC: 27 MMOL/L (ref 23–29)
CREAT SERPL-MCNC: 0.7 MG/DL (ref 0.5–1.4)
DIFFERENTIAL METHOD BLD: ABNORMAL
EOSINOPHIL # BLD AUTO: 0 K/UL (ref 0–0.5)
EOSINOPHIL NFR BLD: 0 % (ref 0–8)
ERYTHROCYTE [DISTWIDTH] IN BLOOD BY AUTOMATED COUNT: 12.3 % (ref 11.5–14.5)
EST. GFR  (NO RACE VARIABLE): >60 ML/MIN/1.73 M^2
GLUCOSE SERPL-MCNC: 94 MG/DL (ref 70–110)
HCT VFR BLD AUTO: 35.3 % (ref 37–48.5)
HGB BLD-MCNC: 11.9 G/DL (ref 12–16)
IMM GRANULOCYTES # BLD AUTO: 0.06 K/UL (ref 0–0.04)
IMM GRANULOCYTES NFR BLD AUTO: 1 % (ref 0–0.5)
LYMPHOCYTES # BLD AUTO: 0.4 K/UL (ref 1–4.8)
LYMPHOCYTES NFR BLD: 7.4 % (ref 18–48)
MAGNESIUM SERPL-MCNC: 1.5 MG/DL (ref 1.6–2.6)
MCH RBC QN AUTO: 32.7 PG (ref 27–31)
MCHC RBC AUTO-ENTMCNC: 33.7 G/DL (ref 32–36)
MCV RBC AUTO: 97 FL (ref 82–98)
MONOCYTES # BLD AUTO: 0.4 K/UL (ref 0.3–1)
MONOCYTES NFR BLD: 6.5 % (ref 4–15)
NEUTROPHILS # BLD AUTO: 5 K/UL (ref 1.8–7.7)
NEUTROPHILS NFR BLD: 84.6 % (ref 38–73)
NRBC BLD-RTO: 0 /100 WBC
PHOSPHATE SERPL-MCNC: 2.3 MG/DL (ref 2.7–4.5)
PLATELET # BLD AUTO: 157 K/UL (ref 150–450)
PMV BLD AUTO: 10.2 FL (ref 9.2–12.9)
POTASSIUM SERPL-SCNC: 3.8 MMOL/L (ref 3.5–5.1)
PROCALCITONIN SERPL IA-MCNC: 0.02 NG/ML (ref 0–0.5)
PROT SERPL-MCNC: 6.2 G/DL (ref 6–8.4)
RBC # BLD AUTO: 3.64 M/UL (ref 4–5.4)
SODIUM SERPL-SCNC: 142 MMOL/L (ref 136–145)
WBC # BLD AUTO: 5.85 K/UL (ref 3.9–12.7)

## 2024-01-31 PROCEDURE — 11000001 HC ACUTE MED/SURG PRIVATE ROOM

## 2024-01-31 PROCEDURE — 83735 ASSAY OF MAGNESIUM: CPT | Performed by: NURSE PRACTITIONER

## 2024-01-31 PROCEDURE — 25000003 PHARM REV CODE 250: Performed by: NURSE PRACTITIONER

## 2024-01-31 PROCEDURE — 25000242 PHARM REV CODE 250 ALT 637 W/ HCPCS: Performed by: NURSE PRACTITIONER

## 2024-01-31 PROCEDURE — S0179 MEGESTROL 20 MG: HCPCS | Performed by: NURSE PRACTITIONER

## 2024-01-31 PROCEDURE — 63600175 PHARM REV CODE 636 W HCPCS: Performed by: NURSE PRACTITIONER

## 2024-01-31 PROCEDURE — 97530 THERAPEUTIC ACTIVITIES: CPT

## 2024-01-31 PROCEDURE — 94640 AIRWAY INHALATION TREATMENT: CPT

## 2024-01-31 PROCEDURE — 27000221 HC OXYGEN, UP TO 24 HOURS

## 2024-01-31 PROCEDURE — 80053 COMPREHEN METABOLIC PANEL: CPT | Performed by: NURSE PRACTITIONER

## 2024-01-31 PROCEDURE — 84100 ASSAY OF PHOSPHORUS: CPT | Performed by: NURSE PRACTITIONER

## 2024-01-31 PROCEDURE — 99900035 HC TECH TIME PER 15 MIN (STAT)

## 2024-01-31 PROCEDURE — 85025 COMPLETE CBC W/AUTO DIFF WBC: CPT | Performed by: NURSE PRACTITIONER

## 2024-01-31 PROCEDURE — 86580 TB INTRADERMAL TEST: CPT | Performed by: INTERNAL MEDICINE

## 2024-01-31 PROCEDURE — 94761 N-INVAS EAR/PLS OXIMETRY MLT: CPT

## 2024-01-31 PROCEDURE — 94618 PULMONARY STRESS TESTING: CPT

## 2024-01-31 PROCEDURE — 97162 PT EVAL MOD COMPLEX 30 MIN: CPT

## 2024-01-31 PROCEDURE — 36415 COLL VENOUS BLD VENIPUNCTURE: CPT | Performed by: NURSE PRACTITIONER

## 2024-01-31 PROCEDURE — 84145 PROCALCITONIN (PCT): CPT | Performed by: NURSE PRACTITIONER

## 2024-01-31 PROCEDURE — 30200315 PPD INTRADERMAL TEST REV CODE 302: Performed by: INTERNAL MEDICINE

## 2024-01-31 RX ORDER — TRAMADOL HYDROCHLORIDE 50 MG/1
50 TABLET ORAL EVERY 12 HOURS PRN
Status: DISCONTINUED | OUTPATIENT
Start: 2024-01-31 | End: 2024-02-01 | Stop reason: HOSPADM

## 2024-01-31 RX ORDER — VERAPAMIL HYDROCHLORIDE 120 MG/1
120 TABLET, FILM COATED, EXTENDED RELEASE ORAL NIGHTLY
Status: DISCONTINUED | OUTPATIENT
Start: 2024-01-31 | End: 2024-02-01 | Stop reason: HOSPADM

## 2024-01-31 RX ORDER — FAMOTIDINE 20 MG/1
20 TABLET, FILM COATED ORAL 2 TIMES DAILY
Status: DISCONTINUED | OUTPATIENT
Start: 2024-01-31 | End: 2024-01-31

## 2024-01-31 RX ORDER — LISINOPRIL 2.5 MG/1
5 TABLET ORAL DAILY
Status: DISCONTINUED | OUTPATIENT
Start: 2024-01-31 | End: 2024-02-01 | Stop reason: HOSPADM

## 2024-01-31 RX ORDER — MEGESTROL ACETATE 40 MG/ML
200 SUSPENSION ORAL 2 TIMES DAILY
Status: DISCONTINUED | OUTPATIENT
Start: 2024-01-31 | End: 2024-02-01 | Stop reason: HOSPADM

## 2024-01-31 RX ORDER — SODIUM CHLORIDE 450 MG/100ML
INJECTION, SOLUTION INTRAVENOUS CONTINUOUS
Status: DISCONTINUED | OUTPATIENT
Start: 2024-01-31 | End: 2024-02-01 | Stop reason: HOSPADM

## 2024-01-31 RX ORDER — GLYCOPYRROLATE 1 MG/1
1 TABLET ORAL 2 TIMES DAILY
Status: DISCONTINUED | OUTPATIENT
Start: 2024-01-31 | End: 2024-02-01 | Stop reason: HOSPADM

## 2024-01-31 RX ADMIN — MEGESTROL ACETATE 200 MG: 400 SUSPENSION ORAL at 08:01

## 2024-01-31 RX ADMIN — LEVALBUTEROL 0.63 MG: 0.63 SOLUTION RESPIRATORY (INHALATION) at 03:01

## 2024-01-31 RX ADMIN — AZITHROMYCIN MONOHYDRATE 500 MG: 500 INJECTION, POWDER, LYOPHILIZED, FOR SOLUTION INTRAVENOUS at 03:01

## 2024-01-31 RX ADMIN — Medication 800 MG: at 05:01

## 2024-01-31 RX ADMIN — PANTOPRAZOLE SODIUM 40 MG: 40 TABLET, DELAYED RELEASE ORAL at 09:01

## 2024-01-31 RX ADMIN — LISINOPRIL 5 MG: 2.5 TABLET ORAL at 02:01

## 2024-01-31 RX ADMIN — LINACLOTIDE 72 MCG: 72 CAPSULE, GELATIN COATED ORAL at 05:01

## 2024-01-31 RX ADMIN — Medication 800 MG: at 11:01

## 2024-01-31 RX ADMIN — FERROUS SULFATE TAB 325 MG (65 MG ELEMENTAL FE) 1 EACH: 325 (65 FE) TAB at 09:01

## 2024-01-31 RX ADMIN — POTASSIUM & SODIUM PHOSPHATES POWDER PACK 280-160-250 MG 2 PACKET: 280-160-250 PACK at 11:01

## 2024-01-31 RX ADMIN — QUETIAPINE 200 MG: 100 TABLET ORAL at 08:01

## 2024-01-31 RX ADMIN — CEFTRIAXONE SODIUM 1 G: 1 INJECTION, POWDER, FOR SOLUTION INTRAMUSCULAR; INTRAVENOUS at 09:01

## 2024-01-31 RX ADMIN — VERAPAMIL HYDROCHLORIDE 120 MG: 120 TABLET, FILM COATED, EXTENDED RELEASE ORAL at 08:01

## 2024-01-31 RX ADMIN — DONEPEZIL HYDROCHLORIDE 10 MG: 5 TABLET, FILM COATED ORAL at 08:01

## 2024-01-31 RX ADMIN — ENOXAPARIN SODIUM 30 MG: 30 INJECTION SUBCUTANEOUS at 06:01

## 2024-01-31 RX ADMIN — OXYBUTYNIN CHLORIDE 5 MG: 5 TABLET, EXTENDED RELEASE ORAL at 09:01

## 2024-01-31 RX ADMIN — GLYCOPYRROLATE 1 MG: 1 TABLET ORAL at 11:01

## 2024-01-31 RX ADMIN — DOCUSATE SODIUM AND SENNOSIDES 1 TABLET: 8.6; 5 TABLET, FILM COATED ORAL at 08:01

## 2024-01-31 RX ADMIN — SERTRALINE HYDROCHLORIDE 25 MG: 25 TABLET ORAL at 09:01

## 2024-01-31 RX ADMIN — SODIUM CHLORIDE: 4.5 INJECTION, SOLUTION INTRAVENOUS at 01:01

## 2024-01-31 RX ADMIN — LEVALBUTEROL 0.63 MG: 0.63 SOLUTION RESPIRATORY (INHALATION) at 12:01

## 2024-01-31 RX ADMIN — POTASSIUM BICARBONATE 50 MEQ: 977.5 TABLET, EFFERVESCENT ORAL at 05:01

## 2024-01-31 RX ADMIN — GLYCOPYRROLATE 1 MG: 1 TABLET ORAL at 08:01

## 2024-01-31 RX ADMIN — DOCUSATE SODIUM AND SENNOSIDES 1 TABLET: 8.6; 5 TABLET, FILM COATED ORAL at 09:01

## 2024-01-31 RX ADMIN — POTASSIUM & SODIUM PHOSPHATES POWDER PACK 280-160-250 MG 2 PACKET: 280-160-250 PACK at 05:01

## 2024-01-31 RX ADMIN — MEGESTROL ACETATE 200 MG: 400 SUSPENSION ORAL at 02:01

## 2024-01-31 RX ADMIN — TUBERCULIN PURIFIED PROTEIN DERIVATIVE 5 UNITS: 5 INJECTION, SOLUTION INTRADERMAL at 04:01

## 2024-01-31 NOTE — SUBJECTIVE & OBJECTIVE
Interval History:  Sitting up in bed, awake and alert.  Staff reports patient not eating.  She currently denies pain.  Would not participate with PT yesterday due to the therapist being a male.  Daughter at bedside and concerned about patient debility and requests rehab evaluation.    Review of Systems   Unable to perform ROS: Dementia     Objective:     Vital Signs (Most Recent):  Temp: 98 °F (36.7 °C) (01/31/24 0711)  Pulse: 90 (01/31/24 0711)  Resp: 18 (01/31/24 0711)  BP: (!) 145/82 (01/31/24 0711)  SpO2: 95 % (01/31/24 0711) Vital Signs (24h Range):  Temp:  [97.5 °F (36.4 °C)-98.8 °F (37.1 °C)] 98 °F (36.7 °C)  Pulse:  [73-94] 90  Resp:  [16-20] 18  SpO2:  [95 %-100 %] 95 %  BP: (130-161)/(63-82) 145/82     Weight: 50.8 kg (111 lb 15.9 oz)  Body mass index is 24.24 kg/m².    Intake/Output Summary (Last 24 hours) at 1/31/2024 1024  Last data filed at 1/31/2024 0956  Gross per 24 hour   Intake 3364.58 ml   Output --   Net 3364.58 ml         Physical Exam  Constitutional:       General: She is not in acute distress.     Comments: Frail, elderly   HENT:      Head: Normocephalic and atraumatic.      Comments: [place]     Nose: Nose normal.      Mouth/Throat:      Mouth: Mucous membranes are moist.      Pharynx: Oropharynx is clear.   Eyes:      Extraocular Movements: Extraocular movements intact.      Conjunctiva/sclera: Conjunctivae normal.      Pupils: Pupils are equal, round, and reactive to light.   Neck:      Comments: Limited range of motion to neck due to rigidity  Cardiovascular:      Rate and Rhythm: Normal rate and regular rhythm.      Pulses: Normal pulses.      Heart sounds: Normal heart sounds.   Pulmonary:      Effort: Pulmonary effort is normal. No respiratory distress.      Breath sounds: Normal breath sounds.   Abdominal:      General: Bowel sounds are normal. There is no distension.      Palpations: Abdomen is soft.      Tenderness: There is no abdominal tenderness.   Musculoskeletal:         General:  Normal range of motion.      Cervical back: Neck supple. No tenderness.      Right lower leg: No edema.      Left lower leg: No edema.   Skin:     General: Skin is warm and dry.      Capillary Refill: Capillary refill takes less than 2 seconds.      Comments: Intact Blood blister to tip of right great toe   Neurological:      General: No focal deficit present.      Mental Status: She is alert and oriented to person, place, and time.      Motor: Weakness (Generalized) present.   Psychiatric:         Mood and Affect: Mood normal.         Speech: Speech normal.         Behavior: Behavior is cooperative.         Judgment: Judgment is impulsive.             Significant Labs: All pertinent labs within the past 24 hours have been reviewed.  CBC:   Recent Labs   Lab 01/29/24  1321 01/30/24  0307 01/31/24  0305   WBC 4.93 4.54 5.85   HGB 11.6* 11.4* 11.9*   HCT 35.5* 34.9* 35.3*   * 140* 157     CMP:   Recent Labs   Lab 01/29/24  1321 01/30/24  0307 01/31/24  0305    141 142   K 4.1 3.4* 3.8    103 103   CO2 26 27 27   GLU 97 91 94   BUN 15 12 8   CREATININE 1.0 0.8 0.7   CALCIUM 9.8 9.7 9.6   PROT 6.0 6.3 6.2   ALBUMIN 3.0* 3.1* 3.1*   BILITOT 1.0 0.9 0.9   ALKPHOS 86 90 87   AST 15 17 51*   ALT 6* 6* 10   ANIONGAP 9 11 12     Component Ref Range & Units 01/31/24 0300   Procalcitonin 0.00 - 0.50 ng/mL 0.02     Microbiology Results (last 7 days)       Procedure Component Value Units Date/Time    Blood culture x two cultures. Draw prior to antibiotics. [[id number]] Collected: 01/29/24 1321    Order Status: Completed Specimen: Blood from Antecubital, Left Updated: 01/30/24 1432     Blood Culture, Routine No Growth to date      No Growth to date    Narrative:      Aerobic and anaerobic    Blood culture x two cultures. Draw prior to antibiotics. [[id number]] Collected: 01/29/24 1320    Order Status: Completed Specimen: Blood Updated: 01/30/24 1432     Blood Culture, Routine No Growth to date      No Growth to  date    Narrative:      Aerobic and anaerobic    Culture, Respiratory with Gram Stain [[id number]]     Order Status: No result Specimen: Respiratory     Influenza A & B by Molecular [[id number]] Collected: 01/29/24 1310    Order Status: Completed Specimen: Nasopharyngeal Swab Updated: 01/29/24 1402     Influenza A, Molecular Negative     Influenza B, Molecular Negative     Flu A & B Source Nasal swab              Significant Imaging: I have reviewed all pertinent imaging results/findings within the past 24 hours.

## 2024-01-31 NOTE — ASSESSMENT & PLAN NOTE
Patient with dementia with likely etiology of unknown dementia. Dementia is moderate. The patient does not have signs of behavioral disturbance. Home dementia medications are continued.. Continue non-pharmacologic interventions to prevent delirium (No VS between 11PM-5AM, activity during day, opening blinds, providing glasses/hearing aids, and up in chair during daytime). Will avoid narcotics and benzos unless absolutely necessary. PRN anti-psychotics are not prescribed to avoid self harm behaviors.  ---------------------------------------------------------------------------------------  Chronic/stable.  Maintain fall/delirium precautions

## 2024-01-31 NOTE — ASSESSMENT & PLAN NOTE
Multifactorial:  Pneumonia, decreased activity level, poor oral intake   Continue PT/OT efforts   Maintain Fall precautions

## 2024-01-31 NOTE — PLAN OF CARE
Problem: Physical Therapy  Goal: Physical Therapy Goal  Description: Goals to be met by: 0  2-     Patient will increase functional independence with mobility by performin. Supine to sit with MInimal Assistance  2. Sit to stand transfer with Minimal Assistance  3. Bed to chair transfer with Minimal Assistance using Rolling Walker  4. Gait  x 20 feet with Moderate Assistance using Rolling Walker.   5. Lower extremity exercise program x20 reps   Outcome: Ongoing, Progressing   PT eval and treat. Pt requiring extra time to mobilize. Kyphotic with head deviated to Left. Pt requiring max assist to sit EOB and assist x2 to chair. Pt to benefit from SNF

## 2024-01-31 NOTE — PLAN OF CARE
HARI spoke to pts daughter Kesha at [phone] and she stated that they are interested in SNF services for pt. We discussed that pt would need a 3 midnight qualifying stay and need to start participating with therapy. She asked me to send referrals out in the [place] area and stated that she was working with Dr. Hurtado on this prior to pts illness. HARI sent packet to [facility], [facility] and [facility]. HARI following.    01/31/24 5299   Post-Acute Status   Post-Acute Authorization Placement   Post-Acute Placement Status Referrals Sent   Patient choice form signed by patient/caregiver List with quality metrics by geographic area provided

## 2024-01-31 NOTE — ASSESSMENT & PLAN NOTE
Patient with Hypoxic Respiratory failure which is Acute.  she is not on home oxygen. Supplemental oxygen was provided and noted-      .   Signs/symptoms of respiratory failure include- tachypnea. Contributing diagnoses includes - Pneumonia Labs and images were reviewed. Patient Has not had a recent ABG. Will treat underlying causes and adjust management of respiratory failure as follows- continue supplemental oxygen as needed and treat PNA  -------------------------------------------------------------------------------------  Obtain desat study to evaluate if qualifies for home O2  Continue current treatment regimen

## 2024-01-31 NOTE — PT/OT/SLP EVAL
Physical Therapy Evaluation    Patient Name:  Angelique Hamilton   MRN:  [id number]    Recommendations:     Discharge Recommendations: Moderate Intensity Therapy   Discharge Equipment Recommendations: none   Barriers to discharge: Decreased caregiver support    Assessment:     Angelique Hamilton is a 89 y.o. female admitted with a medical diagnosis of Acute respiratory failure with hypoxia.  She presents with the following impairments/functional limitations: weakness, impaired endurance, impaired functional mobility, gait instability, impaired balance, impaired cognition, decreased lower extremity function, decreased safety awareness, impaired cardiopulmonary response to activity .    Pt seen supine in bed, awake,confusional. Pt requiring donning of gown- was restless earlier. Pt requiring max assist to sit EOB, extra time for safe handling of pt due to postural abnormality- kyphotic and head deviated to L. Pt requiring max assist to stand and pivot transfer to chair. Repositioned with pillows for comfort. Chair alarm active.  Pt to benefit from SNF    Rehab Prognosis: Fair; patient would benefit from acute skilled PT services to address these deficits and reach maximum level of function.    Recent Surgery: * No surgery found *      Plan:     During this hospitalization, patient to be seen 6 x/week to address the identified rehab impairments via gait training, therapeutic activities, therapeutic exercises and progress toward the following goals:    Plan of Care Expires:  02/01/24    Subjective     Chief Complaint: none  Patient/Family Comments/goals: none stated  Pain/Comfort:  Pain Rating 1: 0/10    Patients cultural, spiritual, Denominational conflicts given the current situation:      Living Environment:  Home with son and daughter in law  Prior to admission, patients level of function was assist for all mobility.  Equipment used at home: wheelchair, walker, rolling.  DME owned (not currently used): none.  Upon discharge,  patient will have assistance from family.    Objective:     Communicated with nurse Dunne prior to session.  Patient found HOB elevated with bed alarm, peripheral IV, telemetry, oxygen ([employer])  upon PT entry to room.    General Precautions: Standard, fall  Orthopedic Precautions:N/A   Braces: N/A  Respiratory Status: Nasal cannula, flow 2 L/min    Exams:  Postural Exam:  Patient presented with the following abnormalities:    -       Kyphosis  -       head deviated to L  BMI 24.24  RLE ROM: WFL  RLE Strength: Deficits: 3/5  LLE ROM: WFL  LLE Strength: Deficits: 3/5    Functional Mobility:  Bed Mobility:     Rolling Right: maximal assistance  Scooting: maximal assistance  Supine to Sit: maximal assistance  Transfers:     Sit to Stand:  maximal assistance and of 2 persons with no AD  Bed to Chair: maximal assistance and of 2 persons with  no AD  using  Squat Pivot      AM-PAC 6 CLICK MOBILITY  Total Score:10       Treatment & Education:  Patient was educated on the importance of OOB activity and functional mobility to negate negative effects of prolonged bed rest during hospitalization, safe transfers and ambulation, and D/C planning   OOB chair and repositioned    Patient left up in chair with all lines intact, call button in reach, and chair alarm on.    GOALS:   Multidisciplinary Problems       Physical Therapy Goals          Problem: Physical Therapy    Goal Priority Disciplines Outcome Goal Variances Interventions   Physical Therapy Goal     PT, PT/OT Ongoing, Progressing     Description: Goals to be met by: 0  2-     Patient will increase functional independence with mobility by performin. Supine to sit with MInimal Assistance  2. Sit to stand transfer with Minimal Assistance  3. Bed to chair transfer with Minimal Assistance using Rolling Walker  4. Gait  x 20 feet with Moderate Assistance using Rolling Walker.   5. Lower extremity exercise program x20 reps                        History:     Past  Medical History:   Diagnosis Date    ACP (advance care planning) 11/04/2015    Back problem     Constipation     History of fractured vertebra     Hyperlipidemia     Hypertension     IBS (irritable bowel syndrome)     Migraine headache     Osteoporosis     Rectal prolapse     Scoliosis     Vertigo        Past Surgical History:   Procedure Laterality Date    LEWIS      Back Pain    EYE SURGERY      Bilateral Cataracs    HYSTERECTOMY      OOPHORECTOMY      ROTATOR CUFF REPAIR      bialteral    TONSILLECTOMY         Time Tracking:     PT Received On: 01/31/24  PT Start Time: 1032     PT Stop Time: 1048  PT Total Time (min): 16 min     Billable Minutes: Evaluation 8 and Therapeutic Activity 8      01/31/2024

## 2024-01-31 NOTE — CARE UPDATE
01/31/24 1200   Patient Assessment/Suction   Level of Consciousness (AVPU) alert   Home Oxygen Qualification   $ Home O2 Qualification Pulmonary Stress Test/6 min walk   Room Air SpO2 At Rest 98 %   Room Air SpO2 During Ambulation 97 %   Heart Rate on O2 81 bpm   Post Ambulation Heart Rate 84 bpm   Home O2 Eval Comments Does not qualify

## 2024-01-31 NOTE — ASSESSMENT & PLAN NOTE
Patient's anemia is currently controlled. Has not received any PRBCs to date. Etiology likely d/t Iron deficiency  Current CBC reviewed-   Lab Results   Component Value Date    HGB 11.9 (L) 01/31/2024    HCT 35.3 (L) 01/31/2024     Monitor serial CBC and transfuse if patient becomes hemodynamically unstable, symptomatic or H/H drops below 7/21.

## 2024-01-31 NOTE — PT/OT/SLP PROGRESS
Occupational Therapy      Patient Name:  Angelique Hamilton   MRN:  [id number]    Patient not seen today secondary to Other (Comment) (Unable to engage. Nurse Vibha advised). Will follow-up.    1/31/2024

## 2024-01-31 NOTE — PROGRESS NOTES
Critical access hospital Medicine  Progress Note    Patient Name: Angelique Hamilton  MRN: [id number]  Patient Class: IP- Inpatient   Admission Date: 1/29/2024  Length of Stay: 2 days  Attending Physician: Bekah Hdz MD  Primary Care Provider: Ryan Hurtado MD        Subjective:     Principal Problem:Acute respiratory failure with hypoxia        HPI:  Angelique Hamilton is a 89 y/o F with a past medical history significant for HLD, HTN, IBS, scoliosis and dementia who presented to the ED for further evaluation of increased fatigue and weakness.  She is accompanied by her daughter in law who states pt appeared to have increased generalized weakness 2 days ago, but symptoms have progressed and she was unable to get out of bed this morning.  She was referred to the ED by her home health nurse.  Per family report, pt was diagnosed with pneumonia and UTI about 2 weeks ago and completed a course of augmentin.  Symptoms are accompanied by low grade fevers, cough and loss of appetite.  Further history is limited due to the patient's chronic dementia.  She was hypoxic at 89% on presentation and was placed on supplemental oxygen 2L.  Findings on CXR today are consistent with LLL pneumonia.  IV antibiotics were initiated.   She will be admitted to the service of hospital medicine for continued medical management.       Interval History:  Sitting up in bed, awake and alert.  Staff reports patient not eating.  She currently denies pain.  Would not participate with PT yesterday due to the therapist being a male.  Daughter at bedside and concerned about patient debility and requests rehab evaluation.    Review of Systems   Unable to perform ROS: Dementia     Objective:     Vital Signs (Most Recent):  Temp: 98 °F (36.7 °C) (01/31/24 0711)  Pulse: 90 (01/31/24 0711)  Resp: 18 (01/31/24 0711)  BP: (!) 145/82 (01/31/24 0711)  SpO2: 95 % (01/31/24 0711) Vital Signs (24h Range):  Temp:  [97.5 °F (36.4 °C)-98.8 °F (37.1  °C)] 98 °F (36.7 °C)  Pulse:  [73-94] 90  Resp:  [16-20] 18  SpO2:  [95 %-100 %] 95 %  BP: (130-161)/(63-82) 145/82     Weight: 50.8 kg (111 lb 15.9 oz)  Body mass index is 24.24 kg/m².    Intake/Output Summary (Last 24 hours) at 1/31/2024 1024  Last data filed at 1/31/2024 0956  Gross per 24 hour   Intake 3364.58 ml   Output --   Net 3364.58 ml         Physical Exam  Constitutional:       General: She is not in acute distress.     Comments: Frail, elderly   HENT:      Head: Normocephalic and atraumatic.      Comments: [place]     Nose: Nose normal.      Mouth/Throat:      Mouth: Mucous membranes are moist.      Pharynx: Oropharynx is clear.   Eyes:      Extraocular Movements: Extraocular movements intact.      Conjunctiva/sclera: Conjunctivae normal.      Pupils: Pupils are equal, round, and reactive to light.   Neck:      Comments: Limited range of motion to neck due to rigidity  Cardiovascular:      Rate and Rhythm: Normal rate and regular rhythm.      Pulses: Normal pulses.      Heart sounds: Normal heart sounds.   Pulmonary:      Effort: Pulmonary effort is normal. No respiratory distress.      Breath sounds: Normal breath sounds.   Abdominal:      General: Bowel sounds are normal. There is no distension.      Palpations: Abdomen is soft.      Tenderness: There is no abdominal tenderness.   Musculoskeletal:         General: Normal range of motion.      Cervical back: Neck supple. No tenderness.      Right lower leg: No edema.      Left lower leg: No edema.   Skin:     General: Skin is warm and dry.      Capillary Refill: Capillary refill takes less than 2 seconds.      Comments: Intact Blood blister to tip of right great toe   Neurological:      General: No focal deficit present.      Mental Status: She is alert and oriented to person and place, not time.  Noted to have intermittent confusion.   Motor: Weakness (Generalized) present.   Psychiatric:         Mood and Affect: Mood normal.         Speech: Speech  normal.         Behavior: Behavior is cooperative.         Judgment: Judgment is impulsive.             Significant Labs: All pertinent labs within the past 24 hours have been reviewed.  CBC:   Recent Labs   Lab 01/29/24  1321 01/30/24  0307 01/31/24  0305   WBC 4.93 4.54 5.85   HGB 11.6* 11.4* 11.9*   HCT 35.5* 34.9* 35.3*   * 140* 157     CMP:   Recent Labs   Lab 01/29/24  1321 01/30/24  0307 01/31/24  0305    141 142   K 4.1 3.4* 3.8    103 103   CO2 26 27 27   GLU 97 91 94   BUN 15 12 8   CREATININE 1.0 0.8 0.7   CALCIUM 9.8 9.7 9.6   PROT 6.0 6.3 6.2   ALBUMIN 3.0* 3.1* 3.1*   BILITOT 1.0 0.9 0.9   ALKPHOS 86 90 87   AST 15 17 51*   ALT 6* 6* 10   ANIONGAP 9 11 12     Component Ref Range & Units 01/31/24 0300   Procalcitonin 0.00 - 0.50 ng/mL 0.02     Microbiology Results (last 7 days)       Procedure Component Value Units Date/Time    Blood culture x two cultures. Draw prior to antibiotics. [[id number]] Collected: 01/29/24 1321    Order Status: Completed Specimen: Blood from Antecubital, Left Updated: 01/30/24 1432     Blood Culture, Routine No Growth to date      No Growth to date    Narrative:      Aerobic and anaerobic    Blood culture x two cultures. Draw prior to antibiotics. [[id number]] Collected: 01/29/24 1320    Order Status: Completed Specimen: Blood Updated: 01/30/24 1432     Blood Culture, Routine No Growth to date      No Growth to date    Narrative:      Aerobic and anaerobic    Culture, Respiratory with Gram Stain [[id number]]     Order Status: No result Specimen: Respiratory     Influenza A & B by Molecular [[id number]] Collected: 01/29/24 1310    Order Status: Completed Specimen: Nasopharyngeal Swab Updated: 01/29/24 1402     Influenza A, Molecular Negative     Influenza B, Molecular Negative     Flu A & B Source Nasal swab              Significant Imaging: I have reviewed all pertinent imaging results/findings within the past 24 hours.    Assessment/Plan:      * Acute  respiratory failure with hypoxia  Patient with Hypoxic Respiratory failure which is Acute.  she is not on home oxygen. Supplemental oxygen was provided and noted-      .   Signs/symptoms of respiratory failure include- tachypnea. Contributing diagnoses includes - Pneumonia Labs and images were reviewed. Patient Has not had a recent ABG. Will treat underlying causes and adjust management of respiratory failure as follows- continue supplemental oxygen as needed and treat PNA  -------------------------------------------------------------------------------------  Obtain desat study to evaluate if qualifies for home O2  Continue current treatment regimen    Pneumonia of left lower lobe due to infectious organism  Failed OP therapy  Blood cultures negative so far.  Sputum culture pending.  Procalcitonin normal  Continue IV rocephin/ IV azithromycin, nebulizers, supplemental O2    Decreased oral intake  Consult registered dietitian  Start Megace  Await further recommendations per dietitian      Thrombocytopenia  Chronic/stable  Possibly medication induced, due to antibiotics  Monitor labs      Dementia  Patient with dementia with likely etiology of unknown dementia. Dementia is moderate. The patient does not have signs of behavioral disturbance. Home dementia medications are continued.. Continue non-pharmacologic interventions to prevent delirium (No VS between 11PM-5AM, activity during day, opening blinds, providing glasses/hearing aids, and up in chair during daytime). Will avoid narcotics and benzos unless absolutely necessary. PRN anti-psychotics are not prescribed to avoid self harm behaviors.  ---------------------------------------------------------------------------------------  Chronic/stable.  Maintain fall/delirium precautions    Debility  Multifactorial:  Pneumonia, advanced age, decreased activity level, poor oral intake   Continue PT/OT efforts   Maintain Fall precautions      Iron deficiency  anemia  Patient's anemia is currently controlled. Has not received any PRBCs to date. Etiology likely d/t Iron deficiency  Current CBC reviewed-   Lab Results   Component Value Date    HGB 11.9 (L) 01/31/2024    HCT 35.3 (L) 01/31/2024     Monitor serial CBC and transfuse if patient becomes hemodynamically unstable, symptomatic or H/H drops below 7/21.    Aortic stenosis with trileaflet valve  Chronic.  Monitor for volume overload.      Essential hypertension  Chronic, uncontrolled. Latest blood pressure and vitals reviewed-     Temp:  [97.5 °F (36.4 °C)-98.8 °F (37.1 °C)]   Pulse:  [73-94]   Resp:  [16-20]   BP: (130-161)/(63-82)   SpO2:  [95 %-100 %] .   Home meds for hypertension were reviewed and noted below.   Hypertension Medications               hydroCHLOROthiazide (HYDRODIURIL) 25 MG tablet Take 0.5 tablets (12.5 mg total) by mouth daily as needed (leg swelling).    lisinopriL (PRINIVIL,ZESTRIL) 5 MG tablet Take 5 mg by mouth once daily.    verapamiL (CALAN-SR) 120 MG CR tablet Take 1 tablet (120 mg total) by mouth once daily.            While in the hospital, will manage blood pressure as follows; Adjust home antihypertensive regimen as follows- pt with episode of hypotension while in the ED requiring fluid bolus with appropriate response noted.  Hold chronic antihypertensives for now and resume when clinically appropriate.    Will utilize p.r.n. blood pressure medication only if patient's blood pressure greater than 180/110 and she develops symptoms such as worsening chest pain or shortness of breath.  -------------------------------------------------------------------------------------------------------  No longer hypotensive and BP is trending up  Resume lisinopril and verapamil  Continue holding HCTZ due to lack of oral intake and risk for dehydration  Monitor vitals    ACP (advance care planning)  Advance Care Planning    Date: 01/29/2024    Code Status  In light of the patients advanced age,I  engaged the the family in a voluntary conversation about the patient's preferences for care  at the very end of life. The patient wishes to have a natural, peaceful death.  Along those lines, the patient does not wish to have CPR or other invasive treatments performed when her heart and/or breathing stops. I communicated to the family that a DNR order would be placed in her medical record to reflect this preference.  I spent a total of 5 minutes engaging the patient in this advance care planning discussion.                VTE Risk Mitigation (From admission, onward)           Ordered     enoxaparin injection 30 mg  Daily         01/29/24 1708     IP VTE HIGH RISK PATIENT  Once         01/29/24 1708     Place sequential compression device  Until discontinued         01/29/24 1708                    Discharge Planning   FRANKY: 2/1/2024     Code Status: DNR   Is the patient medically ready for discharge?:     Reason for patient still in hospital (select all that apply): Patient trending condition and Treatment  Discharge Plan A: Home Health                  Priscilla Mayfield NP  Department of Hospital Medicine   [facility]/Surg

## 2024-01-31 NOTE — PLAN OF CARE
Hospital follow up with Dr. Hurtado added to pts AVS for 2/20/2024 at 11:20 . CM awaiting HH orders and home O2 evaluation.     01/31/24 1044   Discharge Assessment   Assessment Type Discharge Planning Reassessment

## 2024-01-31 NOTE — CONSULTS
[facility]/Surg  Adult Nutrition  Consult Note    SUMMARY     Recommendations    1. Encourage po intake of texture modified diet per SLP recommendations    2. Recommend to consider appetite stimulant if appropriate    3. Recommend commercial beverages    4. Monitor labs and weights   5. Collaboration with medical providers    Goals: Patient to consume >50% of estimated energy needs prior to RD follow up  Nutrition Goal Status: new  Communication of RD Recs: other (comment) (consult, poc)    Assessment and Plan  Nutrition Problem  Inadequate nutrition    Related to (etiology):   Decreased appetite    Signs and Symptoms (as evidenced by):   Decreased po intake    Interventions (treatment strategy):  Commercial beverages  Collaboration with medical providers      Nutrition Diagnosis Status:   New      Malnutrition Assessment             Energy Intake (Malnutrition): less than 75% for greater than 7 days                         Reason for Assessment    Reason For Assessment: consult    Diagnosis: pulmonary disease  Patient Active Problem List   Diagnosis    ACP (advance care planning)    Rectocele    Essential hypertension    Hyperlipidemia    Aortic stenosis with trileaflet valve    Irritable bowel syndrome with both constipation and diarrhea    Iron deficiency anemia    Mild protein-calorie malnutrition    Nonintractable epilepsy without status epilepticus    Sacroiliitis, not elsewhere classified    Stage 3a chronic kidney disease    Kyphoscoliosis    Colitis    Pneumonia of left lower lobe due to infectious organism    History of pneumonia    Thrombocytopenia, unspecified    Debility    Dementia    Macrocytic anemia    Thrombocytopenia    Advanced age    Walker as ambulation aid    Acute respiratory failure with hypoxia    Decreased oral intake       Relevant Medical History:   Past Medical History:   Diagnosis Date    ACP (advance care planning) 11/04/2015    Back problem     Constipation      "History of fractured vertebra     Hyperlipidemia     Hypertension     IBS (irritable bowel syndrome)     Migraine headache     Osteoporosis     Rectal prolapse     Scoliosis     Vertigo        Interdisciplinary Rounds: did not attend (RD remote)    General Information Comments:   1/31/2024: Patient is on a IDDSI level 6: soft and bite size diet at this time.  Patient with decreased po intake and is not eating much at this time.  Medical chart weight history reviewed. No significant weight loss found at this time.  Patient with PMH of dementia.  RD to recommend commercial beverages and recommend to consider adding and appetite stimulant.  Patient with texture modifed diet due to difficulty with chewing and swallowing.  Labs reviewed.  NKFA.  LBM:  1/26/24.  RD to continue to monitor and follow.    Nutrition Discharge Planning: Patient to continue on recommended texture modified diet po discharge    Nutrition Risk Screen    Nutrition Risk Screen: reduced oral intake over the last month    Nutrition/Diet History    Spiritual, Cultural Beliefs, Adventist Practices, Values that Affect Care: no  Food Allergies: NKFA  Factors Affecting Nutritional Intake: chewing difficulties/inability to chew food    Anthropometrics    Temp: 98 °F (36.7 °C)  Height Method: Stated  Height: 4' 9" (144.8 cm)  Height (inches): 57 in  Weight Method: Bed Scale  Weight: 50.8 kg (111 lb 15.9 oz)  Weight (lb): 111.99 lb  Ideal Body Weight (IBW), Female: 85 lb  % Ideal Body Weight, Female (lb): 131.75 %  BMI (Calculated): 24.2  BMI Grade: 18.5-24.9 - normal       Lab/Procedures/Meds    Pertinent Labs Reviewed: reviewed  BMP  Lab Results   Component Value Date     01/31/2024    K 3.8 01/31/2024     01/31/2024    CO2 27 01/31/2024    BUN 8 01/31/2024    CREATININE 0.7 01/31/2024    CALCIUM 9.6 01/31/2024    ANIONGAP 12 01/31/2024    EGFRNORACEVR >60 01/31/2024     No results found for: "LABA1C", "HGBA1C"  Lab Results   Component Value " Date    CALCIUM 9.6 01/31/2024    PHOS 2.3 (L) 01/31/2024       Pertinent Medications Reviewed: reviewed  Scheduled Meds:   azithromycin  500 mg Intravenous Q24H    cefTRIAXone (Rocephin) IV (PEDS and ADULTS)  1 g Intravenous Q24H    donepeziL  10 mg Oral QHS    enoxparin  30 mg Subcutaneous Daily    ferrous sulfate  1 tablet Oral Daily    glycopyrrolate  1 mg Oral BID    levalbuterol  0.63 mg Nebulization Q8H    linaCLOtide  72 mcg Oral Before breakfast    lisinopriL  5 mg Oral Daily    megestroL  200 mg Oral BID    oxybutynin  5 mg Oral Daily    pantoprazole  40 mg Oral Daily    QUEtiapine  200 mg Oral QHS    senna-docusate 8.6-50 mg  1 tablet Oral BID    sertraline  25 mg Oral Daily    verapamiL  120 mg Oral Nightly     Continuous Infusions:   sodium chloride 0.45%       PRN Meds:.acetaminophen, aluminum-magnesium hydroxide-simethicone, dextrose 10%, dextrose 10%, glucagon (human recombinant), glucose, glucose, magnesium oxide, magnesium oxide, melatonin, naloxone, ondansetron, potassium bicarbonate, potassium bicarbonate, potassium bicarbonate, potassium, sodium phosphates, potassium, sodium phosphates, potassium, sodium phosphates, simethicone, sodium chloride 0.9%, traMADoL    Physical Findings/Assessment         Estimated/Assessed Needs    Weight Used For Calorie Calculations: 50.8 kg (111 lb 15.9 oz)  Energy Calorie Requirements (kcal): 25-30kcals/kg (1270-1524kcals/day)  Energy Need Method: Kcal/kg  Protein Requirements: 0.8-1.2g/kg (40-61g/day)  Weight Used For Protein Calculations: 50.8 kg (111 lb 15.9 oz)  Fluid Requirements (mL): 1ml/kcal  Estimated Fluid Requirement Method: RDA Method  RDA Method (mL): 25  CHO Requirement: 250      Nutrition Prescription Ordered    Current Diet Order: IDDSI level 6 soft  Oral Nutrition Supplement: Boost    Evaluation of Received Nutrient/Fluid Intake    % Kcal Needs: <50%  % Protein Needs: <50%  I/O: +6.4L since admit  Energy Calories Required: not meeting  needs  Protein Required: not meeting needs  Fluid Required: not meeting needs  Comments: LBM: 1/26/2024  Tolerance: not tolerating  % Intake of Estimated Energy Needs: 0 - 25 %  % Meal Intake: 0 - 25 %    Nutrition Risk    Level of Risk/Frequency of Follow-up: moderate (follow up: 1-2 x per week)       Monitor and Evaluation    Food and Nutrient Intake: energy intake, food and beverage intake  Food and Nutrient Adminstration: diet order  Knowledge/Beliefs/Attitudes: beliefs and attitudes  Physical Activity and Function: nutrition-related ADLs and IADLs, factors affecting access to physical activity  Anthropometric Measurements: height/length, weight, weight change, body mass index  Biochemical Data, Medical Tests and Procedures: electrolyte and renal panel, gastrointestinal profile, glucose/endocrine profile, inflammatory profile, lipid profile       Nutrition Follow-Up    RD Follow-up?: Yes  Nay Vargas, MS, RDN, LDN

## 2024-01-31 NOTE — PLAN OF CARE
Nutrition Plan of Care:    Recommendations     1. Encourage po intake of texture modified diet per SLP recommendations    2. Recommend to consider appetite stimulant if appropriate    3. Recommend commercial beverages    4. Monitor labs and weights   5. Collaboration with medical providers     Goals: Patient to consume >50% of estimated energy needs prior to RD follow up  Nutrition Goal Status: new  Communication of RD Recs: other (comment) (consult, poc)     Assessment and Plan  Nutrition Problem  Inadequate nutrition     Related to (etiology):   Decreased appetite     Signs and Symptoms (as evidenced by):   Decreased po intake     Interventions (treatment strategy):  Commercial beverages  Collaboration with medical providers        Nutrition Diagnosis Status:   New        Malnutrition Assessment          Energy Intake (Malnutrition): less than 75% for greater than 7 days         Nay Vargas MS, RDN, LDN

## 2024-01-31 NOTE — ASSESSMENT & PLAN NOTE
Chronic, uncontrolled. Latest blood pressure and vitals reviewed-     Temp:  [97.5 °F (36.4 °C)-98.8 °F (37.1 °C)]   Pulse:  [73-94]   Resp:  [16-20]   BP: (130-161)/(63-82)   SpO2:  [95 %-100 %] .   Home meds for hypertension were reviewed and noted below.   Hypertension Medications               hydroCHLOROthiazide (HYDRODIURIL) 25 MG tablet Take 0.5 tablets (12.5 mg total) by mouth daily as needed (leg swelling).    lisinopriL (PRINIVIL,ZESTRIL) 5 MG tablet Take 5 mg by mouth once daily.    verapamiL (CALAN-SR) 120 MG CR tablet Take 1 tablet (120 mg total) by mouth once daily.            While in the hospital, will manage blood pressure as follows; Adjust home antihypertensive regimen as follows- pt with episode of hypotension while in the ED requiring fluid bolus with appropriate response noted.  Hold chronic antihypertensives for now and resume when clinically appropriate.    Will utilize p.r.n. blood pressure medication only if patient's blood pressure greater than 180/110 and she develops symptoms such as worsening chest pain or shortness of breath.  -------------------------------------------------------------------------------------------------------  Resume lisinopril and verapamil  Continue holding HCTZ due to lack of oral intake and risk for dehydration  Monitor vitals

## 2024-01-31 NOTE — ASSESSMENT & PLAN NOTE
Failed OP therapy  Blood cultures negative so far.  Sputum culture pending.  Procalcitonin normal  Continue IV rocephin/ IV azithromycin, nebulizers, supplemental O2

## 2024-02-01 VITALS
HEIGHT: 57 IN | TEMPERATURE: 98 F | SYSTOLIC BLOOD PRESSURE: 142 MMHG | WEIGHT: 112 LBS | BODY MASS INDEX: 24.16 KG/M2 | OXYGEN SATURATION: 97 % | DIASTOLIC BLOOD PRESSURE: 68 MMHG | HEART RATE: 83 BPM | RESPIRATION RATE: 17 BRPM

## 2024-02-01 LAB
ALBUMIN SERPL BCP-MCNC: 2.6 G/DL (ref 3.5–5.2)
ALP SERPL-CCNC: 74 U/L (ref 55–135)
ALT SERPL W/O P-5'-P-CCNC: 10 U/L (ref 10–44)
ANION GAP SERPL CALC-SCNC: 6 MMOL/L (ref 8–16)
AST SERPL-CCNC: 43 U/L (ref 10–40)
BASOPHILS # BLD AUTO: 0.03 K/UL (ref 0–0.2)
BASOPHILS NFR BLD: 0.7 % (ref 0–1.9)
BILIRUB SERPL-MCNC: 0.6 MG/DL (ref 0.1–1)
BUN SERPL-MCNC: 7 MG/DL (ref 8–23)
CALCIUM SERPL-MCNC: 9 MG/DL (ref 8.7–10.5)
CHLORIDE SERPL-SCNC: 105 MMOL/L (ref 95–110)
CO2 SERPL-SCNC: 28 MMOL/L (ref 23–29)
CREAT SERPL-MCNC: 0.7 MG/DL (ref 0.5–1.4)
DIFFERENTIAL METHOD BLD: ABNORMAL
EOSINOPHIL # BLD AUTO: 0 K/UL (ref 0–0.5)
EOSINOPHIL NFR BLD: 1 % (ref 0–8)
ERYTHROCYTE [DISTWIDTH] IN BLOOD BY AUTOMATED COUNT: 12.8 % (ref 11.5–14.5)
EST. GFR  (NO RACE VARIABLE): >60 ML/MIN/1.73 M^2
GLUCOSE SERPL-MCNC: 103 MG/DL (ref 70–110)
HCT VFR BLD AUTO: 31.6 % (ref 37–48.5)
HGB BLD-MCNC: 10.5 G/DL (ref 12–16)
IMM GRANULOCYTES # BLD AUTO: 0.03 K/UL (ref 0–0.04)
IMM GRANULOCYTES NFR BLD AUTO: 0.7 % (ref 0–0.5)
LYMPHOCYTES # BLD AUTO: 1.1 K/UL (ref 1–4.8)
LYMPHOCYTES NFR BLD: 27.4 % (ref 18–48)
MAGNESIUM SERPL-MCNC: 1.6 MG/DL (ref 1.6–2.6)
MCH RBC QN AUTO: 32 PG (ref 27–31)
MCHC RBC AUTO-ENTMCNC: 33.2 G/DL (ref 32–36)
MCV RBC AUTO: 96 FL (ref 82–98)
MONOCYTES # BLD AUTO: 0.4 K/UL (ref 0.3–1)
MONOCYTES NFR BLD: 9.7 % (ref 4–15)
NEUTROPHILS # BLD AUTO: 2.4 K/UL (ref 1.8–7.7)
NEUTROPHILS NFR BLD: 60.5 % (ref 38–73)
NRBC BLD-RTO: 0 /100 WBC
PHOSPHATE SERPL-MCNC: 2.6 MG/DL (ref 2.7–4.5)
PLATELET # BLD AUTO: 143 K/UL (ref 150–450)
PMV BLD AUTO: 9.8 FL (ref 9.2–12.9)
POTASSIUM SERPL-SCNC: 3.7 MMOL/L (ref 3.5–5.1)
PROT SERPL-MCNC: 5.1 G/DL (ref 6–8.4)
RBC # BLD AUTO: 3.28 M/UL (ref 4–5.4)
SODIUM SERPL-SCNC: 139 MMOL/L (ref 136–145)
WBC # BLD AUTO: 4.01 K/UL (ref 3.9–12.7)

## 2024-02-01 PROCEDURE — 84100 ASSAY OF PHOSPHORUS: CPT | Performed by: NURSE PRACTITIONER

## 2024-02-01 PROCEDURE — 25000242 PHARM REV CODE 250 ALT 637 W/ HCPCS: Performed by: NURSE PRACTITIONER

## 2024-02-01 PROCEDURE — 25000003 PHARM REV CODE 250: Performed by: NURSE PRACTITIONER

## 2024-02-01 PROCEDURE — 83735 ASSAY OF MAGNESIUM: CPT | Performed by: NURSE PRACTITIONER

## 2024-02-01 PROCEDURE — 36415 COLL VENOUS BLD VENIPUNCTURE: CPT | Performed by: NURSE PRACTITIONER

## 2024-02-01 PROCEDURE — 97165 OT EVAL LOW COMPLEX 30 MIN: CPT

## 2024-02-01 PROCEDURE — 85025 COMPLETE CBC W/AUTO DIFF WBC: CPT | Performed by: NURSE PRACTITIONER

## 2024-02-01 PROCEDURE — 94761 N-INVAS EAR/PLS OXIMETRY MLT: CPT

## 2024-02-01 PROCEDURE — 97535 SELF CARE MNGMENT TRAINING: CPT

## 2024-02-01 PROCEDURE — S0179 MEGESTROL 20 MG: HCPCS | Performed by: NURSE PRACTITIONER

## 2024-02-01 PROCEDURE — 94640 AIRWAY INHALATION TREATMENT: CPT

## 2024-02-01 PROCEDURE — 97530 THERAPEUTIC ACTIVITIES: CPT

## 2024-02-01 PROCEDURE — 99900031 HC PATIENT EDUCATION (STAT)

## 2024-02-01 PROCEDURE — 80053 COMPREHEN METABOLIC PANEL: CPT | Performed by: NURSE PRACTITIONER

## 2024-02-01 PROCEDURE — 63600175 PHARM REV CODE 636 W HCPCS: Performed by: NURSE PRACTITIONER

## 2024-02-01 RX ORDER — LEVALBUTEROL INHALATION SOLUTION 0.63 MG/3ML
0.63 SOLUTION RESPIRATORY (INHALATION) EVERY 8 HOURS PRN
Qty: 126 ML | Refills: 0 | Status: SHIPPED | OUTPATIENT
Start: 2024-02-01 | End: 2024-02-15

## 2024-02-01 RX ORDER — CEFUROXIME AXETIL 500 MG/1
500 TABLET ORAL 2 TIMES DAILY
Qty: 6 TABLET | Refills: 0 | Status: ON HOLD | OUTPATIENT
Start: 2024-02-01 | End: 2024-02-06 | Stop reason: HOSPADM

## 2024-02-01 RX ORDER — AMOXICILLIN 250 MG
1 CAPSULE ORAL 2 TIMES DAILY
Qty: 28 TABLET | Refills: 0 | Status: SHIPPED | OUTPATIENT
Start: 2024-02-01 | End: 2024-02-15

## 2024-02-01 RX ORDER — TRAMADOL HYDROCHLORIDE AND ACETAMINOPHEN 37.5; 325 MG/1; MG/1
1 TABLET, FILM COATED ORAL 2 TIMES DAILY
Qty: 28 TABLET | Refills: 0 | Status: SHIPPED | OUTPATIENT
Start: 2024-02-01 | End: 2024-02-15

## 2024-02-01 RX ORDER — ACETAMINOPHEN 325 MG/1
650 TABLET ORAL EVERY 8 HOURS PRN
Refills: 0
Start: 2024-02-01

## 2024-02-01 RX ORDER — AZITHROMYCIN 500 MG/1
500 TABLET, FILM COATED ORAL DAILY
Qty: 3 TABLET | Refills: 0 | Status: ON HOLD | OUTPATIENT
Start: 2024-02-01 | End: 2024-02-06 | Stop reason: HOSPADM

## 2024-02-01 RX ORDER — MEGESTROL ACETATE 40 MG/ML
200 SUSPENSION ORAL 2 TIMES DAILY
Qty: 140 ML | Refills: 0 | Status: SHIPPED | OUTPATIENT
Start: 2024-02-01 | End: 2024-02-15

## 2024-02-01 RX ORDER — TALC
6 POWDER (GRAM) TOPICAL NIGHTLY PRN
Qty: 14 TABLET | Refills: 0 | Status: SHIPPED | OUTPATIENT
Start: 2024-02-01 | End: 2024-02-15

## 2024-02-01 RX ADMIN — SERTRALINE HYDROCHLORIDE 25 MG: 25 TABLET ORAL at 09:02

## 2024-02-01 RX ADMIN — SODIUM CHLORIDE: 4.5 INJECTION, SOLUTION INTRAVENOUS at 03:02

## 2024-02-01 RX ADMIN — POTASSIUM BICARBONATE 50 MEQ: 977.5 TABLET, EFFERVESCENT ORAL at 06:02

## 2024-02-01 RX ADMIN — FERROUS SULFATE TAB 325 MG (65 MG ELEMENTAL FE) 1 EACH: 325 (65 FE) TAB at 09:02

## 2024-02-01 RX ADMIN — Medication 800 MG: at 12:02

## 2024-02-01 RX ADMIN — DOCUSATE SODIUM AND SENNOSIDES 1 TABLET: 8.6; 5 TABLET, FILM COATED ORAL at 09:02

## 2024-02-01 RX ADMIN — OXYBUTYNIN CHLORIDE 5 MG: 5 TABLET, EXTENDED RELEASE ORAL at 09:02

## 2024-02-01 RX ADMIN — CEFTRIAXONE SODIUM 1 G: 1 INJECTION, POWDER, FOR SOLUTION INTRAMUSCULAR; INTRAVENOUS at 09:02

## 2024-02-01 RX ADMIN — MEGESTROL ACETATE 200 MG: 400 SUSPENSION ORAL at 09:02

## 2024-02-01 RX ADMIN — LEVALBUTEROL 0.63 MG: 0.63 SOLUTION RESPIRATORY (INHALATION) at 12:02

## 2024-02-01 RX ADMIN — Medication 800 MG: at 06:02

## 2024-02-01 RX ADMIN — POTASSIUM BICARBONATE 50 MEQ: 977.5 TABLET, EFFERVESCENT ORAL at 12:02

## 2024-02-01 RX ADMIN — AZITHROMYCIN MONOHYDRATE 500 MG: 500 INJECTION, POWDER, LYOPHILIZED, FOR SOLUTION INTRAVENOUS at 04:02

## 2024-02-01 RX ADMIN — LINACLOTIDE 72 MCG: 72 CAPSULE, GELATIN COATED ORAL at 05:02

## 2024-02-01 RX ADMIN — PANTOPRAZOLE SODIUM 40 MG: 40 TABLET, DELAYED RELEASE ORAL at 09:02

## 2024-02-01 RX ADMIN — LISINOPRIL 5 MG: 2.5 TABLET ORAL at 09:02

## 2024-02-01 RX ADMIN — POTASSIUM & SODIUM PHOSPHATES POWDER PACK 280-160-250 MG 2 PACKET: 280-160-250 PACK at 12:02

## 2024-02-01 RX ADMIN — LEVALBUTEROL 0.63 MG: 0.63 SOLUTION RESPIRATORY (INHALATION) at 07:02

## 2024-02-01 RX ADMIN — POTASSIUM & SODIUM PHOSPHATES POWDER PACK 280-160-250 MG 2 PACKET: 280-160-250 PACK at 06:02

## 2024-02-01 RX ADMIN — GLYCOPYRROLATE 1 MG: 1 TABLET ORAL at 09:02

## 2024-02-01 NOTE — PLAN OF CARE
Pt accepted at [facility] and can admit today. CM requested DC orders    LOCET called in, faxed completed PSSR to [facility]. Awaiting 142         02/01/24 1157   Post-Acute Status   Post-Acute Authorization Placement   Post-Acute Placement Status Set-up Complete/Auth obtained

## 2024-02-01 NOTE — ASSESSMENT & PLAN NOTE
Nutrition consulted. Most recent weight and BMI monitored-     Measurements:  Wt Readings from Last 1 Encounters:   01/31/24 50.8 kg (111 lb 15.9 oz)   Body mass index is 24.24 kg/m².    Patient has been screened and assessed by RD.    Malnutrition Characteristic Summary:  Energy Intake (Malnutrition): less than 75% for greater than 7 days    Interventions/Recommendations (treatment strategy):  1. Encourage po intake of texture modified diet per SLP recommendations  2. Recommend to consider appetite stimulant if appropriate  3. Recommend commercial beverages  4. Monitor labs and weights 5. Collaboration with medical providers   -----------------------------------------------------------------------------------------------------------  Continue Megace b.i.d. (started 1/31)  Continue nutritional supplement  Dietitian following

## 2024-02-01 NOTE — ASSESSMENT & PLAN NOTE
Patient's anemia is currently controlled. Has not received any PRBCs to date. Etiology likely d/t Iron deficiency  Current CBC reviewed-   Lab Results   Component Value Date    HGB 10.5 (L) 02/01/2024    HCT 31.6 (L) 02/01/2024     Monitor serial CBC and transfuse if patient becomes hemodynamically unstable, symptomatic or H/H drops below 7/21.

## 2024-02-01 NOTE — ASSESSMENT & PLAN NOTE
Patient with Hypoxic Respiratory failure which is Acute.  she is not on home oxygen. Supplemental oxygen was provided and noted-      .   Signs/symptoms of respiratory failure include- tachypnea. Contributing diagnoses includes - Pneumonia Labs and images were reviewed. Patient Has not had a recent ABG. Will treat underlying causes and adjust management of respiratory failure as follows- continue supplemental oxygen as needed and treat PNA  -------------------------------------------------------------------------------------  Improving, no longer requiring supplemental O2  Desat study obtained and does not qualify for home O2   Continue supplemental O2 PRN

## 2024-02-01 NOTE — DISCHARGE SUMMARY
[facility] Medicine  Discharge Summary      Patient Name: Angelique Hamilton  MRN: [id number]  MILAN: [id number]  Patient Class: IP- Inpatient  Admission Date: 1/29/2024  Hospital Length of Stay: 3 days  Discharge Date and Time:  02/02/2024 12:21 PM  Attending Physician: Evin Hdz MD  Discharging Provider: Priscilla Mayfield NP  Primary Care Provider: Ryan Hurtado MD    Primary Care Team: Networked reference to record PCT     HPI:   Angelique Hamilton is a 89 y/o F with a past medical history significant for HLD, HTN, IBS, scoliosis and dementia who presented to the ED for further evaluation of increased fatigue and weakness.  She is accompanied by her daughter in law who states pt appeared to have increased generalized weakness 2 days ago, but symptoms have progressed and she was unable to get out of bed this morning.  She was referred to the ED by her home health nurse.  Per family report, pt was diagnosed with pneumonia and UTI about 2 weeks ago and completed a course of augmentin.  Symptoms are accompanied by low grade fevers, cough and loss of appetite.  Further history is limited due to the patient's chronic dementia.  She was hypoxic at 89% on presentation and was placed on supplemental oxygen 2L.  Findings on CXR today are consistent with LLL pneumonia.  IV antibiotics were initiated.   She will be admitted to the service of hospital medicine for continued medical management.       * No surgery found *      Hospital Course:    was monitored closely during her hospital stay. She was admitted on 1/29/24 with acute hypoxemic resp failure and LLL pna with failed outpt management.  Her labs and vital signs were monitored.  She was treated with rocephin/azithro, scheduled xopenex nebs, and supplemental O2.  A sputum culture was unable to be collected.  She has significant scoliosis that may be contributing to hypoxemia. Blood cultures remained negative.  Her overall  respiratory status improved, and she was weaned off of supplemental O2.  She had a desat study which showed that she does not qualify for home O2.  PT was consulted and followed her.  She was noted to have poor oral intake and dietitian was consulted.  She was started on Megace therapy as well.  Her daughter requested that she be placed and case management was consulted.  She has been accepted to [facility] in his will be discharged there today.  She will continue a course of oral antibiotics.  She will follow up with her PCP.  She did not have any adverse events while here.     Goals of Care Treatment Preferences:  Code Status: DNR      Consults:   Consults (From admission, onward)          Status Ordering Provider     Inpatient consult to   Once        Provider:  (Not yet assigned)    Completed EUSEBIO CADE     Inpatient consult to Social Work/Case Management  Once        Provider:  (Not yet assigned)    Completed VAISHNAVI TONY     Inpatient consult to Registered Dietitian/Nutritionist  Once        Provider:  (Not yet assigned)    Completed VAISHNAVI TONY     Case Management/  Once        Provider:  (Not yet assigned)    Completed SOPHIA PEÑA            No new Assessment & Plan notes have been filed under this hospital service since the last note was generated.  Service: Hospital Medicine    Final Active Diagnoses:    Diagnosis Date Noted POA    PRINCIPAL PROBLEM:  Acute respiratory failure with hypoxia [J96.01] 01/29/2024 Yes    Pneumonia of left lower lobe due to infectious organism [J18.9] 05/22/2023 Yes    Decreased oral intake [R63.8] 01/31/2024 Yes    Dementia [F03.90] 11/26/2023 Yes    Debility [R53.81] 11/26/2023 Yes    Thrombocytopenia [D69.6] 11/26/2023 Yes    Iron deficiency anemia [D50.9] 12/27/2019 Yes    Aortic stenosis with trileaflet valve [I35.0] 10/25/2019 Yes    Essential hypertension [I10] 06/14/2016 Yes    ACP (advance care planning) [Z71.89]  11/04/2015 Not Applicable      Problems Resolved During this Admission:       Discharged Condition: stable    Disposition: Skilled Nursing Facility    Follow Up:   Follow-up Information       Ryan Hurtado MD Follow up on 2/20/2024.    Specialties: Family Medicine, Home Health Services, Hospice Services  Why: 11:40 Hosptial follow up  Contact information:  [address] [facility]  [address] [place] [id number]  [phone]                           Patient Instructions:      Ambulatory referral/consult to Outpatient Case Management   Referral Priority: Routine Referral Type: Consultation   Referral Reason: Specialty Services Required   Number of Visits Requested: 1     Diet Adult Regular     Notify your health care provider if you experience any of the following:  temperature >100.4     Notify your health care provider if you experience any of the following:  persistent nausea and vomiting or diarrhea     Notify your health care provider if you experience any of the following:  severe uncontrolled pain     Notify your health care provider if you experience any of the following:  difficulty breathing or increased cough     Notify your health care provider if you experience any of the following:  severe persistent headache     Notify your health care provider if you experience any of the following:  persistent dizziness, light-headedness, or visual disturbances     Notify your health care provider if you experience any of the following:  increased confusion or weakness     Activity as tolerated       Significant Diagnostic Studies: Labs: CMP   Recent Labs   Lab 02/01/24  0523 02/02/24  0329    141   K 3.7 4.3    106   CO2 28 27    100   BUN 7* 10   CREATININE 0.7 0.8   CALCIUM 9.0 9.7   PROT 5.1* 6.2   ALBUMIN 2.6* 3.3*   BILITOT 0.6 0.8   ALKPHOS 74 78   AST 43* 40   ALT 10 11   ANIONGAP 6* 8   , CBC   Recent Labs   Lab 02/01/24  0529 02/02/24  0329   WBC 4.01 4.59   HGB 10.5* 11.8*   HCT 31.6* 35.4*    * 153   , All labs within the past 24 hours have been reviewed, and   Component Ref Range & Units 01/31/24 0300   Procalcitonin 0.00 - 0.50 ng/mL 0.02     Component Ref Range & Units 02/01/24 0523   Magnesium 1.6 - 2.6 mg/dL 1.6     omponent Ref Range & Units 02/01/24 0523   Phosphorus 2.7 - 4.5 mg/dL 2.6 Low      Microbiology:   Microbiology Results (last 7 days)       Procedure Component Value Units Date/Time    Blood culture x two cultures. Draw prior to antibiotics. [[id number]] Collected: 01/29/24 1320    Order Status: Completed Specimen: Blood Updated: 02/01/24 1432     Blood Culture, Routine No Growth to date      No Growth to date      No Growth to date      No Growth to date    Narrative:      Aerobic and anaerobic    Blood culture x two cultures. Draw prior to antibiotics. [[id number]] Collected: 01/29/24 1321    Order Status: Completed Specimen: Blood from Antecubital, Left Updated: 02/01/24 1432     Blood Culture, Routine No Growth to date      No Growth to date      No Growth to date      No Growth to date    Narrative:      Aerobic and anaerobic    Culture, Respiratory with Gram Stain [[id number]]     Order Status: Canceled Specimen: Respiratory     Influenza A & B by Molecular [[id number]] Collected: 01/29/24 1310    Order Status: Completed Specimen: Nasopharyngeal Swab Updated: 01/29/24 1402     Influenza A, Molecular Negative     Influenza B, Molecular Negative     Flu A & B Source Nasal swab            Radiology: CT Head Without Contrast  CRANIAL CT SCAN WITHOUT CONTRAST    CLINICAL HISTORY: fall    COMPARISON: None.    TECHNIQUE: Radiation dose reduction techniques were utilized, including automated exposure control and exposure modulation based on body size. Multiple axial images of the head were obtained without contrast.    FINDINGS:  There is mild atrophy, somewhat more pronounced in the cerebellum compared to the cerebrum. No acute hemorrhage. There are minimal chronic-appearing  white matter changes. Ventricles, sulci, and cisterns appear normal. Bone window images are unremarkable.    IMPRESSION:  1. No acute intracranial abnormality.    Electronically signed by:  Catracho Rogers MD  02/02/2024 04:14 AM [facility] Workstation: [id number]  CT Cervical Spine Without Contrast  NONCONTRAST CT SCAN CERVICAL SPINE    CLINICAL HISTORY: fall    COMPARISON: None.    TECHNIQUE: Radiation dose reduction techniques were utilized, including automated exposure control and exposure modulation based on body size. Axial noncontrast images of the cervical spine were obtained. Sagittal reformatted images were supplemented.    FINDINGS:  No acute vertebral fracture identified on the axial series. .    There is 1 mm retrolisthesis at C3-4. There is 2 mm of anterolisthesis at C4-5. The remaining cervical vertebrae are well aligned. The facet joints are well aligned bilaterally on the parasagittal images.  No significant compression deformity or retropulsion.    Multilevel degenerative and arthritic changes are present. Marginal osteophytes contributes areas of mild canal and foraminal narrowing bilaterally.    IMPRESSION:  1. No acute osseous abnormality    Electronically signed by:  Catracho Rogers MD  02/02/2024 04:04 AM [facility] Workstation: [id number]        Pending Diagnostic Studies:       None           Medications:  Reconciled Home Medications:      Medication List        START taking these medications      acetaminophen 325 MG tablet  Commonly known as: TYLENOL  Take 2 tablets (650 mg total) by mouth every 8 (eight) hours as needed.     azithromycin 500 MG tablet  Commonly known as: ZITHROMAX  Take 1 tablet (500 mg total) by mouth once daily. for 3 days     cefUROXime 500 MG tablet  Commonly known as: CEFTIN  Take 1 tablet (500 mg total) by mouth 2 (two) times a day. for 3 days     levalbuterol 0.63 mg/3 mL nebulizer solution  Commonly known as: XOPENEX  Take 3 mLs (0.63 mg total) by nebulization every 8 (eight)  hours as needed for Wheezing or Shortness of Breath. Rescue     megestroL 400 mg/10 mL (10 mL) Susp  Commonly known as: MEGACE  Take 5 mLs (200 mg total) by mouth 2 (two) times daily. for 14 days     melatonin 3 mg tablet  Commonly known as: MELATIN  Take 2 tablets (6 mg total) by mouth nightly as needed for Insomnia.     senna-docusate 8.6-50 mg 8.6-50 mg per tablet  Commonly known as: PERICOLACE  Take 1 tablet by mouth 2 (two) times daily. for 14 days            CHANGE how you take these medications      meclizine 12.5 mg tablet  Commonly known as: ANTIVERT  Take 0.5 tablets (6.25 mg total) by mouth 2 (two) times daily as needed for Dizziness (1/2 tablet twice daily as needed for dizziness).  What changed: reasons to take this     verapamiL 120 MG CR tablet  Commonly known as: CALAN-SR  Take 1 tablet (120 mg total) by mouth once daily.  What changed: when to take this            CONTINUE taking these medications      biotin 1 mg tablet  Take 1,000 mcg by mouth once daily.     donepeziL 10 MG tablet  Commonly known as: ARICEPT  Take 10 mg by mouth every evening.     ferrous sulfate 325 mg (65 mg iron) Tab tablet  Commonly known as: FEOSOL  Take 325 mg by mouth every evening.     glycopyrrolate 1 mg Tab  Commonly known as: ROBINUL  Take 1 mg by mouth 2 (two) times daily.     hydroCHLOROthiazide 25 MG tablet  Commonly known as: HYDRODIURIL  Take 0.5 tablets (12.5 mg total) by mouth daily as needed (leg swelling).     linaCLOtide 72 mcg Cap capsule  Commonly known as: LINZESS  Take 1 capsule (72 mcg total) by mouth before breakfast.     lisinopriL 5 MG tablet  Commonly known as: PRINIVIL,ZESTRIL  Take 5 mg by mouth once daily.     pantoprazole 40 MG tablet  Commonly known as: PROTONIX  Take 1 tablet (40 mg total) by mouth once daily.     QUEtiapine 200 MG Tab  Commonly known as: SEROQUEL  Take 200 mg by mouth every evening.     sertraline 25 MG tablet  Commonly known as: ZOLOFT  Take 25 mg by mouth once daily.      tolterodine 2 MG Cp24  Commonly known as: DETROL LA  Take 1 capsule (2 mg total) by mouth once daily. For bladder     tramadol-acetaminophen 37.5-325 mg 37.5-325 mg Tab  Commonly known as: ULTRACET  Take 1 tablet by mouth 2 (two) times a day. for 14 days            STOP taking these medications      famotidine 20 MG tablet  Commonly known as: PEPCID     mupirocin 2 % ointment  Commonly known as: BACTROBAN              Indwelling Lines/Drains at time of discharge:   Lines/Drains/Airways       None                   Time spent on the discharge of patient: 38 minutes         Priscilla Mayfield NP  Department of Hospital Medicine  [facility]/Surg

## 2024-02-01 NOTE — PLAN OF CARE
Problem: Occupational Therapy  Goal: Occupational Therapy Goal  Description: Goals to be met by: 2/15/2024    Patient will increase functional independence with ADLs by performing:    UE Dressing with Minimal Assistance.  LE Dressing with Moderate Assistance.  Grooming with Modified [place].  Toileting from toilet with Minimal Assistance for hygiene and clothing management.   Toilet transfer to toilet with Minimal Assistance.    Outcome: Ongoing, Progressing     OT evaluation completed. POC established.

## 2024-02-01 NOTE — PT/OT/SLP EVAL
Occupational Therapy Evaluation and Treatment    Name: Angelique Hamilton  MRN: [id number]  Admitting Diagnosis: Acute respiratory failure with hypoxia  Recent Surgery: * No surgery found *    Recommendations:     Discharge Recommendations: Moderate Intensity Therapy  Discharge Equipment Recommendations: none  Barriers to discharge: None    Assessment:     Angelique Hamilton is a 89 y.o. female with a medical diagnosis of Acute respiratory failure with hypoxia. She presents with performance deficits affecting function including weakness, impaired endurance, impaired self care skills, impaired functional mobility, gait instability, impaired balance and impaired cognition.     Rehab Prognosis: Good; patient would benefit from acute OT services to address these deficits and reach maximum level of function.    Plan:     Patient to be seen 6 x/week to address the above listed problems via self-care/home management, therapeutic activities, therapeutic exercises  Plan of Care Expires: 02/15/24  Plan of Care Reviewed with: patient    Subjective     Chief Complaint: Fatigue  Patient Comments/Goals: Patient agreed to participate in OT. Pt's son present during session to assist with answering questions and bed mobility.     Pain/Comfort:  Pain Rating 1: 0/10    Patients cultural, spiritual, Restoration conflicts given the current situation: yes    Social History:  Living Environment: Patient lives with her son and daughter in law.  Prior Level of Function: Prior to admission, patient requires assistance with ADLs including bathing, dressing and transferring to the toilet. Patient has required more assistance in the last month.   Equipment Used at Home: bedside commode, walker, rolling, shower chair  DME owned (not currently used): none  Assistance Upon Discharge: family    Objective:     Communicated with nurse prior to session. Patient found supine upon OT entry to room.    General Precautions: Standard, fall, hearing impaired    Orthopedic Precautions: N/A   Braces: N/A    Respiratory Status: Room air    Occupational Performance    Bed Mobility:   Scooting to HOB in supine: maximal assistance  Scooting anteriorly to EOB to have both feet on floor: maximal assistance  Supine to sit with maximal assistance  Sit to Supine with maximal assistance    Functional Mobility/Transfers:  Pt declined.     Activities of Daily Living:  Feeding: TBA  Grooming: set up A/SBA at bed level  Upper Body Dressing: maximal assistance  Lower Body Dressing: total assistance  Toileting: total assistance    Cognitive/Visual Perceptual:  Cognitive/Psychosocial Skills: -     Oriented to: Person and Place  -     Follows Commands/attention: Follows one-step commands  -     Communication: clear/fluent  -     Memory: Impaired STM  -     Safety awareness/insight to disability: impaired  -     Mood/Affect/Coping skills/emotional control: Cooperative and Pleasant    Physical Exam:  Upper Extremity Range of Motion:  -       Right Upper Extremity: WFL  -       Left Upper Extremity: WFL  Upper Extremity Strength: -       Right Upper Extremity: Deficits: 3+/5 grossly  -       Left Upper Extremity: Deficits: 3+/5 grossly    AMPAC 6 Click ADL:  AMPAC Total Score: 13    Treatment & Education:  Educated on the importance of mobility to maximize recovery.  Educated on the importance of OOB mobility within safe range in order to decrease adverse effects of prolonged bedrest.  Therapist provided facilitation and instruction of proper body mechanics strategies during tasks listed above.  Patient educated on role of OT.   Pt sat EOB with min A secondary to posterior lean.       Patient left HOB elevated with all lines intact, call button in reach, bed alarm on and patient's son present.    GOALS:   Multidisciplinary Problems       Occupational Therapy Goals          Problem: Occupational Therapy    Goal Priority Disciplines Outcome Interventions   Occupational Therapy Goal     OT, PT/OT  Ongoing, Progressing    Description: Goals to be met by: 2/15/2024    Patient will increase functional independence with ADLs by performing:    UE Dressing with Minimal Assistance.  LE Dressing with Moderate Assistance.  Grooming with Modified [place].  Toileting from toilet with Minimal Assistance for hygiene and clothing management.   Toilet transfer to toilet with Minimal Assistance.                         History:     Past Medical History:   Diagnosis Date    ACP (advance care planning) 11/04/2015    Back problem     Constipation     History of fractured vertebra     Hyperlipidemia     Hypertension     IBS (irritable bowel syndrome)     Migraine headache     Osteoporosis     Rectal prolapse     Scoliosis     Vertigo          Past Surgical History:   Procedure Laterality Date    LEWIS      Back Pain    EYE SURGERY      Bilateral Cataracs    HYSTERECTOMY      OOPHORECTOMY      ROTATOR CUFF REPAIR      bialteral    TONSILLECTOMY         Time Tracking:     OT Date of Treatment: 02/01/24  OT Start Time: 1005  OT Stop Time: 1030  OT Total Time (min): 25 min    Billable Minutes: Evaluation 15 and Self Care/Home Management 10    2/1/2024

## 2024-02-01 NOTE — PLAN OF CARE
Per Diane with [facility], report can be called to [phone]. Pt going to room A17. [facility] to provide transportation after report has been called.        02/01/24 1400   Post-Acute Status   Post-Acute Authorization Placement   Post-Acute Placement Status Set-up Complete/Auth obtained

## 2024-02-01 NOTE — SUBJECTIVE & OBJECTIVE
Interval History:  Lying in bed, awake and alert.  No acute issues at this time.  States that she feels good today. Participated with PT yesterday without issues.  Had desat study yesterday and does not qualify for home O2.  Daughter at bedside.  Discharge planning is in process, possible SNF placement.    Review of Systems   Unable to perform ROS: Dementia     Objective:     Vital Signs (Most Recent):  Temp: 97.7 °F (36.5 °C) (02/01/24 0842)  Pulse: 82 (02/01/24 0842)  Resp: 17 (02/01/24 0842)  BP: 130/68 (02/01/24 0842)  SpO2: 95 % (02/01/24 0842) Vital Signs (24h Range):  Temp:  [97.2 °F (36.2 °C)-98.2 °F (36.8 °C)] 97.7 °F (36.5 °C)  Pulse:  [68-87] 82  Resp:  [16-18] 17  SpO2:  [93 %-100 %] 95 %  BP: (101-150)/(58-79) 130/68     Weight: 50.8 kg (111 lb 15.9 oz)  Body mass index is 24.24 kg/m².    Intake/Output Summary (Last 24 hours) at 2/1/2024 1102  Last data filed at 2/1/2024 0614  Gross per 24 hour   Intake 461.35 ml   Output --   Net 461.35 ml           Physical Exam  Constitutional:       General: She is not in acute distress.     Comments: Frail, elderly   HENT:      Head: Normocephalic and atraumatic.      Comments: [place]     Nose: Nose normal.      Mouth/Throat:      Mouth: Mucous membranes are moist.      Pharynx: Oropharynx is clear.   Eyes:      Extraocular Movements: Extraocular movements intact.      Conjunctiva/sclera: Conjunctivae normal.      Pupils: Pupils are equal, round, and reactive to light.   Neck:      Comments: Limited range of motion to neck due to rigidity  Cardiovascular:      Rate and Rhythm: Normal rate and regular rhythm.      Pulses: Normal pulses.      Heart sounds: Normal heart sounds.   Pulmonary:      Effort: Pulmonary effort is normal. No respiratory distress.      Breath sounds: Normal breath sounds.   Abdominal:      General: Bowel sounds are normal. There is no distension.      Palpations: Abdomen is soft.      Tenderness: There is no abdominal tenderness.    Musculoskeletal:         General: Normal range of motion.      Cervical back: Neck supple. No tenderness.      Right lower leg: No edema.      Left lower leg: No edema.   Skin:     General: Skin is warm and dry.      Capillary Refill: Capillary refill takes less than 2 seconds.      Comments: Intact Blood blister to tip of right great toe   Neurological:      General: No focal deficit present.      Mental Status: She is alert. She is disoriented.      Motor: Weakness (Generalized) present.   Psychiatric:         Mood and Affect: Mood normal.         Speech: Speech normal.         Behavior: Behavior is cooperative.         Judgment: Judgment is impulsive.             Significant Labs: All pertinent labs within the past 24 hours have been reviewed.  CBC:   Recent Labs   Lab 01/31/24 0305 02/01/24  0529   WBC 5.85 4.01   HGB 11.9* 10.5*   HCT 35.3* 31.6*    143*       CMP:   Recent Labs   Lab 01/31/24 0305 02/01/24  0523    139   K 3.8 3.7    105   CO2 27 28   GLU 94 103   BUN 8 7*   CREATININE 0.7 0.7   CALCIUM 9.6 9.0   PROT 6.2 5.1*   ALBUMIN 3.1* 2.6*   BILITOT 0.9 0.6   ALKPHOS 87 74   AST 51* 43*   ALT 10 10   ANIONGAP 12 6*       Component Ref Range & Units 02/01/24 0523   Phosphorus 2.7 - 4.5 mg/dL 2.6 Low      Component Ref Range & Units 01/31/24 0300     Microbiology Results (last 7 days)       Procedure Component Value Units Date/Time    Blood culture x two cultures. Draw prior to antibiotics. [[id number]] Collected: 01/29/24 1321    Order Status: Completed Specimen: Blood from Antecubital, Left Updated: 01/31/24 1432     Blood Culture, Routine No Growth to date      No Growth to date      No Growth to date    Narrative:      Aerobic and anaerobic    Blood culture x two cultures. Draw prior to antibiotics. [[id number]] Collected: 01/29/24 1320    Order Status: Completed Specimen: Blood Updated: 01/31/24 1432     Blood Culture, Routine No Growth to date      No Growth to date      No  Growth to date    Narrative:      Aerobic and anaerobic    Culture, Respiratory with Gram Stain [[id number]]     Order Status: No result Specimen: Respiratory     Influenza A & B by Molecular [[id number]] Collected: 01/29/24 1310    Order Status: Completed Specimen: Nasopharyngeal Swab Updated: 01/29/24 1402     Influenza A, Molecular Negative     Influenza B, Molecular Negative     Flu A & B Source Nasal swab              Significant Imaging: I have reviewed all pertinent imaging results/findings within the past 24 hours.

## 2024-02-01 NOTE — PROGRESS NOTES
[facility] Medicine  Progress Note    Patient Name: Angelique Hamilton  MRN: [id number]  Patient Class: IP- Inpatient   Admission Date: 1/29/2024  Length of Stay: 3 days  Attending Physician: Bekah Hdz MD  Primary Care Provider: Ryan Hurtado MD        Subjective:     Principal Problem:Acute respiratory failure with hypoxia        HPI:  Angelique Hamilton is a 89 y/o F with a past medical history significant for HLD, HTN, IBS, scoliosis and dementia who presented to the ED for further evaluation of increased fatigue and weakness.  She is accompanied by her daughter in law who states pt appeared to have increased generalized weakness 2 days ago, but symptoms have progressed and she was unable to get out of bed this morning.  She was referred to the ED by her home health nurse.  Per family report, pt was diagnosed with pneumonia and UTI about 2 weeks ago and completed a course of augmentin.  Symptoms are accompanied by low grade fevers, cough and loss of appetite.  Further history is limited due to the patient's chronic dementia.  She was hypoxic at 89% on presentation and was placed on supplemental oxygen 2L.  Findings on CXR today are consistent with LLL pneumonia.  IV antibiotics were initiated.   She will be admitted to the service of hospital medicine for continued medical management.         Interval History:  Lying in bed, awake and alert.  No acute issues at this time.  States that she feels good today. Participated with PT yesterday without issues.  Had desat study yesterday and does not qualify for home O2.  Daughter at bedside.  Discharge planning is in process, possible SNF placement.    Review of Systems   Unable to perform ROS: Dementia     Objective:     Vital Signs (Most Recent):  Temp: 97.7 °F (36.5 °C) (02/01/24 0842)  Pulse: 82 (02/01/24 0842)  Resp: 17 (02/01/24 0842)  BP: 130/68 (02/01/24 0842)  SpO2: 95 % (02/01/24 0842) Vital Signs (24h Range):  Temp:  [97.2 °F  (36.2 °C)-98.2 °F (36.8 °C)] 97.7 °F (36.5 °C)  Pulse:  [68-87] 82  Resp:  [16-18] 17  SpO2:  [93 %-100 %] 95 %  BP: (101-150)/(58-79) 130/68     Weight: 50.8 kg (111 lb 15.9 oz)  Body mass index is 24.24 kg/m².    Intake/Output Summary (Last 24 hours) at 2/1/2024 1102  Last data filed at 2/1/2024 0614  Gross per 24 hour   Intake 461.35 ml   Output --   Net 461.35 ml           Physical Exam  Constitutional:       General: She is not in acute distress.     Comments: Frail, elderly   HENT:      Head: Normocephalic and atraumatic.      Comments: [place]     Nose: Nose normal.      Mouth/Throat:      Mouth: Mucous membranes are moist.      Pharynx: Oropharynx is clear.   Eyes:      Extraocular Movements: Extraocular movements intact.      Conjunctiva/sclera: Conjunctivae normal.      Pupils: Pupils are equal, round, and reactive to light.   Neck:      Comments: Limited range of motion to neck due to rigidity  Cardiovascular:      Rate and Rhythm: Normal rate and regular rhythm.      Pulses: Normal pulses.      Heart sounds: Normal heart sounds.   Pulmonary:      Effort: Pulmonary effort is normal. No respiratory distress.      Breath sounds: Normal breath sounds.   Abdominal:      General: Bowel sounds are normal. There is no distension.      Palpations: Abdomen is soft.      Tenderness: There is no abdominal tenderness.   Musculoskeletal:         General: Normal range of motion.      Cervical back: Neck supple. No tenderness.      Right lower leg: No edema.      Left lower leg: No edema.   Skin:     General: Skin is warm and dry.      Capillary Refill: Capillary refill takes less than 2 seconds.      Comments: Intact Blood blister to tip of right great toe   Neurological:      General: No focal deficit present.      Mental Status: She is alert. She is disoriented.      Motor: Weakness (Generalized) present.   Psychiatric:         Mood and Affect: Mood normal.         Speech: Speech normal.         Behavior: Behavior is  cooperative.         Judgment: Judgment is impulsive.             Significant Labs: All pertinent labs within the past 24 hours have been reviewed.  CBC:   Recent Labs   Lab 01/31/24 0305 02/01/24  0529   WBC 5.85 4.01   HGB 11.9* 10.5*   HCT 35.3* 31.6*    143*       CMP:   Recent Labs   Lab 01/31/24  0305 02/01/24  0523    139   K 3.8 3.7    105   CO2 27 28   GLU 94 103   BUN 8 7*   CREATININE 0.7 0.7   CALCIUM 9.6 9.0   PROT 6.2 5.1*   ALBUMIN 3.1* 2.6*   BILITOT 0.9 0.6   ALKPHOS 87 74   AST 51* 43*   ALT 10 10   ANIONGAP 12 6*       Component Ref Range & Units 02/01/24 0523   Phosphorus 2.7 - 4.5 mg/dL 2.6 Low      Component Ref Range & Units 01/31/24 0300     Microbiology Results (last 7 days)       Procedure Component Value Units Date/Time    Blood culture x two cultures. Draw prior to antibiotics. [[id number]] Collected: 01/29/24 1321    Order Status: Completed Specimen: Blood from Antecubital, Left Updated: 01/31/24 1432     Blood Culture, Routine No Growth to date      No Growth to date      No Growth to date    Narrative:      Aerobic and anaerobic    Blood culture x two cultures. Draw prior to antibiotics. [[id number]] Collected: 01/29/24 1320    Order Status: Completed Specimen: Blood Updated: 01/31/24 1432     Blood Culture, Routine No Growth to date      No Growth to date      No Growth to date    Narrative:      Aerobic and anaerobic    Culture, Respiratory with Gram Stain [[id number]]     Order Status: No result Specimen: Respiratory     Influenza A & B by Molecular [[id number]] Collected: 01/29/24 1310    Order Status: Completed Specimen: Nasopharyngeal Swab Updated: 01/29/24 1402     Influenza A, Molecular Negative     Influenza B, Molecular Negative     Flu A & B Source Nasal swab              Significant Imaging: I have reviewed all pertinent imaging results/findings within the past 24 hours.    Assessment/Plan:      * Acute respiratory failure with hypoxia  Patient with  Hypoxic Respiratory failure which is Acute.  she is not on home oxygen. Supplemental oxygen was provided and noted-      .   Signs/symptoms of respiratory failure include- tachypnea. Contributing diagnoses includes - Pneumonia Labs and images were reviewed. Patient Has not had a recent ABG. Will treat underlying causes and adjust management of respiratory failure as follows- continue supplemental oxygen as needed and treat PNA  -------------------------------------------------------------------------------------  Improving, no longer requiring supplemental O2  Desat study obtained and does not qualify for home O2   Continue supplemental O2 PRN    Pneumonia of left lower lobe due to infectious organism  Failed OP therapy  Blood cultures remain negative.    Sputum culture has not been done due to unable to get collected.  Procalcitonin normal  Continue IV rocephin/ IV azithromycin, nebulizers, supplemental O2  Discharge planning:  Possible SNF placement    Decreased oral intake  Nutrition consulted. Most recent weight and BMI monitored-     Measurements:  Wt Readings from Last 1 Encounters:   01/31/24 50.8 kg (111 lb 15.9 oz)   Body mass index is 24.24 kg/m².    Patient has been screened and assessed by RD.    Malnutrition Characteristic Summary:  Energy Intake (Malnutrition): less than 75% for greater than 7 days    Interventions/Recommendations (treatment strategy):  1. Encourage po intake of texture modified diet per SLP recommendations  2. Recommend to consider appetite stimulant if appropriate  3. Recommend commercial beverages  4. Monitor labs and weights 5. Collaboration with medical providers   -----------------------------------------------------------------------------------------------------------  Continue Megace b.i.d. (started 1/31)  Continue nutritional supplement  Dietitian following      Thrombocytopenia  Chronic/stable  Possibly medication induced, due to recent augmentin, HCTZ  No longer on  Augmentin and HCTZ currently on hold  Monitor labs      Dementia  Patient with dementia with likely etiology of unknown dementia. Dementia is moderate. The patient does not have signs of behavioral disturbance. Home dementia medications are continued.. Continue non-pharmacologic interventions to prevent delirium (No VS between 11PM-5AM, activity during day, opening blinds, providing glasses/hearing aids, and up in chair during daytime). Will avoid narcotics and benzos unless absolutely necessary. PRN anti-psychotics are not prescribed to avoid self harm behaviors.  ---------------------------------------------------------------------------------------  Chronic/stable.  Maintain fall/delirium precautions    Debility  Multifactorial:  Pneumonia, decreased activity level, poor oral intake   Continue PT/OT efforts   Maintain Fall precautions      Iron deficiency anemia  Patient's anemia is currently controlled. Has not received any PRBCs to date. Etiology likely d/t Iron deficiency  Current CBC reviewed-   Lab Results   Component Value Date    HGB 10.5 (L) 02/01/2024    HCT 31.6 (L) 02/01/2024     Monitor serial CBC and transfuse if patient becomes hemodynamically unstable, symptomatic or H/H drops below 7/21.    Aortic stenosis with trileaflet valve  Chronic.  Monitor for volume overload.      Essential hypertension  Chronic, better controlled today. Latest blood pressure and vitals reviewed-     Temp:  [97.2 °F (36.2 °C)-98.2 °F (36.8 °C)]   Pulse:  [68-87]   Resp:  [16-18]   BP: (101-150)/(58-79)   SpO2:  [93 %-100 %] .   Home meds for hypertension were reviewed and noted below.   Hypertension Medications               hydroCHLOROthiazide (HYDRODIURIL) 25 MG tablet Take 0.5 tablets (12.5 mg total) by mouth daily as needed (leg swelling).    lisinopriL (PRINIVIL,ZESTRIL) 5 MG tablet Take 5 mg by mouth once daily.    verapamiL (CALAN-SR) 120 MG CR tablet Take 1 tablet (120 mg total) by mouth once daily.             While in the hospital, will manage blood pressure as follows; Adjust home antihypertensive regimen as follows- pt with episode of hypotension while in the ED requiring fluid bolus with appropriate response noted.  Hold chronic antihypertensives for now and resume when clinically appropriate.    Will utilize p.r.n. blood pressure medication only if patient's blood pressure greater than 180/110 and she develops symptoms such as worsening chest pain or shortness of breath.  -------------------------------------------------------------------------------------------------------  Continue lisinopril and verapamil (resumed 1/31)  Continue holding HCTZ due to lack of oral intake and risk for dehydration  Monitor vitals    ACP (advance care planning)  Advance Care Planning    Date: 01/29/2024    Code Status  In light of the patients advanced age,I engaged the the family in a voluntary conversation about the patient's preferences for care  at the very end of life. The patient wishes to have a natural, peaceful death.  Along those lines, the patient does not wish to have CPR or other invasive treatments performed when her heart and/or breathing stops. I communicated to the family that a DNR order would be placed in her medical record to reflect this preference.  I spent a total of 5 minutes engaging the patient in this advance care planning discussion.                VTE Risk Mitigation (From admission, onward)           Ordered     enoxaparin injection 30 mg  Daily         01/29/24 1708     IP VTE HIGH RISK PATIENT  Once         01/29/24 1708     Place sequential compression device  Until discontinued         01/29/24 1708                    Discharge Planning   FRANKY: 2/2/2024     Code Status: DNR   Is the patient medically ready for discharge?:     Reason for patient still in hospital (select all that apply): Patient trending condition, Treatment, and Pending disposition  Discharge Plan A: Skilled Nursing Facility                   Priscilla Mayfield NP  Department of Hospital Medicine   [facility]/Surg

## 2024-02-01 NOTE — ASSESSMENT & PLAN NOTE
Failed OP therapy  Blood cultures remain negative.    Sputum culture has not been done due to unable to get collected.  Procalcitonin normal  Continue IV rocephin/ IV azithromycin, nebulizers, supplemental O2  Discharge planning:  Possible SNF placement

## 2024-02-01 NOTE — PLAN OF CARE
DC orders sent to [facility] for review       02/01/24 1157   Post-Acute Status   Post-Acute Authorization Placement   Post-Acute Placement Status Pending post-acute provider review/more information requested

## 2024-02-01 NOTE — PLAN OF CARE
Pt clear for DC from case management standpoint. Discharging to [facility]       02/01/24 1407   Final Note   Assessment Type Final Discharge Note   Anticipated Discharge Disposition SNF

## 2024-02-01 NOTE — ASSESSMENT & PLAN NOTE
Chronic, better controlled today. Latest blood pressure and vitals reviewed-     Temp:  [97.2 °F (36.2 °C)-98.2 °F (36.8 °C)]   Pulse:  [68-87]   Resp:  [16-18]   BP: (101-150)/(58-79)   SpO2:  [93 %-100 %] .   Home meds for hypertension were reviewed and noted below.   Hypertension Medications               hydroCHLOROthiazide (HYDRODIURIL) 25 MG tablet Take 0.5 tablets (12.5 mg total) by mouth daily as needed (leg swelling).    lisinopriL (PRINIVIL,ZESTRIL) 5 MG tablet Take 5 mg by mouth once daily.    verapamiL (CALAN-SR) 120 MG CR tablet Take 1 tablet (120 mg total) by mouth once daily.            While in the hospital, will manage blood pressure as follows; Adjust home antihypertensive regimen as follows- pt with episode of hypotension while in the ED requiring fluid bolus with appropriate response noted.  Hold chronic antihypertensives for now and resume when clinically appropriate.    Will utilize p.r.n. blood pressure medication only if patient's blood pressure greater than 180/110 and she develops symptoms such as worsening chest pain or shortness of breath.  -------------------------------------------------------------------------------------------------------  Continue lisinopril and verapamil (resumed 1/31)  Continue holding HCTZ due to lack of oral intake and risk for dehydration  Monitor vitals

## 2024-02-01 NOTE — CARE UPDATE
02/01/24 0758   Patient Assessment/Suction   Level of Consciousness (AVPU) responds to voice   Respiratory Effort Normal;Unlabored   Expansion/Accessory Muscles/Retractions no use of accessory muscles   All Lung Fields Breath Sounds Anterior:   DEREK Breath Sounds clear   LLL Breath Sounds clear   RUL Breath Sounds clear   RLL Breath Sounds clear   Rhythm/Pattern, Respiratory unlabored;pattern regular;depth regular   Cough Frequency infrequent   Cough Type nonproductive   PRE-TX-O2   Device (Oxygen Therapy) room air   SpO2 96 %   Pulse Oximetry Type Intermittent   $ Pulse Oximetry - Multiple Charge Pulse Oximetry - Multiple   Pulse 68   Resp 16   Positioning   Head of Bed (HOB) Positioning HOB at 30-45 degrees   Aerosol Therapy   $ Aerosol Therapy Charges Aerosol Treatment   Daily Review of Necessity (SVN) completed   Respiratory Treatment Status (SVN) given   Treatment Route (SVN) mask;oxygen   Patient Position (SVN) HOB elevated   Post Treatment Assessment (SVN) breath sounds improved   Signs of Intolerance (SVN) none   Breath Sounds Post-Respiratory Treatment   Throughout All Fields Post-Treatment All Fields   Throughout All Fields Post-Treatment clear   Post-treatment Heart Rate (beats/min) 71   Post-treatment Resp Rate (breaths/min) 16   Education   $ Education Bronchodilator

## 2024-02-01 NOTE — PLAN OF CARE
Problem: Physical Therapy  Goal: Physical Therapy Goal  Description: Goals to be met by: 0  2-     Patient will increase functional independence with mobility by performin. Supine to sit with MInimal Assistance  2. Sit to stand transfer with Minimal Assistance  3. Bed to chair transfer with Minimal Assistance using Rolling Walker  4. Gait  x 20 feet with Moderate Assistance using Rolling Walker.   5. Lower extremity exercise program x20 reps   Outcome: Ongoing, Progressing   Pt more alert. Son and daughter in law visiting. Pt requiring max assist for mobility and OOB chair

## 2024-02-01 NOTE — PLAN OF CARE
Problem: Adult Inpatient Plan of Care  Goal: Plan of Care Review  Outcome: Ongoing, Progressing  Goal: Patient-Specific Goal (Individualized)  Outcome: Ongoing, Progressing  Goal: Optimal Comfort and Wellbeing  Outcome: Ongoing, Progressing     Problem: Fall Injury Risk  Goal: Absence of Fall and Fall-Related Injury  Outcome: Ongoing, Progressing     Problem: Skin Injury Risk Increased  Goal: Skin Health and Integrity  Outcome: Ongoing, Progressing     Problem: Impaired Wound Healing  Goal: Optimal Wound Healing  Outcome: Ongoing, Progressing

## 2024-02-01 NOTE — ASSESSMENT & PLAN NOTE
Chronic/stable  Possibly medication induced, due to recent augmentin, HCTZ  No longer on Augmentin and HCTZ currently on hold  Monitor labs

## 2024-02-01 NOTE — PT/OT/SLP PROGRESS
Physical Therapy Treatment    Patient Name:  Angelique Hamilton   MRN:  [id number]    Recommendations:     Discharge Recommendations: Moderate Intensity Therapy  Discharge Equipment Recommendations: none  Barriers to discharge: Decreased caregiver support    Assessment:     Angelique Hamilton is a 89 y.o. female admitted with a medical diagnosis of Acute respiratory failure with hypoxia.  She presents with the following impairments/functional limitations: weakness, impaired endurance, impaired functional mobility, gait instability, impaired balance, impaired cognition, decreased lower extremity function, decreased safety awareness, impaired cardiopulmonary response to activity .    Pt more alert and interactive. Son stated pt requiring assist for all mobility at home and taking few steps to chair or commode. Pt c/o RH pain today with rolling and EOB sitting. Daughter in law stated that she has been c/o pain for a week now.  Pt requiring max assist to sit EOB- pt with bowel incontinence and placed back supine and requiring total assist with hygiene care and diaper change. OOB chair assist x2 and repositioned for comfort..    Rehab Prognosis: Fair; patient would benefit from acute skilled PT services to address these deficits and reach maximum level of function.    Recent Surgery: * No surgery found *      Plan:     During this hospitalization, patient to be seen 6 x/week to address the identified rehab impairments via gait training, therapeutic activities, therapeutic exercises and progress toward the following goals:    Plan of Care Expires:  02/01/24    Subjective     Chief Complaint: RH pain  Patient/Family Comments/goals: none stated  Pain/Comfort:  Pain Rating 1:  (not rated)  Location - Side 1: Right  Location 1: hip  Pain Addressed 1: Reposition, Distraction, Cessation of Activity      Objective:     Communicated with nurse Azevedo prior to session.  Patient found HOB elevated with bed alarm, peripheral IV, telemetry,  oxygen (Avasys) upon PT entry to room.     General Precautions: Standard, fall  Orthopedic Precautions: N/A  Braces: N/A  Respiratory Status: Room air     Functional Mobility:  Bed Mobility:     Scooting: maximal assistance  Supine to Sit: maximal assistance  Transfers:     Sit to Stand:  maximal assistance with no AD  Bed to Chair: maximal assistance with  no AD  using  Squat Pivot      AM-PAC 6 CLICK MOBILITY          Treatment & Education:  Patient was educated on the importance of OOB activity and functional mobility to negate negative effects of prolonged bed rest during hospitalization, safe transfers and ambulation, and D/C planning   Scooting and repositioning requiring 2 people assist for safety    Patient left up in chair with all lines intact, call button in reach, and chair alarm on..    GOALS:   Multidisciplinary Problems       Physical Therapy Goals          Problem: Physical Therapy    Goal Priority Disciplines Outcome Goal Variances Interventions   Physical Therapy Goal     PT, PT/OT Ongoing, Progressing     Description: Goals to be met by: 0  2-     Patient will increase functional independence with mobility by performin. Supine to sit with MInimal Assistance  2. Sit to stand transfer with Minimal Assistance  3. Bed to chair transfer with Minimal Assistance using Rolling Walker  4. Gait  x 20 feet with Moderate Assistance using Rolling Walker.   5. Lower extremity exercise program x20 reps                        Time Tracking:     PT Received On: 24  PT Start Time: 1036     PT Stop Time: 1059  PT Total Time (min): 23 min     Billable Minutes: Therapeutic Activity 23    Treatment Type: Treatment  PT/PTA: PT     Number of PTA visits since last PT visit: 0     2024

## 2024-02-01 NOTE — DISCHARGE INSTRUCTIONS
[facility]  Facility Transfer Orders        Admit to:  Josh Ackerman    Diagnoses:   Active Hospital Problems    Diagnosis  POA    *Acute respiratory failure with hypoxia [J96.01]  Yes     Priority: 1 - High    Pneumonia of left lower lobe due to infectious organism [J18.9]  Yes     Priority: 2     Decreased oral intake [R63.8]  Yes    Dementia [F03.90]  Yes    Debility [R53.81]  Yes    Thrombocytopenia [D69.6]  Yes    Iron deficiency anemia [D50.9]  Yes    Aortic stenosis with trileaflet valve [I35.0]  Yes    Essential hypertension [I10]  Yes    ACP (advance care planning) [Z71.89]  Not Applicable      Resolved Hospital Problems   No resolved problems to display.     Allergies:   Review of patient's allergies indicates:   Allergen Reactions    Antihistamines - alkylamine     Torrie [amlodipine-olmesartan]     Flexeril [cyclobenzaprine]      Hallucinations    Hydrocodone     Hydrocodone-acetaminophen Other (See Comments)    Simvastatin     Statins-hmg-coa reductase inhibitors        Code Status: DNR    Vitals: Routine       Diet: regular diet    Activity: Activity as tolerated    Nursing Precautions: Fall    Bed/Surface: Low Air Loss    Consults: PT to evaluate and treat- 5 times a week, OT to evaluate and treat- 5 times a week, and Nutrition to evaluate and recommend diet    Oxygen: 2 liters/min via nasal cannula prn SPO2 <92%    Dialysis: Patient is not on dialysis.     Labs:  CBL and BMP Monday   Pending Diagnostic Studies:       None          Imaging:  none    Miscellaneous Care:  None    IV Access:  None     Medications: Discontinue all previous medication orders, if any. See new list below.  Current Discharge Medication List        START taking these medications    Details   acetaminophen (TYLENOL) 325 MG tablet Take 2 tablets (650 mg total) by mouth every 8 (eight) hours as needed.  Refills: 0      azithromycin (ZITHROMAX) 500 MG tablet Take 1 tablet (500 mg total) by mouth once daily. for 3  days  Qty: 3 tablet, Refills: 0      cefUROXime (CEFTIN) 500 MG tablet Take 1 tablet (500 mg total) by mouth 2 (two) times a day. for 3 days  Qty: 6 tablet, Refills: 0      levalbuterol (XOPENEX) 0.63 mg/3 mL nebulizer solution Take 3 mLs (0.63 mg total) by nebulization every 8 (eight) hours as needed for Wheezing or Shortness of Breath. Rescue  Qty: 126 mL, Refills: 0      megestroL (MEGACE) 400 mg/10 mL (10 mL) Susp Take 5 mLs (200 mg total) by mouth 2 (two) times daily. for 14 days  Qty: 140 mL, Refills: 0      melatonin (MELATIN) 3 mg tablet Take 2 tablets (6 mg total) by mouth nightly as needed for Insomnia.  Qty: 14 tablet, Refills: 0      senna-docusate 8.6-50 mg (PERICOLACE) 8.6-50 mg per tablet Take 1 tablet by mouth 2 (two) times daily. for 14 days  Qty: 28 tablet, Refills: 0           CONTINUE these medications which have NOT CHANGED    Details   biotin 1 mg tablet Take 1,000 mcg by mouth once daily.      donepeziL (ARICEPT) 10 MG tablet Take 10 mg by mouth every evening.      ferrous sulfate (FEOSOL) 325 mg (65 mg iron) Tab tablet Take 325 mg by mouth every evening.      glycopyrrolate (ROBINUL) 1 mg Tab Take 1 mg by mouth 2 (two) times daily.      hydroCHLOROthiazide (HYDRODIURIL) 25 MG tablet Take 0.5 tablets (12.5 mg total) by mouth daily as needed (leg swelling).  Qty: 45 tablet, Refills: 1    Comments: .  Associated Diagnoses: Essential hypertension      linaCLOtide (LINZESS) 72 mcg Cap capsule Take 1 capsule (72 mcg total) by mouth before breakfast.  Qty: 30 capsule, Refills: 5    Associated Diagnoses: Slow transit constipation      lisinopriL (PRINIVIL,ZESTRIL) 5 MG tablet Take 5 mg by mouth once daily.      meclizine (ANTIVERT) 12.5 mg tablet Take 0.5 tablets (6.25 mg total) by mouth 2 (two) times daily as needed for Dizziness (1/2 tablet twice daily as needed for dizziness).  Qty: 90 tablet, Refills: 1    Associated Diagnoses: Vertigo      pantoprazole (PROTONIX) 40 MG tablet Take 1 tablet (40  mg total) by mouth once daily.  Qty: 90 tablet, Refills: 1      QUEtiapine (SEROQUEL) 200 MG Tab Take 200 mg by mouth every evening.      sertraline (ZOLOFT) 25 MG tablet Take 25 mg by mouth once daily.      tolterodine (DETROL LA) 2 MG Cp24 Take 1 capsule (2 mg total) by mouth once daily. For bladder  Qty: 90 capsule, Refills: 3    Associated Diagnoses: OAB (overactive bladder)      tramadol-acetaminophen 37.5-325 mg (ULTRACET) 37.5-325 mg Tab Take 1 tablet by mouth 2 (two) times a day.  Qty: 180 tablet, Refills: 0    Comments: Quantity prescribed more than 7 day supply? Yes, quantity medically necessary      verapamiL (CALAN-SR) 120 MG CR tablet Take 1 tablet (120 mg total) by mouth once daily.  Qty: 90 tablet, Refills: 3    Associated Diagnoses: Essential hypertension           STOP taking these medications       famotidine (PEPCID) 20 MG tablet Comments:   Reason for Stopping:         mupirocin (BACTROBAN) 2 % ointment Comments:   Reason for Stopping:             Follow up:    Follow-up Information       Ryan Hurtado MD Follow up on 2/20/2024.    Specialties: Family Medicine, Home Health Services, Hospice Services  Why: 11:40 Hosptial follow up  Contact information:  [address] [facility]  [address] [place] [id number]  [phone]                               Immunizations Administered as of 2/1/2024       Name Date Dose VIS Date Route Exp Date    COVID-19, MRNA, LN-S, PF (Pfizer) (Purple Cap) 1/3/2022 0.3 mL 5/10/2021 Intramuscular --    Site: Right deltoid     : [employer]     Lot: [id number]     External: MyChart Entered     Comment: Adminis                 Some patients may experience side effects after vaccination.  These may include fever, headache, muscle or joint aches.  Most symptoms resolve with 24-48 hours and do not require urgent medical evaluation unless they persist for more than 72 hours or symptoms are concerning for an unrelated medical condition.          Priscilla Mayfield NP

## 2024-02-02 ENCOUNTER — HOSPITAL ENCOUNTER (INPATIENT)
Facility: HOSPITAL | Age: 89
LOS: 3 days | Discharge: NURSING, REHAB OR LONG-TERM CARE | DRG: 948 | End: 2024-02-06
Attending: EMERGENCY MEDICINE | Admitting: INTERNAL MEDICINE
Payer: MEDICARE

## 2024-02-02 DIAGNOSIS — I21.4 NSTEMI (NON-ST ELEVATED MYOCARDIAL INFARCTION): ICD-10-CM

## 2024-02-02 PROBLEM — R79.89 ELEVATED TROPONIN: Status: ACTIVE | Noted: 2024-02-02
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
## 2024-02-02 LAB
ALBUMIN SERPL BCP-MCNC: 3.3 G/DL (ref 3.5–5.2)
ALP SERPL-CCNC: 78 U/L (ref 55–135)
ALT SERPL W/O P-5'-P-CCNC: 11 U/L (ref 10–44)
ANION GAP SERPL CALC-SCNC: 8 MMOL/L (ref 8–16)
AST SERPL-CCNC: 40 U/L (ref 10–40)
BASOPHILS # BLD AUTO: 0.04 K/UL (ref 0–0.2)
BASOPHILS NFR BLD: 0.9 % (ref 0–1.9)
BILIRUB SERPL-MCNC: 0.8 MG/DL (ref 0.1–1)
BILIRUB UR QL STRIP: NEGATIVE
BNP SERPL-MCNC: 455 PG/ML (ref 0–99)
BUN SERPL-MCNC: 10 MG/DL (ref 8–23)
CALCIUM SERPL-MCNC: 9.7 MG/DL (ref 8.7–10.5)
CHLORIDE SERPL-SCNC: 106 MMOL/L (ref 95–110)
CLARITY UR: ABNORMAL
CO2 SERPL-SCNC: 27 MMOL/L (ref 23–29)
COLOR UR: YELLOW
CREAT SERPL-MCNC: 0.8 MG/DL (ref 0.5–1.4)
DIFFERENTIAL METHOD BLD: ABNORMAL
EOSINOPHIL # BLD AUTO: 0.1 K/UL (ref 0–0.5)
EOSINOPHIL NFR BLD: 1.3 % (ref 0–8)
ERYTHROCYTE [DISTWIDTH] IN BLOOD BY AUTOMATED COUNT: 13 % (ref 11.5–14.5)
EST. GFR  (NO RACE VARIABLE): >60 ML/MIN/1.73 M^2
GLUCOSE SERPL-MCNC: 100 MG/DL (ref 70–110)
GLUCOSE SERPL-MCNC: 91 MG/DL (ref 70–110)
GLUCOSE SERPL-MCNC: 93 MG/DL (ref 70–110)
GLUCOSE UR QL STRIP: NEGATIVE
HCT VFR BLD AUTO: 35.4 % (ref 37–48.5)
HGB BLD-MCNC: 11.8 G/DL (ref 12–16)
HGB UR QL STRIP: NEGATIVE
IMM GRANULOCYTES # BLD AUTO: 0.08 K/UL (ref 0–0.04)
IMM GRANULOCYTES NFR BLD AUTO: 1.7 % (ref 0–0.5)
KETONES UR QL STRIP: ABNORMAL
LEUKOCYTE ESTERASE UR QL STRIP: NEGATIVE
LYMPHOCYTES # BLD AUTO: 1.1 K/UL (ref 1–4.8)
LYMPHOCYTES NFR BLD: 24.2 % (ref 18–48)
MCH RBC QN AUTO: 32.8 PG (ref 27–31)
MCHC RBC AUTO-ENTMCNC: 33.3 G/DL (ref 32–36)
MCV RBC AUTO: 98 FL (ref 82–98)
MONOCYTES # BLD AUTO: 0.4 K/UL (ref 0.3–1)
MONOCYTES NFR BLD: 9.6 % (ref 4–15)
NEUTROPHILS # BLD AUTO: 2.9 K/UL (ref 1.8–7.7)
NEUTROPHILS NFR BLD: 62.3 % (ref 38–73)
NITRITE UR QL STRIP: NEGATIVE
NRBC BLD-RTO: 0 /100 WBC
PH UR STRIP: >8 [PH] (ref 5–8)
PLATELET # BLD AUTO: 153 K/UL (ref 150–450)
PMV BLD AUTO: 10.3 FL (ref 9.2–12.9)
POTASSIUM SERPL-SCNC: 4.3 MMOL/L (ref 3.5–5.1)
PROT SERPL-MCNC: 6.2 G/DL (ref 6–8.4)
PROT UR QL STRIP: NEGATIVE
RBC # BLD AUTO: 3.6 M/UL (ref 4–5.4)
SODIUM SERPL-SCNC: 141 MMOL/L (ref 136–145)
SP GR UR STRIP: 1.01 (ref 1–1.03)
TROPONIN I SERPL HS-MCNC: 52.2 PG/ML (ref 0–14.9)
TROPONIN I SERPL HS-MCNC: 82.7 PG/ML (ref 0–14.9)
URN SPEC COLLECT METH UR: ABNORMAL
UROBILINOGEN UR STRIP-ACNC: NEGATIVE EU/DL
WBC # BLD AUTO: 4.59 K/UL (ref 3.9–12.7)

## 2024-02-02 PROCEDURE — 96367 TX/PROPH/DG ADDL SEQ IV INF: CPT

## 2024-02-02 PROCEDURE — 83880 ASSAY OF NATRIURETIC PEPTIDE: CPT | Performed by: EMERGENCY MEDICINE

## 2024-02-02 PROCEDURE — 97530 THERAPEUTIC ACTIVITIES: CPT

## 2024-02-02 PROCEDURE — 93010 ELECTROCARDIOGRAM REPORT: CPT | Mod: ,,, | Performed by: GENERAL PRACTICE

## 2024-02-02 PROCEDURE — 84484 ASSAY OF TROPONIN QUANT: CPT | Performed by: EMERGENCY MEDICINE

## 2024-02-02 PROCEDURE — 99291 CRITICAL CARE FIRST HOUR: CPT

## 2024-02-02 PROCEDURE — 93005 ELECTROCARDIOGRAM TRACING: CPT | Performed by: GENERAL PRACTICE

## 2024-02-02 PROCEDURE — 25000003 PHARM REV CODE 250: Performed by: INTERNAL MEDICINE

## 2024-02-02 PROCEDURE — 85025 COMPLETE CBC W/AUTO DIFF WBC: CPT | Performed by: EMERGENCY MEDICINE

## 2024-02-02 PROCEDURE — 82962 GLUCOSE BLOOD TEST: CPT

## 2024-02-02 PROCEDURE — 97535 SELF CARE MNGMENT TRAINING: CPT

## 2024-02-02 PROCEDURE — 81003 URINALYSIS AUTO W/O SCOPE: CPT | Performed by: INTERNAL MEDICINE

## 2024-02-02 PROCEDURE — 84484 ASSAY OF TROPONIN QUANT: CPT | Mod: 91 | Performed by: INTERNAL MEDICINE

## 2024-02-02 PROCEDURE — G0378 HOSPITAL OBSERVATION PER HR: HCPCS

## 2024-02-02 PROCEDURE — 94761 N-INVAS EAR/PLS OXIMETRY MLT: CPT

## 2024-02-02 PROCEDURE — 80053 COMPREHEN METABOLIC PANEL: CPT | Performed by: EMERGENCY MEDICINE

## 2024-02-02 PROCEDURE — S0179 MEGESTROL 20 MG: HCPCS | Performed by: INTERNAL MEDICINE

## 2024-02-02 PROCEDURE — 97166 OT EVAL MOD COMPLEX 45 MIN: CPT

## 2024-02-02 PROCEDURE — 96365 THER/PROPH/DIAG IV INF INIT: CPT

## 2024-02-02 PROCEDURE — 97162 PT EVAL MOD COMPLEX 30 MIN: CPT

## 2024-02-02 PROCEDURE — 63600175 PHARM REV CODE 636 W HCPCS: Performed by: INTERNAL MEDICINE

## 2024-02-02 RX ORDER — NAPROXEN SODIUM 220 MG/1
325 TABLET, FILM COATED ORAL DAILY
Status: DISCONTINUED | OUTPATIENT
Start: 2024-02-02 | End: 2024-02-02

## 2024-02-02 RX ORDER — TALC
6 POWDER (GRAM) TOPICAL NIGHTLY PRN
Status: DISCONTINUED | OUTPATIENT
Start: 2024-02-02 | End: 2024-02-06 | Stop reason: HOSPADM

## 2024-02-02 RX ORDER — NAPROXEN SODIUM 220 MG/1
325 TABLET, FILM COATED ORAL DAILY
Status: DISCONTINUED | OUTPATIENT
Start: 2024-02-02 | End: 2024-02-06 | Stop reason: HOSPADM

## 2024-02-02 RX ORDER — ASPIRIN 325 MG
325 TABLET ORAL
Status: DISCONTINUED | OUTPATIENT
Start: 2024-02-02 | End: 2024-02-02

## 2024-02-02 RX ORDER — QUETIAPINE FUMARATE 100 MG/1
200 TABLET, FILM COATED ORAL NIGHTLY
Status: DISCONTINUED | OUTPATIENT
Start: 2024-02-02 | End: 2024-02-06 | Stop reason: HOSPADM

## 2024-02-02 RX ORDER — LISINOPRIL 5 MG/1
5 TABLET ORAL DAILY
Status: DISCONTINUED | OUTPATIENT
Start: 2024-02-02 | End: 2024-02-06 | Stop reason: HOSPADM

## 2024-02-02 RX ORDER — ONDANSETRON 4 MG/1
8 TABLET, ORALLY DISINTEGRATING ORAL EVERY 8 HOURS PRN
Status: DISCONTINUED | OUTPATIENT
Start: 2024-02-02 | End: 2024-02-06 | Stop reason: HOSPADM

## 2024-02-02 RX ORDER — SODIUM CHLORIDE 0.9 % (FLUSH) 0.9 %
10 SYRINGE (ML) INJECTION EVERY 12 HOURS
Status: DISCONTINUED | OUTPATIENT
Start: 2024-02-02 | End: 2024-02-02

## 2024-02-02 RX ORDER — DONEPEZIL HYDROCHLORIDE 5 MG/1
10 TABLET, FILM COATED ORAL NIGHTLY
Status: DISCONTINUED | OUTPATIENT
Start: 2024-02-02 | End: 2024-02-06 | Stop reason: HOSPADM

## 2024-02-02 RX ORDER — LANOLIN ALCOHOL/MO/W.PET/CERES
1 CREAM (GRAM) TOPICAL DAILY
Status: DISCONTINUED | OUTPATIENT
Start: 2024-02-02 | End: 2024-02-06 | Stop reason: HOSPADM

## 2024-02-02 RX ORDER — PANTOPRAZOLE SODIUM 40 MG/1
40 TABLET, DELAYED RELEASE ORAL
Status: DISCONTINUED | OUTPATIENT
Start: 2024-02-02 | End: 2024-02-06 | Stop reason: HOSPADM

## 2024-02-02 RX ORDER — VERAPAMIL HYDROCHLORIDE 120 MG/1
120 CAPSULE, EXTENDED RELEASE ORAL DAILY
Status: DISCONTINUED | OUTPATIENT
Start: 2024-02-02 | End: 2024-02-06 | Stop reason: HOSPADM

## 2024-02-02 RX ORDER — HYDROCHLOROTHIAZIDE 12.5 MG/1
12.5 TABLET ORAL DAILY PRN
Status: DISCONTINUED | OUTPATIENT
Start: 2024-02-02 | End: 2024-02-06 | Stop reason: HOSPADM

## 2024-02-02 RX ORDER — SERTRALINE HYDROCHLORIDE 25 MG/1
25 TABLET, FILM COATED ORAL DAILY
Status: DISCONTINUED | OUTPATIENT
Start: 2024-02-02 | End: 2024-02-06 | Stop reason: HOSPADM

## 2024-02-02 RX ORDER — AMOXICILLIN 250 MG
1 CAPSULE ORAL 2 TIMES DAILY
Status: DISCONTINUED | OUTPATIENT
Start: 2024-02-02 | End: 2024-02-06 | Stop reason: HOSPADM

## 2024-02-02 RX ORDER — MEGESTROL ACETATE 40 MG/ML
200 SUSPENSION ORAL 2 TIMES DAILY
Status: DISCONTINUED | OUTPATIENT
Start: 2024-02-02 | End: 2024-02-06 | Stop reason: HOSPADM

## 2024-02-02 RX ADMIN — MEGESTROL ACETATE 200 MG: 400 SUSPENSION ORAL at 08:02

## 2024-02-02 RX ADMIN — MEGESTROL ACETATE 200 MG: 400 SUSPENSION ORAL at 09:02

## 2024-02-02 RX ADMIN — QUETIAPINE 200 MG: 100 TABLET ORAL at 09:02

## 2024-02-02 RX ADMIN — VERAPAMIL HYDROCHLORIDE 120 MG: 120 CAPSULE, EXTENDED RELEASE ORAL at 08:02

## 2024-02-02 RX ADMIN — SENNOSIDES AND DOCUSATE SODIUM 1 TABLET: 8.6; 5 TABLET ORAL at 09:02

## 2024-02-02 RX ADMIN — ASPIRIN 81 MG 324 MG: 81 TABLET ORAL at 05:02

## 2024-02-02 RX ADMIN — AZITHROMYCIN DIHYDRATE 500 MG: 500 INJECTION, POWDER, LYOPHILIZED, FOR SOLUTION INTRAVENOUS at 04:02

## 2024-02-02 RX ADMIN — LISINOPRIL 5 MG: 2.5 TABLET ORAL at 08:02

## 2024-02-02 RX ADMIN — SERTRALINE HYDROCHLORIDE 25 MG: 25 TABLET ORAL at 08:02

## 2024-02-02 RX ADMIN — DONEPEZIL HYDROCHLORIDE 10 MG: 5 TABLET ORAL at 09:02

## 2024-02-02 RX ADMIN — SENNOSIDES AND DOCUSATE SODIUM 1 TABLET: 8.6; 5 TABLET ORAL at 08:02

## 2024-02-02 RX ADMIN — FERROUS SULFATE TAB 325 MG (65 MG ELEMENTAL FE) 1 EACH: 325 (65 FE) TAB at 08:02

## 2024-02-02 RX ADMIN — CEFTRIAXONE SODIUM 1 G: 1 INJECTION, POWDER, FOR SOLUTION INTRAMUSCULAR; INTRAVENOUS at 09:02

## 2024-02-02 NOTE — PT/OT/SLP EVAL
Occupational Therapy   Evaluation    Name: Angelique Hamilton  MRN: [id number]  Admitting Diagnosis: Elevated troponin  Recent Surgery: * No surgery found *      Recommendations:     Discharge Recommendations: Moderate Intensity Therapy  Discharge Equipment Recommendations:  none  Barriers to discharge:  None    Assessment:     Angelique Hamilton is a 89 y.o. female with a medical diagnosis of Elevated troponin.  Patient limited with overall mobility due to weakness and poor cervical and thoracic posture. Patient sat EOB requiring Max A. Patient was able to stand requiring Mod A using RW. Patient was able to stand for ~20 seconds before requesting to return to bed. Patient is a poor historian of PLOF as well. Performance deficits affecting function: weakness, impaired endurance, impaired self care skills, impaired functional mobility, gait instability, impaired balance, decreased lower extremity function, decreased upper extremity function, decreased coordination, decreased ROM, impaired cognition, decreased safety awareness.      Rehab Prognosis: Fair; patient would benefit from acute skilled OT services to address these deficits and reach maximum level of function.       Plan:     Patient to be seen 4 x/week to address the above listed problems via self-care/home management, therapeutic activities, therapeutic exercises  Plan of Care Expires: 03/01/24  Plan of Care Reviewed with: patient    Subjective     Chief Complaint: none  Patient/Family Comments/goals: none    Occupational Profile:  Living Environment: Patient is from NH. Prior to NH stay, patient was living son and daughter-in-law.   Previous level of function: Patient was at NH only for one day before current hospitalization. Uncertain of PLOF at NH. At home, patient required assist with bath/dress/toileting. Patient required assist with mobility.   Equipment Used at Home: bedside commode, walker, rolling, shower chair  Assistance upon Discharge: Patient will  receive assistance from NH staff.     Pain/Comfort:  Pain Rating 1: 0/10  Pain Rating Post-Intervention 1: 0/10    Patients cultural, spiritual, Nondenominational conflicts given the current situation:      Objective:     Communicated with: nurse Andres prior to session.  Patient found HOB elevated with blood pressure cuff, PureWick, peripheral IV, pulse ox (continuous), telemetry upon OT entry to room.    General Precautions: Standard, fall, hearing impaired  Orthopedic Precautions: N/A  Braces: N/A  Respiratory Status: Room air    Occupational Performance:    Bed Mobility:    Patient completed Scooting/Bridging with maximal assistance  Patient completed Supine to Sit with maximal assistance  Patient completed Sit to Supine with maximal assistance    Functional Mobility/Transfers:  Patient completed Sit <> Stand Transfer with moderate assistance  with  rolling walker     Activities of Daily Living:  Grooming: contact guard assistance with all grooming tasks at EOB  Lower Body Dressing: maximal assistance to don/doff socks while seated EOB  Toileting: dependence with Purewick in place    Cognitive/Visual Perceptual:  Cognitive/Psychosocial Skills:     -       Oriented to: personal, place, situation   -       Follows Commands/attention:Follows two-step commands  -       Communication: clear/fluent  -       Safety awareness/insight to disability: impaired   -       Mood/Affect/Coping skills/emotional control: Appropriate to situation and Cooperative  Visual/Perceptual:      -Intact     Physical Exam:  Postural examination/scapula alignment:    -       Rounded shoulders  -       Forward head  -       Kyphosis  -       L cervical flexion (possibly fixed)  Upper Extremity Range of Motion:     -       Right Upper Extremity: WFL except at shldr  -       Left Upper Extremity: WFL except at shldr  Upper Extremity Strength:    -       Right Upper Extremity: WFL except at shldr  -       Left Upper Extremity: WFL except at  shldr   Strength:    -       Right Upper Extremity: WFL  -       Left Upper Extremity: WFL  Fine Motor Coordination:    -       Intact  Gross motor coordination:   Limited with BUE    AMPAC 6 Click ADL:  AMPAC Total Score: 14    Treatment & Education:  OT ed pt on OT role & POC as well as discharge recommendations.  OT ed patient on safety with walker use for functional mobility with cues for hand placement & sequencing.       Patient left HOB elevated with all lines intact, call button in reach, and nurse notified    GOALS:   Multidisciplinary Problems       Occupational Therapy Goals          Problem: Occupational Therapy    Goal Priority Disciplines Outcome Interventions   Occupational Therapy Goal     OT, PT/OT Ongoing, Progressing    Description: Goals to be met by: 3/1/2024     Patient will increase functional independence with ADLs by performing:    LE Dressing with Stand-by Assistance and Assistive Devices as needed.  Grooming while EOB with Supervision.  Toileting from bedside commode with Minimal Assistance for hygiene and clothing management.   Supine to sit with Stand-by Assistance.  Toilet transfer to bedside commode with Stand-by Assistance.                         History:     Past Medical History:   Diagnosis Date    ACP (advance care planning) 11/04/2015    Back problem     Constipation     History of fractured vertebra     Hyperlipidemia     Hypertension     IBS (irritable bowel syndrome)     Migraine headache     Osteoporosis     Rectal prolapse     Scoliosis     Vertigo          Past Surgical History:   Procedure Laterality Date    LEWIS      Back Pain    EYE SURGERY      Bilateral Cataracs    HYSTERECTOMY      OOPHORECTOMY      ROTATOR CUFF REPAIR      bialteral    TONSILLECTOMY         Time Tracking:     OT Date of Treatment: 02/02/24  OT Start Time: 0929  OT Stop Time: 0949  OT Total Time (min): 20 min    Billable Minutes:Evaluation 10  Self Care/Home Management 10    2/2/2024

## 2024-02-02 NOTE — ASSESSMENT & PLAN NOTE
Likely mechanical fall considering her story.    PTOT to evaluate and treat.    CT head and cervical spine unremarkable.

## 2024-02-02 NOTE — ASSESSMENT & PLAN NOTE
High sensitivity troponin 82.7.    Continue trending troponins q.3h.    Consider cardiology consult if worsening.    Morning EKG.    No active chest pains.

## 2024-02-02 NOTE — SUBJECTIVE & OBJECTIVE
Past Medical History:   Diagnosis Date    ACP (advance care planning) 11/04/2015    Back problem     Constipation     History of fractured vertebra     Hyperlipidemia     Hypertension     IBS (irritable bowel syndrome)     Migraine headache     Osteoporosis     Rectal prolapse     Scoliosis     Vertigo        Past Surgical History:   Procedure Laterality Date    LEWIS      Back Pain    EYE SURGERY      Bilateral Cataracs    HYSTERECTOMY      OOPHORECTOMY      ROTATOR CUFF REPAIR      bialteral    TONSILLECTOMY         Review of patient's allergies indicates:   Allergen Reactions    Antihistamines - alkylamine     Torrie [amlodipine-olmesartan]     Flexeril [cyclobenzaprine]      Hallucinations    Hydrocodone     Hydrocodone-acetaminophen Other (See Comments)    Simvastatin     Statins-hmg-coa reductase inhibitors        Current Facility-Administered Medications on File Prior to Encounter   Medication    [DISCONTINUED] 0.45% NaCl infusion    [DISCONTINUED] acetaminophen tablet 650 mg    [DISCONTINUED] aluminum-magnesium hydroxide-simethicone 200-200-20 mg/5 mL suspension 30 mL    [DISCONTINUED] azithromycin (ZITHROMAX) 500 mg in dextrose 5 % (D5W) 250 mL IVPB (Vial-Mate)    [DISCONTINUED] cefTRIAXone (Rocephin) 1 g in dextrose 5 % in water (D5W) 100 mL IVPB (MB+)    [DISCONTINUED] dextrose 10% bolus 125 mL 125 mL    [DISCONTINUED] dextrose 10% bolus 250 mL 250 mL    [DISCONTINUED] donepeziL tablet 10 mg    [DISCONTINUED] enoxaparin injection 30 mg    [DISCONTINUED] ferrous sulfate tablet 1 each    [DISCONTINUED] glucagon (human recombinant) injection 1 mg    [DISCONTINUED] glucose chewable tablet 16 g    [DISCONTINUED] glucose chewable tablet 24 g    [DISCONTINUED] glycopyrrolate tablet 1 mg    [DISCONTINUED] levalbuterol nebulizer solution 0.63 mg    [DISCONTINUED] linaCLOtide capsule 72 mcg    [DISCONTINUED] lisinopriL tablet 5 mg    [DISCONTINUED] magnesium oxide tablet 800 mg    [DISCONTINUED] magnesium oxide tablet  800 mg    [DISCONTINUED] megestroL 400 mg/10 mL (10 mL) suspension 200 mg    [DISCONTINUED] melatonin tablet 6 mg    [DISCONTINUED] naloxone 0.4 mg/mL injection 0.02 mg    [DISCONTINUED] ondansetron injection 4 mg    [DISCONTINUED] oxybutynin 24 hr tablet 5 mg    [DISCONTINUED] pantoprazole EC tablet 40 mg    [DISCONTINUED] potassium bicarbonate disintegrating tablet 35 mEq    [DISCONTINUED] potassium bicarbonate disintegrating tablet 50 mEq    [DISCONTINUED] potassium bicarbonate disintegrating tablet 60 mEq    [DISCONTINUED] potassium, sodium phosphates 280-160-250 mg packet 2 packet    [DISCONTINUED] potassium, sodium phosphates 280-160-250 mg packet 2 packet    [DISCONTINUED] potassium, sodium phosphates 280-160-250 mg packet 2 packet    [DISCONTINUED] QUEtiapine tablet 200 mg    [DISCONTINUED] senna-docusate 8.6-50 mg per tablet 1 tablet    [DISCONTINUED] sertraline tablet 25 mg    [DISCONTINUED] simethicone chewable tablet 80 mg    [DISCONTINUED] sodium chloride 0.9% flush 10 mL    [DISCONTINUED] traMADoL tablet 50 mg    [DISCONTINUED] verapamiL CR tablet 120 mg     Current Outpatient Medications on File Prior to Encounter   Medication Sig    acetaminophen (TYLENOL) 325 MG tablet Take 2 tablets (650 mg total) by mouth every 8 (eight) hours as needed.    azithromycin (ZITHROMAX) 500 MG tablet Take 1 tablet (500 mg total) by mouth once daily. for 3 days    biotin 1 mg tablet Take 1,000 mcg by mouth once daily.    cefUROXime (CEFTIN) 500 MG tablet Take 1 tablet (500 mg total) by mouth 2 (two) times a day. for 3 days    donepeziL (ARICEPT) 10 MG tablet Take 10 mg by mouth every evening.    ferrous sulfate (FEOSOL) 325 mg (65 mg iron) Tab tablet Take 325 mg by mouth every evening.    glycopyrrolate (ROBINUL) 1 mg Tab Take 1 mg by mouth 2 (two) times daily.    hydroCHLOROthiazide (HYDRODIURIL) 25 MG tablet Take 0.5 tablets (12.5 mg total) by mouth daily as needed (leg swelling).    levalbuterol (XOPENEX) 0.63 mg/3  mL nebulizer solution Take 3 mLs (0.63 mg total) by nebulization every 8 (eight) hours as needed for Wheezing or Shortness of Breath. Rescue    linaCLOtide (LINZESS) 72 mcg Cap capsule Take 1 capsule (72 mcg total) by mouth before breakfast.    lisinopriL (PRINIVIL,ZESTRIL) 5 MG tablet Take 5 mg by mouth once daily.    meclizine (ANTIVERT) 12.5 mg tablet Take 0.5 tablets (6.25 mg total) by mouth 2 (two) times daily as needed for Dizziness (1/2 tablet twice daily as needed for dizziness).    megestroL (MEGACE) 400 mg/10 mL (10 mL) Susp Take 5 mLs (200 mg total) by mouth 2 (two) times daily. for 14 days    melatonin (MELATIN) 3 mg tablet Take 2 tablets (6 mg total) by mouth nightly as needed for Insomnia.    pantoprazole (PROTONIX) 40 MG tablet Take 1 tablet (40 mg total) by mouth once daily.    QUEtiapine (SEROQUEL) 200 MG Tab Take 200 mg by mouth every evening.    senna-docusate 8.6-50 mg (PERICOLACE) 8.6-50 mg per tablet Take 1 tablet by mouth 2 (two) times daily. for 14 days    sertraline (ZOLOFT) 25 MG tablet Take 25 mg by mouth once daily.    tolterodine (DETROL LA) 2 MG Cp24 Take 1 capsule (2 mg total) by mouth once daily. For bladder    tramadol-acetaminophen 37.5-325 mg (ULTRACET) 37.5-325 mg Tab Take 1 tablet by mouth 2 (two) times a day. for 14 days    verapamiL (CALAN-SR) 120 MG CR tablet Take 1 tablet (120 mg total) by mouth once daily. (Patient taking differently: Take 120 mg by mouth nightly.)     Family History       Problem Relation (Age of Onset)    Breast cancer Mother (70)    Colon cancer Father    Hypertension Mother          Tobacco Use    Smoking status: Never    Smokeless tobacco: Never   Substance and Sexual Activity    Alcohol use: No    Drug use: No    Sexual activity: Not Currently     Partners: Male     Birth control/protection: None     Review of Systems   Respiratory:  Negative for cough, shortness of breath and wheezing.    Cardiovascular:  Negative for chest pain and leg swelling.    Gastrointestinal:  Negative for abdominal pain, constipation, diarrhea, nausea and vomiting.   Genitourinary:  Negative for dysuria.   Musculoskeletal:  Positive for gait problem.   Neurological:  Positive for weakness.     Objective:     Vital Signs (Most Recent):  Temp: 98.4 °F (36.9 °C) (02/02/24 0230)  Pulse: 81 (02/02/24 0230)  Resp: 16 (02/02/24 0230)  BP: (!) 152/83 (02/02/24 0230)  SpO2: 99 % (02/02/24 0230) Vital Signs (24h Range):  Temp:  [97.7 °F (36.5 °C)-98.4 °F (36.9 °C)] 98.4 °F (36.9 °C)  Pulse:  [68-83] 81  Resp:  [16-17] 16  SpO2:  [95 %-99 %] 99 %  BP: (130-152)/(68-83) 152/83     Weight: 50.3 kg (111 lb)  Body mass index is 24.02 kg/m².     Physical Exam  Constitutional:       Comments: Elderly appearing   HENT:      Head: Normocephalic and atraumatic.   Eyes:      Pupils: Pupils are equal, round, and reactive to light.   Cardiovascular:      Rate and Rhythm: Normal rate and regular rhythm.      Pulses: Normal pulses.      Heart sounds: Murmur heard.   Pulmonary:      Effort: Pulmonary effort is normal. No respiratory distress.      Breath sounds: Normal breath sounds.   Abdominal:      General: Abdomen is flat. Bowel sounds are normal. There is no distension.      Palpations: Abdomen is soft.      Tenderness: There is no abdominal tenderness. There is no guarding.   Musculoskeletal:      Comments: 2/5 strength in bilateral lower extremities.   Skin:     General: Skin is warm and dry.   Neurological:      General: No focal deficit present.      Mental Status: She is alert and oriented to person, place, and time. Mental status is at baseline.   Psychiatric:         Mood and Affect: Mood normal.         Behavior: Behavior normal.         Judgment: Judgment normal.              CRANIAL NERVES     CN III, IV, VI   Pupils are equal, round, and reactive to light.       Significant Labs: All pertinent labs within the past 24 hours have been reviewed.    Significant Imaging: I have reviewed all  pertinent imaging results/findings within the past 24 hours.

## 2024-02-02 NOTE — ASSESSMENT & PLAN NOTE
Continue home meds  Latest blood pressure and vitals reviewed-     Temp:  [97.7 °F (36.5 °C)-98.4 °F (36.9 °C)]   Pulse:  [68-83]   Resp:  [16-17]   BP: (130-152)/(68-83)   SpO2:  [95 %-99 %] .   Home meds for hypertension were reviewed and noted below.   Hypertension Medications               hydroCHLOROthiazide (HYDRODIURIL) 25 MG tablet Take 0.5 tablets (12.5 mg total) by mouth daily as needed (leg swelling).    lisinopriL (PRINIVIL,ZESTRIL) 5 MG tablet Take 5 mg by mouth once daily.    verapamiL (CALAN-SR) 120 MG CR tablet Take 1 tablet (120 mg total) by mouth once daily.

## 2024-02-02 NOTE — PHARMACY MED REC
"Admission Medication History     The home medication history was taken by Luis Vazquez.    You may go to "Admission" then "Reconcile Home Medications" tabs to review and/or act upon these items.     The home medication list has been updated by the Pharmacy department.   Please read ALL comments highlighted in yellow.   Please address this information as you see fit.    Feel free to contact us if you have any questions or require assistance.        Medications listed below were obtained from: Patient/family, [employer] [facility]- [employer], and Nursing home  Current Facility-Administered Medications on File Prior to Encounter   Medication Dose Route Frequency Provider Last Rate Last Admin    [DISCONTINUED] 0.45% NaCl infusion   Intravenous Continuous Priscilla Mayfield, NP 75 mL/hr at 02/01/24 0340 New Bag at 02/01/24 0340    [DISCONTINUED] acetaminophen tablet 650 mg  650 mg Oral Q8H PRN Akila Taylor FNP   650 mg at 01/30/24 0929    [DISCONTINUED] aluminum-magnesium hydroxide-simethicone 200-200-20 mg/5 mL suspension 30 mL  30 mL Oral QID PRN Akila Taylor FNP        [DISCONTINUED] azithromycin (ZITHROMAX) 500 mg in dextrose 5 % (D5W) 250 mL IVPB (Vial-Mate)  500 mg Intravenous Q24H Akila Taylor  mL/hr at 02/01/24 1627 500 mg at 02/01/24 1627    [DISCONTINUED] cefTRIAXone (Rocephin) 1 g in dextrose 5 % in water (D5W) 100 mL IVPB (MB+)  1 g Intravenous Q24H Akila Taylor FNP   Stopped at 02/01/24 1015    [DISCONTINUED] dextrose 10% bolus 125 mL 125 mL  12.5 g Intravenous PRN Akila Taylor FNP        [DISCONTINUED] dextrose 10% bolus 250 mL 250 mL  25 g Intravenous PRN Akila Taylor FNP        [DISCONTINUED] donepeziL tablet 10 mg  10 mg Oral QHS Akila Taylor FNP   10 mg at 01/31/24 2044    [DISCONTINUED] enoxaparin injection 30 mg  30 mg Subcutaneous Daily Akila Taylor FNP   30 mg at 01/31/24 1816    [DISCONTINUED] ferrous sulfate tablet 1 " each  1 tablet Oral Daily Akila Taylor, MIKHAILP   1 each at 02/01/24 0936    [DISCONTINUED] glucagon (human recombinant) injection 1 mg  1 mg Intramuscular PRN Akila Taylor, MIKHAILP        [DISCONTINUED] glucose chewable tablet 16 g  16 g Oral PRN Akila Taylor, MIKHAILP        [DISCONTINUED] glucose chewable tablet 24 g  24 g Oral PRN Akila Taylor, FNP        [DISCONTINUED] glycopyrrolate tablet 1 mg  1 mg Oral BID Priscilla Mayfield NP   1 mg at 02/01/24 0936    [DISCONTINUED] levalbuterol nebulizer solution 0.63 mg  0.63 mg Nebulization Q8H Akila Taylor FNP   0.63 mg at 02/01/24 0757    [DISCONTINUED] linaCLOtide capsule 72 mcg  72 mcg Oral Before breakfast Akila Taylor, MIKHAILP   72 mcg at 02/01/24 0536    [DISCONTINUED] lisinopriL tablet 5 mg  5 mg Oral Daily Priscilla Mayfield NP   5 mg at 02/01/24 0937    [DISCONTINUED] magnesium oxide tablet 800 mg  800 mg Oral PRN Akila Taylor, FNP   800 mg at 02/01/24 0649    [DISCONTINUED] magnesium oxide tablet 800 mg  800 mg Oral PRN Akila Taylor, FNP   800 mg at 02/01/24 1228    [DISCONTINUED] megestroL 400 mg/10 mL (10 mL) suspension 200 mg  200 mg Oral BID Priscilla Mayfield NP   200 mg at 02/01/24 0937    [DISCONTINUED] melatonin tablet 6 mg  6 mg Oral Nightly PRN Akila Taylor, MIKHAILP   6 mg at 01/29/24 2342    [DISCONTINUED] naloxone 0.4 mg/mL injection 0.02 mg  0.02 mg Intravenous PRN Akila Taylor, MIKHAILP        [DISCONTINUED] ondansetron injection 4 mg  4 mg Intravenous Q8H PRN Akila Taylor, MIKHAILP        [DISCONTINUED] oxybutynin 24 hr tablet 5 mg  5 mg Oral Daily Akila Taylor, FNP   5 mg at 02/01/24 0936    [DISCONTINUED] pantoprazole EC tablet 40 mg  40 mg Oral Daily Akila Taylor, MIKHAILP   40 mg at 02/01/24 0934    [DISCONTINUED] potassium bicarbonate disintegrating tablet 35 mEq  35 mEq Oral PRN Akila Taylor, MIKHAILP   35 mEq at 01/30/24 0545    [DISCONTINUED] potassium  bicarbonate disintegrating tablet 50 mEq  50 mEq Oral PRN Akila Taylor, FNP   50 mEq at 02/01/24 1228    [DISCONTINUED] potassium bicarbonate disintegrating tablet 60 mEq  60 mEq Oral PRN Akila Taylor, FNP        [DISCONTINUED] potassium, sodium phosphates 280-160-250 mg packet 2 packet  2 packet Oral PRN Akila Taylor, FNP   2 packet at 02/01/24 1227    [DISCONTINUED] potassium, sodium phosphates 280-160-250 mg packet 2 packet  2 packet Oral PRN Aklia Taylor, FNP   2 packet at 01/31/24 0543    [DISCONTINUED] potassium, sodium phosphates 280-160-250 mg packet 2 packet  2 packet Oral PRN Akila Taylor, MIKHAILP        [DISCONTINUED] QUEtiapine tablet 200 mg  200 mg Oral QHS Akila Taylor, MIKHAILP   200 mg at 01/31/24 2044    [DISCONTINUED] senna-docusate 8.6-50 mg per tablet 1 tablet  1 tablet Oral BID Akila Taylor FNP   1 tablet at 02/01/24 0937    [DISCONTINUED] sertraline tablet 25 mg  25 mg Oral Daily Akila Taylor, MIKHAILP   25 mg at 02/01/24 0937    [DISCONTINUED] simethicone chewable tablet 80 mg  1 tablet Oral QID PRN Akila Taylor, MIKHAILP        [DISCONTINUED] sodium chloride 0.9% flush 10 mL  10 mL Intravenous Q12H PRN Akila Taylor, MIKHAILP        [DISCONTINUED] traMADoL tablet 50 mg  50 mg Oral Q12H PRN Bekah Hdz MD        [DISCONTINUED] verapamiL CR tablet 120 mg  120 mg Oral Nightly Priscilla Mayfield NP   120 mg at 01/31/24 2044     Current Outpatient Medications on File Prior to Encounter   Medication Sig Dispense Refill    biotin 1 mg tablet Take 1,000 mcg by mouth once daily.      cefUROXime (CEFTIN) 500 MG tablet Take 1 tablet (500 mg total) by mouth 2 (two) times a day. for 3 days 6 tablet 0    donepeziL (ARICEPT) 10 MG tablet Take 10 mg by mouth every evening.      ferrous sulfate (FEOSOL) 325 mg (65 mg iron) Tab tablet Take 325 mg by mouth every evening.      glycopyrrolate (ROBINUL) 1 mg Tab Take 1 mg by mouth 2 (two) times  daily.      linaCLOtide (LINZESS) 72 mcg Cap capsule Take 1 capsule (72 mcg total) by mouth before breakfast. 30 capsule 5    lisinopriL (PRINIVIL,ZESTRIL) 5 MG tablet Take 5 mg by mouth once daily.      melatonin (MELATIN) 3 mg tablet Take 2 tablets (6 mg total) by mouth nightly as needed for Insomnia. 14 tablet 0    pantoprazole (PROTONIX) 40 MG tablet Take 1 tablet (40 mg total) by mouth once daily. 90 tablet 1    QUEtiapine (SEROQUEL) 200 MG Tab Take 200 mg by mouth every evening.      sertraline (ZOLOFT) 25 MG tablet Take 25 mg by mouth once daily.      tolterodine (DETROL LA) 2 MG Cp24 Take 1 capsule (2 mg total) by mouth once daily. For bladder 90 capsule 3    tramadol-acetaminophen 37.5-325 mg (ULTRACET) 37.5-325 mg Tab Take 1 tablet by mouth 2 (two) times a day. for 14 days 28 tablet 0    verapamiL (CALAN-SR) 120 MG CR tablet Take 1 tablet (120 mg total) by mouth once daily. (Patient taking differently: Take 120 mg by mouth nightly.) 90 tablet 3    acetaminophen (TYLENOL) 325 MG tablet Take 2 tablets (650 mg total) by mouth every 8 (eight) hours as needed. (Patient taking differently: Take 650 mg by mouth every 8 (eight) hours as needed for Pain.)  0    azithromycin (ZITHROMAX) 500 MG tablet Take 1 tablet (500 mg total) by mouth once daily. for 3 days 3 tablet 0    hydroCHLOROthiazide (HYDRODIURIL) 25 MG tablet Take 0.5 tablets (12.5 mg total) by mouth daily as needed (leg swelling). 45 tablet 1    levalbuterol (XOPENEX) 0.63 mg/3 mL nebulizer solution Take 3 mLs (0.63 mg total) by nebulization every 8 (eight) hours as needed for Wheezing or Shortness of Breath. Rescue 126 mL 0    meclizine (ANTIVERT) 12.5 mg tablet Take 0.5 tablets (6.25 mg total) by mouth 2 (two) times daily as needed for Dizziness (1/2 tablet twice daily as needed for dizziness). 90 tablet 1    megestroL (MEGACE) 400 mg/10 mL (10 mL) Susp Take 5 mLs (200 mg total) by mouth 2 (two) times daily. for 14 days 140 mL 0    senna-docusate  8.6-50 mg (PERICOLACE) 8.6-50 mg per tablet Take 1 tablet by mouth 2 (two) times daily. for 14 days 28 tablet 0           Luis Vazquez  EXT 1924                .

## 2024-02-02 NOTE — PT/OT/SLP EVAL
Physical Therapy Evaluation    Patient Name:  Angelique Hamilton   MRN:  [id number]    Recommendations:     Discharge Recommendations: Moderate Intensity Therapy   Discharge Equipment Recommendations: to be determined by next level of care   Barriers to discharge:  Medical status    Assessment:     Angelique Hamilton is a 89 y.o. female admitted with a medical diagnosis of Elevated troponin.  She presents with the following impairments/functional limitations: weakness, impaired endurance, impaired self care skills, impaired functional mobility, gait instability, impaired balance, impaired cognition, decreased upper extremity function, decreased lower extremity function, decreased safety awareness, impaired coordination, impaired cardiopulmonary response to activity .    Pt presented in supine and was reluctant for PT eval as pt reported feeling tired. She has scoliosis and her neck is deviated to the left.  Pt requires Max A for bed mobility and Mod/Max A for sitting at EOB.  She refused t/f to stand  as she requested to return to supine.     Rehab Prognosis: Fair; patient would benefit from acute skilled PT services to address these deficits and reach maximum level of function.    Recent Surgery: * No surgery found *      Plan:     During this hospitalization, patient to be seen 6 x/week to address the identified rehab impairments via gait training, therapeutic activities, therapeutic exercises and progress toward the following goals:    Plan of Care Expires:  03/02/24    Subjective     Chief Complaint: fatigue  Patient/Family Comments/goals: Did not state  Pain/Comfort:  Pain Rating 1: 0/10    Patients cultural, spiritual, Restorationist conflicts given the current situation:      Living Environment:  Pt t/f'ed to [facility] from [facility] having been there for only 1 day. Before admit to [place] pt was home with her son and daughter -in-law.  Prior to admission, patients level of function was questionable as pt  is a poor historian .Equipment used at home: walker, rolling.  DME owned (not currently used): none.  Upon discharge, patient will have assistance from facility/family.    Objective:     Communicated with nurse prior to session.  Patient found supine with blood pressure cuff, PureWick, pulse ox (continuous), telemetry  upon PT entry to room.    General Precautions: Standard, fall, hearing impaired  Orthopedic Precautions:    Braces:    Respiratory Status: Room air    Exams:  Cognitive Exam:  Patient is oriented to Person, Place, and Situation  RLE ROM: WFL  RLE Strength: 3/5  LLE ROM: WFL  LLE Strength: 3/5    Functional Mobility:  Bed Mobility:     Supine to Sit: maximal assistance  Sit to Supine: maximal assistance  Balance: mod/max A      AM-PAC 6 CLICK MOBILITY  Total Score:11       Treatment & Education:  Pt was educated on safety and use of call light. Bed mobility and balance as indicated above    Patient left supine with all lines intact and call button in reach.    GOALS:   Multidisciplinary Problems       Physical Therapy Goals          Problem: Physical Therapy    Goal Priority Disciplines Outcome Goal Variances Interventions   Physical Therapy Goal     PT, PT/OT      Description: Goals to be met by: 3/2/2024     Patient will increase functional independence with mobility by performin. Supine to sit with MInimal Assistance  2. Sit to stand transfer with Minimal Assistance  3. Gait  x 30  feet with Minimal Assistance using Rolling Walker.                          History:     Past Medical History:   Diagnosis Date    ACP (advance care planning) 2015    Back problem     Constipation     History of fractured vertebra     Hyperlipidemia     Hypertension     IBS (irritable bowel syndrome)     Migraine headache     Osteoporosis     Rectal prolapse     Scoliosis     Vertigo        Past Surgical History:   Procedure Laterality Date    LEWIS      Back Pain    EYE SURGERY      Bilateral Cataracs     HYSTERECTOMY      OOPHORECTOMY      ROTATOR CUFF REPAIR      bialteral    TONSILLECTOMY         Time Tracking:     PT Received On: 02/02/24  PT Start Time: 1015     PT Stop Time: 1030  PT Total Time (min): 15 min     Billable Minutes: Evaluation 7 minutes  and Therapeutic Activity 8 minutes      02/02/2024

## 2024-02-02 NOTE — ASSESSMENT & PLAN NOTE
Medication filled 1 time as pt is due for a follow-up in clinic. My chart has been sent to patient notifying to schedule appointment.     Dee PAREDES, Triage RN  [facility]      Recently discharged from [facility] at the [facility] on 02/01/2024 on cefuroxime and azithromycin.   While inpatient we will continue ceftriaxone and azithromycin for 3 more days.

## 2024-02-02 NOTE — PLAN OF CARE
02/02/24 1623   VERA Message   Medicare Outpatient and Observation Notification regarding financial responsibility Given to patient/caregiver;Explained to patient/caregiver;Other (comments)   Date VERA was signed 02/02/24   Time VERA was signed 1008     Pt daughter was explained VERA. Pt daughter  verbalized understanding of VERA and signed. VERA scanned to .

## 2024-02-02 NOTE — PLAN OF CARE
[facility] - Emergency Dept  Initial Discharge Assessment       Primary Care Provider: Ryan Hurtado MD    Admission Diagnosis: NSTEMI (non-ST elevated myocardial infarction) [I21.4]    Admission Date: 2/2/2024  Expected Discharge Date: 2/4/2024    completed discharge assessment via phone with Pt daughter Sanford [phone]. Unable to assess Pt AAO. Pt currently doing SNF at [facility]. Demographics, PCP, and insurance verified by Pt daughter. Pt has [facility] home health. No dialysis. Pt daughter reports ability to complete ADLs wit assistance. Pt verbalized plan to discharge home via family transport. Pt has no other needs to be addressed at this time.    Transition of Care Barriers: None    Payor: MEDICARE / Plan: MEDICARE PART A & B / Product Type: Government /     Extended Emergency Contact Information  Primary Emergency Contact: SANFORD ADKINS  Address: [address]           [place] [address] [place]  Mobile Phone: [phone]  Relation: Daughter  Preferred language: English   needed? No    Discharge Plan A: Skilled Nursing Facility  Discharge Plan B: Skilled Nursing Facility      [facility] [place] - [address]  [address]  [place] [address]  Phone: [phone] Fax: [phone]    [id number] - [place] - [address]  [address]  [place] [address]  Phone: [phone] Fax: [phone]      Initial Assessment (most recent)       Adult Discharge Assessment - 02/02/24 1409          Discharge Assessment    Assessment Type Discharge Planning Assessment     Confirmed/corrected address, phone number and insurance Yes     Confirmed Demographics Correct on Facesheet     Source of Information family     Does patient/caregiver understand observation status Yes     Communicated FRANKY with patient/caregiver Date not available/Unable to determine     Reason For Admission Elevated troponin     People in Home  alone     [facility]     Do you expect to return to your current living situation? Yes     Do you have help at home or someone to help you manage your care at home? Yes     Who are your caregiver(s) and their phone number(s)? SANFORD ADKINS (Daughter) [phone] (     Prior to hospitilization cognitive status: Unable to Assess     Current cognitive status: Unable to Assess     Home Accessibility wheelchair accessible     Home Layout Able to live on 1st floor     Equipment Currently Used at Home walker, rolling     Readmission within 30 days? Yes     Patient currently being followed by outpatient case management? No     Do you currently have service(s) that help you manage your care at home? Yes     Name and Contact number of agency [facility]     Is the pt/caregiver preference to resume services with current agency Yes     Do you take prescription medications? Yes     Do you have prescription coverage? Yes     Coverage Payor: MEDICARE - MEDICARE PART A & B -     Do you have any problems affording any of your prescribed medications? No     Is the patient taking medications as prescribed? yes     Who is going to help you get home at discharge? SANFORD ADKINS (Daughter) [phone] (     How do you get to doctors appointments? family or friend will provide     Are you on dialysis? No     Do you take coumadin? No     Discharge Plan A Skilled Nursing Facility     Discharge Plan B Skilled Nursing Facility     Discharge Plan discussed with: Adult children     Transition of Care Barriers None

## 2024-02-02 NOTE — PT/OT/SLP EVAL
Physical Therapy Evaluation    Patient Name:  Angelique Hamilton   MRN:  [id number]    Recommendations:     Discharge Recommendations: Moderate Intensity Therapy   Discharge Equipment Recommendations: to be determined by next level of care   Barriers to discharge:  weakness, impaired functional mobility    Assessment:     Angelique Hamilton is a 89 y.o. female admitted with a medical diagnosis of Elevated troponin.  She presents with the following impairments/functional limitations: weakness, impaired endurance, impaired self care skills, impaired functional mobility, gait instability, impaired balance, impaired cognition, decreased upper extremity function, decreased lower extremity function, decreased safety awareness, impaired coordination, impaired cardiopulmonary response to activity .    Pt presented in supine. She has scoliosis and her neck and upper trunk  deviated heavily to the L.  Pt required cueing  to keep her  eyes open.  She reluctantly t/f'ed supine to sit  with Max A  requiring Mod A for balance. PT refused t/f to stand because of report of fatigue.      Rehab Prognosis: Fair; patient would benefit from acute skilled PT services to address these deficits and reach maximum level of function.    Recent Surgery: * No surgery found *      Plan:     During this hospitalization, patient to be seen 6 x/week to address the identified rehab impairments via gait training, therapeutic activities, therapeutic exercises and progress toward the following goals:    Plan of Care Expires:  03/02/24    Subjective     Chief Complaint: fatigue  Patient/Family Comments/goals: Did not state  Pain/Comfort:  Pain Rating 1: 0/10    Patients cultural, spiritual, Spiritism conflicts given the current situation:      Living Environment:  Pt t/fed to [facility] from [facility] after being there for only 1 day. Pt lives  at home with her son and daughter-in-law  Prior to admission, patients level of function was questionable as pt is a poor  historian .  Equipment used at home: walker, rolling.  DME owned (not currently used): none.  Upon discharge, patient will have assistance from facility/family.    Objective:     Communicated with nurse prior to session.  Patient found supine with blood pressure cuff, PureWick, pulse ox (continuous), telemetry  upon PT entry to room.    General Precautions: Standard, fall, hearing impaired  Orthopedic Precautions:    Braces:    Respiratory Status: Room air    Exams:  Cognitive Exam:  Patient is oriented to Person, Place, and Situation  RLE ROM: WFL  RLE Strength: WFL  LLE ROM: WFL  LLE Strength: WFL    Functional Mobility:  Bed Mobility:     Supine to Sit: moderate assistance and of 2 persons  Sit to Supine: moderate assistance and of 2 persons  Balance: Moderate assistance for sitting at EOB      AM-PAC 6 CLICK MOBILITY  Total Score:11       Treatment & Education:  Pt was educated on safety and use of call light.Bed mobility and balance as indicated above    Patient left supine with all lines intact and call button in reach.    GOALS:   Multidisciplinary Problems       Physical Therapy Goals          Problem: Physical Therapy    Goal Priority Disciplines Outcome Goal Variances Interventions   Physical Therapy Goal     PT, PT/OT      Description: Goals to be met by: 3/2/2024     Patient will increase functional independence with mobility by performin. Supine to sit with MInimal Assistance  2. Sit to stand transfer with Minimal Assistance  3. Gait  x 30  feet with Minimal Assistance using Rolling Walker.                          History:     Past Medical History:   Diagnosis Date    ACP (advance care planning) 2015    Back problem     Constipation     History of fractured vertebra     Hyperlipidemia     Hypertension     IBS (irritable bowel syndrome)     Migraine headache     Osteoporosis     Rectal prolapse     Scoliosis     Vertigo        Past Surgical History:   Procedure Laterality Date    LEWIS       Back Pain    EYE SURGERY      Bilateral Cataracs    HYSTERECTOMY      OOPHORECTOMY      ROTATOR CUFF REPAIR      bialteral    TONSILLECTOMY         Time Tracking:     PT Received On: 02/02/24  PT Start Time: 1015     PT Stop Time: 1030  PT Total Time (min): 15 min     Billable Minutes: Evaluation 7 minutes  and Therapeutic Activity 8 minutes      02/02/2024

## 2024-02-02 NOTE — H&P
[facility] - Emergency Dept  Hospital Medicine  History & Physical    Patient Name: Angelique Hamilton  MRN: [id number]  Patient Class: OP- Observation  Admission Date: 2/2/2024  Attending Physician: Richard Juan DO   Primary Care Provider: Ryan Hurtado MD         Patient information was obtained from patient and ER records.     Subjective:     Principal Problem:Elevated troponin    Chief Complaint:   Chief Complaint   Patient presents with    Fall     [facility] from [facility], unwitnessed fall in room, unknown loc, hematoma to left forehead        HPI: Patient is a 89-year-old female with a history hypertension, hyperlipidemia, IBS, scoliosis, and dementia who presented to the emergency department from [facility] for evaluation for a fall.  She was recently discharged from [facility] on 02/01/2024 after receiving treatment for left lower lobe pneumonia and acute hypoxic respiratory failure.  Was discharged on 3 additional days of cefuroxime mean and azithromycin.  She did not require any supplemental oxygen at discharge.    She was reported dementia however during my encounter she was alert and oriented and able to provide a review of systems.  She fell when she was ambulating from the bed to the bathroom.  She does not walk well.  She denies any chest pains, abdominal pains, loss of consciousness, headaches, or lower extremity swelling.  She was not on the ground for a long amount of time.      In the ED, high sensitivity troponin 82.7 WBC 4.59, hemoglobin 11.8, platelets 153, sodium 141, potassium 4.3, creatinine 0.8, , CT head and cervical spine unremarkable.  Admit to [facility].    Past Medical History:   Diagnosis Date    ACP (advance care planning) 11/04/2015    Back problem     Constipation     History of fractured vertebra     Hyperlipidemia     Hypertension     IBS (irritable bowel syndrome)     Migraine headache     Osteoporosis     Rectal prolapse     Scoliosis      Vertigo        Past Surgical History:   Procedure Laterality Date    LEWIS      Back Pain    EYE SURGERY      Bilateral Cataracs    HYSTERECTOMY      OOPHORECTOMY      ROTATOR CUFF REPAIR      bialteral    TONSILLECTOMY         Review of patient's allergies indicates:   Allergen Reactions    Antihistamines - alkylamine     Torrie [amlodipine-olmesartan]     Flexeril [cyclobenzaprine]      Hallucinations    Hydrocodone     Hydrocodone-acetaminophen Other (See Comments)    Simvastatin     Statins-hmg-coa reductase inhibitors        Current Facility-Administered Medications on File Prior to Encounter   Medication    [DISCONTINUED] 0.45% NaCl infusion    [DISCONTINUED] acetaminophen tablet 650 mg    [DISCONTINUED] aluminum-magnesium hydroxide-simethicone 200-200-20 mg/5 mL suspension 30 mL    [DISCONTINUED] azithromycin (ZITHROMAX) 500 mg in dextrose 5 % (D5W) 250 mL IVPB (Vial-Mate)    [DISCONTINUED] cefTRIAXone (Rocephin) 1 g in dextrose 5 % in water (D5W) 100 mL IVPB (MB+)    [DISCONTINUED] dextrose 10% bolus 125 mL 125 mL    [DISCONTINUED] dextrose 10% bolus 250 mL 250 mL    [DISCONTINUED] donepeziL tablet 10 mg    [DISCONTINUED] enoxaparin injection 30 mg    [DISCONTINUED] ferrous sulfate tablet 1 each    [DISCONTINUED] glucagon (human recombinant) injection 1 mg    [DISCONTINUED] glucose chewable tablet 16 g    [DISCONTINUED] glucose chewable tablet 24 g    [DISCONTINUED] glycopyrrolate tablet 1 mg    [DISCONTINUED] levalbuterol nebulizer solution 0.63 mg    [DISCONTINUED] linaCLOtide capsule 72 mcg    [DISCONTINUED] lisinopriL tablet 5 mg    [DISCONTINUED] magnesium oxide tablet 800 mg    [DISCONTINUED] magnesium oxide tablet 800 mg    [DISCONTINUED] megestroL 400 mg/10 mL (10 mL) suspension 200 mg    [DISCONTINUED] melatonin tablet 6 mg    [DISCONTINUED] naloxone 0.4 mg/mL injection 0.02 mg    [DISCONTINUED] ondansetron injection 4 mg    [DISCONTINUED] oxybutynin 24 hr tablet 5 mg    [DISCONTINUED] pantoprazole EC  tablet 40 mg    [DISCONTINUED] potassium bicarbonate disintegrating tablet 35 mEq    [DISCONTINUED] potassium bicarbonate disintegrating tablet 50 mEq    [DISCONTINUED] potassium bicarbonate disintegrating tablet 60 mEq    [DISCONTINUED] potassium, sodium phosphates 280-160-250 mg packet 2 packet    [DISCONTINUED] potassium, sodium phosphates 280-160-250 mg packet 2 packet    [DISCONTINUED] potassium, sodium phosphates 280-160-250 mg packet 2 packet    [DISCONTINUED] QUEtiapine tablet 200 mg    [DISCONTINUED] senna-docusate 8.6-50 mg per tablet 1 tablet    [DISCONTINUED] sertraline tablet 25 mg    [DISCONTINUED] simethicone chewable tablet 80 mg    [DISCONTINUED] sodium chloride 0.9% flush 10 mL    [DISCONTINUED] traMADoL tablet 50 mg    [DISCONTINUED] verapamiL CR tablet 120 mg     Current Outpatient Medications on File Prior to Encounter   Medication Sig    acetaminophen (TYLENOL) 325 MG tablet Take 2 tablets (650 mg total) by mouth every 8 (eight) hours as needed.    azithromycin (ZITHROMAX) 500 MG tablet Take 1 tablet (500 mg total) by mouth once daily. for 3 days    biotin 1 mg tablet Take 1,000 mcg by mouth once daily.    cefUROXime (CEFTIN) 500 MG tablet Take 1 tablet (500 mg total) by mouth 2 (two) times a day. for 3 days    donepeziL (ARICEPT) 10 MG tablet Take 10 mg by mouth every evening.    ferrous sulfate (FEOSOL) 325 mg (65 mg iron) Tab tablet Take 325 mg by mouth every evening.    glycopyrrolate (ROBINUL) 1 mg Tab Take 1 mg by mouth 2 (two) times daily.    hydroCHLOROthiazide (HYDRODIURIL) 25 MG tablet Take 0.5 tablets (12.5 mg total) by mouth daily as needed (leg swelling).    levalbuterol (XOPENEX) 0.63 mg/3 mL nebulizer solution Take 3 mLs (0.63 mg total) by nebulization every 8 (eight) hours as needed for Wheezing or Shortness of Breath. Rescue    linaCLOtide (LINZESS) 72 mcg Cap capsule Take 1 capsule (72 mcg total) by mouth before breakfast.    lisinopriL (PRINIVIL,ZESTRIL) 5 MG tablet Take 5 mg  by mouth once daily.    meclizine (ANTIVERT) 12.5 mg tablet Take 0.5 tablets (6.25 mg total) by mouth 2 (two) times daily as needed for Dizziness (1/2 tablet twice daily as needed for dizziness).    megestroL (MEGACE) 400 mg/10 mL (10 mL) Susp Take 5 mLs (200 mg total) by mouth 2 (two) times daily. for 14 days    melatonin (MELATIN) 3 mg tablet Take 2 tablets (6 mg total) by mouth nightly as needed for Insomnia.    pantoprazole (PROTONIX) 40 MG tablet Take 1 tablet (40 mg total) by mouth once daily.    QUEtiapine (SEROQUEL) 200 MG Tab Take 200 mg by mouth every evening.    senna-docusate 8.6-50 mg (PERICOLACE) 8.6-50 mg per tablet Take 1 tablet by mouth 2 (two) times daily. for 14 days    sertraline (ZOLOFT) 25 MG tablet Take 25 mg by mouth once daily.    tolterodine (DETROL LA) 2 MG Cp24 Take 1 capsule (2 mg total) by mouth once daily. For bladder    tramadol-acetaminophen 37.5-325 mg (ULTRACET) 37.5-325 mg Tab Take 1 tablet by mouth 2 (two) times a day. for 14 days    verapamiL (CALAN-SR) 120 MG CR tablet Take 1 tablet (120 mg total) by mouth once daily. (Patient taking differently: Take 120 mg by mouth nightly.)     Family History       Problem Relation (Age of Onset)    Breast cancer Mother (70)    Colon cancer Father    Hypertension Mother          Tobacco Use    Smoking status: Never    Smokeless tobacco: Never   Substance and Sexual Activity    Alcohol use: No    Drug use: No    Sexual activity: Not Currently     Partners: Male     Birth control/protection: None     Review of Systems   Respiratory:  Negative for cough, shortness of breath and wheezing.    Cardiovascular:  Negative for chest pain and leg swelling.   Gastrointestinal:  Negative for abdominal pain, constipation, diarrhea, nausea and vomiting.   Genitourinary:  Negative for dysuria.   Musculoskeletal:  Positive for gait problem.   Neurological:  Positive for weakness.     Objective:     Vital Signs (Most Recent):  Temp: 98.4 °F (36.9 °C)  (02/02/24 0230)  Pulse: 81 (02/02/24 0230)  Resp: 16 (02/02/24 0230)  BP: (!) 152/83 (02/02/24 0230)  SpO2: 99 % (02/02/24 0230) Vital Signs (24h Range):  Temp:  [97.7 °F (36.5 °C)-98.4 °F (36.9 °C)] 98.4 °F (36.9 °C)  Pulse:  [68-83] 81  Resp:  [16-17] 16  SpO2:  [95 %-99 %] 99 %  BP: (130-152)/(68-83) 152/83     Weight: 50.3 kg (111 lb)  Body mass index is 24.02 kg/m².     Physical Exam  Constitutional:       Comments: Elderly appearing   HENT:      Head: Normocephalic and atraumatic.   Eyes:      Pupils: Pupils are equal, round, and reactive to light.   Cardiovascular:      Rate and Rhythm: Normal rate and regular rhythm.      Pulses: Normal pulses.      Heart sounds: Murmur heard.   Pulmonary:      Effort: Pulmonary effort is normal. No respiratory distress.      Breath sounds: Normal breath sounds.   Abdominal:      General: Abdomen is flat. Bowel sounds are normal. There is no distension.      Palpations: Abdomen is soft.      Tenderness: There is no abdominal tenderness. There is no guarding.   Musculoskeletal:      Comments: 2/5 strength in bilateral lower extremities.   Skin:     General: Skin is warm and dry.   Neurological:      General: No focal deficit present.      Mental Status: She is alert and oriented to person, place, and time. Mental status is at baseline.   Psychiatric:         Mood and Affect: Mood normal.         Behavior: Behavior normal.         Judgment: Judgment normal.              CRANIAL NERVES     CN III, IV, VI   Pupils are equal, round, and reactive to light.       Significant Labs: All pertinent labs within the past 24 hours have been reviewed.    Significant Imaging: I have reviewed all pertinent imaging results/findings within the past 24 hours.  Assessment/Plan:     * Elevated troponin  High sensitivity troponin 82.7.    Continue trending troponins q.3h.    Consider cardiology consult if worsening.    Morning EKG.    No active chest pains.          Fall  Likely mechanical fall  considering her story.    PTOT to evaluate and treat.    CT head and cervical spine unremarkable.          Dementia  Appears to be at baseline.  Alert and oriented during my encounter with her.    Debility  PTOT to evaluate and treat    History of pneumonia  Recently discharged from [facility] on 02/01/2024 on cefuroxime and azithromycin.   While inpatient we will continue ceftriaxone and azithromycin for 3 more days.      Essential hypertension  Continue home meds  Latest blood pressure and vitals reviewed-     Temp:  [97.7 °F (36.5 °C)-98.4 °F (36.9 °C)]   Pulse:  [68-83]   Resp:  [16-17]   BP: (130-152)/(68-83)   SpO2:  [95 %-99 %] .   Home meds for hypertension were reviewed and noted below.   Hypertension Medications               hydroCHLOROthiazide (HYDRODIURIL) 25 MG tablet Take 0.5 tablets (12.5 mg total) by mouth daily as needed (leg swelling).    lisinopriL (PRINIVIL,ZESTRIL) 5 MG tablet Take 5 mg by mouth once daily.    verapamiL (CALAN-SR) 120 MG CR tablet Take 1 tablet (120 mg total) by mouth once daily.              VTE Risk Mitigation (From admission, onward)           Ordered     IP VTE HIGH RISK PATIENT  Once         02/02/24 0430     Place sequential compression device  Until discontinued         02/02/24 0430                       On 02/02/2024, patient should be placed in hospital observation services under my care.             Richard Juan DO  Department of Hospital Medicine  Counts include 234 beds at the [facility] - Emergency Dept

## 2024-02-02 NOTE — PLAN OF CARE
Problem: Occupational Therapy  Goal: Occupational Therapy Goal  Description: Goals to be met by: 3/1/2024     Patient will increase functional independence with ADLs by performing:    LE Dressing with Stand-by Assistance and Assistive Devices as needed.  Grooming while EOB with Supervision.  Toileting from bedside commode with Minimal Assistance for hygiene and clothing management.   Supine to sit with Stand-by Assistance.  Toilet transfer to bedside commode with Stand-by Assistance.    Outcome: Ongoing, Progressing

## 2024-02-02 NOTE — HPI
Patient is a 89-year-old female with a history hypertension, hyperlipidemia, IBS, scoliosis, and dementia who presented to the emergency department from [facility] for evaluation for a fall.  She was recently discharged from [facility] on 02/01/2024 after receiving treatment for left lower lobe pneumonia and acute hypoxic respiratory failure.  Was discharged on 3 additional days of cefuroxime and azithromycin.  She did not require any supplemental oxygen at discharge.    She was reported dementia however during my encounter she was alert and oriented and able to provide a review of systems.  She fell when she was ambulating from the bed to the bathroom.  She does not walk well.  She denies any chest pains, abdominal pains, loss of consciousness, headaches, or lower extremity swelling.  She was not on the ground for a long amount of time.      In the ED, high sensitivity troponin 82.7 WBC 4.59, hemoglobin 11.8, platelets 153, sodium 141, potassium 4.3, creatinine 0.8, , CT head and cervical spine unremarkable.  Admit to [facility].

## 2024-02-03 LAB
ALBUMIN SERPL BCP-MCNC: 3.5 G/DL (ref 3.5–5.2)
ALP SERPL-CCNC: 80 U/L (ref 55–135)
ALT SERPL W/O P-5'-P-CCNC: 15 U/L (ref 10–44)
ANION GAP SERPL CALC-SCNC: 11 MMOL/L (ref 8–16)
AST SERPL-CCNC: 35 U/L (ref 10–40)
BACTERIA BLD CULT: NORMAL
BACTERIA BLD CULT: NORMAL
BASOPHILS # BLD AUTO: 0.05 K/UL (ref 0–0.2)
BASOPHILS NFR BLD: 0.5 % (ref 0–1.9)
BILIRUB SERPL-MCNC: 1.1 MG/DL (ref 0.1–1)
BUN SERPL-MCNC: 12 MG/DL (ref 8–23)
CALCIUM SERPL-MCNC: 9.9 MG/DL (ref 8.7–10.5)
CHLORIDE SERPL-SCNC: 104 MMOL/L (ref 95–110)
CO2 SERPL-SCNC: 24 MMOL/L (ref 23–29)
CREAT SERPL-MCNC: 0.7 MG/DL (ref 0.5–1.4)
DIFFERENTIAL METHOD BLD: ABNORMAL
EOSINOPHIL # BLD AUTO: 0 K/UL (ref 0–0.5)
EOSINOPHIL NFR BLD: 0.3 % (ref 0–8)
ERYTHROCYTE [DISTWIDTH] IN BLOOD BY AUTOMATED COUNT: 12.9 % (ref 11.5–14.5)
EST. GFR  (NO RACE VARIABLE): >60 ML/MIN/1.73 M^2
GLUCOSE SERPL-MCNC: 93 MG/DL (ref 70–110)
HCT VFR BLD AUTO: 35.8 % (ref 37–48.5)
HGB BLD-MCNC: 11.6 G/DL (ref 12–16)
IMM GRANULOCYTES # BLD AUTO: 0.07 K/UL (ref 0–0.04)
IMM GRANULOCYTES NFR BLD AUTO: 0.8 % (ref 0–0.5)
LYMPHOCYTES # BLD AUTO: 0.9 K/UL (ref 1–4.8)
LYMPHOCYTES NFR BLD: 9.6 % (ref 18–48)
MCH RBC QN AUTO: 32 PG (ref 27–31)
MCHC RBC AUTO-ENTMCNC: 32.4 G/DL (ref 32–36)
MCV RBC AUTO: 99 FL (ref 82–98)
MONOCYTES # BLD AUTO: 0.7 K/UL (ref 0.3–1)
MONOCYTES NFR BLD: 7.9 % (ref 4–15)
NEUTROPHILS # BLD AUTO: 7.4 K/UL (ref 1.8–7.7)
NEUTROPHILS NFR BLD: 80.9 % (ref 38–73)
NRBC BLD-RTO: 0 /100 WBC
PLATELET # BLD AUTO: 149 K/UL (ref 150–450)
PMV BLD AUTO: 10.3 FL (ref 9.2–12.9)
POTASSIUM SERPL-SCNC: 3.9 MMOL/L (ref 3.5–5.1)
PROT SERPL-MCNC: 6.3 G/DL (ref 6–8.4)
RBC # BLD AUTO: 3.63 M/UL (ref 4–5.4)
SODIUM SERPL-SCNC: 139 MMOL/L (ref 136–145)
WBC # BLD AUTO: 9.16 K/UL (ref 3.9–12.7)

## 2024-02-03 PROCEDURE — 80053 COMPREHEN METABOLIC PANEL: CPT | Performed by: INTERNAL MEDICINE

## 2024-02-03 PROCEDURE — S0179 MEGESTROL 20 MG: HCPCS | Performed by: INTERNAL MEDICINE

## 2024-02-03 PROCEDURE — 36415 COLL VENOUS BLD VENIPUNCTURE: CPT | Performed by: INTERNAL MEDICINE

## 2024-02-03 PROCEDURE — 63600175 PHARM REV CODE 636 W HCPCS: Performed by: INTERNAL MEDICINE

## 2024-02-03 PROCEDURE — 93010 ELECTROCARDIOGRAM REPORT: CPT | Mod: ,,, | Performed by: GENERAL PRACTICE

## 2024-02-03 PROCEDURE — 96366 THER/PROPH/DIAG IV INF ADDON: CPT

## 2024-02-03 PROCEDURE — 94760 N-INVAS EAR/PLS OXIMETRY 1: CPT

## 2024-02-03 PROCEDURE — 27000221 HC OXYGEN, UP TO 24 HOURS

## 2024-02-03 PROCEDURE — 85025 COMPLETE CBC W/AUTO DIFF WBC: CPT | Performed by: INTERNAL MEDICINE

## 2024-02-03 PROCEDURE — 97530 THERAPEUTIC ACTIVITIES: CPT | Mod: CQ

## 2024-02-03 PROCEDURE — 12000002 HC ACUTE/MED SURGE SEMI-PRIVATE ROOM

## 2024-02-03 PROCEDURE — 25000003 PHARM REV CODE 250: Performed by: INTERNAL MEDICINE

## 2024-02-03 PROCEDURE — 93005 ELECTROCARDIOGRAM TRACING: CPT | Performed by: GENERAL PRACTICE

## 2024-02-03 RX ADMIN — FERROUS SULFATE TAB 325 MG (65 MG ELEMENTAL FE) 1 EACH: 325 (65 FE) TAB at 09:02

## 2024-02-03 RX ADMIN — SENNOSIDES AND DOCUSATE SODIUM 1 TABLET: 8.6; 5 TABLET ORAL at 09:02

## 2024-02-03 RX ADMIN — AZITHROMYCIN DIHYDRATE 500 MG: 500 INJECTION, POWDER, LYOPHILIZED, FOR SOLUTION INTRAVENOUS at 04:02

## 2024-02-03 RX ADMIN — DONEPEZIL HYDROCHLORIDE 10 MG: 5 TABLET ORAL at 07:02

## 2024-02-03 RX ADMIN — ASPIRIN 81 MG 324 MG: 81 TABLET ORAL at 09:02

## 2024-02-03 RX ADMIN — SENNOSIDES AND DOCUSATE SODIUM 1 TABLET: 8.6; 5 TABLET ORAL at 07:02

## 2024-02-03 RX ADMIN — SERTRALINE HYDROCHLORIDE 25 MG: 25 TABLET ORAL at 09:02

## 2024-02-03 RX ADMIN — MEGESTROL ACETATE 200 MG: 400 SUSPENSION ORAL at 07:02

## 2024-02-03 RX ADMIN — PANTOPRAZOLE SODIUM 40 MG: 40 TABLET, DELAYED RELEASE ORAL at 05:02

## 2024-02-03 RX ADMIN — MEGESTROL ACETATE 200 MG: 400 SUSPENSION ORAL at 09:02

## 2024-02-03 RX ADMIN — VERAPAMIL HYDROCHLORIDE 120 MG: 120 CAPSULE, EXTENDED RELEASE ORAL at 09:02

## 2024-02-03 RX ADMIN — QUETIAPINE 200 MG: 100 TABLET ORAL at 07:02

## 2024-02-03 RX ADMIN — LISINOPRIL 5 MG: 2.5 TABLET ORAL at 09:02

## 2024-02-03 RX ADMIN — CEFTRIAXONE SODIUM 1 G: 1 INJECTION, POWDER, FOR SOLUTION INTRAMUSCULAR; INTRAVENOUS at 09:02

## 2024-02-03 NOTE — PT/OT/SLP PROGRESS
Physical Therapy Treatment    Patient Name:  Angelique Hamilton   MRN:  [id number]    Recommendations:     Discharge Recommendations: Moderate Intensity Therapy  Discharge Equipment Recommendations: to be determined by next level of care  Barriers to discharge: None    Assessment:     Angelique Hamilton is a 89 y.o. female admitted with a medical diagnosis of Elevated troponin.  She presents with the following impairments/functional limitations: weakness, impaired endurance, impaired functional mobility, gait instability, impaired balance, impaired cognition, decreased safety awareness, impaired cardiopulmonary response to activity . Pt presented HOB elevated and agreeable to PT. Pt mobilized to EOB requiring mod a x2. Once seated EOB, pt presented with poor sitting balance. Pt performed 3 sit to stand t/f HHA requiring blocked knees and mod A x2. Pt requested to remain sitting EOB, pt educated on being unable due to safety concerns. Pt returned HOB elevated with bed alarm on.     Rehab Prognosis: Fair; patient would benefit from acute skilled PT services to address these deficits and reach maximum level of function.    Recent Surgery: * No surgery found *      Plan:     During this hospitalization, patient to be seen 6 x/week to address the identified rehab impairments via gait training, therapeutic activities, therapeutic exercises and progress toward the following goals:    Plan of Care Expires:  03/02/24    Subjective     Chief Complaint: None stated  Patient/Family Comments/goals: Pt agreeable to EOB activity.   Pain/Comfort:  Pain Rating 1: 0/10  Pain Rating Post-Intervention 1: 0/10      Objective:     Communicated with RN prior to session.  Patient found HOB elevated with bed alarm, peripheral IV, telemetry, PureWick upon PT entry to room.     General Precautions: Standard, fall, hearing impaired  Orthopedic Precautions:    Braces:    Respiratory Status: Room air     Functional Mobility:  Bed Mobility:     Supine to  Sit: moderate assistance and of 2 persons  Sit to Supine: moderate assistance and of 2 persons  Transfers:     Sit to Stand:  moderate assistance and of 2 persons with hand-held assist      AM-PAC 6 CLICK MOBILITY          Treatment & Education:  Pt was educated on the following: call light use, importance of OOB activity and functional mobility to negate the negative effects of prolonged bed rest during this hospitalization, safe transfers/ambulation and discharge planning recommendations/options.     Patient left HOB elevated with all lines intact, call button in reach, and bed alarm on..    GOALS:   Multidisciplinary Problems       Physical Therapy Goals          Problem: Physical Therapy    Goal Priority Disciplines Outcome Goal Variances Interventions   Physical Therapy Goal     PT, PT/OT      Description: Goals to be met by: 3/2/2024     Patient will increase functional independence with mobility by performin. Supine to sit with MInimal Assistance  2. Sit to stand transfer with Minimal Assistance  3. Gait  x 30  feet with Minimal Assistance using Rolling Walker.                          Time Tracking:     PT Received On: 24  PT Start Time: 1104     PT Stop Time: 1118  PT Total Time (min): 14 min     Billable Minutes: Therapeutic Activity 14    Treatment Type: Treatment  PT/PTA: PTA     Number of PTA visits since last PT visit: 2024

## 2024-02-03 NOTE — PLAN OF CARE
Problem: Adult Inpatient Plan of Care  Goal: Plan of Care Review  Outcome: Ongoing, Progressing  Goal: Patient-Specific Goal (Individualized)  Outcome: Ongoing, Progressing  Goal: Absence of Hospital-Acquired Illness or Injury  Outcome: Ongoing, Progressing  Goal: Optimal Comfort and Wellbeing  Outcome: Ongoing, Progressing  Goal: Readiness for Transition of Care  Outcome: Ongoing, Progressing     Problem: Fall Injury Risk  Goal: Absence of Fall and Fall-Related Injury  Outcome: Ongoing, Progressing     Problem: Skin Injury Risk Increased  Goal: Skin Health and Integrity  Outcome: Ongoing, Progressing     Problem: Fluid Imbalance (Pneumonia)  Goal: Fluid Balance  Outcome: Ongoing, Progressing     Problem: Infection (Pneumonia)  Goal: Resolution of Infection Signs and Symptoms  Outcome: Ongoing, Progressing     Problem: Respiratory Compromise (Pneumonia)  Goal: Effective Oxygenation and Ventilation  Outcome: Ongoing, Progressing     Problem: Impaired Wound Healing  Goal: Optimal Wound Healing  Outcome: Ongoing, Progressing

## 2024-02-03 NOTE — PROGRESS NOTES
Subjective:  Patient seen examined today.  She was sleeping on my encounter.  I was able to wake her and she said she felt fine.  She did seem a little confused but this is likely from waking up from deep sleep.  She also has a reported history of dementia.    Constitutional:       Comments: Elderly appearing   HENT:      Head: Normocephalic and atraumatic.   Eyes:      Pupils: Pupils are equal, round, and reactive to light.   Cardiovascular:      Rate and Rhythm: Normal rate and regular rhythm.   Pulmonary:      Effort: Pulmonary effort is normal.  Abdominal:      General: Abdomen is flat.   Skin:     General: Skin is warm and dry.   Neurological:      General: No focal deficit present.      Mental Status:  Initially sleeping, lethargic/slightly confused upon wakening      Status post mechanical fall  Elevated troponin  History of dementia   Debility  History of recent pneumonia   -troponin eleavtion likely secondary to demand ischemia.  Otherwise patient appears to be doing well.   -continue ceftriaxone and azithromycin for recent pneumonia which she was currently being treated for.

## 2024-02-03 NOTE — CARE UPDATE
02/03/24 0908   Patient Assessment/Suction   Level of Consciousness (AVPU) alert   Respiratory Effort Normal;Unlabored   Expansion/Accessory Muscles/Retractions no use of accessory muscles;no retractions;expansion symmetric   All Lung Fields Breath Sounds clear   Rhythm/Pattern, Respiratory unlabored   Cough Frequency no cough   PRE-TX-O2   Device (Oxygen Therapy) nasal cannula   $ Is the patient on Low Flow Oxygen? Yes   Flow (L/min) 2   SpO2 96 %   Pulse 95

## 2024-02-03 NOTE — PROGRESS NOTES
Nurses Note -- 4 Eyes      2/2/2024   6:06 PM      Skin assessed during: ADMIT       [] No Altered Skin Integrity Present    []Prevention Measures Documented      [x] Yes- Altered Skin Integrity Present or Discovered   [x] LDA Added if Not in Epic (Describe Wound)   [x] New Altered Skin Integrity was Present on Admit and Documented in LDA   [x] Wound Image Taken    Wound Care Consulted? Yes    Attending Nurse:  Uli Neumann RN/Staff Member:   Dmitry

## 2024-02-04 LAB
ALBUMIN SERPL BCP-MCNC: 2.7 G/DL (ref 3.5–5.2)
ALP SERPL-CCNC: 59 U/L (ref 55–135)
ALT SERPL W/O P-5'-P-CCNC: 8 U/L (ref 10–44)
ANION GAP SERPL CALC-SCNC: 7 MMOL/L (ref 8–16)
AST SERPL-CCNC: 24 U/L (ref 10–40)
BASOPHILS # BLD AUTO: 0.04 K/UL (ref 0–0.2)
BASOPHILS NFR BLD: 0.7 % (ref 0–1.9)
BILIRUB SERPL-MCNC: 0.7 MG/DL (ref 0.1–1)
BUN SERPL-MCNC: 12 MG/DL (ref 8–23)
CALCIUM SERPL-MCNC: 9.4 MG/DL (ref 8.7–10.5)
CHLORIDE SERPL-SCNC: 108 MMOL/L (ref 95–110)
CO2 SERPL-SCNC: 23 MMOL/L (ref 23–29)
CREAT SERPL-MCNC: 0.7 MG/DL (ref 0.5–1.4)
DIFFERENTIAL METHOD BLD: ABNORMAL
EOSINOPHIL # BLD AUTO: 0.2 K/UL (ref 0–0.5)
EOSINOPHIL NFR BLD: 3.6 % (ref 0–8)
ERYTHROCYTE [DISTWIDTH] IN BLOOD BY AUTOMATED COUNT: 13.2 % (ref 11.5–14.5)
EST. GFR  (NO RACE VARIABLE): >60 ML/MIN/1.73 M^2
GLUCOSE SERPL-MCNC: 82 MG/DL (ref 70–110)
HCT VFR BLD AUTO: 30.5 % (ref 37–48.5)
HGB BLD-MCNC: 10.2 G/DL (ref 12–16)
IMM GRANULOCYTES # BLD AUTO: 0.09 K/UL (ref 0–0.04)
IMM GRANULOCYTES NFR BLD AUTO: 1.6 % (ref 0–0.5)
LYMPHOCYTES # BLD AUTO: 1 K/UL (ref 1–4.8)
LYMPHOCYTES NFR BLD: 18.4 % (ref 18–48)
MCH RBC QN AUTO: 32.7 PG (ref 27–31)
MCHC RBC AUTO-ENTMCNC: 33.4 G/DL (ref 32–36)
MCV RBC AUTO: 98 FL (ref 82–98)
MONOCYTES # BLD AUTO: 0.7 K/UL (ref 0.3–1)
MONOCYTES NFR BLD: 12 % (ref 4–15)
NEUTROPHILS # BLD AUTO: 3.5 K/UL (ref 1.8–7.7)
NEUTROPHILS NFR BLD: 63.7 % (ref 38–73)
NRBC BLD-RTO: 1 /100 WBC
PLATELET # BLD AUTO: 129 K/UL (ref 150–450)
PMV BLD AUTO: 10.5 FL (ref 9.2–12.9)
POTASSIUM SERPL-SCNC: 4.4 MMOL/L (ref 3.5–5.1)
PROT SERPL-MCNC: 5.3 G/DL (ref 6–8.4)
RBC # BLD AUTO: 3.12 M/UL (ref 4–5.4)
SODIUM SERPL-SCNC: 138 MMOL/L (ref 136–145)
WBC # BLD AUTO: 5.49 K/UL (ref 3.9–12.7)

## 2024-02-04 PROCEDURE — 36415 COLL VENOUS BLD VENIPUNCTURE: CPT | Performed by: INTERNAL MEDICINE

## 2024-02-04 PROCEDURE — 27000221 HC OXYGEN, UP TO 24 HOURS

## 2024-02-04 PROCEDURE — 12000002 HC ACUTE/MED SURGE SEMI-PRIVATE ROOM

## 2024-02-04 PROCEDURE — 85025 COMPLETE CBC W/AUTO DIFF WBC: CPT | Performed by: INTERNAL MEDICINE

## 2024-02-04 PROCEDURE — 63600175 PHARM REV CODE 636 W HCPCS: Performed by: INTERNAL MEDICINE

## 2024-02-04 PROCEDURE — S0179 MEGESTROL 20 MG: HCPCS | Performed by: INTERNAL MEDICINE

## 2024-02-04 PROCEDURE — 80053 COMPREHEN METABOLIC PANEL: CPT | Performed by: INTERNAL MEDICINE

## 2024-02-04 PROCEDURE — 94760 N-INVAS EAR/PLS OXIMETRY 1: CPT

## 2024-02-04 PROCEDURE — 25000003 PHARM REV CODE 250: Performed by: INTERNAL MEDICINE

## 2024-02-04 RX ADMIN — LISINOPRIL 5 MG: 2.5 TABLET ORAL at 09:02

## 2024-02-04 RX ADMIN — MEGESTROL ACETATE 200 MG: 400 SUSPENSION ORAL at 09:02

## 2024-02-04 RX ADMIN — SENNOSIDES AND DOCUSATE SODIUM 1 TABLET: 8.6; 5 TABLET ORAL at 07:02

## 2024-02-04 RX ADMIN — VERAPAMIL HYDROCHLORIDE 120 MG: 120 CAPSULE, EXTENDED RELEASE ORAL at 09:02

## 2024-02-04 RX ADMIN — ASPIRIN 81 MG 324 MG: 81 TABLET ORAL at 09:02

## 2024-02-04 RX ADMIN — FERROUS SULFATE TAB 325 MG (65 MG ELEMENTAL FE) 1 EACH: 325 (65 FE) TAB at 09:02

## 2024-02-04 RX ADMIN — PANTOPRAZOLE SODIUM 40 MG: 40 TABLET, DELAYED RELEASE ORAL at 03:02

## 2024-02-04 RX ADMIN — QUETIAPINE 200 MG: 100 TABLET ORAL at 07:02

## 2024-02-04 RX ADMIN — SERTRALINE HYDROCHLORIDE 25 MG: 25 TABLET ORAL at 09:02

## 2024-02-04 RX ADMIN — CEFTRIAXONE SODIUM 1 G: 1 INJECTION, POWDER, FOR SOLUTION INTRAMUSCULAR; INTRAVENOUS at 09:02

## 2024-02-04 RX ADMIN — DONEPEZIL HYDROCHLORIDE 10 MG: 5 TABLET ORAL at 07:02

## 2024-02-04 RX ADMIN — AZITHROMYCIN DIHYDRATE 500 MG: 500 INJECTION, POWDER, LYOPHILIZED, FOR SOLUTION INTRAVENOUS at 05:02

## 2024-02-04 RX ADMIN — SENNOSIDES AND DOCUSATE SODIUM 1 TABLET: 8.6; 5 TABLET ORAL at 09:02

## 2024-02-04 RX ADMIN — MEGESTROL ACETATE 200 MG: 400 SUSPENSION ORAL at 07:02

## 2024-02-04 NOTE — CARE UPDATE
02/03/24 2151   Patient Assessment/Suction   Level of Consciousness (AVPU) alert   Respiratory Effort Normal;Unlabored   Expansion/Accessory Muscles/Retractions no retractions;no use of accessory muscles;expansion symmetric   All Lung Fields Breath Sounds clear;equal bilaterally   Rhythm/Pattern, Respiratory unlabored;pattern regular;no shortness of breath reported   Cough Frequency no cough   Cough Type nonproductive   PRE-TX-O2   Device (Oxygen Therapy) nasal cannula   $ Is the patient on Low Flow Oxygen? Yes   Flow (L/min) 2   SpO2 95 %   Pulse Oximetry Type Intermittent   $ Pulse Oximetry - Single Charge Pulse Oximetry - Single   Pulse 69   Resp 17   Positioning   Head of Bed (HOB) Positioning HOB lowered

## 2024-02-04 NOTE — NURSING
Nurses Note -- 4 Eyes      2/4/2024   7:17 AM      Skin assessed during: Q Shift Change      [] No Altered Skin Integrity Present    []Prevention Measures Documented      [x] Yes- Altered Skin Integrity Present or Discovered   [] LDA Added if Not in Epic (Describe Wound)   [] New Altered Skin Integrity was Present on Admit and Documented in LDA   [] Wound Image Taken    Wound Care Consulted? No    Attending Nurse katey Neumann RN/Staff Member:   ashvin    No new areas

## 2024-02-04 NOTE — CARE UPDATE
02/04/24 0903   Patient Assessment/Suction   Level of Consciousness (AVPU) alert   Respiratory Effort Normal;Unlabored   Expansion/Accessory Muscles/Retractions no use of accessory muscles;no retractions;expansion symmetric   All Lung Fields Breath Sounds clear   Rhythm/Pattern, Respiratory unlabored;depth regular   Cough Frequency no cough   PRE-TX-O2   Device (Oxygen Therapy) nasal cannula   $ Is the patient on Low Flow Oxygen? Yes   Flow (L/min) 2   SpO2 (!) 94 %   Pulse Oximetry Type Intermittent   $ Pulse Oximetry - Multiple Charge Pulse Oximetry - Multiple   Pulse 74   Resp 16   Education   $ Education 15 min;Other (see comment)

## 2024-02-04 NOTE — PROGRESS NOTES
Subjective:  Patient seen examined today.  No changes overnight.    Constitutional:       Comments: Elderly appearing   HENT:      Head: Normocephalic and atraumatic.   Eyes:      Pupils: Pupils are equal, round, and reactive to light.   Cardiovascular:      Rate and Rhythm: Normal rate and regular rhythm.   Pulmonary:      Effort: Pulmonary effort is normal.  Abdominal:      General: Abdomen is flat.   Skin:     General: Skin is warm and dry.   Neurological:      General: No focal deficit present.      Mental Status:  Initially sleeping, lethargic/slightly confused upon wakening      Status post mechanical fall  Elevated troponin  History of dementia   Debility  History of recent pneumonia   -troponin eleavtion likely secondary to demand ischemia.  Otherwise patient appears to be doing well.   -continue ceftriaxone and azithromycin for recent pneumonia which she was currently being treated for.

## 2024-02-05 LAB
ALBUMIN SERPL BCP-MCNC: 2.8 G/DL (ref 3.5–5.2)
ALP SERPL-CCNC: 57 U/L (ref 55–135)
ALT SERPL W/O P-5'-P-CCNC: 8 U/L (ref 10–44)
ANION GAP SERPL CALC-SCNC: 7 MMOL/L (ref 8–16)
AST SERPL-CCNC: 15 U/L (ref 10–40)
BASOPHILS # BLD AUTO: 0.03 K/UL (ref 0–0.2)
BASOPHILS NFR BLD: 0.7 % (ref 0–1.9)
BILIRUB SERPL-MCNC: 0.6 MG/DL (ref 0.1–1)
BUN SERPL-MCNC: 15 MG/DL (ref 8–23)
CALCIUM SERPL-MCNC: 9.5 MG/DL (ref 8.7–10.5)
CHLORIDE SERPL-SCNC: 106 MMOL/L (ref 95–110)
CO2 SERPL-SCNC: 25 MMOL/L (ref 23–29)
CREAT SERPL-MCNC: 0.8 MG/DL (ref 0.5–1.4)
DIFFERENTIAL METHOD BLD: ABNORMAL
EOSINOPHIL # BLD AUTO: 0.1 K/UL (ref 0–0.5)
EOSINOPHIL NFR BLD: 3.4 % (ref 0–8)
ERYTHROCYTE [DISTWIDTH] IN BLOOD BY AUTOMATED COUNT: 13.2 % (ref 11.5–14.5)
EST. GFR  (NO RACE VARIABLE): >60 ML/MIN/1.73 M^2
GLUCOSE SERPL-MCNC: 97 MG/DL (ref 70–110)
HCT VFR BLD AUTO: 32.8 % (ref 37–48.5)
HGB BLD-MCNC: 10.7 G/DL (ref 12–16)
IMM GRANULOCYTES # BLD AUTO: 0.07 K/UL (ref 0–0.04)
IMM GRANULOCYTES NFR BLD AUTO: 1.7 % (ref 0–0.5)
LYMPHOCYTES # BLD AUTO: 1.2 K/UL (ref 1–4.8)
LYMPHOCYTES NFR BLD: 28.6 % (ref 18–48)
MCH RBC QN AUTO: 32.4 PG (ref 27–31)
MCHC RBC AUTO-ENTMCNC: 32.6 G/DL (ref 32–36)
MCV RBC AUTO: 99 FL (ref 82–98)
MONOCYTES # BLD AUTO: 0.4 K/UL (ref 0.3–1)
MONOCYTES NFR BLD: 9.4 % (ref 4–15)
NEUTROPHILS # BLD AUTO: 2.3 K/UL (ref 1.8–7.7)
NEUTROPHILS NFR BLD: 56.2 % (ref 38–73)
NRBC BLD-RTO: 0 /100 WBC
PLATELET # BLD AUTO: 138 K/UL (ref 150–450)
PMV BLD AUTO: 10.4 FL (ref 9.2–12.9)
POTASSIUM SERPL-SCNC: 3.8 MMOL/L (ref 3.5–5.1)
PROT SERPL-MCNC: 5.4 G/DL (ref 6–8.4)
RBC # BLD AUTO: 3.3 M/UL (ref 4–5.4)
SODIUM SERPL-SCNC: 138 MMOL/L (ref 136–145)
WBC # BLD AUTO: 4.13 K/UL (ref 3.9–12.7)

## 2024-02-05 PROCEDURE — S0179 MEGESTROL 20 MG: HCPCS | Performed by: INTERNAL MEDICINE

## 2024-02-05 PROCEDURE — 25000003 PHARM REV CODE 250: Performed by: INTERNAL MEDICINE

## 2024-02-05 PROCEDURE — 12000002 HC ACUTE/MED SURGE SEMI-PRIVATE ROOM

## 2024-02-05 PROCEDURE — 80053 COMPREHEN METABOLIC PANEL: CPT | Performed by: INTERNAL MEDICINE

## 2024-02-05 PROCEDURE — 94761 N-INVAS EAR/PLS OXIMETRY MLT: CPT

## 2024-02-05 PROCEDURE — 97530 THERAPEUTIC ACTIVITIES: CPT

## 2024-02-05 PROCEDURE — 97535 SELF CARE MNGMENT TRAINING: CPT

## 2024-02-05 PROCEDURE — 36415 COLL VENOUS BLD VENIPUNCTURE: CPT | Performed by: INTERNAL MEDICINE

## 2024-02-05 PROCEDURE — 27000221 HC OXYGEN, UP TO 24 HOURS

## 2024-02-05 PROCEDURE — 85025 COMPLETE CBC W/AUTO DIFF WBC: CPT | Performed by: INTERNAL MEDICINE

## 2024-02-05 PROCEDURE — 94760 N-INVAS EAR/PLS OXIMETRY 1: CPT

## 2024-02-05 RX ADMIN — MEGESTROL ACETATE 200 MG: 400 SUSPENSION ORAL at 09:02

## 2024-02-05 RX ADMIN — VERAPAMIL HYDROCHLORIDE 120 MG: 120 CAPSULE, EXTENDED RELEASE ORAL at 09:02

## 2024-02-05 RX ADMIN — DONEPEZIL HYDROCHLORIDE 10 MG: 5 TABLET ORAL at 07:02

## 2024-02-05 RX ADMIN — FERROUS SULFATE TAB 325 MG (65 MG ELEMENTAL FE) 1 EACH: 325 (65 FE) TAB at 09:02

## 2024-02-05 RX ADMIN — SENNOSIDES AND DOCUSATE SODIUM 1 TABLET: 8.6; 5 TABLET ORAL at 09:02

## 2024-02-05 RX ADMIN — SENNOSIDES AND DOCUSATE SODIUM 1 TABLET: 8.6; 5 TABLET ORAL at 07:02

## 2024-02-05 RX ADMIN — ASPIRIN 81 MG 324 MG: 81 TABLET ORAL at 09:02

## 2024-02-05 RX ADMIN — PANTOPRAZOLE SODIUM 40 MG: 40 TABLET, DELAYED RELEASE ORAL at 05:02

## 2024-02-05 RX ADMIN — SERTRALINE HYDROCHLORIDE 25 MG: 25 TABLET ORAL at 09:02

## 2024-02-05 RX ADMIN — QUETIAPINE 200 MG: 100 TABLET ORAL at 08:02

## 2024-02-05 RX ADMIN — MEGESTROL ACETATE 200 MG: 400 SUSPENSION ORAL at 07:02

## 2024-02-05 NOTE — PT/OT/SLP PROGRESS
Physical Therapy Treatment    Patient Name:  Angelique Hamilton   MRN:  [id number]    Recommendations:     Discharge Recommendations: Moderate Intensity Therapy  Discharge Equipment Recommendations: to be determined by next level of care  Barriers to discharge: None    Assessment:     Angelique Hamilton is a 89 y.o. female admitted with a medical diagnosis of Elevated troponin.  She presents with the following impairments/functional limitations: weakness, impaired endurance, impaired functional mobility, gait instability, impaired balance, impaired cognition, decreased safety awareness, impaired cardiopulmonary response to activity . Pt presented HOB elevated and agreeable to PT. Pt mobilized to EOB requiring mod a x 2. Once seated EOB, pt presented with poor sitting balance, leans to left and posteriorly. Needs righting assistance. Patient has  significant kyphosis. Pt performed 3 sit to stand trials with facilitation at Buttock and blocked knees mod-max A x 1. Pt fearful of falling and required encouragement to attempt standing. Max standing time of 15 seconds with max A.    Rehab Prognosis: Fair; patient would benefit from acute skilled PT services to address these deficits and reach maximum level of function.    Recent Surgery: * No surgery found *      Plan:     During this hospitalization, patient to be seen 6 x/week to address the identified rehab impairments via gait training, therapeutic activities, therapeutic exercises and progress toward the following goals:    Plan of Care Expires:  03/02/24    Subjective     Chief Complaint: None stated  Patient/Family Comments/goals: Pt agreeable to EOB activity.   Pain/Comfort:  Pain Rating 1: 0/10  Pain Rating Post-Intervention 1: 0/10      Objective:     Communicated with RN prior to session.  Patient found HOB elevated with telemetry, peripheral IV, oxygen upon PT entry to room.     General Precautions: Standard, fall (dementia)  Orthopedic Precautions:    Braces:     Respiratory Status: Room air     Functional Mobility:  Bed Mobility:     Sit to Supine: moderate assistance, maximal assistance, and of 2 persons  Transfers:     Sit to Stand:  moderate assistance and maximal assistance with no AD  Balance: poor      AM-PAC 6 CLICK MOBILITY  Turning over in bed (including adjusting bedclothes, sheets and blankets)?: 2  Sitting down on and standing up from a chair with arms (e.g., wheelchair, bedside commode, etc.): 2  Moving from lying on back to sitting on the side of the bed?: 2  Moving to and from a bed to a chair (including a wheelchair)?: 2  Need to walk in hospital room?: 1  Climbing 3-5 steps with a railing?: 1  Basic Mobility Total Score: 10       Treatment & Education:  Pt was educated on the following: call light use, importance of OOB activity and functional mobility to negate the negative effects of prolonged bed rest during this hospitalization, safe transfers/ambulation and discharge planning recommendations/options.     Patient left HOB elevated with all lines intact, call button in reach, and bed alarm on..    GOALS:   Multidisciplinary Problems       Physical Therapy Goals          Problem: Physical Therapy    Goal Priority Disciplines Outcome Goal Variances Interventions   Physical Therapy Goal     PT, PT/OT      Description: Goals to be met by: 3/2/2024     Patient will increase functional independence with mobility by performin. Supine to sit with MInimal Assistance  2. Sit to stand transfer with Minimal Assistance  3. Gait  x 30  feet with Minimal Assistance using Rolling Walker.                          Time Tracking:     PT Received On: 24  PT Start Time: 1028     PT Stop Time: 1042  PT Total Time (min): 14 min     Billable Minutes: Therapeutic Activity 14    Treatment Type: Treatment  PT/PTA: PT     Number of PTA visits since last PT visit: 2024

## 2024-02-05 NOTE — CARE UPDATE
02/04/24 2100   Patient Assessment/Suction   Level of Consciousness (AVPU) alert   Respiratory Effort Normal;Unlabored   Expansion/Accessory Muscles/Retractions no retractions;no use of accessory muscles;expansion symmetric   All Lung Fields Breath Sounds clear;equal bilaterally   Rhythm/Pattern, Respiratory unlabored;pattern regular;no shortness of breath reported   Cough Frequency no cough   PRE-TX-O2   Device (Oxygen Therapy) nasal cannula   $ Is the patient on Low Flow Oxygen? Yes   Flow (L/min) 2   SpO2 98 %   Pulse Oximetry Type Intermittent   $ Pulse Oximetry - Single Charge Pulse Oximetry - Single   Pulse 75   Resp 18

## 2024-02-05 NOTE — PLAN OF CARE
02/05/24 0848   Patient Assessment/Suction   Level of Consciousness (AVPU) alert   Respiratory Effort Unlabored   PRE-TX-O2   Device (Oxygen Therapy) nasal cannula   $ Is the patient on Low Flow Oxygen? Yes   Flow (L/min) 2   Pulse Oximetry Type Intermittent   $ Pulse Oximetry - Multiple Charge Pulse Oximetry - Multiple   Pulse 73   Resp 18

## 2024-02-05 NOTE — PT/OT/SLP PROGRESS
Occupational Therapy   Treatment    Name: Angelique Hamilton  MRN: [id number]  Admitting Diagnosis:  Elevated troponin       Recommendations:     Discharge Recommendations: Moderate Intensity Therapy  Discharge Equipment Recommendations:  none  Barriers to discharge:   (Increased physical assistance with ADLs.)    Assessment:     Angelique Hamilton is a 89 y.o. female with a medical diagnosis of Elevated troponin.  She presents with improving medical acuity. Patient participated in bed mobility and unsupported sitting EOB. Performance deficits affecting function are weakness, impaired endurance, impaired sensation, impaired self care skills, impaired functional mobility, gait instability, impaired balance, impaired cognition, decreased upper extremity function, decreased lower extremity function, decreased safety awareness.     Rehab Prognosis:  Fair; patient would benefit from acute skilled OT services to address these deficits and reach maximum level of function.       Plan:     Patient to be seen 4 x/week to address the above listed problems via self-care/home management, therapeutic activities, therapeutic exercises  Plan of Care Expires: 03/01/24  Plan of Care Reviewed with: patient    Subjective     Chief Complaint: General weakness  Patient/Family Comments/goals: Improved functional mobility and ADL independence.  Pain/Comfort:  Pain Rating 1: 0/10  Pain Rating Post-Intervention 1: 0/10    Objective:     Communicated with: nurse prior to session.  Patient found HOB elevated with telemetry, peripheral IV, oxygen upon OT entry to room.    General Precautions: Standard, fall    Orthopedic Precautions:N/A  Braces: N/A  Respiratory Status: Nasal cannula, flow 2 L/min     Occupational Performance:     Bed Mobility:    Patient completed Scooting/Bridging with maximal assistance  Patient completed Supine to Sit with maximal assistance  Patient completed Sit to Supine with maximal assistance   Performed unsupported sitting EOB  with contact guard assistance.      [facility] 6 Click ADL:      Treatment & Education:  Patient presented with kyphotic posture while sitting unsupported EOB.      Patient left HOB elevated with all lines intact, call button in reach, and bed alarm on    GOALS:   Multidisciplinary Problems       Occupational Therapy Goals          Problem: Occupational Therapy    Goal Priority Disciplines Outcome Interventions   Occupational Therapy Goal     OT, PT/OT Ongoing, Progressing    Description: Goals to be met by: 3/1/2024     Patient will increase functional independence with ADLs by performing:    LE Dressing with Stand-by Assistance and Assistive Devices as needed.  Grooming while EOB with Supervision.  Toileting from bedside commode with Minimal Assistance for hygiene and clothing management.   Supine to sit with Stand-by Assistance.  Toilet transfer to bedside commode with Stand-by Assistance.                         Time Tracking:     OT Date of Treatment: 02/05/24  OT Start Time: 1023  OT Stop Time: 1040  OT Total Time (min): 17 min    Billable Minutes:Therapeutic Activity 17    OT/DELMAR: OT          2/5/2024

## 2024-02-05 NOTE — PLAN OF CARE
Pt plans to return to [facility]. SW reached ou to Diane with [facility] to review Pt on acceptance back to SNF. SW to continue to follow.     11:19am- Per Diane Pt can come back to [facility]      02/05/24 1107   Discharge Reassessment   Assessment Type Discharge Planning Reassessment   Did the patient's condition or plan change since previous assessment? No   Discharge Plan discussed with: Adult children   Discharge Plan A Skilled Nursing Facility   Discharge Plan B Skilled Nursing Facility   DME Needed Upon Discharge  none   Transition of Care Barriers None   Post-Acute Status   Post-Acute Authorization Placement   Post-Acute Placement Status Pending payor review/awaiting authorization (if required)   Coverage Payor: MEDICARE - MEDICARE PART A & B -   Discharge Delays None known at this time

## 2024-02-06 VITALS
RESPIRATION RATE: 16 BRPM | SYSTOLIC BLOOD PRESSURE: 85 MMHG | DIASTOLIC BLOOD PRESSURE: 54 MMHG | BODY MASS INDEX: 23.95 KG/M2 | HEIGHT: 57 IN | OXYGEN SATURATION: 99 % | WEIGHT: 111 LBS | HEART RATE: 99 BPM | TEMPERATURE: 98 F

## 2024-02-06 LAB
ALBUMIN SERPL BCP-MCNC: 2.5 G/DL (ref 3.5–5.2)
ALP SERPL-CCNC: 48 U/L (ref 55–135)
ALT SERPL W/O P-5'-P-CCNC: 7 U/L (ref 10–44)
ANION GAP SERPL CALC-SCNC: 6 MMOL/L (ref 8–16)
AST SERPL-CCNC: 11 U/L (ref 10–40)
BASOPHILS # BLD AUTO: 0.03 K/UL (ref 0–0.2)
BASOPHILS NFR BLD: 0.6 % (ref 0–1.9)
BILIRUB SERPL-MCNC: 0.5 MG/DL (ref 0.1–1)
BUN SERPL-MCNC: 19 MG/DL (ref 8–23)
CALCIUM SERPL-MCNC: 9.1 MG/DL (ref 8.7–10.5)
CHLORIDE SERPL-SCNC: 108 MMOL/L (ref 95–110)
CO2 SERPL-SCNC: 24 MMOL/L (ref 23–29)
CREAT SERPL-MCNC: 0.8 MG/DL (ref 0.5–1.4)
DIFFERENTIAL METHOD BLD: ABNORMAL
EOSINOPHIL # BLD AUTO: 0.1 K/UL (ref 0–0.5)
EOSINOPHIL NFR BLD: 1.1 % (ref 0–8)
ERYTHROCYTE [DISTWIDTH] IN BLOOD BY AUTOMATED COUNT: 13.3 % (ref 11.5–14.5)
EST. GFR  (NO RACE VARIABLE): >60 ML/MIN/1.73 M^2
GLUCOSE SERPL-MCNC: 105 MG/DL (ref 70–110)
HCT VFR BLD AUTO: 29.6 % (ref 37–48.5)
HGB BLD-MCNC: 9.7 G/DL (ref 12–16)
IMM GRANULOCYTES # BLD AUTO: 0.06 K/UL (ref 0–0.04)
IMM GRANULOCYTES NFR BLD AUTO: 1.3 % (ref 0–0.5)
LYMPHOCYTES # BLD AUTO: 1.2 K/UL (ref 1–4.8)
LYMPHOCYTES NFR BLD: 26.5 % (ref 18–48)
MCH RBC QN AUTO: 32.1 PG (ref 27–31)
MCHC RBC AUTO-ENTMCNC: 32.8 G/DL (ref 32–36)
MCV RBC AUTO: 98 FL (ref 82–98)
MONOCYTES # BLD AUTO: 0.4 K/UL (ref 0.3–1)
MONOCYTES NFR BLD: 9.5 % (ref 4–15)
NEUTROPHILS # BLD AUTO: 2.8 K/UL (ref 1.8–7.7)
NEUTROPHILS NFR BLD: 61 % (ref 38–73)
NRBC BLD-RTO: 0 /100 WBC
PLATELET # BLD AUTO: 138 K/UL (ref 150–450)
PMV BLD AUTO: 10.4 FL (ref 9.2–12.9)
POTASSIUM SERPL-SCNC: 3.9 MMOL/L (ref 3.5–5.1)
PROT SERPL-MCNC: 4.9 G/DL (ref 6–8.4)
RBC # BLD AUTO: 3.02 M/UL (ref 4–5.4)
SODIUM SERPL-SCNC: 138 MMOL/L (ref 136–145)
WBC # BLD AUTO: 4.65 K/UL (ref 3.9–12.7)

## 2024-02-06 PROCEDURE — 85025 COMPLETE CBC W/AUTO DIFF WBC: CPT | Performed by: INTERNAL MEDICINE

## 2024-02-06 PROCEDURE — 27000221 HC OXYGEN, UP TO 24 HOURS

## 2024-02-06 PROCEDURE — 94761 N-INVAS EAR/PLS OXIMETRY MLT: CPT

## 2024-02-06 PROCEDURE — S0179 MEGESTROL 20 MG: HCPCS | Performed by: INTERNAL MEDICINE

## 2024-02-06 PROCEDURE — 25000003 PHARM REV CODE 250: Performed by: INTERNAL MEDICINE

## 2024-02-06 PROCEDURE — 36415 COLL VENOUS BLD VENIPUNCTURE: CPT | Performed by: INTERNAL MEDICINE

## 2024-02-06 PROCEDURE — 97530 THERAPEUTIC ACTIVITIES: CPT | Mod: CQ

## 2024-02-06 PROCEDURE — 80053 COMPREHEN METABOLIC PANEL: CPT | Performed by: INTERNAL MEDICINE

## 2024-02-06 PROCEDURE — 97110 THERAPEUTIC EXERCISES: CPT

## 2024-02-06 RX ORDER — NAPROXEN SODIUM 220 MG/1
325 TABLET, FILM COATED ORAL DAILY
Qty: 120 TABLET | Refills: 0 | Status: SHIPPED | OUTPATIENT
Start: 2024-02-07 | End: 2024-03-08

## 2024-02-06 RX ADMIN — VERAPAMIL HYDROCHLORIDE 120 MG: 120 CAPSULE, EXTENDED RELEASE ORAL at 09:02

## 2024-02-06 RX ADMIN — ASPIRIN 81 MG 324 MG: 81 TABLET ORAL at 09:02

## 2024-02-06 RX ADMIN — PANTOPRAZOLE SODIUM 40 MG: 40 TABLET, DELAYED RELEASE ORAL at 05:02

## 2024-02-06 RX ADMIN — SERTRALINE HYDROCHLORIDE 25 MG: 25 TABLET ORAL at 09:02

## 2024-02-06 RX ADMIN — SENNOSIDES AND DOCUSATE SODIUM 1 TABLET: 8.6; 5 TABLET ORAL at 09:02

## 2024-02-06 RX ADMIN — FERROUS SULFATE TAB 325 MG (65 MG ELEMENTAL FE) 1 EACH: 325 (65 FE) TAB at 09:02

## 2024-02-06 RX ADMIN — MEGESTROL ACETATE 200 MG: 400 SUSPENSION ORAL at 09:02

## 2024-02-06 NOTE — PLAN OF CARE
Charts and orders reviewed. Pt to discharge to SNF at [facility].  informed [facility] Report could be called to [phone]. Facility to provide transportation.   Pt has no other needs to be addressed by case management. Pt cleared to discharge to SNF from case management standpoint.     02/06/24 1601   Final Note   Assessment Type Final Discharge Note   Anticipated Discharge Disposition SNF   What phone number can be called within the next 1-3 days to see how you are doing after discharge? [phone]   Hospital Resources/Appts/Education Provided Provided patient/caregiver with written discharge plan information   Post-Acute Status   Post-Acute Authorization Placement   Post-Acute Placement Status Set-up Complete/Auth obtained   Coverage MEDICARE PART A&B   Discharge Delays None known at this time

## 2024-02-06 NOTE — CARE UPDATE
02/05/24 1943   Patient Assessment/Suction   Level of Consciousness (AVPU) alert   Respiratory Effort Normal;Unlabored   Expansion/Accessory Muscles/Retractions no retractions;no use of accessory muscles   Rhythm/Pattern, Respiratory pattern regular;unlabored   PRE-TX-O2   Device (Oxygen Therapy) nasal cannula   $ Is the patient on Low Flow Oxygen? Yes   Flow (L/min) 2   Oxygen Concentration (%) 28   SpO2 96 %   Pulse Oximetry Type Intermittent   $ Pulse Oximetry - Single Charge Pulse Oximetry - Single   Ready to Wean/Extubation Screen   FIO2<=50 (chart decimal) 0.28

## 2024-02-06 NOTE — PLAN OF CARE
Problem: Physical Therapy  Goal: Physical Therapy Goal  Description: Goals to be met by: 3/2/2024     Patient will increase functional independence with mobility by performin. Supine to sit with MInimal Assistance  2. Sit to stand transfer with Minimal Assistance  3. Gait  x 30  feet with Minimal Assistance using Rolling Walker.     Outcome: Ongoing, Progressing

## 2024-02-06 NOTE — PT/OT/SLP PROGRESS
"Physical Therapy Treatment    Patient Name:  Angelique Hamilton   MRN:  [id number]    Recommendations:     Discharge Recommendations: Moderate Intensity Therapy  Discharge Equipment Recommendations: to be determined by next level of care  Barriers to discharge:  decreased mobility from baseline, significant kyphosis, mod-maxA standing balance    Assessment:     Angelique Hamilton is a 89 y.o. female admitted with a medical diagnosis of Elevated troponin.  She presents with the following impairments/functional limitations: weakness, impaired endurance, impaired functional mobility, impaired balance, decreased ROM.    Pt performed bed mobility, sitting balance training, and two stands to the RW with good tolerance and participation.    Initial stand required maxA of 2 to stand, and maxA to prevent posterior LOB. Second stand required modAx2 to stand, and varying min-modA at posterior pelvis to prevent posterior LOB. Standing endurance 53 seconds.    Daughter in law present and supportive. No complaints from patient during session.     Rehab Prognosis: Good; patient would benefit from acute skilled PT services to address these deficits and reach maximum level of function.    Recent Surgery: * No surgery found *      Plan:     During this hospitalization, patient to be seen 6 x/week to address the identified rehab impairments via gait training, therapeutic activities, therapeutic exercises and progress toward the following goals:    Plan of Care Expires:  03/02/24    Subjective     Chief Complaint: weakness  Patient/Family Comments/goals: "I don't remember how long it's been since I was regularly on my feet"   Pain/Comfort:  Pain Rating 1: 0/10  Pain Rating Post-Intervention 1: 0/10      Objective:     Communicated with nurse prior to session.  Patient found HOB elevated with telemetry, oxygen, bed alarm, peripheral IV upon PT entry to room.     General Precautions: Standard, fall  Orthopedic Precautions: N/A  Braces: " N/A  Respiratory Status: Nasal cannula, flow 2 L/min     Functional Mobility:  Bed Mobility:     Supine to Sit: maximal assistance and VC; good command-following  Sit to Supine: maximal assistance  Transfers:     Sit to Stand:  1st stand maxAx2 & maxA at posterior pelvis to remain standing. 2nd stand modAx2 and varying min-modA at posterior pelvis to remain standing with rolling walker  Standing balance: 1st trial maxA, 2nd trial modA, with standing endurance of 53 seconds       AM-PAC 6 CLICK MOBILITY          Treatment & Education:  -Pt educ: benefits of participation with PT, frequent mobility as tolerated throughout day, awareness of symptoms suggestive of overexertion, fall prevention, call light, bed positioning to promote neutral neck position and reduce shortness/ L lateral flexion of neck   -Transfer training  -Bed mobility training  -Seated EOB: thoracic extension AROM w/ facilitation to paraspinals and VC/demo for form   -Sit balance training: UE reaches to anterior target to improve posterior COG; sit bal progressed from min/modA to close SBA      Patient left HOB elevated with all lines intact, call button in reach, bed alarm on, nurse notified, and family present..    GOALS:   Multidisciplinary Problems       Physical Therapy Goals          Problem: Physical Therapy    Goal Priority Disciplines Outcome Goal Variances Interventions   Physical Therapy Goal     PT, PT/OT      Description: Goals to be met by: 3/2/2024     Patient will increase functional independence with mobility by performin. Supine to sit with MInimal Assistance  2. Sit to stand transfer with Minimal Assistance  3. Gait  x 30  feet with Minimal Assistance using Rolling Walker.                          Time Tracking:     PT Received On: 24  PT Start Time: 1009     PT Stop Time: 1037  PT Total Time (min): 28 min     Billable Minutes: Therapeutic Activity 28    Treatment Type: Treatment  PT/PTA: PTA     Number of PTA visits  since last PT visit: 1 02/06/2024

## 2024-02-06 NOTE — PT/OT/SLP PROGRESS
Occupational Therapy   Treatment    Name: Angelique Hamilton  MRN: [id number]  Admitting Diagnosis:  Elevated troponin       Recommendations:     Discharge Recommendations: Moderate Intensity Therapy  Discharge Equipment Recommendations:  none  Barriers to discharge:   (Increased physical assistance with ADLs.)    Assessment:     Angelique Hamilton is a 89 y.o. female with a medical diagnosis of Elevated troponin.  She presents with general weakness. Patient participated in BUE/BLE therex bed level. Performance deficits affecting function are weakness, impaired endurance, impaired self care skills, impaired functional mobility, gait instability, impaired balance, decreased upper extremity function, decreased lower extremity function, decreased safety awareness.     Rehab Prognosis:  Fair; patient would benefit from acute skilled OT services to address these deficits and reach maximum level of function.       Plan:     Patient to be seen 4 x/week to address the above listed problems via self-care/home management, therapeutic activities, therapeutic exercises  Plan of Care Expires: 03/01/24  Plan of Care Reviewed with: patient, family    Subjective     Chief Complaint: General weakness  Patient/Family Comments/goals: Improved functional mobility and ADL independence.  Pain/Comfort:  Pain Rating 1: 0/10  Pain Rating Post-Intervention 1: 0/10    Objective:     Communicated with: nurse prior to session.  Patient found HOB elevated with telemetry, oxygen upon OT entry to room.    General Precautions: Standard, fall    Orthopedic Precautions:N/A  Braces: N/A  Respiratory Status: Nasal cannula, flow 2 L/min     Occupational Performance:     BUE/BLE Therex:    Performed BUE/BLE therex x10 reps with minimal assistance bed level.     [facility] 6 Click ADL: 13    Treatment & Education:  Patient fatigues from working with Physical Therapy prior to this session, but was agreeable to bed level therex.    Patient left HOB elevated with all  lines intact, call button in reach, and family present    GOALS:   Multidisciplinary Problems       Occupational Therapy Goals          Problem: Occupational Therapy    Goal Priority Disciplines Outcome Interventions   Occupational Therapy Goal     OT, PT/OT Ongoing, Progressing    Description: Goals to be met by: 3/1/2024     Patient will increase functional independence with ADLs by performing:    LE Dressing with Stand-by Assistance and Assistive Devices as needed.  Grooming while EOB with Supervision.  Toileting from bedside commode with Minimal Assistance for hygiene and clothing management.   Supine to sit with Stand-by Assistance.  Toilet transfer to bedside commode with Stand-by Assistance.                         Time Tracking:     OT Date of Treatment: 02/06/24  OT Start Time: 1027  OT Stop Time: 1050  OT Total Time (min): 23 min    Billable Minutes:Therapeutic Exercise 23    OT/DELMAR: OT          2/6/2024

## 2024-02-06 NOTE — DISCHARGE SUMMARY
Count includes the [facility]  Discharge Summary  Patient Name: Angelique Hamilton MRN: [id number]   Patient Class: IP- Inpatient  Length of Stay: 3   Admission Date: 2/2/2024  2:23 AM Attending Physician: Samson Tovar MD   Primary Care Provider: Ryan Hurtado MD Face-to-Face encounter date: 02/06/2024   Chief Complaint: Fall ([facility] from [facility], unwitnessed fall in room, unknown loc, hematoma to left forehead)    Date of Discharge: 2/6/2024  Discharge Disposition:  Home or Self Care  Condition: Stable       Reason for Hospitalization   Mechanical fall        Patient Active Problem List   Diagnosis    ACP (advance care planning)    Rectocele    Essential hypertension    Hyperlipidemia    Aortic stenosis with trileaflet valve    Irritable bowel syndrome with both constipation and diarrhea    Iron deficiency anemia    Mild protein-calorie malnutrition    Nonintractable epilepsy without status epilepticus    Sacroiliitis, not elsewhere classified    Stage 3a chronic kidney disease    Kyphoscoliosis    Colitis    Pneumonia of left lower lobe due to infectious organism    History of pneumonia    Thrombocytopenia, unspecified    Debility    Dementia    Macrocytic anemia    Thrombocytopenia    Advanced age    Walker as ambulation aid    Acute respiratory failure with hypoxia    Decreased oral intake    Fall    Elevated troponin       Brief History of Present Illness   Patient is a 89-year-old female with a history hypertension, hyperlipidemia, IBS, scoliosis, and dementia who presented to the emergency department from [facility] for evaluation for a fall.  She was recently discharged from [facility] on 02/01/2024 after receiving treatment for left lower lobe pneumonia and acute hypoxic respiratory failure.  Was discharged on 3 additional days of cefuroxime mean and azithromycin.  She did not require any supplemental oxygen at discharge.     She was reported dementia however during my encounter she was alert and  "oriented and able to provide a review of systems.  She fell when she was ambulating from the bed to the bathroom.  She does not walk well.  She denies any chest pains, abdominal pains, loss of consciousness, headaches, or lower extremity swelling.  She was not on the ground for a long amount of time.       In the ED, high sensitivity troponin 82.7 WBC 4.59, hemoglobin 11.8, platelets 153, sodium 141, potassium 4.3, creatinine 0.8, , CT head and cervical spine unremarkable.  Admit to [facility].    For the full HPI please refer to the History & Physical from this admission.    Hospital Course By Problem with Pertinent Findings     Patient was admitted after having a reported fall at her SNF.  Patient was reported not to walk well and was weak.  She was recently discharged from the hospital for treatment of pneumonia.  She had a very mild troponin elevation that peaked at 52.  Patient never complained of chest pain or had EKG changes.  However she does have some baseline dementia.  I think her fall/weakness can largely be attributed to her use of blood pressure medications.  She was started on her home blood pressure medications while here including lisinopril and hydrochlorothiazide.  Her systolic pressures ranged in the 90s to low 100s.  Given this and recent pneumonia this is likely cause of her recent fall.  I will discontinue her lisinopril and hydrochlorothiazide for now.  It can be re-evaluated by her PCP.  She will go back to SNF today      Patient was seen and examined on the date of discharge and determined to be suitable for discharge.    Physical Exam  BP (!) 85/54   Pulse 99   Temp 97.8 °F (36.6 °C) (Oral)   Resp 16   Ht 4' 9" (1.448 m)   Wt 50.3 kg (111 lb)   SpO2 99%   BMI 24.02 kg/m²   Vitals reviewed.    Constitutional: No distress.   HENT: Atraumatic.   Cardiovascular: Normal rate, regular rhythm and normal heart sounds.   Pulmonary/Chest: Effort normal. Clear to auscultation " "bilaterally. No wheezes.   Abdominal: Soft. Bowel sounds are normal. Exhibits no distension and no mass. No tenderness  Neurological: Alert.   Skin: Skin is warm and dry.     Following labs were Reviewed   Recent Labs   Lab 02/06/24  0518   WBC 4.65   HGB 9.7*   HCT 29.6*   *   CALCIUM 9.1   ALBUMIN 2.5*   PROT 4.9*      K 3.9   CO2 24      BUN 19   CREATININE 0.8   ALKPHOS 48*   ALT 7*   AST 11   BILITOT 0.5     No results found for: "POCTGLUCOSE"       Microbiology Results (last 7 days)       ** No results found for the last 168 hours. **          CT Cervical Spine Without Contrast   Final Result      CT Head Without Contrast   Final Result          No results found for this or any previous visit.                Discharge Information:   Regular diet    Physical Activity:  Activity as tolerated    Instructions:  1. Take all medications as prescribed  2. Keep all follow-up appointments      Follow-Up Appointments:  Please call your primary care physician to schedule an appointment in 1 week time.            Pending laboratory work/Tests to be performed/followed by the Primary care Physician:    The patient was discharged in the care of her parents//wife/family/caregiver, with discharge instructions were reviewed in written and verbal form. All pertinent questions were discussed and prescriptions were provided. The importance of making follow up appointments and compliance of medications has been stressed repeatedly. The patient will follow up in 1 week or sooner as needed with the PCP, and the patient is on board with the plan. Upon discharge, patient needs to be on following medications.    Discharge Medications:     Medication List        START taking these medications      aspirin 81 MG Chew  Take 4 tablets (324 mg total) by mouth once daily.  Start taking on: February 7, 2024            CHANGE how you take these medications      verapamiL 120 MG CR tablet  Commonly known as: " CALAN-SR  Take 1 tablet (120 mg total) by mouth once daily.  What changed: when to take this            CONTINUE taking these medications      acetaminophen 325 MG tablet  Commonly known as: TYLENOL  Take 2 tablets (650 mg total) by mouth every 8 (eight) hours as needed.     biotin 1 mg tablet     donepeziL 10 MG tablet  Commonly known as: ARICEPT     ferrous sulfate 325 mg (65 mg iron) Tab tablet  Commonly known as: FEOSOL     glycopyrrolate 1 mg Tab  Commonly known as: ROBINUL     levalbuterol 0.63 mg/3 mL nebulizer solution  Commonly known as: XOPENEX  Take 3 mLs (0.63 mg total) by nebulization every 8 (eight) hours as needed for Wheezing or Shortness of Breath. Rescue     linaCLOtide 72 mcg Cap capsule  Commonly known as: LINZESS  Take 1 capsule (72 mcg total) by mouth before breakfast.     meclizine 12.5 mg tablet  Commonly known as: ANTIVERT  Take 0.5 tablets (6.25 mg total) by mouth 2 (two) times daily as needed for Dizziness (1/2 tablet twice daily as needed for dizziness).     megestroL 400 mg/10 mL (10 mL) Susp  Commonly known as: MEGACE  Take 5 mLs (200 mg total) by mouth 2 (two) times daily. for 14 days     melatonin 3 mg tablet  Commonly known as: MELATIN  Take 2 tablets (6 mg total) by mouth nightly as needed for Insomnia.     pantoprazole 40 MG tablet  Commonly known as: PROTONIX  Take 1 tablet (40 mg total) by mouth once daily.     QUEtiapine 200 MG Tab  Commonly known as: SEROQUEL     senna-docusate 8.6-50 mg 8.6-50 mg per tablet  Commonly known as: PERICOLACE  Take 1 tablet by mouth 2 (two) times daily. for 14 days     sertraline 25 MG tablet  Commonly known as: ZOLOFT     tolterodine 2 MG Cp24  Commonly known as: DETROL LA  Take 1 capsule (2 mg total) by mouth once daily. For bladder     tramadol-acetaminophen 37.5-325 mg 37.5-325 mg Tab  Commonly known as: ULTRACET  Take 1 tablet by mouth 2 (two) times a day. for 14 days            STOP taking these medications      azithromycin 500 MG  tablet  Commonly known as: ZITHROMAX     cefUROXime 500 MG tablet  Commonly known as: CEFTIN     hydroCHLOROthiazide 25 MG tablet  Commonly known as: HYDRODIURIL     lisinopriL 5 MG tablet  Commonly known as: PRINIVIL,ZESTRIL               Where to Get Your Medications        These medications [facility] to [facility] [address]  [address] [place] [address]      Phone: [phone]   aspirin 81 MG Chew           I spent 45  minutes preparing the discharge including reviewing records from previous encounters, preparation of discharge summary, assessing and final examination of the patient, discharge medicine reconciliation, discussing plan of care, follow up and education and prescriptions.       Samson Rodriguez  [facility] Hospitalist  02/06/2024

## 2024-02-06 NOTE — PLAN OF CARE
02/06/24 0800   Patient Assessment/Suction   Level of Consciousness (AVPU) alert   Respiratory Effort Unlabored   PRE-TX-O2   Device (Oxygen Therapy) nasal cannula   $ Is the patient on Low Flow Oxygen? Yes   Flow (L/min) 2   SpO2 95 %   Pulse Oximetry Type Intermittent   $ Pulse Oximetry - Multiple Charge Pulse Oximetry - Multiple   Pulse 72   Resp 18

## 2024-02-07 ENCOUNTER — OUTPATIENT CASE MANAGEMENT (OUTPATIENT)
Dept: ADMINISTRATIVE | Facility: OTHER | Age: 89
End: 2024-02-07
Payer: MEDICARE

## 2024-02-07 NOTE — PROGRESS NOTES
02/07/24-Discharged to [facility] on 02/06/24. OPCM Case Closure.

## 2024-02-13 ENCOUNTER — LAB VISIT (OUTPATIENT)
Dept: LAB | Facility: HOSPITAL | Age: 89
End: 2024-02-13
Attending: FAMILY MEDICINE
Payer: MEDICARE

## 2024-02-13 DIAGNOSIS — R60.9 EDEMA: ICD-10-CM

## 2024-02-13 LAB
BASOPHILS # BLD AUTO: 0.06 K/UL (ref 0–0.2)
BASOPHILS NFR BLD: 1.2 % (ref 0–1.9)
DIFFERENTIAL METHOD BLD: ABNORMAL
EOSINOPHIL # BLD AUTO: 0.3 K/UL (ref 0–0.5)
EOSINOPHIL NFR BLD: 4.8 % (ref 0–8)
ERYTHROCYTE [DISTWIDTH] IN BLOOD BY AUTOMATED COUNT: 15.2 % (ref 11.5–14.5)
HCT VFR BLD AUTO: 30.6 % (ref 37–48.5)
HGB BLD-MCNC: 10.2 G/DL (ref 12–16)
IMM GRANULOCYTES # BLD AUTO: 0.08 K/UL (ref 0–0.04)
IMM GRANULOCYTES NFR BLD AUTO: 1.5 % (ref 0–0.5)
LYMPHOCYTES # BLD AUTO: 1.4 K/UL (ref 1–4.8)
LYMPHOCYTES NFR BLD: 26.3 % (ref 18–48)
MCH RBC QN AUTO: 33.4 PG (ref 27–31)
MCHC RBC AUTO-ENTMCNC: 33.3 G/DL (ref 32–36)
MCV RBC AUTO: 100 FL (ref 82–98)
MONOCYTES # BLD AUTO: 0.5 K/UL (ref 0.3–1)
MONOCYTES NFR BLD: 9.5 % (ref 4–15)
NEUTROPHILS # BLD AUTO: 2.9 K/UL (ref 1.8–7.7)
NEUTROPHILS NFR BLD: 56.7 % (ref 38–73)
NRBC BLD-RTO: 0 /100 WBC
PLATELET # BLD AUTO: 187 K/UL (ref 150–450)
PMV BLD AUTO: 9.9 FL (ref 9.2–12.9)
RBC # BLD AUTO: 3.05 M/UL (ref 4–5.4)
WBC # BLD AUTO: 5.18 K/UL (ref 3.9–12.7)

## 2024-02-13 PROCEDURE — 85025 COMPLETE CBC W/AUTO DIFF WBC: CPT | Performed by: FAMILY MEDICINE

## 2024-02-13 PROCEDURE — 36415 COLL VENOUS BLD VENIPUNCTURE: CPT | Performed by: FAMILY MEDICINE

## 2024-03-12 ENCOUNTER — LAB VISIT (OUTPATIENT)
Dept: LAB | Facility: HOSPITAL | Age: 89
End: 2024-03-12
Attending: FAMILY MEDICINE
Payer: MEDICARE

## 2024-03-12 DIAGNOSIS — N39.0 URINARY TRACT INFECTION, SITE NOT SPECIFIED: Primary | ICD-10-CM

## 2024-03-12 LAB
BACTERIA #/AREA URNS HPF: ABNORMAL /HPF
BILIRUB UR QL STRIP: NEGATIVE
CAOX CRY URNS QL MICRO: ABNORMAL
CLARITY UR: CLEAR
COLOR UR: YELLOW
GLUCOSE UR QL STRIP: NEGATIVE
HGB UR QL STRIP: NEGATIVE
HYALINE CASTS #/AREA URNS LPF: 0 /LPF
KETONES UR QL STRIP: NEGATIVE
LEUKOCYTE ESTERASE UR QL STRIP: ABNORMAL
MICROSCOPIC COMMENT: ABNORMAL
NITRITE UR QL STRIP: NEGATIVE
PH UR STRIP: 6 [PH] (ref 5–8)
PROT UR QL STRIP: ABNORMAL
RBC #/AREA URNS HPF: 4 /HPF (ref 0–4)
SP GR UR STRIP: 1.02 (ref 1–1.03)
SQUAMOUS #/AREA URNS HPF: 0 /HPF
URN SPEC COLLECT METH UR: ABNORMAL
UROBILINOGEN UR STRIP-ACNC: ABNORMAL EU/DL
WBC #/AREA URNS HPF: 6 /HPF (ref 0–5)

## 2024-03-12 PROCEDURE — 87077 CULTURE AEROBIC IDENTIFY: CPT | Mod: 59

## 2024-03-12 PROCEDURE — 81000 URINALYSIS NONAUTO W/SCOPE: CPT

## 2024-03-12 PROCEDURE — 87086 URINE CULTURE/COLONY COUNT: CPT

## 2024-03-12 PROCEDURE — 87186 SC STD MICRODIL/AGAR DIL: CPT

## 2024-03-15 LAB
BACTERIA UR CULT: ABNORMAL
BACTERIA UR CULT: ABNORMAL

## 2024-03-18 ENCOUNTER — TELEPHONE (OUTPATIENT)
Dept: FAMILY MEDICINE | Facility: CLINIC | Age: 89
End: 2024-03-18
Payer: MEDICARE

## 2024-03-18 NOTE — TELEPHONE ENCOUNTER
----- Message from Dai Doe sent at 3/18/2024  9:25 AM CDT -----  Patient daughter Karen came by and left paperwork to be filled out.  Call [phone] when done.  Gave to Alejandra

## 2024-03-20 NOTE — TELEPHONE ENCOUNTER
Completed forms scanned into Media. Informed Karen forms are available for  at the front office. She voiced understanding.

## 2024-04-02 ENCOUNTER — LAB VISIT (OUTPATIENT)
Dept: LAB | Facility: HOSPITAL | Age: 89
End: 2024-04-02
Attending: FAMILY MEDICINE
Payer: MEDICARE

## 2024-04-02 DIAGNOSIS — E86.0 DEHYDRATION: Primary | ICD-10-CM

## 2024-04-02 DIAGNOSIS — R60.9 EDEMA: ICD-10-CM

## 2024-04-02 DIAGNOSIS — N39.0 URINARY TRACT INFECTION, SITE NOT SPECIFIED: ICD-10-CM

## 2024-04-02 LAB
ALBUMIN SERPL BCP-MCNC: 2.9 G/DL (ref 3.5–5.2)
ALP SERPL-CCNC: 120 U/L (ref 55–135)
ALT SERPL W/O P-5'-P-CCNC: 10 U/L (ref 10–44)
ANION GAP SERPL CALC-SCNC: 9 MMOL/L (ref 8–16)
AST SERPL-CCNC: 17 U/L (ref 10–40)
BACTERIA #/AREA URNS HPF: ABNORMAL /HPF
BASOPHILS # BLD AUTO: 0.03 K/UL (ref 0–0.2)
BASOPHILS NFR BLD: 0.3 % (ref 0–1.9)
BILIRUB SERPL-MCNC: 1.5 MG/DL (ref 0.1–1)
BILIRUB UR QL STRIP: NEGATIVE
BUN SERPL-MCNC: 24 MG/DL (ref 8–23)
CALCIUM SERPL-MCNC: 10.6 MG/DL (ref 8.7–10.5)
CAOX CRY URNS QL MICRO: ABNORMAL
CHLORIDE SERPL-SCNC: 103 MMOL/L (ref 95–110)
CLARITY UR: CLEAR
CO2 SERPL-SCNC: 29 MMOL/L (ref 23–29)
COLOR UR: YELLOW
CREAT SERPL-MCNC: 0.9 MG/DL (ref 0.5–1.4)
DIFFERENTIAL METHOD BLD: ABNORMAL
EOSINOPHIL # BLD AUTO: 0 K/UL (ref 0–0.5)
EOSINOPHIL NFR BLD: 0.4 % (ref 0–8)
ERYTHROCYTE [DISTWIDTH] IN BLOOD BY AUTOMATED COUNT: 12.3 % (ref 11.5–14.5)
EST. GFR  (NO RACE VARIABLE): >60 ML/MIN/1.73 M^2
GLUCOSE SERPL-MCNC: 111 MG/DL (ref 70–110)
GLUCOSE UR QL STRIP: NEGATIVE
HCT VFR BLD AUTO: 34.8 % (ref 37–48.5)
HGB BLD-MCNC: 11.4 G/DL (ref 12–16)
HGB UR QL STRIP: NEGATIVE
IMM GRANULOCYTES # BLD AUTO: 0.04 K/UL (ref 0–0.04)
IMM GRANULOCYTES NFR BLD AUTO: 0.4 % (ref 0–0.5)
KETONES UR QL STRIP: NEGATIVE
LEUKOCYTE ESTERASE UR QL STRIP: ABNORMAL
LYMPHOCYTES # BLD AUTO: 1.2 K/UL (ref 1–4.8)
LYMPHOCYTES NFR BLD: 11.6 % (ref 18–48)
MCH RBC QN AUTO: 34 PG (ref 27–31)
MCHC RBC AUTO-ENTMCNC: 32.8 G/DL (ref 32–36)
MCV RBC AUTO: 104 FL (ref 82–98)
MICROSCOPIC COMMENT: ABNORMAL
MONOCYTES # BLD AUTO: 0.9 K/UL (ref 0.3–1)
MONOCYTES NFR BLD: 8.8 % (ref 4–15)
NEUTROPHILS # BLD AUTO: 8.2 K/UL (ref 1.8–7.7)
NEUTROPHILS NFR BLD: 78.5 % (ref 38–73)
NITRITE UR QL STRIP: NEGATIVE
NRBC BLD-RTO: 0 /100 WBC
PH UR STRIP: 6 [PH] (ref 5–8)
PLATELET # BLD AUTO: 145 K/UL (ref 150–450)
PMV BLD AUTO: 10.4 FL (ref 9.2–12.9)
POTASSIUM SERPL-SCNC: 4 MMOL/L (ref 3.5–5.1)
PROT SERPL-MCNC: 6.3 G/DL (ref 6–8.4)
PROT UR QL STRIP: ABNORMAL
RBC # BLD AUTO: 3.35 M/UL (ref 4–5.4)
RBC #/AREA URNS HPF: 2 /HPF (ref 0–4)
SODIUM SERPL-SCNC: 141 MMOL/L (ref 136–145)
SP GR UR STRIP: 1.02 (ref 1–1.03)
SQUAMOUS #/AREA URNS HPF: 2 /HPF
URN SPEC COLLECT METH UR: ABNORMAL
UROBILINOGEN UR STRIP-ACNC: ABNORMAL EU/DL
WBC # BLD AUTO: 10.48 K/UL (ref 3.9–12.7)
WBC #/AREA URNS HPF: 5 /HPF (ref 0–5)

## 2024-04-02 PROCEDURE — 80053 COMPREHEN METABOLIC PANEL: CPT | Performed by: FAMILY MEDICINE

## 2024-04-02 PROCEDURE — 81000 URINALYSIS NONAUTO W/SCOPE: CPT

## 2024-04-02 PROCEDURE — 85025 COMPLETE CBC W/AUTO DIFF WBC: CPT | Performed by: FAMILY MEDICINE

## 2024-04-02 PROCEDURE — 87086 URINE CULTURE/COLONY COUNT: CPT

## 2024-04-02 PROCEDURE — 36415 COLL VENOUS BLD VENIPUNCTURE: CPT | Performed by: FAMILY MEDICINE

## 2024-04-05 LAB — BACTERIA UR CULT: NO GROWTH

## 2024-05-06 PROBLEM — J96.01 ACUTE RESPIRATORY FAILURE WITH HYPOXIA: Status: RESOLVED | Noted: 2024-01-29 | Resolved: 2024-05-06

## 2024-05-06 PROBLEM — J18.9 PNEUMONIA OF LEFT LOWER LOBE DUE TO INFECTIOUS ORGANISM: Status: RESOLVED | Noted: 2023-05-22 | Resolved: 2024-05-06

## 2024-12-14 ENCOUNTER — TELEPHONE (OUTPATIENT)
Dept: ADMINISTRATIVE | Facility: CLINIC | Age: 89
End: 2024-12-14
Payer: MEDICARE

## 2025-01-14 DIAGNOSIS — Z00.00 ENCOUNTER FOR MEDICARE ANNUAL WELLNESS EXAM: ICD-10-CM

## 2025-08-20 ENCOUNTER — PATIENT MESSAGE (OUTPATIENT)
Dept: ADMINISTRATIVE | Facility: HOSPITAL | Age: OVER 89
End: 2025-08-20
Payer: MEDICARE